# Patient Record
Sex: FEMALE | Race: WHITE | NOT HISPANIC OR LATINO | Employment: OTHER | URBAN - NONMETROPOLITAN AREA
[De-identification: names, ages, dates, MRNs, and addresses within clinical notes are randomized per-mention and may not be internally consistent; named-entity substitution may affect disease eponyms.]

---

## 2023-02-28 LAB — URINE CULTURE: NO GROWTH
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
## 2023-03-11 PROBLEM — R11.0 NAUSEA IN ADULT: Status: ACTIVE | Noted: 2023-03-11

## 2023-03-11 PROBLEM — F32.A ANXIETY AND DEPRESSION: Status: ACTIVE | Noted: 2023-03-11

## 2023-03-11 PROBLEM — R93.89 ABNORMAL MRI: Status: ACTIVE | Noted: 2023-03-11

## 2023-03-11 PROBLEM — K29.50 CHRONIC GASTRITIS: Status: ACTIVE | Noted: 2023-03-11

## 2023-03-11 PROBLEM — F32.1 MODERATE MAJOR DEPRESSION (MULTI): Status: ACTIVE | Noted: 2023-03-11

## 2023-03-11 PROBLEM — R10.9 ABDOMINAL PAIN: Status: ACTIVE | Noted: 2023-03-11

## 2023-03-11 PROBLEM — E03.9 HYPOTHYROID: Status: ACTIVE | Noted: 2023-03-11

## 2023-03-11 PROBLEM — M81.0 OSTEOPOROSIS: Status: ACTIVE | Noted: 2023-03-11

## 2023-03-11 PROBLEM — N39.0 ACUTE UTI: Status: ACTIVE | Noted: 2023-03-11

## 2023-03-11 PROBLEM — R39.9 URINARY SYMPTOM OR SIGN: Status: ACTIVE | Noted: 2023-03-11

## 2023-03-11 PROBLEM — K58.9 IBS (IRRITABLE BOWEL SYNDROME): Status: ACTIVE | Noted: 2023-03-11

## 2023-03-11 PROBLEM — F51.04 CHRONIC INSOMNIA: Status: ACTIVE | Noted: 2023-03-11

## 2023-03-11 PROBLEM — R63.4 WEIGHT LOSS: Status: ACTIVE | Noted: 2023-03-11

## 2023-03-11 PROBLEM — Q45.3 ABNORMALITY OF PANCREATIC DUCT: Status: ACTIVE | Noted: 2023-03-11

## 2023-03-11 PROBLEM — F41.9 ANXIETY AND DEPRESSION: Status: ACTIVE | Noted: 2023-03-11

## 2023-03-11 PROBLEM — M21.169 BOWLEGGED: Status: ACTIVE | Noted: 2023-03-11

## 2023-03-11 PROBLEM — K80.20 GALL STONES: Status: ACTIVE | Noted: 2023-03-11

## 2023-03-11 PROBLEM — R93.5 ABNORMAL CT OF THE ABDOMEN: Status: ACTIVE | Noted: 2023-03-11

## 2023-03-11 RX ORDER — ONDANSETRON 4 MG/1
4 TABLET, FILM COATED ORAL EVERY 6 HOURS PRN
COMMUNITY
End: 2023-03-22 | Stop reason: ALTCHOICE

## 2023-03-11 RX ORDER — LEVOTHYROXINE SODIUM 50 UG/1
1 TABLET ORAL DAILY
COMMUNITY
Start: 2021-05-12 | End: 2023-12-26 | Stop reason: SDUPTHER

## 2023-03-11 RX ORDER — PANTOPRAZOLE SODIUM 40 MG/1
1 TABLET, DELAYED RELEASE ORAL DAILY
COMMUNITY
End: 2023-12-26 | Stop reason: SDUPTHER

## 2023-03-11 RX ORDER — CIPROFLOXACIN 500 MG/1
500 TABLET ORAL EVERY 12 HOURS
COMMUNITY
End: 2023-03-22 | Stop reason: ALTCHOICE

## 2023-03-11 RX ORDER — ESCITALOPRAM OXALATE 20 MG/1
20 TABLET ORAL NIGHTLY
COMMUNITY

## 2023-03-11 RX ORDER — BUSPIRONE HYDROCHLORIDE 10 MG/1
20 TABLET ORAL 3 TIMES DAILY
COMMUNITY
Start: 2021-06-29

## 2023-03-11 RX ORDER — CALCIUM CARBONATE 300MG(750)
TABLET,CHEWABLE ORAL
COMMUNITY

## 2023-03-11 RX ORDER — DICYCLOMINE HYDROCHLORIDE 10 MG/1
10 CAPSULE ORAL EVERY 8 HOURS PRN
COMMUNITY
End: 2023-03-22 | Stop reason: ALTCHOICE

## 2023-03-11 RX ORDER — TRAZODONE HYDROCHLORIDE 150 MG/1
1 TABLET ORAL NIGHTLY
COMMUNITY

## 2023-03-11 RX ORDER — ACETAMINOPHEN 500 MG
1 TABLET ORAL DAILY
COMMUNITY

## 2023-03-11 RX ORDER — SUCRALFATE 1 G/1
1 TABLET ORAL 4 TIMES DAILY
COMMUNITY
Start: 2021-12-28 | End: 2023-04-05 | Stop reason: SDUPTHER

## 2023-03-11 RX ORDER — MUPIROCIN 20 MG/G
OINTMENT TOPICAL
Status: ON HOLD | COMMUNITY
End: 2024-01-25 | Stop reason: ALTCHOICE

## 2023-03-22 ENCOUNTER — OFFICE VISIT (OUTPATIENT)
Dept: PRIMARY CARE | Facility: CLINIC | Age: 78
End: 2023-03-22
Payer: MEDICARE

## 2023-03-22 VITALS
DIASTOLIC BLOOD PRESSURE: 60 MMHG | WEIGHT: 97 LBS | TEMPERATURE: 97.8 F | SYSTOLIC BLOOD PRESSURE: 100 MMHG | HEART RATE: 60 BPM | HEIGHT: 60 IN | BODY MASS INDEX: 19.04 KG/M2 | OXYGEN SATURATION: 98 %

## 2023-03-22 DIAGNOSIS — K59.00 CONSTIPATION, UNSPECIFIED CONSTIPATION TYPE: Primary | ICD-10-CM

## 2023-03-22 PROBLEM — R93.89 ABNORMAL MRI: Status: RESOLVED | Noted: 2023-03-11 | Resolved: 2023-03-22

## 2023-03-22 PROBLEM — R39.9 URINARY SYMPTOM OR SIGN: Status: RESOLVED | Noted: 2023-03-11 | Resolved: 2023-03-22

## 2023-03-22 PROBLEM — Q45.3 ABNORMALITY OF PANCREATIC DUCT: Status: RESOLVED | Noted: 2023-03-11 | Resolved: 2023-03-22

## 2023-03-22 PROBLEM — R11.0 NAUSEA IN ADULT: Status: RESOLVED | Noted: 2023-03-11 | Resolved: 2023-03-22

## 2023-03-22 PROBLEM — R93.5 ABNORMAL CT OF THE ABDOMEN: Status: RESOLVED | Noted: 2023-03-11 | Resolved: 2023-03-22

## 2023-03-22 PROBLEM — N39.0 ACUTE UTI: Status: RESOLVED | Noted: 2023-03-11 | Resolved: 2023-03-22
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
## 2023-03-22 PROCEDURE — 99212 OFFICE O/P EST SF 10 MIN: CPT | Performed by: FAMILY MEDICINE

## 2023-03-22 PROCEDURE — 1036F TOBACCO NON-USER: CPT | Performed by: FAMILY MEDICINE

## 2023-03-22 ASSESSMENT — PATIENT HEALTH QUESTIONNAIRE - PHQ9
SUM OF ALL RESPONSES TO PHQ9 QUESTIONS 1 AND 2: 0
2. FEELING DOWN, DEPRESSED OR HOPELESS: NOT AT ALL
1. LITTLE INTEREST OR PLEASURE IN DOING THINGS: NOT AT ALL

## 2023-03-22 ASSESSMENT — ENCOUNTER SYMPTOMS
DEPRESSION: 0
OCCASIONAL FEELINGS OF UNSTEADINESS: 0
LOSS OF SENSATION IN FEET: 0

## 2023-03-22 NOTE — PROGRESS NOTES
"Subjective   Patient ID: Latha Quispe is a 77 y.o. female who presents for Follow-up (3mo follow up/Stomach issue has cleared up).  HPI  Here for follow up   Abdominal pain resolved, been using miralax for constipation, has been helping.     Review of Systems  General: no fever  Eyes: no blurry vision  ENT: no sore throat, no ear pain  Resp: no cough, sob or wheezing  Cardio: no chest pain, no palpitations  Abd: no nausea/vomiting  : no dysuria, no increased urinary frequency      /60   Pulse 60   Temp 36.6 °C (97.8 °F)   Ht 1.52 m (4' 11.84\")   Wt 44 kg (97 lb)   SpO2 98%   BMI 19.04 kg/m²       Objective   Physical Exam  Gen: NAD, alert  Head: normocephalic/atraumatic  Eyes: conjunctivae normal  Ears: canals clear bilaterally, TM normal   Nose: Deferred due to covid precautions, wearing mask  Oropharynx: Deferred due to covid precautions, wearing mask  Resp: Clear to auscultation  CVS: Regular rate and rhythm  Abdomen: soft, NT, ND  Ext: no edema, NT of lower extremities         Assessment/Plan   Problem List Items Addressed This Visit    None  Visit Diagnoses       Constipation, unspecified constipation type    -  Primary  Continue miralax PRN    Body mass index (BMI) of 19 or less in adult                   "

## 2023-04-05 ENCOUNTER — TELEPHONE (OUTPATIENT)
Dept: PRIMARY CARE | Facility: CLINIC | Age: 78
End: 2023-04-05
Payer: MEDICARE

## 2023-04-05 DIAGNOSIS — K29.50 CHRONIC GASTRITIS, PRESENCE OF BLEEDING UNSPECIFIED, UNSPECIFIED GASTRITIS TYPE: Primary | ICD-10-CM

## 2023-04-05 RX ORDER — SUCRALFATE 1 G/1
1 TABLET ORAL 4 TIMES DAILY
Qty: 120 TABLET | Refills: 11 | Status: SHIPPED | OUTPATIENT
Start: 2023-04-05 | End: 2024-04-05 | Stop reason: SDUPTHER

## 2023-04-05 NOTE — TELEPHONE ENCOUNTER
Refill request:  sucralfate 1 gm tablet  Qty:  120  Refills:  3  Take 1 tablet by mouth 4 time dialy

## 2023-06-06 DIAGNOSIS — K58.9 IRRITABLE BOWEL SYNDROME, UNSPECIFIED TYPE: ICD-10-CM

## 2023-06-06 RX ORDER — DICYCLOMINE HYDROCHLORIDE 10 MG/1
10 CAPSULE ORAL EVERY 6 HOURS PRN
Qty: 120 CAPSULE | Refills: 2 | Status: SHIPPED | OUTPATIENT
Start: 2023-06-06 | End: 2023-07-06

## 2023-06-06 RX ORDER — DICYCLOMINE HYDROCHLORIDE 10 MG/1
10 CAPSULE ORAL EVERY 6 HOURS PRN
COMMUNITY
Start: 2021-12-08 | End: 2023-06-06 | Stop reason: SDUPTHER

## 2023-06-30 ENCOUNTER — OFFICE VISIT (OUTPATIENT)
Dept: PRIMARY CARE | Facility: CLINIC | Age: 78
End: 2023-06-30
Payer: MEDICARE

## 2023-06-30 VITALS
DIASTOLIC BLOOD PRESSURE: 62 MMHG | HEART RATE: 50 BPM | TEMPERATURE: 97.5 F | OXYGEN SATURATION: 97 % | SYSTOLIC BLOOD PRESSURE: 122 MMHG | BODY MASS INDEX: 19.01 KG/M2 | WEIGHT: 96.8 LBS | HEIGHT: 60 IN

## 2023-06-30 DIAGNOSIS — N39.0 ACUTE UTI: ICD-10-CM

## 2023-06-30 DIAGNOSIS — E03.9 HYPOTHYROIDISM, UNSPECIFIED TYPE: Primary | ICD-10-CM

## 2023-06-30 LAB
POC APPEARANCE, URINE: ABNORMAL
POC BILIRUBIN, URINE: ABNORMAL
POC BLOOD, URINE: ABNORMAL
POC COLOR, URINE: YELLOW
POC GLUCOSE, URINE: NEGATIVE MG/DL
POC KETONES, URINE: ABNORMAL MG/DL
POC LEUKOCYTES, URINE: ABNORMAL
POC NITRITE,URINE: NEGATIVE
POC PH, URINE: 6 PH
POC PROTEIN, URINE: ABNORMAL MG/DL
POC SPECIFIC GRAVITY, URINE: >=1.03
POC UROBILINOGEN, URINE: 0.2 EU/DL

## 2023-06-30 PROCEDURE — 1036F TOBACCO NON-USER: CPT | Performed by: FAMILY MEDICINE

## 2023-06-30 PROCEDURE — 87086 URINE CULTURE/COLONY COUNT: CPT

## 2023-06-30 PROCEDURE — 99213 OFFICE O/P EST LOW 20 MIN: CPT | Performed by: FAMILY MEDICINE

## 2023-06-30 PROCEDURE — 1160F RVW MEDS BY RX/DR IN RCRD: CPT | Performed by: FAMILY MEDICINE

## 2023-06-30 PROCEDURE — 87077 CULTURE AEROBIC IDENTIFY: CPT

## 2023-06-30 PROCEDURE — 1159F MED LIST DOCD IN RCRD: CPT | Performed by: FAMILY MEDICINE

## 2023-06-30 PROCEDURE — 81003 URINALYSIS AUTO W/O SCOPE: CPT | Performed by: FAMILY MEDICINE

## 2023-06-30 PROCEDURE — 87186 SC STD MICRODIL/AGAR DIL: CPT

## 2023-06-30 RX ORDER — SULFAMETHOXAZOLE AND TRIMETHOPRIM 800; 160 MG/1; MG/1
1 TABLET ORAL 2 TIMES DAILY
Qty: 14 TABLET | Refills: 0 | Status: SHIPPED | OUTPATIENT
Start: 2023-06-30 | End: 2023-07-07 | Stop reason: ALTCHOICE

## 2023-06-30 NOTE — PATIENT INSTRUCTIONS
Please take your medications as prescribed  please call our clinic if your symptoms fail to improve or worsen  Please follow up in 6 months  Please get your blood work done

## 2023-06-30 NOTE — PROGRESS NOTES
"Subjective   Patient ID: Latha Quispe is a 78 y.o. female who presents for UTI (Urgency going on 3 weeks, she thought it was her stomach, ).  HPI  Here for urgent visit  Has been having stomach pain, urinary urgency x 3 weeks, no dysuria, no fever, no flank pain  Has hypothyroidism, compliant with levothyroxine    Review of Systems  General: no fever  Eyes: no blurry vision  ENT: no sore throat, no ear pain  Resp: no cough, sob or wheezing  Cardio: no chest pain, no palpitations  Abd: no nausea/vomiting  : no dysuria, no increased urinary frequency      /62   Pulse 50   Temp 36.4 °C (97.5 °F)   Ht 1.52 m (4' 11.84\")   Wt (!) 43.9 kg (96 lb 12.8 oz)   SpO2 97%   BMI 19.01 kg/m²       Objective   Physical Exam  Gen: NAD, alert  Head: normocephalic/atraumatic  Eyes: conjunctivae normal  Ears: canals clear bilaterally, TM normal   Nose: external nose normal   Oropharynx: clear   Resp: Clear to auscultation  CVS: Regular rate and rhythm  Abdomen: soft, NT, ND  Ext: no edema, NT of lower extremities    Assessment/Plan   Problem List Items Addressed This Visit       Hypothyroid - Primary    Relevant Orders    Lipid Panel    TSH with reflex to Free T4 if abnormal    Comprehensive Metabolic Panel    CBC     Other Visit Diagnoses       Acute UTI        Relevant Medications    sulfamethoxazole-trimethoprim (Bactrim DS) 800-160 mg tablet    Other Relevant Orders    POCT UA Automated manually resulted (Completed)    Urine Culture               "

## 2023-07-03 LAB — URINE CULTURE: ABNORMAL

## 2023-07-07 ENCOUNTER — TELEPHONE (OUTPATIENT)
Dept: PRIMARY CARE | Facility: CLINIC | Age: 78
End: 2023-07-07
Payer: MEDICARE

## 2023-07-07 DIAGNOSIS — N39.0 ACUTE UTI: Primary | ICD-10-CM

## 2023-07-07 RX ORDER — CIPROFLOXACIN 500 MG/1
500 TABLET ORAL 2 TIMES DAILY
Qty: 10 TABLET | Refills: 0 | Status: SHIPPED | OUTPATIENT
Start: 2023-07-07 | End: 2023-07-12

## 2023-07-07 NOTE — TELEPHONE ENCOUNTER
Spoke to patient, switched to cipro, after finished to give a urine sample at the lab to make sure it's cleared

## 2023-07-07 NOTE — TELEPHONE ENCOUNTER
Patient called saying she was till having UTI symptoms. She finished the Abx. Pain in stomach/pain will move down legs

## 2023-07-10 ENCOUNTER — TELEPHONE (OUTPATIENT)
Dept: PRIMARY CARE | Facility: CLINIC | Age: 78
End: 2023-07-10
Payer: MEDICARE

## 2023-07-10 NOTE — TELEPHONE ENCOUNTER
Is The Patient Presenting As Previously Scheduled?: No, they are coming in before their scheduled appointment Patient went to ED for abdominal pain, they did the UA at the ED.  No UTI, constipation.  She is currently using Miralax and feels better.  RAIN   How Severe Is Your Rash?: moderate Is This A New Presentation, Or A Follow-Up?: Follow Up Rash

## 2023-10-04 DIAGNOSIS — K58.9 IRRITABLE BOWEL SYNDROME WITHOUT DIARRHEA: Primary | ICD-10-CM

## 2023-10-04 RX ORDER — DICYCLOMINE HYDROCHLORIDE 10 MG/1
10 CAPSULE ORAL EVERY 6 HOURS PRN
Qty: 120 CAPSULE | Refills: 1 | Status: SHIPPED | OUTPATIENT
Start: 2023-10-04 | End: 2023-12-03

## 2023-11-29 ENCOUNTER — OFFICE VISIT (OUTPATIENT)
Dept: PRIMARY CARE | Facility: CLINIC | Age: 78
End: 2023-11-29
Payer: MEDICARE

## 2023-11-29 VITALS
HEIGHT: 60 IN | SYSTOLIC BLOOD PRESSURE: 127 MMHG | DIASTOLIC BLOOD PRESSURE: 72 MMHG | TEMPERATURE: 96.8 F | WEIGHT: 86.8 LBS | BODY MASS INDEX: 17.04 KG/M2 | OXYGEN SATURATION: 97 % | HEART RATE: 50 BPM

## 2023-11-29 DIAGNOSIS — R10.9 CHRONIC ABDOMINAL PAIN: ICD-10-CM

## 2023-11-29 DIAGNOSIS — G89.29 CHRONIC ABDOMINAL PAIN: ICD-10-CM

## 2023-11-29 DIAGNOSIS — N39.0 ACUTE UTI: ICD-10-CM

## 2023-11-29 DIAGNOSIS — R31.29 PERSISTENT MICROSCOPIC HEMATURIA: Primary | ICD-10-CM

## 2023-11-29 LAB
POC APPEARANCE, URINE: CLEAR
POC BILIRUBIN, URINE: NEGATIVE
POC BLOOD, URINE: ABNORMAL
POC COLOR, URINE: YELLOW
POC GLUCOSE, URINE: NEGATIVE MG/DL
POC KETONES, URINE: NEGATIVE MG/DL
POC LEUKOCYTES, URINE: ABNORMAL
POC NITRITE,URINE: NEGATIVE
POC PH, URINE: 5.5 PH
POC PROTEIN, URINE: NEGATIVE MG/DL
POC SPECIFIC GRAVITY, URINE: 1.02
POC UROBILINOGEN, URINE: 0.2 EU/DL

## 2023-11-29 PROCEDURE — 1036F TOBACCO NON-USER: CPT | Performed by: FAMILY MEDICINE

## 2023-11-29 PROCEDURE — 87086 URINE CULTURE/COLONY COUNT: CPT

## 2023-11-29 PROCEDURE — 81003 URINALYSIS AUTO W/O SCOPE: CPT | Performed by: FAMILY MEDICINE

## 2023-11-29 PROCEDURE — 99214 OFFICE O/P EST MOD 30 MIN: CPT | Performed by: FAMILY MEDICINE

## 2023-11-29 PROCEDURE — 1159F MED LIST DOCD IN RCRD: CPT | Performed by: FAMILY MEDICINE

## 2023-11-29 PROCEDURE — 1160F RVW MEDS BY RX/DR IN RCRD: CPT | Performed by: FAMILY MEDICINE

## 2023-11-29 RX ORDER — NITROFURANTOIN 25; 75 MG/1; MG/1
100 CAPSULE ORAL 2 TIMES DAILY
Qty: 14 CAPSULE | Refills: 0 | Status: SHIPPED | OUTPATIENT
Start: 2023-11-29 | End: 2023-12-01 | Stop reason: ALTCHOICE

## 2023-11-29 ASSESSMENT — ENCOUNTER SYMPTOMS
DEPRESSION: 0
LOSS OF SENSATION IN FEET: 0
OCCASIONAL FEELINGS OF UNSTEADINESS: 0

## 2023-11-29 NOTE — PROGRESS NOTES
"Subjective   Patient ID: Latha Quispe is a 78 y.o. female who presents for Abdominal Pain (Steady really hurts/Lost weight).  HPI  Still having diffuse abdominal pain, worse in the lower abdomen x 1 year, had CT scan which was normal. Did have EGD which was normal. On protonix and carafate, not helping. No nausea/vomiting/diarrhea.   Has blood in her urine sample, persistent, no dysuria, or increased urinary frequency.   Has been losing weight    Review of Systems  General: no fever  Eyes: no blurry vision  ENT: no sore throat, no ear pain  Resp: no cough, sob or wheezing  Cardio: no chest pain, no palpitations  Abd: no nausea/vomiting  : see HPI    /72   Pulse 50   Temp 36 °C (96.8 °F)   Ht 1.52 m (4' 11.84\")   Wt (!) 39.4 kg (86 lb 12.8 oz)   SpO2 97%   BMI 17.04 kg/m²       Objective   Physical Exam  Gen: NAD, alert  Head: normocephalic/atraumatic  Eyes: conjunctivae normal  Ears: canals clear bilaterally, TM normal   Nose: external nose normal   Oropharynx: clear   Resp: Clear to auscultation  CVS: Regular rate and rhythm  Abdomen: soft, diffuse tenderness worse in the lower abdomen  Ext: no edema, NT of lower extremities  Neuro: gait normal     Assessment/Plan   Problem List Items Addressed This Visit       Acute UTI  Nitrofurantoin  Urinalysis and urine culture     Other Visit Diagnoses       Persistent microscopic hematuria    -  Primary    Relevant Orders    CT urography w 3D volume rendered imaging    Creatinine, Serum    Chronic abdominal pain        Relevant Orders    gastroenterology               "

## 2023-11-30 ENCOUNTER — LAB (OUTPATIENT)
Dept: LAB | Facility: LAB | Age: 78
End: 2023-11-30
Payer: MEDICARE

## 2023-11-30 ENCOUNTER — HOSPITAL ENCOUNTER (OUTPATIENT)
Dept: RADIOLOGY | Facility: HOSPITAL | Age: 78
Discharge: HOME | End: 2023-11-30
Payer: MEDICARE

## 2023-11-30 DIAGNOSIS — E03.9 HYPOTHYROIDISM, UNSPECIFIED TYPE: ICD-10-CM

## 2023-11-30 DIAGNOSIS — R31.29 PERSISTENT MICROSCOPIC HEMATURIA: ICD-10-CM

## 2023-11-30 DIAGNOSIS — R10.9 UNSPECIFIED ABDOMINAL PAIN: ICD-10-CM

## 2023-11-30 LAB
ALBUMIN SERPL BCP-MCNC: 3.9 G/DL (ref 3.4–5)
ALP SERPL-CCNC: 83 U/L (ref 33–136)
ALT SERPL W P-5'-P-CCNC: 14 U/L (ref 7–45)
ANION GAP SERPL CALC-SCNC: 13 MMOL/L (ref 10–20)
AST SERPL W P-5'-P-CCNC: 21 U/L (ref 9–39)
BILIRUB SERPL-MCNC: 0.5 MG/DL (ref 0–1.2)
BUN SERPL-MCNC: 8 MG/DL (ref 6–23)
CALCIUM SERPL-MCNC: 9.2 MG/DL (ref 8.6–10.3)
CHLORIDE SERPL-SCNC: 103 MMOL/L (ref 98–107)
CHOLEST SERPL-MCNC: 173 MG/DL (ref 0–199)
CHOLESTEROL/HDL RATIO: 3.1
CO2 SERPL-SCNC: 27 MMOL/L (ref 21–32)
CREAT SERPL-MCNC: 0.91 MG/DL (ref 0.5–1.05)
ERYTHROCYTE [DISTWIDTH] IN BLOOD BY AUTOMATED COUNT: 12.6 % (ref 11.5–14.5)
GFR SERPL CREATININE-BSD FRML MDRD: 65 ML/MIN/1.73M*2
GLUCOSE SERPL-MCNC: 119 MG/DL (ref 74–99)
HCT VFR BLD AUTO: 41.8 % (ref 36–46)
HDLC SERPL-MCNC: 56 MG/DL
HGB BLD-MCNC: 12.9 G/DL (ref 12–16)
LDLC SERPL CALC-MCNC: 95 MG/DL
MCH RBC QN AUTO: 29.4 PG (ref 26–34)
MCHC RBC AUTO-ENTMCNC: 30.9 G/DL (ref 32–36)
MCV RBC AUTO: 95 FL (ref 80–100)
NON HDL CHOLESTEROL: 117 MG/DL (ref 0–149)
NRBC BLD-RTO: 0 /100 WBCS (ref 0–0)
PLATELET # BLD AUTO: 267 X10*3/UL (ref 150–450)
POTASSIUM SERPL-SCNC: 3.3 MMOL/L (ref 3.5–5.3)
PROT SERPL-MCNC: 6.4 G/DL (ref 6.4–8.2)
RBC # BLD AUTO: 4.39 X10*6/UL (ref 4–5.2)
SODIUM SERPL-SCNC: 140 MMOL/L (ref 136–145)
TRIGL SERPL-MCNC: 111 MG/DL (ref 0–149)
TSH SERPL-ACNC: 1.32 MIU/L (ref 0.44–3.98)
VLDL: 22 MG/DL (ref 0–40)
WBC # BLD AUTO: 4.6 X10*3/UL (ref 4.4–11.3)

## 2023-11-30 PROCEDURE — 74176 CT ABD & PELVIS W/O CONTRAST: CPT | Mod: ASTAT | Performed by: STUDENT IN AN ORGANIZED HEALTH CARE EDUCATION/TRAINING PROGRAM

## 2023-11-30 PROCEDURE — 36415 COLL VENOUS BLD VENIPUNCTURE: CPT

## 2023-11-30 PROCEDURE — 74176 CT ABD & PELVIS W/O CONTRAST: CPT | Mod: ASTAT

## 2023-12-01 DIAGNOSIS — E87.6 HYPOKALEMIA: Primary | ICD-10-CM

## 2023-12-01 DIAGNOSIS — J18.9 PNEUMONIA OF LEFT LOWER LOBE DUE TO INFECTIOUS ORGANISM: ICD-10-CM

## 2023-12-01 DIAGNOSIS — K80.20 CALCULUS OF GALLBLADDER WITHOUT CHOLECYSTITIS WITHOUT OBSTRUCTION: Primary | ICD-10-CM

## 2023-12-01 LAB — BACTERIA UR CULT: NORMAL

## 2023-12-01 RX ORDER — AMOXICILLIN AND CLAVULANATE POTASSIUM 875; 125 MG/1; MG/1
875 TABLET, FILM COATED ORAL 2 TIMES DAILY
Qty: 20 TABLET | Refills: 0 | Status: SHIPPED | OUTPATIENT
Start: 2023-12-01 | End: 2023-12-11

## 2023-12-01 RX ORDER — POTASSIUM CHLORIDE 20 MEQ/1
20 TABLET, EXTENDED RELEASE ORAL DAILY
Qty: 5 TABLET | Refills: 0 | Status: SHIPPED | OUTPATIENT
Start: 2023-12-01 | End: 2023-12-06

## 2023-12-08 DIAGNOSIS — R11.2 NAUSEA AND VOMITING, UNSPECIFIED VOMITING TYPE: Primary | ICD-10-CM

## 2023-12-08 RX ORDER — ONDANSETRON 4 MG/1
4 TABLET, ORALLY DISINTEGRATING ORAL EVERY 12 HOURS PRN
Qty: 20 TABLET | Refills: 0 | Status: SHIPPED | OUTPATIENT
Start: 2023-12-08

## 2023-12-12 ENCOUNTER — APPOINTMENT (OUTPATIENT)
Dept: RADIOLOGY | Facility: HOSPITAL | Age: 78
End: 2023-12-12
Payer: MEDICARE

## 2023-12-14 ENCOUNTER — OFFICE VISIT (OUTPATIENT)
Dept: PRIMARY CARE | Facility: CLINIC | Age: 78
End: 2023-12-14
Payer: MEDICARE

## 2023-12-14 VITALS
DIASTOLIC BLOOD PRESSURE: 76 MMHG | HEART RATE: 55 BPM | TEMPERATURE: 97 F | HEIGHT: 60 IN | WEIGHT: 83.2 LBS | BODY MASS INDEX: 16.33 KG/M2 | OXYGEN SATURATION: 98 % | SYSTOLIC BLOOD PRESSURE: 130 MMHG

## 2023-12-14 DIAGNOSIS — Z00.00 MEDICARE ANNUAL WELLNESS VISIT, SUBSEQUENT: Primary | ICD-10-CM

## 2023-12-14 DIAGNOSIS — R10.32 CHRONIC LEFT LOWER QUADRANT PAIN: ICD-10-CM

## 2023-12-14 DIAGNOSIS — G89.29 CHRONIC LEFT LOWER QUADRANT PAIN: ICD-10-CM

## 2023-12-14 DIAGNOSIS — Z23 NEED FOR INFLUENZA VACCINATION: ICD-10-CM

## 2023-12-14 PROCEDURE — G0008 ADMIN INFLUENZA VIRUS VAC: HCPCS | Performed by: FAMILY MEDICINE

## 2023-12-14 PROCEDURE — 1036F TOBACCO NON-USER: CPT | Performed by: FAMILY MEDICINE

## 2023-12-14 PROCEDURE — 90662 IIV NO PRSV INCREASED AG IM: CPT | Performed by: FAMILY MEDICINE

## 2023-12-14 PROCEDURE — 1160F RVW MEDS BY RX/DR IN RCRD: CPT | Performed by: FAMILY MEDICINE

## 2023-12-14 PROCEDURE — 1170F FXNL STATUS ASSESSED: CPT | Performed by: FAMILY MEDICINE

## 2023-12-14 PROCEDURE — 1159F MED LIST DOCD IN RCRD: CPT | Performed by: FAMILY MEDICINE

## 2023-12-14 PROCEDURE — G0439 PPPS, SUBSEQ VISIT: HCPCS | Performed by: FAMILY MEDICINE

## 2023-12-14 ASSESSMENT — PATIENT HEALTH QUESTIONNAIRE - PHQ9
1. LITTLE INTEREST OR PLEASURE IN DOING THINGS: NOT AT ALL
SUM OF ALL RESPONSES TO PHQ9 QUESTIONS 1 AND 2: 0
2. FEELING DOWN, DEPRESSED OR HOPELESS: NOT AT ALL

## 2023-12-14 ASSESSMENT — ACTIVITIES OF DAILY LIVING (ADL)
DOING_HOUSEWORK: INDEPENDENT
GROCERY_SHOPPING: INDEPENDENT
TAKING_MEDICATION: INDEPENDENT
DRESSING: INDEPENDENT
MANAGING_FINANCES: INDEPENDENT
BATHING: INDEPENDENT

## 2023-12-14 ASSESSMENT — ENCOUNTER SYMPTOMS
LOSS OF SENSATION IN FEET: 0
OCCASIONAL FEELINGS OF UNSTEADINESS: 0
DEPRESSION: 0

## 2023-12-14 NOTE — PROGRESS NOTES
"Subjective   Reason for Visit: Latha Quispe is an 78 y.o. female here for a Medicare Wellness visit.     Past Medical, Surgical, and Family History reviewed and updated in chart.    Reviewed all medications by prescribing practitioner or clinical pharmacist (such as prescriptions, OTCs, herbal therapies and supplements) and documented in the medical record.    HPI    Still having LLQ pain for the past 6 months, finished augmentin. CT abd/pelvis a few weeks ago showed mild gallstones and non-obstructing left renal calculus without hydronephrosis, but was otherwise normal. Pain is debilitating for the patient. Has decreased appetite, loss in weight. Denies any constipation, no diarrhea.   Has appt with general surgeon in 2 days    Patient Care Team:  Melanie Andrade MD as PCP - General     Review of Systems  General: no fever  Eyes: no blurry vision  ENT: no sore throat, no ear pain  Resp: no cough, sob or wheezing  Cardio: no chest pain, no palpitations  Abd: see HPI  : no dysuria, no increased urinary frequency    Objective   Vitals:  /76   Pulse 55   Temp 36.1 °C (97 °F)   Ht 1.52 m (4' 11.84\")   Wt (!) 37.7 kg (83 lb 3.2 oz)   SpO2 98%   BMI 16.34 kg/m²       Physical Exam  Gen: NAD, alert  Head: normocephalic/atraumatic  Eyes: conjunctivae normal  Ears: canals clear bilaterally, TM normal   Nose: external nose normal   Oropharynx: clear   Resp: Clear to auscultation  CVS: Regular rate and rhythm  Abdomen: soft, tenderness in LLQ  Ext: no edema, NT of lower extremities       Assessment/Plan   Problem List Items Addressed This Visit    None  Visit Diagnoses       Medicare annual wellness visit, subsequent    -  Primary    Chronic left lower quadrant pain        Relevant Orders    Referral to General Surgery    Need for influenza vaccination        Relevant Orders    Flu vaccine, quadrivalent, high-dose, preservative free, age 65y+ (FLUZONE) (Completed)                    "

## 2023-12-20 ENCOUNTER — OFFICE VISIT (OUTPATIENT)
Dept: SURGERY | Facility: CLINIC | Age: 78
End: 2023-12-20
Payer: MEDICARE

## 2023-12-20 VITALS
BODY MASS INDEX: 16.89 KG/M2 | DIASTOLIC BLOOD PRESSURE: 79 MMHG | SYSTOLIC BLOOD PRESSURE: 121 MMHG | TEMPERATURE: 98 F | HEART RATE: 50 BPM | WEIGHT: 86 LBS

## 2023-12-20 DIAGNOSIS — G89.29 CHRONIC LEFT LOWER QUADRANT PAIN: ICD-10-CM

## 2023-12-20 DIAGNOSIS — R10.32 CHRONIC LEFT LOWER QUADRANT PAIN: ICD-10-CM

## 2023-12-20 DIAGNOSIS — R63.4 WEIGHT LOSS: Primary | ICD-10-CM

## 2023-12-20 PROCEDURE — 1036F TOBACCO NON-USER: CPT | Performed by: SURGERY

## 2023-12-20 PROCEDURE — 1160F RVW MEDS BY RX/DR IN RCRD: CPT | Performed by: SURGERY

## 2023-12-20 PROCEDURE — 1159F MED LIST DOCD IN RCRD: CPT | Performed by: SURGERY

## 2023-12-20 PROCEDURE — 99214 OFFICE O/P EST MOD 30 MIN: CPT | Performed by: SURGERY

## 2023-12-20 ASSESSMENT — ENCOUNTER SYMPTOMS
ABDOMINAL PAIN: 1
UNEXPECTED WEIGHT CHANGE: 1
CONSTIPATION: 1

## 2023-12-20 NOTE — PROGRESS NOTES
Subjective   Patient ID: Latha Quispe is a 78 y.o. female who presents for chronic left lower quadrant pain and weight loss.    HPI     This is a 75-year female was well until a few years ago when she began to develop chronic left lower quadrant pain that the pain was present in the left lower quadrant.  It was constant.  She has chronic constipation.  She apparently had colon resection for this.  Her pain is worse in the last few months and has been associated with weight loss.  She has 2 brothers with colon carcinoma.  She had a negative colonoscopy by her account 5 to 6 years ago.  She does not take any ibuprofen or aspirin.  She has had no blood in her stool.  Her weight loss has been approximate 20 pounds.  She has pain when she eats meals this occurs with every meal.  She had a essentially normal CT scan in 2023.  She did have mild irritation of her pancreatic duct but no pancreatic mass.  No evidence of bowel obstruction or abnormalities in the bowel.    Past Medical History:   Diagnosis Date    Cat bite 2023    Personal history of malignant neoplasm of breast 2021    History of malignant neoplasm of breast    Personal history of other complications of pregnancy, childbirth and the puerperium     History of spontaneous         Current Outpatient Medications on File Prior to Visit   Medication Sig Dispense Refill    busPIRone (Buspar) 10 mg tablet Take 2 tablets (20 mg) by mouth 3 times a day.      cholecalciferol (Vitamin D3) 50 mcg (2,000 unit) capsule Take 1 capsule (50 mcg) by mouth once daily.      escitalopram (Lexapro) 20 mg tablet Take 1 tablet (20 mg) by mouth once daily at bedtime.      levothyroxine (Synthroid, Levoxyl) 50 mcg tablet Take 1 tablet (50 mcg) by mouth once daily.      magnesium oxide (Mag-Ox) 400 mg tablet Take by mouth.      mupirocin (Bactroban) 2 % ointment Apply topically 3 times a day. Apply a small amount as directed      ondansetron ODT (Zofran-ODT) 4  mg disintegrating tablet Take 1 tablet (4 mg) by mouth every 12 hours if needed for nausea or vomiting. 20 tablet 0    pantoprazole (ProtoNix) 40 mg EC tablet Take 1 tablet (40 mg) by mouth once daily.      POTASSIUM ACETATE, BULK, MISC Take by mouth.      sucralfate (Carafate) 1 gram tablet Take 1 tablet (1 g) by mouth in the morning and 1 tablet (1 g) at noon and 1 tablet (1 g) in the evening and 1 tablet (1 g) before bedtime. Before meals and at bedtime. 120 tablet 11    traZODone (Desyrel) 150 mg tablet Take 1 tablet (150 mg) by mouth once daily at bedtime.       No current facility-administered medications on file prior to visit.        Review of Systems   Constitutional:  Positive for unexpected weight change.   Gastrointestinal:  Positive for abdominal pain and constipation.   All other systems reviewed and are negative.      Vitals:    12/20/23 1050   BP: 121/79   Pulse: 50   Temp: 36.7 °C (98 °F)        Objective     Physical Exam  Vitals reviewed. Exam conducted with a chaperone present.   Constitutional:       Appearance: She is cachectic.   HENT:      Head: Normocephalic.      Nose: Nose normal.      Mouth/Throat:      Pharynx: Oropharynx is clear.   Cardiovascular:      Rate and Rhythm: Normal rate and regular rhythm.      Heart sounds: Normal heart sounds.   Pulmonary:      Effort: Pulmonary effort is normal.      Breath sounds: Normal breath sounds.   Abdominal:      General: Abdomen is flat.      Palpations: Abdomen is soft. There is no mass.      Tenderness: There is no abdominal tenderness. There is no guarding.      Hernia: No hernia is present.      Comments: Upper midline scar.  Previous gastric resection.  Very thin.  No obvious palpable mass left lower quadrant   Musculoskeletal:         General: Normal range of motion.      Cervical back: Normal range of motion.   Skin:     General: Skin is warm.   Neurological:      General: No focal deficit present.   Psychiatric:         Mood and Affect:  Mood normal.       Labs reviewed.  Within normal limits.  Problem List Items Addressed This Visit       Weight loss - Primary     Other Visit Diagnoses       Chronic left lower quadrant pain                 Assessment/Plan   Chronic left lower quadrant pain which occurs with eating.  History of gastric resection.  Possible mesenteric ischemia.     Plan-CT angiogram.  EGD to assess gastric resection to rule out possible malignancy.    Cam Diez MD

## 2023-12-20 NOTE — PATIENT INSTRUCTIONS
You have significant weight loss.  You appear to have pain every time you eat.  I will order a scan to ensure that there is enough blood flow to your bowel.  In view of your previous stomach surgeries appropriate recommend a scope of your stomach and we will schedule that for you on January 2, 2024

## 2023-12-21 ENCOUNTER — PRE-ADMISSION TESTING (OUTPATIENT)
Dept: PREADMISSION TESTING | Facility: HOSPITAL | Age: 78
End: 2023-12-21
Payer: MEDICARE

## 2023-12-21 ENCOUNTER — ANESTHESIA EVENT (OUTPATIENT)
Dept: ANESTHESIOLOGY | Facility: HOSPITAL | Age: 78
End: 2023-12-21

## 2023-12-21 SDOH — HEALTH STABILITY: MENTAL HEALTH: CURRENT SMOKER: 0

## 2023-12-21 NOTE — ANESTHESIA PREPROCEDURE EVALUATION
Patient: Latha Quispe    Procedure Information    Date: 12/21/23  Reason: PAT         Relevant Problems   Anesthesia (within normal limits)      Endocrine   (+) Hypothyroid      GI   (+) IBS (irritable bowel syndrome)      Neuro/Psych   (+) Anxiety and depression   (+) Moderate major depression (CMS/HCC)      GI/Hepatic   (+) Gall stones       Clinical information reviewed:   Tobacco  Allergies  Meds   Med Hx  Surg Hx   Fam Hx  Soc Hx        NPO Detail:  No data recorded     Physical Exam    Airway  Mallampati: I  TM distance: >3 FB  Neck ROM: full     Cardiovascular - normal exam     Dental - normal exam     Pulmonary - normal exam     Abdominal - normal exam           Anesthesia Plan    ASA 3     MAC     The patient is not a current smoker.    intravenous induction   Anesthetic plan and risks discussed with patient.  Use of blood products discussed with patient who.    Plan discussed with CRNA.

## 2023-12-21 NOTE — PREPROCEDURE INSTRUCTIONS
Medication List            Accurate as of December 21, 2023 10:25 AM. Always use your most recent med list.                busPIRone 10 mg tablet  Commonly known as: Buspar     escitalopram 20 mg tablet  Commonly known as: Lexapro     levothyroxine 50 mcg tablet  Commonly known as: Synthroid, Levoxyl     magnesium oxide 400 mg tablet  Commonly known as: Mag-Ox     mupirocin 2 % ointment  Commonly known as: Bactroban     ondansetron ODT 4 mg disintegrating tablet  Commonly known as: Zofran-ODT  Take 1 tablet (4 mg) by mouth every 12 hours if needed for nausea or vomiting.     pantoprazole 40 mg EC tablet  Commonly known as: ProtoNix     POTASSIUM ACETATE (BULK) MISC     sucralfate 1 gram tablet  Commonly known as: Carafate  Take 1 tablet (1 g) by mouth in the morning and 1 tablet (1 g) at noon and 1 tablet (1 g) in the evening and 1 tablet (1 g) before bedtime. Before meals and at bedtime.     traZODone 150 mg tablet  Commonly known as: Desyrel     Vitamin D3 50 mcg (2,000 unit) capsule  Generic drug: cholecalciferol            MEDICATIONS:      The following is a list of medications you may take the morning of your procedure with just a sip of water    Buspar  Lexapro  Levothyroxine   Pantoprazole    Call the outpatient surgery on Friday December 29th. between 1-3 pm to get your arrival time for Jan 2nd.   [phone]    No food after midnight including candy, gum or mints on Monday January 1st.       You can have clear liquids up to 3 hrs prior to your procedure.  NO DAIRY, NO CREAMER, NO NON DAIRY OR ALMOND, OAT, COCONUT ETC.    Clear liquids is Coffee or Tea black, powerade, gatorade, sprite, popsicles, chicken broth.      Please refrain from smoking THC, cigarettes or vaping prior to your procedure at least 24 hrs.     When  you arrive park in the back ER parking lot.  Come through the ER lobby and take the first elevator to second floor.   Check in on the outpatient surgery window.    Leave all valuables at  home and remove all piercing's.      NO driving for 24 hrs if you have had anesthesia or sedation.   You will also need a  if you are receiving sedation.       Bring glasses so you can read what you are signing.

## 2023-12-26 DIAGNOSIS — E03.9 HYPOTHYROIDISM, UNSPECIFIED TYPE: ICD-10-CM

## 2023-12-26 DIAGNOSIS — K21.9 GASTROESOPHAGEAL REFLUX DISEASE WITHOUT ESOPHAGITIS: Primary | ICD-10-CM

## 2023-12-26 RX ORDER — LEVOTHYROXINE SODIUM 50 UG/1
50 TABLET ORAL DAILY
Qty: 90 TABLET | Refills: 3 | Status: SHIPPED | OUTPATIENT
Start: 2023-12-26

## 2023-12-26 RX ORDER — PANTOPRAZOLE SODIUM 40 MG/1
40 TABLET, DELAYED RELEASE ORAL DAILY
Qty: 90 TABLET | Refills: 3 | Status: SHIPPED | OUTPATIENT
Start: 2023-12-26

## 2023-12-28 ENCOUNTER — HOSPITAL ENCOUNTER (OUTPATIENT)
Dept: RADIOLOGY | Facility: HOSPITAL | Age: 78
Discharge: HOME | End: 2023-12-28
Payer: MEDICARE

## 2023-12-28 ENCOUNTER — APPOINTMENT (OUTPATIENT)
Dept: RADIOLOGY | Facility: HOSPITAL | Age: 78
End: 2023-12-28
Payer: MEDICARE

## 2023-12-28 DIAGNOSIS — R10.32 CHRONIC LEFT LOWER QUADRANT PAIN: ICD-10-CM

## 2023-12-28 DIAGNOSIS — G89.29 CHRONIC LEFT LOWER QUADRANT PAIN: ICD-10-CM

## 2023-12-28 PROCEDURE — 74175 CTA ABDOMEN W/CONTRAST: CPT | Performed by: RADIOLOGY

## 2023-12-28 PROCEDURE — 2550000001 HC RX 255 CONTRASTS: Performed by: NURSE PRACTITIONER

## 2023-12-28 PROCEDURE — 74175 CTA ABDOMEN W/CONTRAST: CPT

## 2023-12-28 RX ADMIN — IOHEXOL 75 ML: 350 INJECTION, SOLUTION INTRAVENOUS at 11:10

## 2023-12-31 ENCOUNTER — PREP FOR PROCEDURE (OUTPATIENT)
Dept: SURGERY | Facility: HOSPITAL | Age: 78
End: 2023-12-31
Payer: MEDICARE

## 2023-12-31 RX ORDER — ONDANSETRON HYDROCHLORIDE 2 MG/ML
4 INJECTION, SOLUTION INTRAVENOUS ONCE AS NEEDED
Status: CANCELLED | OUTPATIENT
Start: 2023-12-31

## 2023-12-31 RX ORDER — SODIUM CHLORIDE, SODIUM LACTATE, POTASSIUM CHLORIDE, CALCIUM CHLORIDE 600; 310; 30; 20 MG/100ML; MG/100ML; MG/100ML; MG/100ML
100 INJECTION, SOLUTION INTRAVENOUS CONTINUOUS
Status: CANCELLED | OUTPATIENT
Start: 2023-12-31

## 2024-01-02 ENCOUNTER — ANESTHESIA (OUTPATIENT)
Dept: GASTROENTEROLOGY | Facility: HOSPITAL | Age: 79
End: 2024-01-02
Payer: MEDICARE

## 2024-01-02 ENCOUNTER — ANESTHESIA EVENT (OUTPATIENT)
Dept: GASTROENTEROLOGY | Facility: HOSPITAL | Age: 79
End: 2024-01-02
Payer: MEDICARE

## 2024-01-02 ENCOUNTER — HOSPITAL ENCOUNTER (OUTPATIENT)
Dept: GASTROENTEROLOGY | Facility: HOSPITAL | Age: 79
Setting detail: OUTPATIENT SURGERY
Discharge: HOME | End: 2024-01-02
Payer: MEDICARE

## 2024-01-02 VITALS
BODY MASS INDEX: 17.33 KG/M2 | TEMPERATURE: 97.9 F | HEIGHT: 59 IN | HEART RATE: 44 BPM | WEIGHT: 85.98 LBS | RESPIRATION RATE: 18 BRPM | SYSTOLIC BLOOD PRESSURE: 113 MMHG | OXYGEN SATURATION: 99 % | DIASTOLIC BLOOD PRESSURE: 46 MMHG

## 2024-01-02 DIAGNOSIS — R63.4 WEIGHT LOSS: ICD-10-CM

## 2024-01-02 DIAGNOSIS — G89.29 CHRONIC LEFT LOWER QUADRANT PAIN: ICD-10-CM

## 2024-01-02 DIAGNOSIS — R10.32 CHRONIC LEFT LOWER QUADRANT PAIN: ICD-10-CM

## 2024-01-02 PROCEDURE — 7100000010 HC PHASE TWO TIME - EACH INCREMENTAL 1 MINUTE

## 2024-01-02 PROCEDURE — 2500000005 HC RX 250 GENERAL PHARMACY W/O HCPCS

## 2024-01-02 PROCEDURE — 43239 EGD BIOPSY SINGLE/MULTIPLE: CPT | Performed by: SURGERY

## 2024-01-02 PROCEDURE — 7100000009 HC PHASE TWO TIME - INITIAL BASE CHARGE

## 2024-01-02 PROCEDURE — 3700000001 HC GENERAL ANESTHESIA TIME - INITIAL BASE CHARGE

## 2024-01-02 PROCEDURE — 94760 N-INVAS EAR/PLS OXIMETRY 1: CPT

## 2024-01-02 PROCEDURE — 88305 TISSUE EXAM BY PATHOLOGIST: CPT | Mod: TC,SUR,GENLAB | Performed by: SURGERY

## 2024-01-02 PROCEDURE — 88305 TISSUE EXAM BY PATHOLOGIST: CPT | Performed by: STUDENT IN AN ORGANIZED HEALTH CARE EDUCATION/TRAINING PROGRAM

## 2024-01-02 PROCEDURE — 2500000004 HC RX 250 GENERAL PHARMACY W/ HCPCS (ALT 636 FOR OP/ED): Performed by: SURGERY

## 2024-01-02 PROCEDURE — 3700000002 HC GENERAL ANESTHESIA TIME - EACH INCREMENTAL 1 MINUTE

## 2024-01-02 PROCEDURE — 2500000004 HC RX 250 GENERAL PHARMACY W/ HCPCS (ALT 636 FOR OP/ED)

## 2024-01-02 RX ORDER — SODIUM CHLORIDE, SODIUM LACTATE, POTASSIUM CHLORIDE, CALCIUM CHLORIDE 600; 310; 30; 20 MG/100ML; MG/100ML; MG/100ML; MG/100ML
100 INJECTION, SOLUTION INTRAVENOUS CONTINUOUS
Status: DISCONTINUED | OUTPATIENT
Start: 2024-01-02 | End: 2024-01-03 | Stop reason: HOSPADM

## 2024-01-02 RX ORDER — PROPOFOL 10 MG/ML
INJECTION, EMULSION INTRAVENOUS AS NEEDED
Status: DISCONTINUED | OUTPATIENT
Start: 2024-01-02 | End: 2024-01-02

## 2024-01-02 RX ORDER — LIDOCAINE HYDROCHLORIDE 20 MG/ML
INJECTION, SOLUTION EPIDURAL; INFILTRATION; INTRACAUDAL; PERINEURAL AS NEEDED
Status: DISCONTINUED | OUTPATIENT
Start: 2024-01-02 | End: 2024-01-02

## 2024-01-02 RX ORDER — ONDANSETRON HYDROCHLORIDE 2 MG/ML
4 INJECTION, SOLUTION INTRAVENOUS ONCE AS NEEDED
Status: DISCONTINUED | OUTPATIENT
Start: 2024-01-02 | End: 2024-01-03 | Stop reason: HOSPADM

## 2024-01-02 RX ORDER — FENTANYL CITRATE 50 UG/ML
INJECTION, SOLUTION INTRAMUSCULAR; INTRAVENOUS CONTINUOUS PRN
Status: DISCONTINUED | OUTPATIENT
Start: 2024-01-02 | End: 2024-01-02

## 2024-01-02 RX ORDER — LACTULOSE 10 G/15ML
20 SOLUTION ORAL DAILY
Status: DISCONTINUED | OUTPATIENT
Start: 2024-01-02 | End: 2024-01-03 | Stop reason: HOSPADM

## 2024-01-02 RX ADMIN — PROPOFOL 20 MG: 10 INJECTION, EMULSION INTRAVENOUS at 09:34

## 2024-01-02 RX ADMIN — LIDOCAINE HYDROCHLORIDE 40 MG: 20 INJECTION, SOLUTION EPIDURAL; INFILTRATION; INTRACAUDAL; PERINEURAL at 09:32

## 2024-01-02 RX ADMIN — PROPOFOL 20 MG: 10 INJECTION, EMULSION INTRAVENOUS at 09:35

## 2024-01-02 RX ADMIN — PROPOFOL 100 MG: 10 INJECTION, EMULSION INTRAVENOUS at 09:32

## 2024-01-02 RX ADMIN — SODIUM CHLORIDE, POTASSIUM CHLORIDE, SODIUM LACTATE AND CALCIUM CHLORIDE 100 ML/HR: 600; 310; 30; 20 INJECTION, SOLUTION INTRAVENOUS at 08:43

## 2024-01-02 SDOH — HEALTH STABILITY: MENTAL HEALTH: CURRENT SMOKER: 0

## 2024-01-02 ASSESSMENT — COLUMBIA-SUICIDE SEVERITY RATING SCALE - C-SSRS
6. HAVE YOU EVER DONE ANYTHING, STARTED TO DO ANYTHING, OR PREPARED TO DO ANYTHING TO END YOUR LIFE?: NO
1. IN THE PAST MONTH, HAVE YOU WISHED YOU WERE DEAD OR WISHED YOU COULD GO TO SLEEP AND NOT WAKE UP?: NO
2. HAVE YOU ACTUALLY HAD ANY THOUGHTS OF KILLING YOURSELF?: NO

## 2024-01-02 ASSESSMENT — PAIN SCALES - GENERAL
PAINLEVEL_OUTOF10: 2
PAINLEVEL_OUTOF10: 2
PAINLEVEL_OUTOF10: 5 - MODERATE PAIN
PAINLEVEL_OUTOF10: 2
PAINLEVEL_OUTOF10: 0 - NO PAIN
PAINLEVEL_OUTOF10: 0 - NO PAIN
PAINLEVEL_OUTOF10: 2
PAIN_LEVEL: 0

## 2024-01-02 ASSESSMENT — PAIN - FUNCTIONAL ASSESSMENT
PAIN_FUNCTIONAL_ASSESSMENT: 0-10

## 2024-01-02 NOTE — ANESTHESIA POSTPROCEDURE EVALUATION
Patient: Latha Quispe    Procedure Summary       Date: 01/02/24 Room / Location: [facility]    Anesthesia Start: 0919 Anesthesia Stop: 0943    Procedure: EGD Diagnosis:       Chronic left lower quadrant pain      Weight loss    Scheduled Providers: Cam Diez MD Responsible Provider: DINORAH Min    Anesthesia Type: MAC ASA Status: 3            Anesthesia Type: MAC    Vitals Value Taken Time   /47 01/02/24 0943   Temp 36.6 01/02/24 0943   Pulse 82 01/02/24 0943   Resp 15 01/02/24 0943   SpO2 95 01/02/24 0943       Anesthesia Post Evaluation    Patient location during evaluation: PACU  Patient participation: complete - patient participated  Level of consciousness: awake and alert  Pain score: 0  Pain management: adequate  Airway patency: patent  Cardiovascular status: acceptable  Respiratory status: acceptable and room air  Hydration status: acceptable  Postoperative Nausea and Vomiting: none        There were no known notable events for this encounter.

## 2024-01-02 NOTE — ANESTHESIA PREPROCEDURE EVALUATION
Patient: Latha Quispe    Procedure Information       Date/Time: 01/02/24 0945    Scheduled providers: Cam Diez MD    Procedure: EGD    Location: [facility]            Relevant Problems   Anesthesia (within normal limits)      Cardiovascular (within normal limits)      Endocrine   (+) Hypothyroid      GI   (+) IBS (irritable bowel syndrome)      /Renal (within normal limits)      Neuro/Psych   (+) Anxiety and depression   (+) Moderate major depression (CMS/HCC)      Pulmonary (within normal limits)      GI/Hepatic   (+) Gall stones      Hematology (within normal limits)      Musculoskeletal (within normal limits)      Eyes, Ears, Nose, and Throat (within normal limits)      Infectious Disease (within normal limits)      Nervous   (+) Abdominal pain       Clinical information reviewed:   Tobacco  Allergies  Meds   Med Hx  Surg Hx   Fam Hx  Soc Hx        NPO Detail:  NPO/Void Status  Date of Last Liquid: 01/02/24  Time of Last Liquid: 0700  Date of Last Solid: 01/01/24  Time of Last Solid: 2030         Physical Exam    Airway  Mallampati: II  TM distance: >3 FB  Neck ROM: full     Cardiovascular - normal exam     Dental   (+) lower dentures, upper dentures     Pulmonary - normal exam     Abdominal - normal exam             Anesthesia Plan    ASA 3     MAC     The patient is not a current smoker.  Patient was previously instructed to abstain from smoking on day of procedure.  Patient did not smoke on day of procedure.    intravenous induction   Anesthetic plan and risks discussed with patient.  Use of blood products discussed with patient who consented to blood products.    Plan discussed with CRNA.

## 2024-01-02 NOTE — DISCHARGE INSTRUCTIONS

## 2024-01-02 NOTE — H&P
History Of Present Illness  Latha Quispe is a 78 y.o. female presenting with epigastric pain for an EGD . History of gastric ulcer resection      Past Medical History  Past Medical History:   Diagnosis Date    Cat bite 2023    Cataract     GERD (gastroesophageal reflux disease)     Hypothyroidism     Irritable bowel syndrome     Personal history of malignant neoplasm of breast 2021    History of malignant neoplasm of breast    Personal history of other complications of pregnancy, childbirth and the puerperium     History of spontaneous        Surgical History  Past Surgical History:   Procedure Laterality Date    BREAST SURGERY      CATARACT EXTRACTION      COLON SURGERY      CT ABDOMEN ANGIOGRAM W AND/OR WO IV CONTRAST  2023    CT ABDOMEN ANGIOGRAM W AND/OR WO IV CONTRAST 2023 CON CT    GASTRIC BYPASS      HYSTERECTOMY      MASTECTOMY, PARTIAL Left     OTHER SURGICAL HISTORY  2021    Varicose vein ligation        Social History  She reports that she has never smoked. She has never used smokeless tobacco. She reports that she does not currently use alcohol. She reports that she does not use drugs.    Family History  Family History   Problem Relation Name Age of Onset    Osteoporosis Mother      Alcohol abuse Father      Cancer Sister          breast    Osteoporosis Sister      Cancer Brother          colon. prostate    Hypothyroidism Other          Allergies  Nsaids (non-steroidal anti-inflammatory drug)    Review of Systems   All other systems reviewed and are negative.       Physical Exam  Vitals reviewed. Exam conducted with a chaperone present.   Constitutional:       Appearance: Normal appearance.   HENT:      Head: Normocephalic.      Nose: Nose normal.      Mouth/Throat:      Pharynx: Oropharynx is clear.   Cardiovascular:      Rate and Rhythm: Normal rate and regular rhythm.      Heart sounds: Normal heart sounds.   Pulmonary:      Effort: Pulmonary effort is normal.  "     Breath sounds: Normal breath sounds.   Abdominal:      General: Abdomen is flat.      Palpations: Abdomen is soft. There is no mass.      Tenderness: There is no abdominal tenderness. There is no guarding.      Hernia: No hernia is present.   Musculoskeletal:         General: Normal range of motion.      Cervical back: Normal range of motion.   Skin:     General: Skin is warm.   Neurological:      General: No focal deficit present.   Psychiatric:         Mood and Affect: Mood normal.          Last Recorded Vitals  Blood pressure 136/62, pulse (!) 45, temperature 36.4 °C (97.5 °F), resp. rate 18, height 1.499 m (4' 11\"), weight (!) 39 kg (85 lb 15.7 oz), SpO2 100 %.    Relevant Results           Assessment/Plan   Active Problems:Epigastric pain   There are no active Hospital Problems.    ESOPHAGOGASTRODODENOSCOPY/ BIOPSIES   Risks include, but not limited to pain, infection, bleeding, perforation,  missed lesions, aspiration, risk of cardiac, pulmonary, neurologic, locomotor, anesthetic events,  and other unforeseen complications including death.           Cam Diez MD    "

## 2024-01-03 DIAGNOSIS — K59.00 CONSTIPATION, UNSPECIFIED CONSTIPATION TYPE: Primary | ICD-10-CM

## 2024-01-03 RX ORDER — LACTULOSE 10 G/10G
10 SOLUTION ORAL 3 TIMES DAILY
Qty: 90 PACKET | Refills: 0 | Status: ON HOLD | OUTPATIENT
Start: 2024-01-03 | End: 2024-01-27 | Stop reason: SDUPTHER

## 2024-01-05 ENCOUNTER — TELEPHONE (OUTPATIENT)
Dept: SURGERY | Facility: CLINIC | Age: 79
End: 2024-01-05
Payer: MEDICARE

## 2024-01-05 LAB
LABORATORY COMMENT REPORT: NORMAL
PATH REPORT.FINAL DX SPEC: NORMAL
PATH REPORT.GROSS SPEC: NORMAL
PATH REPORT.TOTAL CANCER: NORMAL

## 2024-01-05 NOTE — TELEPHONE ENCOUNTER
----- Message from Cam Diez MD sent at 1/5/2024  1:24 PM EST -----  Let her know the biopsies showed mild inflammation but otherwise normal

## 2024-01-25 ENCOUNTER — HOSPITAL ENCOUNTER (INPATIENT)
Facility: HOSPITAL | Age: 79
LOS: 2 days | Discharge: HOME | DRG: 640 | End: 2024-01-27
Attending: STUDENT IN AN ORGANIZED HEALTH CARE EDUCATION/TRAINING PROGRAM | Admitting: NURSE PRACTITIONER
Payer: MEDICARE

## 2024-01-25 ENCOUNTER — APPOINTMENT (OUTPATIENT)
Dept: RADIOLOGY | Facility: HOSPITAL | Age: 79
DRG: 640 | End: 2024-01-25
Payer: MEDICARE

## 2024-01-25 ENCOUNTER — APPOINTMENT (OUTPATIENT)
Dept: CARDIOLOGY | Facility: HOSPITAL | Age: 79
DRG: 640 | End: 2024-01-25
Payer: MEDICARE

## 2024-01-25 DIAGNOSIS — E83.39 HYPOPHOSPHATEMIA: ICD-10-CM

## 2024-01-25 DIAGNOSIS — E83.42 HYPOMAGNESEMIA: ICD-10-CM

## 2024-01-25 DIAGNOSIS — K59.00 CONSTIPATION, UNSPECIFIED CONSTIPATION TYPE: ICD-10-CM

## 2024-01-25 DIAGNOSIS — K58.0 IRRITABLE BOWEL SYNDROME WITH DIARRHEA: ICD-10-CM

## 2024-01-25 DIAGNOSIS — R19.7 DIARRHEA, UNSPECIFIED TYPE: ICD-10-CM

## 2024-01-25 DIAGNOSIS — E87.6 HYPOKALEMIA: Primary | ICD-10-CM

## 2024-01-25 DIAGNOSIS — R63.4 WEIGHT LOSS: ICD-10-CM

## 2024-01-25 LAB
ALBUMIN SERPL BCP-MCNC: 4.1 G/DL (ref 3.4–5)
ALP SERPL-CCNC: 79 U/L (ref 33–136)
ALT SERPL W P-5'-P-CCNC: 13 U/L (ref 7–45)
ANION GAP SERPL CALC-SCNC: 13 MMOL/L (ref 10–20)
AST SERPL W P-5'-P-CCNC: 21 U/L (ref 9–39)
BILIRUB SERPL-MCNC: 0.4 MG/DL (ref 0–1.2)
BUN SERPL-MCNC: 17 MG/DL (ref 6–23)
CALCIUM SERPL-MCNC: 9.5 MG/DL (ref 8.6–10.3)
CHLORIDE SERPL-SCNC: 103 MMOL/L (ref 98–107)
CO2 SERPL-SCNC: 22 MMOL/L (ref 21–32)
CREAT SERPL-MCNC: 1 MG/DL (ref 0.5–1.05)
EGFRCR SERPLBLD CKD-EPI 2021: 58 ML/MIN/1.73M*2
ERYTHROCYTE [DISTWIDTH] IN BLOOD BY AUTOMATED COUNT: 12.8 % (ref 11.5–14.5)
GLUCOSE SERPL-MCNC: 76 MG/DL (ref 74–99)
HCT VFR BLD AUTO: 38.2 % (ref 36–46)
HGB BLD-MCNC: 12.4 G/DL (ref 12–16)
LACTATE SERPL-SCNC: 1.9 MMOL/L (ref 0.4–2)
LIPASE SERPL-CCNC: 41 U/L (ref 9–82)
MAGNESIUM SERPL-MCNC: 1.2 MG/DL (ref 1.6–2.4)
MCH RBC QN AUTO: 29.3 PG (ref 26–34)
MCHC RBC AUTO-ENTMCNC: 32.5 G/DL (ref 32–36)
MCV RBC AUTO: 90 FL (ref 80–100)
NRBC BLD-RTO: 0 /100 WBCS (ref 0–0)
PHOSPHATE SERPL-MCNC: 1.9 MG/DL (ref 2.5–4.9)
PLATELET # BLD AUTO: 228 X10*3/UL (ref 150–450)
POTASSIUM SERPL-SCNC: 2.8 MMOL/L (ref 3.5–5.3)
PROT SERPL-MCNC: 6.7 G/DL (ref 6.4–8.2)
RBC # BLD AUTO: 4.23 X10*6/UL (ref 4–5.2)
SODIUM SERPL-SCNC: 135 MMOL/L (ref 136–145)
T4 FREE SERPL-MCNC: 1.03 NG/DL (ref 0.61–1.12)
TSH SERPL-ACNC: 5.77 MIU/L (ref 0.44–3.98)
WBC # BLD AUTO: 6 X10*3/UL (ref 4.4–11.3)

## 2024-01-25 PROCEDURE — 85027 COMPLETE CBC AUTOMATED: CPT | Performed by: STUDENT IN AN ORGANIZED HEALTH CARE EDUCATION/TRAINING PROGRAM

## 2024-01-25 PROCEDURE — 84439 ASSAY OF FREE THYROXINE: CPT | Performed by: STUDENT IN AN ORGANIZED HEALTH CARE EDUCATION/TRAINING PROGRAM

## 2024-01-25 PROCEDURE — 2500000004 HC RX 250 GENERAL PHARMACY W/ HCPCS (ALT 636 FOR OP/ED): Performed by: STUDENT IN AN ORGANIZED HEALTH CARE EDUCATION/TRAINING PROGRAM

## 2024-01-25 PROCEDURE — 93005 ELECTROCARDIOGRAM TRACING: CPT

## 2024-01-25 PROCEDURE — 99285 EMERGENCY DEPT VISIT HI MDM: CPT | Mod: 25

## 2024-01-25 PROCEDURE — 96368 THER/DIAG CONCURRENT INF: CPT

## 2024-01-25 PROCEDURE — 99285 EMERGENCY DEPT VISIT HI MDM: CPT | Performed by: STUDENT IN AN ORGANIZED HEALTH CARE EDUCATION/TRAINING PROGRAM

## 2024-01-25 PROCEDURE — 36415 COLL VENOUS BLD VENIPUNCTURE: CPT | Performed by: STUDENT IN AN ORGANIZED HEALTH CARE EDUCATION/TRAINING PROGRAM

## 2024-01-25 PROCEDURE — 84100 ASSAY OF PHOSPHORUS: CPT | Performed by: STUDENT IN AN ORGANIZED HEALTH CARE EDUCATION/TRAINING PROGRAM

## 2024-01-25 PROCEDURE — 96365 THER/PROPH/DIAG IV INF INIT: CPT

## 2024-01-25 PROCEDURE — 74177 CT ABD & PELVIS W/CONTRAST: CPT

## 2024-01-25 PROCEDURE — 87506 IADNA-DNA/RNA PROBE TQ 6-11: CPT | Mod: GENLAB | Performed by: STUDENT IN AN ORGANIZED HEALTH CARE EDUCATION/TRAINING PROGRAM

## 2024-01-25 PROCEDURE — 2550000001 HC RX 255 CONTRASTS: Performed by: STUDENT IN AN ORGANIZED HEALTH CARE EDUCATION/TRAINING PROGRAM

## 2024-01-25 PROCEDURE — 83690 ASSAY OF LIPASE: CPT | Performed by: STUDENT IN AN ORGANIZED HEALTH CARE EDUCATION/TRAINING PROGRAM

## 2024-01-25 PROCEDURE — 74177 CT ABD & PELVIS W/CONTRAST: CPT | Performed by: STUDENT IN AN ORGANIZED HEALTH CARE EDUCATION/TRAINING PROGRAM

## 2024-01-25 PROCEDURE — 83605 ASSAY OF LACTIC ACID: CPT | Performed by: STUDENT IN AN ORGANIZED HEALTH CARE EDUCATION/TRAINING PROGRAM

## 2024-01-25 PROCEDURE — 84132 ASSAY OF SERUM POTASSIUM: CPT | Performed by: STUDENT IN AN ORGANIZED HEALTH CARE EDUCATION/TRAINING PROGRAM

## 2024-01-25 PROCEDURE — 84443 ASSAY THYROID STIM HORMONE: CPT | Performed by: STUDENT IN AN ORGANIZED HEALTH CARE EDUCATION/TRAINING PROGRAM

## 2024-01-25 PROCEDURE — 1100000001 HC PRIVATE ROOM DAILY: Mod: IPSPLIT

## 2024-01-25 PROCEDURE — 2500000001 HC RX 250 WO HCPCS SELF ADMINISTERED DRUGS (ALT 637 FOR MEDICARE OP): Performed by: STUDENT IN AN ORGANIZED HEALTH CARE EDUCATION/TRAINING PROGRAM

## 2024-01-25 PROCEDURE — 83735 ASSAY OF MAGNESIUM: CPT | Performed by: STUDENT IN AN ORGANIZED HEALTH CARE EDUCATION/TRAINING PROGRAM

## 2024-01-25 RX ORDER — SODIUM,POTASSIUM PHOSPHATES 280-250MG
1 POWDER IN PACKET (EA) ORAL 4 TIMES DAILY
Status: COMPLETED | OUTPATIENT
Start: 2024-01-25 | End: 2024-01-26

## 2024-01-25 RX ORDER — POTASSIUM CHLORIDE 20 MEQ/1
40 TABLET, EXTENDED RELEASE ORAL DAILY
Status: DISCONTINUED | OUTPATIENT
Start: 2024-01-25 | End: 2024-01-27 | Stop reason: HOSPADM

## 2024-01-25 RX ORDER — DICYCLOMINE HYDROCHLORIDE 10 MG/1
10 CAPSULE ORAL EVERY 6 HOURS PRN
COMMUNITY
End: 2024-02-26 | Stop reason: SDUPTHER

## 2024-01-25 RX ORDER — POTASSIUM CHLORIDE 14.9 MG/ML
20 INJECTION INTRAVENOUS
Status: COMPLETED | OUTPATIENT
Start: 2024-01-25 | End: 2024-01-26

## 2024-01-25 RX ORDER — MAGNESIUM SULFATE HEPTAHYDRATE 40 MG/ML
2 INJECTION, SOLUTION INTRAVENOUS ONCE
Status: COMPLETED | OUTPATIENT
Start: 2024-01-25 | End: 2024-01-25

## 2024-01-25 RX ORDER — SODIUM CHLORIDE 9 MG/ML
INJECTION, SOLUTION INTRAVENOUS
Status: COMPLETED
Start: 2024-01-25 | End: 2024-01-25

## 2024-01-25 RX ADMIN — IOHEXOL 75 ML: 350 INJECTION, SOLUTION INTRAVENOUS at 21:03

## 2024-01-25 RX ADMIN — POTASSIUM CHLORIDE 20 MEQ: 200 INJECTION, SOLUTION INTRAVENOUS at 21:08

## 2024-01-25 RX ADMIN — POTASSIUM CHLORIDE 40 MEQ: 1500 TABLET, EXTENDED RELEASE ORAL at 21:07

## 2024-01-25 RX ADMIN — MAGNESIUM SULFATE IN WATER 2 G: 40 INJECTION, SOLUTION INTRAVENOUS at 21:10

## 2024-01-25 RX ADMIN — POTASSIUM & SODIUM PHOSPHATES POWDER PACK 280-160-250 MG 1 PACKET: 280-160-250 PACK at 21:10

## 2024-01-25 SDOH — SOCIAL STABILITY: SOCIAL INSECURITY: ABUSE: ADULT

## 2024-01-25 SDOH — SOCIAL STABILITY: SOCIAL INSECURITY: WITHIN THE LAST YEAR, HAVE YOU BEEN AFRAID OF YOUR PARTNER OR EX-PARTNER?: NO

## 2024-01-25 SDOH — SOCIAL STABILITY: SOCIAL INSECURITY
WITHIN THE LAST YEAR, HAVE TO BEEN RAPED OR FORCED TO HAVE ANY KIND OF SEXUAL ACTIVITY BY YOUR PARTNER OR EX-PARTNER?: NO

## 2024-01-25 SDOH — ECONOMIC STABILITY: INCOME INSECURITY: IN THE PAST 12 MONTHS, HAS THE ELECTRIC, GAS, OIL, OR WATER COMPANY THREATENED TO SHUT OFF SERVICE IN YOUR HOME?: NO

## 2024-01-25 SDOH — SOCIAL STABILITY: SOCIAL INSECURITY: DO YOU FEEL UNSAFE GOING BACK TO THE PLACE WHERE YOU ARE LIVING?: NO

## 2024-01-25 SDOH — SOCIAL STABILITY: SOCIAL INSECURITY
WITHIN THE LAST YEAR, HAVE YOU BEEN KICKED, HIT, SLAPPED, OR OTHERWISE PHYSICALLY HURT BY YOUR PARTNER OR EX-PARTNER?: NO

## 2024-01-25 SDOH — SOCIAL STABILITY: SOCIAL INSECURITY: DOES ANYONE TRY TO KEEP YOU FROM HAVING/CONTACTING OTHER FRIENDS OR DOING THINGS OUTSIDE YOUR HOME?: NO

## 2024-01-25 SDOH — SOCIAL STABILITY: SOCIAL NETWORK: HOW OFTEN DO YOU ATTENT MEETINGS OF THE CLUB OR ORGANIZATION YOU BELONG TO?: NEVER

## 2024-01-25 SDOH — SOCIAL STABILITY: SOCIAL INSECURITY: HAS ANYONE EVER THREATENED TO HURT YOUR FAMILY OR YOUR PETS?: NO

## 2024-01-25 SDOH — SOCIAL STABILITY: SOCIAL INSECURITY: WITHIN THE LAST YEAR, HAVE YOU BEEN HUMILIATED OR EMOTIONALLY ABUSED IN OTHER WAYS BY YOUR PARTNER OR EX-PARTNER?: NO

## 2024-01-25 SDOH — SOCIAL STABILITY: SOCIAL NETWORK: HOW OFTEN DO YOU ATTEND CHURCH OR RELIGIOUS SERVICES?: NEVER

## 2024-01-25 SDOH — SOCIAL STABILITY: SOCIAL INSECURITY: DO YOU FEEL ANYONE HAS EXPLOITED OR TAKEN ADVANTAGE OF YOU FINANCIALLY OR OF YOUR PERSONAL PROPERTY?: NO

## 2024-01-25 SDOH — HEALTH STABILITY: MENTAL HEALTH
STRESS IS WHEN SOMEONE FEELS TENSE, NERVOUS, ANXIOUS, OR CAN'T SLEEP AT NIGHT BECAUSE THEIR MIND IS TROUBLED. HOW STRESSED ARE YOU?: NOT AT ALL

## 2024-01-25 SDOH — SOCIAL STABILITY: SOCIAL NETWORK
DO YOU BELONG TO ANY CLUBS OR ORGANIZATIONS SUCH AS CHURCH GROUPS UNIONS, FRATERNAL OR ATHLETIC GROUPS, OR SCHOOL GROUPS?: NO

## 2024-01-25 SDOH — SOCIAL STABILITY: SOCIAL INSECURITY: ARE THERE ANY APPARENT SIGNS OF INJURIES/BEHAVIORS THAT COULD BE RELATED TO ABUSE/NEGLECT?: NO

## 2024-01-25 SDOH — ECONOMIC STABILITY: FOOD INSECURITY: WITHIN THE PAST 12 MONTHS, THE FOOD YOU BOUGHT JUST DIDN'T LAST AND YOU DIDN'T HAVE MONEY TO GET MORE.: NEVER TRUE

## 2024-01-25 SDOH — SOCIAL STABILITY: SOCIAL NETWORK: ARE YOU MARRIED, WIDOWED, DIVORCED, SEPARATED, NEVER MARRIED, OR LIVING WITH A PARTNER?: MARRIED

## 2024-01-25 SDOH — SOCIAL STABILITY: SOCIAL INSECURITY: ARE YOU OR HAVE YOU BEEN THREATENED OR ABUSED PHYSICALLY, EMOTIONALLY, OR SEXUALLY BY ANYONE?: NO

## 2024-01-25 SDOH — SOCIAL STABILITY: SOCIAL INSECURITY: HAVE YOU HAD THOUGHTS OF HARMING ANYONE ELSE?: NO

## 2024-01-25 SDOH — SOCIAL STABILITY: SOCIAL NETWORK: IN A TYPICAL WEEK, HOW MANY TIMES DO YOU TALK ON THE PHONE WITH FAMILY, FRIENDS, OR NEIGHBORS?: NEVER

## 2024-01-25 SDOH — ECONOMIC STABILITY: FOOD INSECURITY: WITHIN THE PAST 12 MONTHS, YOU WORRIED THAT YOUR FOOD WOULD RUN OUT BEFORE YOU GOT MONEY TO BUY MORE.: NEVER TRUE

## 2024-01-25 SDOH — SOCIAL STABILITY: SOCIAL NETWORK: HOW OFTEN DO YOU GET TOGETHER WITH FRIENDS OR RELATIVES?: ONCE A WEEK

## 2024-01-25 ASSESSMENT — ACTIVITIES OF DAILY LIVING (ADL)
ADEQUATE_TO_COMPLETE_ADL: YES
FEEDING YOURSELF: NEEDS ASSISTANCE
BATHING: NEEDS ASSISTANCE
JUDGMENT_ADEQUATE_SAFELY_COMPLETE_DAILY_ACTIVITIES: YES
HEARING - LEFT EAR: FUNCTIONAL
PATIENT'S MEMORY ADEQUATE TO SAFELY COMPLETE DAILY ACTIVITIES?: YES
TOILETING: NEEDS ASSISTANCE
DRESSING YOURSELF: NEEDS ASSISTANCE
LACK_OF_TRANSPORTATION: NO
GROOMING: NEEDS ASSISTANCE
WALKS IN HOME: NEEDS ASSISTANCE
HEARING - RIGHT EAR: FUNCTIONAL

## 2024-01-25 ASSESSMENT — COLUMBIA-SUICIDE SEVERITY RATING SCALE - C-SSRS

## 2024-01-25 ASSESSMENT — PATIENT HEALTH QUESTIONNAIRE - PHQ9
1. LITTLE INTEREST OR PLEASURE IN DOING THINGS: NOT AT ALL
2. FEELING DOWN, DEPRESSED OR HOPELESS: NOT AT ALL
SUM OF ALL RESPONSES TO PHQ9 QUESTIONS 1 & 2: 0

## 2024-01-25 ASSESSMENT — COGNITIVE AND FUNCTIONAL STATUS - GENERAL
MOVING TO AND FROM BED TO CHAIR: A LITTLE
MOVING FROM LYING ON BACK TO SITTING ON SIDE OF FLAT BED WITH BEDRAILS: A LITTLE
MOBILITY SCORE: 18
DRESSING REGULAR LOWER BODY CLOTHING: A LITTLE
PATIENT BASELINE BEDBOUND: NO
STANDING UP FROM CHAIR USING ARMS: A LITTLE
WALKING IN HOSPITAL ROOM: A LITTLE
TOILETING: A LITTLE
DRESSING REGULAR UPPER BODY CLOTHING: A LITTLE
PERSONAL GROOMING: A LITTLE
EATING MEALS: A LITTLE
HELP NEEDED FOR BATHING: A LITTLE
CLIMB 3 TO 5 STEPS WITH RAILING: A LITTLE
DAILY ACTIVITIY SCORE: 18
TURNING FROM BACK TO SIDE WHILE IN FLAT BAD: A LITTLE

## 2024-01-25 ASSESSMENT — LIFESTYLE VARIABLES
HOW OFTEN DO YOU HAVE A DRINK CONTAINING ALCOHOL: NEVER
HOW OFTEN DO YOU HAVE 6 OR MORE DRINKS ON ONE OCCASION: NEVER
SUBSTANCE_ABUSE_PAST_12_MONTHS: NO
HOW MANY STANDARD DRINKS CONTAINING ALCOHOL DO YOU HAVE ON A TYPICAL DAY: PATIENT DOES NOT DRINK
PRESCIPTION_ABUSE_PAST_12_MONTHS: NO
AUDIT-C TOTAL SCORE: 0
SKIP TO QUESTIONS 9-10: 1
AUDIT-C TOTAL SCORE: 0

## 2024-01-25 ASSESSMENT — PAIN SCALES - GENERAL
PAINLEVEL_OUTOF10: 0 - NO PAIN
PAINLEVEL_OUTOF10: 0 - NO PAIN

## 2024-01-25 ASSESSMENT — PAIN - FUNCTIONAL ASSESSMENT
PAIN_FUNCTIONAL_ASSESSMENT: 0-10
PAIN_FUNCTIONAL_ASSESSMENT: 0-10

## 2024-01-26 LAB
ANION GAP SERPL CALC-SCNC: 11 MMOL/L (ref 10–20)
APPEARANCE UR: CLEAR
ATRIAL RATE: 47 BPM
ATRIAL RATE: 55 BPM
BILIRUB UR STRIP.AUTO-MCNC: NEGATIVE MG/DL
BUN SERPL-MCNC: 12 MG/DL (ref 6–23)
C COLI+JEJ+UPSA DNA STL QL NAA+PROBE: NOT DETECTED
CALCIUM SERPL-MCNC: 8.8 MG/DL (ref 8.6–10.3)
CHLORIDE SERPL-SCNC: 108 MMOL/L (ref 98–107)
CO2 SERPL-SCNC: 22 MMOL/L (ref 21–32)
COLOR UR: ABNORMAL
CREAT SERPL-MCNC: 0.81 MG/DL (ref 0.5–1.05)
EC STX1 GENE STL QL NAA+PROBE: NOT DETECTED
EC STX2 GENE STL QL NAA+PROBE: NOT DETECTED
EGFRCR SERPLBLD CKD-EPI 2021: 75 ML/MIN/1.73M*2
ERYTHROCYTE [DISTWIDTH] IN BLOOD BY AUTOMATED COUNT: 13 % (ref 11.5–14.5)
GLUCOSE SERPL-MCNC: 103 MG/DL (ref 74–99)
GLUCOSE UR STRIP.AUTO-MCNC: NEGATIVE MG/DL
HCT VFR BLD AUTO: 34.5 % (ref 36–46)
HGB BLD-MCNC: 11.3 G/DL (ref 12–16)
HOLD SPECIMEN: NORMAL
KETONES UR STRIP.AUTO-MCNC: NEGATIVE MG/DL
LEUKOCYTE ESTERASE UR QL STRIP.AUTO: ABNORMAL
MAGNESIUM SERPL-MCNC: 1.65 MG/DL (ref 1.6–2.4)
MCH RBC QN AUTO: 29.5 PG (ref 26–34)
MCHC RBC AUTO-ENTMCNC: 32.8 G/DL (ref 32–36)
MCV RBC AUTO: 90 FL (ref 80–100)
NITRITE UR QL STRIP.AUTO: NEGATIVE
NOROVIRUS GI + GII RNA STL NAA+PROBE: NOT DETECTED
NRBC BLD-RTO: 0 /100 WBCS (ref 0–0)
P AXIS: 70 DEGREES
P AXIS: 70 DEGREES
P OFFSET: 185 MS
P OFFSET: 197 MS
P ONSET: 132 MS
P ONSET: 141 MS
PH UR STRIP.AUTO: 5 [PH]
PHOSPHATE SERPL-MCNC: 3.7 MG/DL (ref 2.5–4.9)
PLATELET # BLD AUTO: 229 X10*3/UL (ref 150–450)
POTASSIUM SERPL-SCNC: 3.7 MMOL/L (ref 3.5–5.3)
PR INTERVAL: 150 MS
PR INTERVAL: 152 MS
PROT UR STRIP.AUTO-MCNC: NEGATIVE MG/DL
Q ONSET: 207 MS
Q ONSET: 217 MS
QRS COUNT: 8 BEATS
QRS COUNT: 9 BEATS
QRS DURATION: 102 MS
QRS DURATION: 90 MS
QT INTERVAL: 478 MS
QT INTERVAL: 538 MS
QTC CALCULATION(BAZETT): 457 MS
QTC CALCULATION(BAZETT): 476 MS
QTC FREDERICIA: 464 MS
QTC FREDERICIA: 495 MS
R AXIS: -62 DEGREES
R AXIS: -63 DEGREES
RBC # BLD AUTO: 3.83 X10*6/UL (ref 4–5.2)
RBC # UR STRIP.AUTO: ABNORMAL /UL
RBC #/AREA URNS AUTO: NORMAL /HPF
RV RNA STL NAA+PROBE: NOT DETECTED
SALMONELLA DNA STL QL NAA+PROBE: NOT DETECTED
SHIGELLA DNA SPEC QL NAA+PROBE: NOT DETECTED
SODIUM SERPL-SCNC: 137 MMOL/L (ref 136–145)
SP GR UR STRIP.AUTO: 1.03
SQUAMOUS #/AREA URNS AUTO: NORMAL /HPF
T AXIS: 68 DEGREES
T AXIS: 72 DEGREES
T OFFSET: 456 MS
T OFFSET: 476 MS
UROBILINOGEN UR STRIP.AUTO-MCNC: <2 MG/DL
V CHOLERAE DNA STL QL NAA+PROBE: NOT DETECTED
VENTRICULAR RATE: 47 BPM
VENTRICULAR RATE: 55 BPM
WBC # BLD AUTO: 5.8 X10*3/UL (ref 4.4–11.3)
WBC #/AREA URNS AUTO: NORMAL /HPF
Y ENTEROCOL DNA STL QL NAA+PROBE: NOT DETECTED

## 2024-01-26 PROCEDURE — 2500000004 HC RX 250 GENERAL PHARMACY W/ HCPCS (ALT 636 FOR OP/ED): Mod: IPSPLIT | Performed by: NURSE PRACTITIONER

## 2024-01-26 PROCEDURE — 97165 OT EVAL LOW COMPLEX 30 MIN: CPT | Mod: GO,IPSPLIT | Performed by: OCCUPATIONAL THERAPIST

## 2024-01-26 PROCEDURE — 2500000004 HC RX 250 GENERAL PHARMACY W/ HCPCS (ALT 636 FOR OP/ED): Mod: IPSPLIT | Performed by: STUDENT IN AN ORGANIZED HEALTH CARE EDUCATION/TRAINING PROGRAM

## 2024-01-26 PROCEDURE — 80048 BASIC METABOLIC PNL TOTAL CA: CPT | Mod: IPSPLIT | Performed by: NURSE PRACTITIONER

## 2024-01-26 PROCEDURE — 83735 ASSAY OF MAGNESIUM: CPT | Mod: IPSPLIT | Performed by: NURSE PRACTITIONER

## 2024-01-26 PROCEDURE — 2500000001 HC RX 250 WO HCPCS SELF ADMINISTERED DRUGS (ALT 637 FOR MEDICARE OP): Mod: IPSPLIT | Performed by: NURSE PRACTITIONER

## 2024-01-26 PROCEDURE — 97161 PT EVAL LOW COMPLEX 20 MIN: CPT | Mod: GP,IPSPLIT | Performed by: PHYSICAL THERAPIST

## 2024-01-26 PROCEDURE — 84100 ASSAY OF PHOSPHORUS: CPT | Mod: IPSPLIT | Performed by: NURSE PRACTITIONER

## 2024-01-26 PROCEDURE — 85027 COMPLETE CBC AUTOMATED: CPT | Mod: IPSPLIT | Performed by: NURSE PRACTITIONER

## 2024-01-26 PROCEDURE — 36415 COLL VENOUS BLD VENIPUNCTURE: CPT | Mod: IPSPLIT | Performed by: NURSE PRACTITIONER

## 2024-01-26 PROCEDURE — 99223 1ST HOSP IP/OBS HIGH 75: CPT | Performed by: NURSE PRACTITIONER

## 2024-01-26 PROCEDURE — 87086 URINE CULTURE/COLONY COUNT: CPT | Mod: GENLAB | Performed by: STUDENT IN AN ORGANIZED HEALTH CARE EDUCATION/TRAINING PROGRAM

## 2024-01-26 PROCEDURE — 2500000001 HC RX 250 WO HCPCS SELF ADMINISTERED DRUGS (ALT 637 FOR MEDICARE OP): Mod: IPSPLIT | Performed by: STUDENT IN AN ORGANIZED HEALTH CARE EDUCATION/TRAINING PROGRAM

## 2024-01-26 PROCEDURE — 81001 URINALYSIS AUTO W/SCOPE: CPT | Mod: IPSPLIT | Performed by: STUDENT IN AN ORGANIZED HEALTH CARE EDUCATION/TRAINING PROGRAM

## 2024-01-26 PROCEDURE — 1200000002 HC GENERAL ROOM WITH TELEMETRY DAILY: Mod: IPSPLIT

## 2024-01-26 RX ORDER — MULTIVIT-MIN/IRON FUM/FOLIC AC 7.5 MG-4
1 TABLET ORAL DAILY
Status: DISCONTINUED | OUTPATIENT
Start: 2024-01-27 | End: 2024-01-27

## 2024-01-26 RX ORDER — ESCITALOPRAM OXALATE 10 MG/1
20 TABLET ORAL NIGHTLY
Status: DISCONTINUED | OUTPATIENT
Start: 2024-01-26 | End: 2024-01-27 | Stop reason: HOSPADM

## 2024-01-26 RX ORDER — ONDANSETRON 4 MG/1
4 TABLET, ORALLY DISINTEGRATING ORAL EVERY 12 HOURS PRN
Status: DISCONTINUED | OUTPATIENT
Start: 2024-01-26 | End: 2024-01-27 | Stop reason: HOSPADM

## 2024-01-26 RX ORDER — ACETAMINOPHEN 650 MG/1
650 SUPPOSITORY RECTAL EVERY 4 HOURS PRN
Status: DISCONTINUED | OUTPATIENT
Start: 2024-01-26 | End: 2024-01-27 | Stop reason: HOSPADM

## 2024-01-26 RX ORDER — TALC
3 POWDER (GRAM) TOPICAL NIGHTLY PRN
Status: DISCONTINUED | OUTPATIENT
Start: 2024-01-26 | End: 2024-01-27 | Stop reason: HOSPADM

## 2024-01-26 RX ORDER — ACETAMINOPHEN 160 MG/5ML
650 SOLUTION ORAL EVERY 4 HOURS PRN
Status: DISCONTINUED | OUTPATIENT
Start: 2024-01-26 | End: 2024-01-27 | Stop reason: HOSPADM

## 2024-01-26 RX ORDER — ONDANSETRON HYDROCHLORIDE 2 MG/ML
4 INJECTION, SOLUTION INTRAVENOUS EVERY 8 HOURS PRN
Status: DISCONTINUED | OUTPATIENT
Start: 2024-01-26 | End: 2024-01-27 | Stop reason: HOSPADM

## 2024-01-26 RX ORDER — ACETAMINOPHEN 325 MG/1
650 TABLET ORAL EVERY 4 HOURS PRN
Status: DISCONTINUED | OUTPATIENT
Start: 2024-01-26 | End: 2024-01-27 | Stop reason: HOSPADM

## 2024-01-26 RX ORDER — CHOLECALCIFEROL (VITAMIN D3) 25 MCG
2000 TABLET ORAL DAILY
Status: DISCONTINUED | OUTPATIENT
Start: 2024-01-26 | End: 2024-01-27 | Stop reason: HOSPADM

## 2024-01-26 RX ORDER — LEVOTHYROXINE SODIUM 75 UG/1
75 TABLET ORAL DAILY
Status: DISCONTINUED | OUTPATIENT
Start: 2024-01-26 | End: 2024-01-27 | Stop reason: HOSPADM

## 2024-01-26 RX ORDER — HEPARIN SODIUM 5000 [USP'U]/ML
5000 INJECTION, SOLUTION INTRAVENOUS; SUBCUTANEOUS EVERY 8 HOURS
Status: DISCONTINUED | OUTPATIENT
Start: 2024-01-26 | End: 2024-01-27 | Stop reason: HOSPADM

## 2024-01-26 RX ORDER — PANTOPRAZOLE SODIUM 40 MG/1
40 TABLET, DELAYED RELEASE ORAL DAILY
Status: DISCONTINUED | OUTPATIENT
Start: 2024-01-26 | End: 2024-01-27 | Stop reason: HOSPADM

## 2024-01-26 RX ORDER — SUCRALFATE 1 G/1
1 TABLET ORAL 4 TIMES DAILY
Status: DISCONTINUED | OUTPATIENT
Start: 2024-01-26 | End: 2024-01-27 | Stop reason: HOSPADM

## 2024-01-26 RX ORDER — CEFTRIAXONE 1 G/50ML
1 INJECTION, SOLUTION INTRAVENOUS EVERY 24 HOURS
Status: DISCONTINUED | OUTPATIENT
Start: 2024-01-26 | End: 2024-01-27 | Stop reason: HOSPADM

## 2024-01-26 RX ORDER — ONDANSETRON 4 MG/1
4 TABLET, FILM COATED ORAL EVERY 8 HOURS PRN
Status: DISCONTINUED | OUTPATIENT
Start: 2024-01-26 | End: 2024-01-26

## 2024-01-26 RX ORDER — SODIUM CHLORIDE 9 MG/ML
100 INJECTION, SOLUTION INTRAVENOUS CONTINUOUS
Status: DISCONTINUED | OUTPATIENT
Start: 2024-01-26 | End: 2024-01-26

## 2024-01-26 RX ORDER — SODIUM CHLORIDE AND POTASSIUM CHLORIDE 150; 900 MG/100ML; MG/100ML
75 INJECTION, SOLUTION INTRAVENOUS CONTINUOUS
Status: DISCONTINUED | OUTPATIENT
Start: 2024-01-26 | End: 2024-01-27 | Stop reason: HOSPADM

## 2024-01-26 RX ORDER — LEVOTHYROXINE SODIUM 50 UG/1
50 TABLET ORAL DAILY
Status: DISCONTINUED | OUTPATIENT
Start: 2024-01-26 | End: 2024-01-26

## 2024-01-26 RX ADMIN — TRAZODONE HYDROCHLORIDE 150 MG: 100 TABLET ORAL at 00:35

## 2024-01-26 RX ADMIN — ONDANSETRON 4 MG: 2 INJECTION INTRAMUSCULAR; INTRAVENOUS at 21:08

## 2024-01-26 RX ADMIN — SUCRALFATE 1 G: 1 TABLET ORAL at 09:53

## 2024-01-26 RX ADMIN — SUCRALFATE 1 G: 1 TABLET ORAL at 21:07

## 2024-01-26 RX ADMIN — TRAZODONE HYDROCHLORIDE 150 MG: 100 TABLET ORAL at 21:07

## 2024-01-26 RX ADMIN — HEPARIN SODIUM 5000 UNITS: 5000 INJECTION INTRAVENOUS; SUBCUTANEOUS at 16:09

## 2024-01-26 RX ADMIN — POTASSIUM CHLORIDE AND SODIUM CHLORIDE 75 ML/HR: 900; 150 INJECTION, SOLUTION INTRAVENOUS at 09:50

## 2024-01-26 RX ADMIN — POTASSIUM CHLORIDE AND SODIUM CHLORIDE 75 ML/HR: 900; 150 INJECTION, SOLUTION INTRAVENOUS at 23:42

## 2024-01-26 RX ADMIN — ESCITALOPRAM OXALATE 20 MG: 10 TABLET, FILM COATED ORAL at 00:35

## 2024-01-26 RX ADMIN — PANTOPRAZOLE SODIUM 40 MG: 40 TABLET, DELAYED RELEASE ORAL at 09:53

## 2024-01-26 RX ADMIN — POTASSIUM CHLORIDE 20 MEQ: 200 INJECTION, SOLUTION INTRAVENOUS at 00:35

## 2024-01-26 RX ADMIN — HEPARIN SODIUM 5000 UNITS: 5000 INJECTION INTRAVENOUS; SUBCUTANEOUS at 00:35

## 2024-01-26 RX ADMIN — ACETAMINOPHEN 650 MG: 325 TABLET ORAL at 16:09

## 2024-01-26 RX ADMIN — SUCRALFATE 1 G: 1 TABLET ORAL at 16:09

## 2024-01-26 RX ADMIN — LEVOTHYROXINE SODIUM 75 MCG: 75 TABLET ORAL at 05:25

## 2024-01-26 RX ADMIN — SUCRALFATE 1 G: 1 TABLET ORAL at 12:15

## 2024-01-26 RX ADMIN — CEFTRIAXONE 1 G: 1 INJECTION, SOLUTION INTRAVENOUS at 05:25

## 2024-01-26 RX ADMIN — POTASSIUM & SODIUM PHOSPHATES POWDER PACK 280-160-250 MG 1 PACKET: 280-160-250 PACK at 09:55

## 2024-01-26 RX ADMIN — ESCITALOPRAM OXALATE 20 MG: 10 TABLET, FILM COATED ORAL at 21:07

## 2024-01-26 RX ADMIN — Medication 2000 UNITS: at 09:53

## 2024-01-26 RX ADMIN — HEPARIN SODIUM 5000 UNITS: 5000 INJECTION INTRAVENOUS; SUBCUTANEOUS at 09:53

## 2024-01-26 RX ADMIN — HEPARIN SODIUM 5000 UNITS: 5000 INJECTION INTRAVENOUS; SUBCUTANEOUS at 23:42

## 2024-01-26 ASSESSMENT — PAIN SCALES - GENERAL
PAINLEVEL_OUTOF10: 2
PAINLEVEL_OUTOF10: 0 - NO PAIN

## 2024-01-26 ASSESSMENT — COGNITIVE AND FUNCTIONAL STATUS - GENERAL
DAILY ACTIVITIY SCORE: 24
MOBILITY SCORE: 24

## 2024-01-26 ASSESSMENT — PAIN - FUNCTIONAL ASSESSMENT
PAIN_FUNCTIONAL_ASSESSMENT: 0-10

## 2024-01-26 ASSESSMENT — ENCOUNTER SYMPTOMS
FATIGUE: 1
UNEXPECTED WEIGHT CHANGE: 1
WEAKNESS: 1
ACTIVITY CHANGE: 1

## 2024-01-26 ASSESSMENT — ACTIVITIES OF DAILY LIVING (ADL)
ADL_ASSISTANCE: INDEPENDENT
ADL_ASSISTANCE: INDEPENDENT
BATHING_ASSISTANCE: INDEPENDENT

## 2024-01-26 ASSESSMENT — PAIN DESCRIPTION - LOCATION: LOCATION: HEAD

## 2024-01-26 NOTE — CARE PLAN
The patient's goals for the shift include      The clinical goals for the shift include Pt will have adequate intake    Pt ate 75% of breakfast and lunch. No laxatives have been given and pt davis not had a BM. K replacements administered as ordered and K is WNL. Spouse was at bedside and all questions answered. Pt is hopeful for discharge tomorrow.

## 2024-01-26 NOTE — H&P
"                     History and Physical         Latha Quispe 78 y.o. 1945     History Of Present Illness  Latha Quispe is a 78 y.o. female presented to [facility] ED from home.  PMH of constipation, hypothyroidism, GERD, Anxiety Depression IBS. Patient states she was recently seen by Dr. Diez GI for c/o worsening chronic left lower quadrant abdominal pain.  On 24 Patient underwent EGD which  showed mild inflammation. Patient states she was given a laxative.  Patient reports she became sick from the medication and stopped eating and drinking for \"over a week\" . She states she lost, \"over 10 pounds in a week\" . She came to the ED today for generalized weakness. 24 CT of Abdomen showed Moderate colonic stool On Admission Magnesium 1.20 Potassium 2.4 Electrolytes replaced.  In ED patient was started on Rocephin for UTI. On exam patient resting in bed. Alert x 4. Patient endorses worsening anxiety r/t health care concerns. Patient states she has  at 10# weight loss and has not eating in > one week. Refuses food at this time. Patient c/o impaired ambulation and states \" I am too weak to walk\" Approved of plan  to see PT/OT therapies. Patient denies sob pain cp n/v/d      Past Medical History  Past Medical History:   Diagnosis Date    Abdominal pain     Anxiety     Cat bite 2023    Cataract     Depression     Disease of thyroid gland     GERD (gastroesophageal reflux disease)     GERD (gastroesophageal reflux disease)     Hypothyroidism     Irritable bowel syndrome     Panic attacks     Personal history of malignant neoplasm of breast 2021    History of malignant neoplasm of breast    Personal history of other complications of pregnancy, childbirth and the puerperium     History of spontaneous         Surgical History  She has a past surgical history that includes Other surgical history (2021); Breast surgery; Gastric bypass; Colon surgery; Hysterectomy; Cataract extraction; " Mastectomy, partial (Left); and CT angio abdomen w and or wo IV IV contrast (12/28/2023).     Social History  Social History     Socioeconomic History    Marital status:      Spouse name: Not on file    Number of children: Not on file    Years of education: Not on file    Highest education level: Not on file   Occupational History    Not on file   Tobacco Use    Smoking status: Never     Passive exposure: Never    Smokeless tobacco: Never   Vaping Use    Vaping Use: Never used   Substance and Sexual Activity    Alcohol use: Never    Drug use: Never    Sexual activity: Defer   Other Topics Concern    Not on file   Social History Narrative    Not on file     Social Determinants of Health     Financial Resource Strain: Low Risk  (1/25/2024)    Overall Financial Resource Strain (CARDIA)     Difficulty of Paying Living Expenses: Not hard at all   Food Insecurity: No Food Insecurity (1/25/2024)    Hunger Vital Sign     Worried About Running Out of Food in the Last Year: Never true     Ran Out of Food in the Last Year: Never true   Transportation Needs: No Transportation Needs (1/25/2024)    PRAPARE - Transportation     Lack of Transportation (Medical): No     Lack of Transportation (Non-Medical): No   Physical Activity: Not on file   Stress: No Stress Concern Present (1/25/2024)    [employer] - Occupational Stress Questionnaire     Feeling of Stress : Not at all   Social Connections: Socially Isolated (1/25/2024)    Social Connection and Isolation Panel [NHANES]     Frequency of Communication with Friends and Family: Never     Frequency of Social Gatherings with Friends and Family: Once a week     Attends Congregation Services: Never     Active Member of Clubs or Organizations: No     Attends Club or Organization Meetings: Never     Marital Status:    Intimate Partner Violence: Not At Risk (1/25/2024)    Humiliation, Afraid, Rape, and Kick questionnaire     Fear of Current or  Ex-Partner: No     Emotionally Abused: No     Physically Abused: No     Sexually Abused: No   Housing Stability: Low Risk  (1/25/2024)    Housing Stability Vital Sign     Unable to Pay for Housing in the Last Year: No     Number of Places Lived in the Last Year: 1     Unstable Housing in the Last Year: No        Family History  Family History   Problem Relation Name Age of Onset    Osteoporosis Mother      Alcohol abuse Father      Cancer Sister          breast    Osteoporosis Sister      Cancer Brother          colon. prostate    Hypothyroidism Other          Allergies  Allergies   Allergen Reactions    Nsaids (Non-Steroidal Anti-Inflammatory Drug) Unknown        Vital Signs  Temp:  [36 °C (96.8 °F)-36.6 °C (97.9 °F)] 36.4 °C (97.5 °F)  Heart Rate:  [47-67] 50  Resp:  [18-26] 19  BP: (118-128)/() 127/85    Home Medications   Prior to Admission Medications   Prescriptions Last Dose Informant Patient Reported? Taking?   POTASSIUM ACETATE, BULK, MISC 1/25/2024  Yes No   Sig: Take by mouth.   busPIRone (Buspar) 10 mg tablet 1/25/2024  Yes No   Sig: Take 2 tablets (20 mg) by mouth 3 times a day.   cholecalciferol (Vitamin D3) 50 mcg (2,000 unit) capsule 1/25/2024  Yes No   Sig: Take 1 capsule (50 mcg) by mouth once daily.   dicyclomine (Bentyl) 10 mg capsule 1/25/2024  Yes No   Sig: Take 1 capsule (10 mg) by mouth every 6 hours if needed.   escitalopram (Lexapro) 20 mg tablet 1/24/2024  Yes No   Sig: Take 1 tablet (20 mg) by mouth once daily at bedtime.   lactulose (Kristalose) 10 gram packet 1/25/2024  No No   Sig: Take 1 packet (10 g) by mouth 3 times a day.   levothyroxine (Synthroid, Levoxyl) 50 mcg tablet 1/25/2024  No No   Sig: Take 1 tablet (50 mcg) by mouth once daily.   magnesium oxide (Mag-Ox) 400 mg tablet 1/25/2024  Yes No   Sig: Take by mouth.   ondansetron ODT (Zofran-ODT) 4 mg disintegrating tablet Unknown  No No   Sig: Take 1 tablet (4 mg) by mouth every 12 hours if needed for nausea or vomiting.    pantoprazole (ProtoNix) 40 mg EC tablet 1/25/2024  No No   Sig: Take 1 tablet (40 mg) by mouth once daily.   sucralfate (Carafate) 1 gram tablet 1/25/2024  No No   Sig: Take 1 tablet (1 g) by mouth in the morning and 1 tablet (1 g) at noon and 1 tablet (1 g) in the evening and 1 tablet (1 g) before bedtime. Before meals and at bedtime.   traZODone (Desyrel) 150 mg tablet 1/24/2024  Yes No   Sig: Take 1 tablet (150 mg) by mouth once daily at bedtime.      Facility-Administered Medications: None       New Hospital Orders  Current Facility-Administered Medications   Medication Dose Route Frequency Provider Last Rate Last Admin    acetaminophen (Tylenol) tablet 650 mg  650 mg oral q4h PRN MIRTHA Hines        Or    acetaminophen (Tylenol) oral liquid 650 mg  650 mg nasogastric tube q4h PRN MIRTHA Hines        Or    acetaminophen (Tylenol) suppository 650 mg  650 mg rectal q4h PRN GEO Hines-SKYLAR        cholecalciferol (Vitamin D-3) tablet 2,000 Units  2,000 Units oral Daily GEO Hines-SKYLAR        escitalopram (Lexapro) tablet 20 mg  20 mg oral Nightly GEO Hines-CNP   20 mg at 01/26/24 0035    heparin (porcine) injection 5,000 Units  5,000 Units subcutaneous q8h GEO Hines-CNP   5,000 Units at 01/26/24 0035    levothyroxine (Synthroid, Levoxyl) tablet 50 mcg  50 mcg oral Daily GEO Hines-CNP        melatonin tablet 3 mg  3 mg oral Nightly PRN GEO Hines-SKYLAR        ondansetron (Zofran) injection 4 mg  4 mg intravenous q8h PRN GEO Hines-SKYLAR        ondansetron ODT (Zofran-ODT) disintegrating tablet 4 mg  4 mg oral q12h PRN GEO Hines-SKYLAR        pantoprazole (ProtoNix) EC tablet 40 mg  40 mg oral Daily MIRTHA Hines        potassium chloride CR (Klor-Con M20) ER tablet 40 mEq  40 mEq oral Daily Felecia Michael,    40 mEq at 01/25/24 7648    potassium, sodium phosphates (Phos-NaK) 280-160-250 mg packet 1 packet  1  "packet oral 4x daily Felecia Michael, DO   1 packet at 01/25/24 2110    sucralfate (Carafate) tablet 1 g  1 g oral 4x daily MIRTHA Hines        traZODone (Desyrel) tablet 150 mg  150 mg oral Nightly MIRTHA Hines   150 mg at 01/26/24 0035           Review of Systems   Constitutional:  Positive for activity change, fatigue and unexpected weight change.   Neurological:  Positive for weakness.   All other systems reviewed and are negative.          Physical Exam  Constitutional:       Appearance: She is ill-appearing.   HENT:      Head: Normocephalic and atraumatic.      Nose: Nose normal.      Mouth/Throat:      Mouth: Mucous membranes are moist.      Pharynx: Oropharynx is clear.   Eyes:      Extraocular Movements: Extraocular movements intact.      Pupils: Pupils are equal, round, and reactive to light.   Cardiovascular:      Rate and Rhythm: Normal rate and regular rhythm.      Pulses: Normal pulses.      Heart sounds: Normal heart sounds.   Pulmonary:      Effort: Pulmonary effort is normal.      Breath sounds: Normal breath sounds.   Abdominal:      Tenderness: There is abdominal tenderness. There is left CVA tenderness and guarding.   Musculoskeletal:         General: Normal range of motion.      Cervical back: Normal range of motion and neck supple.   Skin:     General: Skin is warm and dry.   Neurological:      General: No focal deficit present.      Mental Status: She is alert and oriented to person, place, and time.   Psychiatric:         Mood and Affect: Mood normal.         Behavior: Behavior normal.            Last Recorded Vitals  Blood pressure 127/85, pulse 50, temperature 36.4 °C (97.5 °F), temperature source Temporal, resp. rate 19, height 1.499 m (4' 11\"), weight (!) 40.9 kg (90 lb 2.7 oz), SpO2 99 %.    Relevant Results  Results for orders placed or performed during the hospital encounter of 01/25/24   CBC   Result Value Ref Range    WBC 6.0 4.4 - 11.3 x10*3/uL    nRBC 0.0 " 0.0 - 0.0 /100 WBCs    RBC 4.23 4.00 - 5.20 x10*6/uL    Hemoglobin 12.4 12.0 - 16.0 g/dL    Hematocrit 38.2 36.0 - 46.0 %    MCV 90 80 - 100 fL    MCH 29.3 26.0 - 34.0 pg    MCHC 32.5 32.0 - 36.0 g/dL    RDW 12.8 11.5 - 14.5 %    Platelets 228 150 - 450 x10*3/uL   Comprehensive metabolic panel   Result Value Ref Range    Glucose 76 74 - 99 mg/dL    Sodium 135 (L) 136 - 145 mmol/L    Potassium 2.8 (LL) 3.5 - 5.3 mmol/L    Chloride 103 98 - 107 mmol/L    Bicarbonate 22 21 - 32 mmol/L    Anion Gap 13 10 - 20 mmol/L    Urea Nitrogen 17 6 - 23 mg/dL    Creatinine 1.00 0.50 - 1.05 mg/dL    eGFR 58 (L) >60 mL/min/1.73m*2    Calcium 9.5 8.6 - 10.3 mg/dL    Albumin 4.1 3.4 - 5.0 g/dL    Alkaline Phosphatase 79 33 - 136 U/L    Total Protein 6.7 6.4 - 8.2 g/dL    AST 21 9 - 39 U/L    Bilirubin, Total 0.4 0.0 - 1.2 mg/dL    ALT 13 7 - 45 U/L   Lactate   Result Value Ref Range    Lactate 1.9 0.4 - 2.0 mmol/L   Urinalysis with Reflex Culture and Microscopic   Result Value Ref Range    Color, Urine Straw Straw, Yellow    Appearance, Urine Clear Clear    Specific Gravity, Urine 1.033 1.005 - 1.035    pH, Urine 5.0 5.0, 5.5, 6.0, 6.5, 7.0, 7.5, 8.0    Protein, Urine NEGATIVE NEGATIVE mg/dL    Glucose, Urine NEGATIVE NEGATIVE mg/dL    Blood, Urine MODERATE (2+) (A) NEGATIVE    Ketones, Urine NEGATIVE NEGATIVE mg/dL    Bilirubin, Urine NEGATIVE NEGATIVE    Urobilinogen, Urine <2.0 <2.0 mg/dL    Nitrite, Urine NEGATIVE NEGATIVE    Leukocyte Esterase, Urine SMALL (1+) (A) NEGATIVE   Lipase   Result Value Ref Range    Lipase 41 9 - 82 U/L   Magnesium   Result Value Ref Range    Magnesium 1.20 (L) 1.60 - 2.40 mg/dL   Phosphorus   Result Value Ref Range    Phosphorus 1.9 (L) 2.5 - 4.9 mg/dL   TSH with reflex to Free T4 if abnormal   Result Value Ref Range    Thyroid Stimulating Hormone 5.77 (H) 0.44 - 3.98 mIU/L   Thyroxine, Free   Result Value Ref Range    Thyroxine, Free 1.03 0.61 - 1.12 ng/dL   Microscopic Only, Urine   Result Value Ref  Range    WBC, Urine 1-5 1-5, NONE /HPF    RBC, Urine 1-2 NONE, 1-2, 3-5 /HPF    Squamous Epithelial Cells, Urine 1-9 (SPARSE) Reference range not established. /HPF        Imaging   CT abdomen pelvis w IV contrast    Result Date: 1/25/2024  Interpreted By:  Jesus Patrick, STUDY: CT ABDOMEN PELVIS W IV CONTRAST;  1/25/2024 9:07 pm   INDICATION: Signs/Symptoms:history of bowel obstruction.   COMPARISON: CTA abdomen and pelvis 12/28/2023 and CT abdomen and pelvis 11/30/2023   ACCESSION NUMBER(S): [id number]   ORDERING CLINICIAN: JUAN ANTONIO SNOWDEN   TECHNIQUE: CT of the abdomen and pelvis was performed.  Standard contiguous axial images were obtained at 3 mm slice thickness through the abdomen and pelvis. Coronal and sagittal reconstructions at 3 mm slice thickness were performed.   75 ml of contrast Omnipaque 350 were administered intravenously without immediate complication.   FINDINGS: LOWER CHEST: The visualized lung base is unremarkable. The heart is normal in size without pericardial effusion. No pleural effusion is present. Visualized distal esophagus appears normal.   ABDOMEN:   LIVER: The liver demonstrates homogeneous attenuation without evidence of focal liver lesions.   BILE DUCTS: The intrahepatic and extrahepatic ducts are not dilated.   GALLBLADDER: The gallbladder is nondistended and without evidence of radiopaque stones.   PANCREAS: Stable diffusely prominent pancreatic duct. No visualized obstructing lesion.   SPLEEN: The spleen is normal in size.   ADRENAL GLANDS: Bilateral adrenal glands appear normal.   KIDNEYS AND URETERS: The kidneys are normal in size and enhance symmetrically.  No hydroureteronephrosis or nephroureterolithiasis is identified.   PELVIS:   BLADDER: The urinary bladder appears normal without abnormal wall thickening.   REPRODUCTIVE ORGANS: No pelvic masses.   BOWEL: Small hiatal hernia. The stomach is otherwise unremarkable. No bowel dilation or significant wall thickening.  Postoperative changes partial colectomy with sigmoid anastomosis. Moderate amount of colonic stool. The appendix is not definitely visualized. There is however no pericecal stranding or fluid.   VESSELS: There is no aneurysmal dilatation of the abdominal aorta. The IVC appears normal. Atherosclerotic calcification.   PERITONEUM/RETROPERITONEUM/LYMPH NODES: There is no free or loculated fluid collection, no free intraperitoneal air. The retroperitoneum appears normal.  No abdominopelvic lymphadenopathy is present.   BONES AND ABDOMINAL WALL: No suspicious osseous lesions are identified. Degenerative discogenic disease is noted in the lower thoracic and lumbar spine.  The abdominal wall soft tissues appear normal.       1.  No acute findings in the abdomen and pelvis. 2. Moderate colonic stool. 3. Other stable chronic findings as described above.     Signed by: Jesus Patrick 1/25/2024 9:37 PM Dictation workstation:   [id number]    EGD    Result Date: 1/2/2024  Table formatting from the original result was not included. Impression Performed forceps biopsies in the stomach FOOD in the greater curve of the stomach Findings Performed forceps biopsies in the stomach Regular Z-line 32 cm from the incisors FOOD in the greater curve of the stomach Recommendation  Await pathology results Indication Weight loss, Chronic left lower quadrant pain Staff Staff Role Cam Diez MD Proceduralist Medications See Anesthesia Record. Preprocedure A history and physical has been performed, and patient medication allergies have been reviewed. The patient's tolerance of previous anesthesia has been reviewed. The risks and benefits of the procedure and the sedation options and risks were discussed with the patient. All questions were answered and informed consent obtained. Details of the Procedure The patient underwent monitored anesthesia care, which was administered by an anesthesia professional. The patient's blood pressure, ECG,  ETCO2, heart rate, level of consciousness, oxygen and respirations were monitored throughout the procedure. The scope was introduced through the mouth and advanced to the second part of the duodenum. Retroflexion was performed in the cardia. Prior to the procedure, the patient's H. Pylori status was unknown. The patient experienced no blood loss. The procedure was not difficult. The patient tolerated the procedure well. There were no apparent adverse events. Events Procedure Events Event Event Time ENDO SCOPE IN TIME 1/2/2024  9:33 AM ENDO SCOPE OUT TIME 1/2/2024  9:37 AM Specimens ID Type Source Tests Collected by Time 1 :  Tissue STOMACH ANTRUM BIOPSY SURGICAL PATHOLOGY EXAM Cam Diez MD 1/2/2024 0935 Procedure Location [facility] [address] [id number] [phone] Referring Provider Cam Diez Md [address] [facility] [address] [place] [address] Procedure Provider Cam Diez MD     CT angio abdomen w and or wo IV IV contrast    Result Date: 12/30/2023  Interpreted By:  Chaim Falk  and Yordan Griffin STUDY: CT ANGIO ABDOMEN W AND/OR WO IV IV CONTRAST;  12/28/2023 11:09 am   INDICATION: Signs/Symptoms:Abdominal pain with eating.   COMPARISON: CT abdomen/pelvis exams dating back to 04/26/2021. Pancreatic MRI dated 01/14/2022.   ACCESSION NUMBER(S): [id number]   ORDERING CLINICIAN: CAM DIEZ   TECHNIQUE: Contiguous non-contrast axial images of the abdomen were initially obtained.   Thin-section axial images of the abdomen were obtained in the arterial phase after intravenous administration of 75 mL Omnipaque 350. Sagittal and coronal reformatted images were provided. MIP images were provided and reviewed. 3D reconstructions were created on a separate independent workstation and reviewed.   FINDINGS: VASCULATURE:   ABDOMINAL AORTA: No abdominal aortic aneurysm or dissection. Mild abdominal aortic atherosclerosis.   ABDOMINAL  ARTERIES: The celiac axis and its 1st order branches are widely patent without hemodynamically significant stenosis or occlusion. 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   The partially visualized aspects of the superior mesenteric artery and inferior mesenteric artery and their branching vessels appear widely patent without hemodynamically significant arterial stenosis or occlusion.   There are single bilateral renal arteries which demonstrate mild atherosclerotic changes without hemodynamically significant stenosis or occlusion.   The partially visualized aspects of the bilateral common iliac, external iliac and internal iliac arteries are patent.     CT ABDOMEN:   ABDOMINAL WALL: No significant abnormality.   LIVER: No significant parenchymal abnormality.   BILE DUCTS: No significant intrahepatic or extrahepatic dilatation.   GALLBLADDER: There is again cholelithiasis without evidence of cholecystitis.   SPLEEN: No significant abnormality.   PANCREAS: Unchanged appearance of diffuse pancreatic ductal dilatation measuring up to 0.5 cm in diameter without evidence of obstructing stone or mass.   ADRENALS: No significant abnormality.   KIDNEYS, URETERS: The kidneys are normal in size and enhance symmetrically. Unchanged appearance of tiny subcentimeter hypodense lesions within the bilateral renal cortices which are too small to characterize but favored to represent simple cysts. No hydronephrosis or nephrolithiasis.   VESSELS: (See above). No additional significant abnormality.   LYMPH NODES/RETROPERITONEUM: No enlarged lymph nodes.   BOWEL/MESENTERY/PERITONEUM: The stomach is partially decompressed and limited in evaluation, however, there appears to be postsurgical changes with anastomosis of the gastric body to a portion of the jejunum, incompletely evaluated without oral contrast. No bowel wall thickening or dilatation. Normal appendix.  "  No significant ascites, free air, or fluid collection.     OSSEOUS STRUCTURES: No acute osseous abnormality. No acute osseous abnormalities or suspicious osseous lesions. Mild discogenic degenerative changes throughout the visualized lower thoracic and lumbar spine are again noted.   LOWER CHEST: The partially visualized lower lungs are unremarkable. The heart is normal in size without significant pericardial effusion. The distal esophagus is unremarkable.       1. No abdominal aortic aneurysm, abdominal aortic pathology or hemodynamically significant arterial stenosis. 2. Suspected postsurgical changes of the stomach with anastomosis of the gastric body to a portion of jejunum, incompletely evaluated without oral contrast. Correlate with history of prior abdominal surgery. 3. 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 4. No acute process within the abdomen or pelvis. 5. Cholelithiasis without evidence cholecystitis. 6. Unchanged appearance of diffuse pancreatic ductal dilatation measuring up to 0.5 cm in diameter without evidence of obstructing stone or mass. Findings are better evaluated on prior pancreatic MRI dated 01/14/2022.     I personally reviewed the images/study and I agree with the resident findings as stated. This study was interpreted at [facility], [place], [place].   MACRO: None.   Signed by: Chaim Falk 12/30/2023 3:28 PM Dictation workstation:   [id number]          Assessment/Plan   Principal Problem:    Hypokalemia  Hypomagnesia   IBS  Diarrhea   Weakness   Patient is a 77 y/o Female She was recently seen by CASTILLO Felix for c/o worsening chronic left lower quadrant abdominal pain.  Patient underwent EGD which  showed mild inflammation. Patient states she was given a laxative.  Patient reports she became sick from the medication and stopped eating and drinking for \"over a week\"  She states she lost, \"over 10 pounds in " "a week\" . She came to the ED today for generalized weakness.   1/25/24 CT of Abdomen showed Moderate colonic stool  On Admission Magnesium 1.20 Potassium 2.4  Electrolytes replaced.  Repeat labs pending   Continue Tele Monitoring    Monitor VS   Continue home meds Zofran Carafate Protonix   Send stool sample for C Diff evaluation   Consult Nutritionist   PT/OT consult and treat     ##Hypothyroidism  TSH 5.77  Increased Levothyroxine  home dose from 50 mcg to 75 mcg  Follow up with PCP in [facility]     ## UTI   Patient c/o of dysuria- increased frequency   UA Showed Leukocytes and Hematuria   Urine Culture Pending   Continue Rocephin     ## Anxiety and Depression   Patient endorses worsening anxiety r/t health care concerns   Continue home meds Lexapro     DVT Prophylaxis Lovenox   Fluids: PRN   Electrolytes: replace as needed  Nutrition: Regular   Adjuncts: PIV    Total accumulated time spent face to face and not face to face preparing to see the patient, obtaining and reviewing separately obtained history; performing a medically appropriate examination and/or evaluation; counseling and educating the patient, family; ordering medications, tests, or procedures; referring and communicating with other health care professionals; documenting clinical information in the patient's medical record; independently interpreting results and communicating the results to the patient, family; and care coordination was 40 minutes      MIRTHA Hines    "

## 2024-01-26 NOTE — CONSULTS
"Nutrition Assessment Note  Nutrition Assessment      Per H&P / Progress Notes:  Pt presented to ED from home, was taking lactulose and began having a lot of diarrhea.  Also presented with weakness and poor appetite.  K+ 2.8 (L); Na 135 (L); Phos 1.9 (L); Mg 1.20 (L).  +UTI.  She received IV fluids, antibiotics, electrolytes were replaced and she was admitted to [facility] on .  Lactulose held.  Stool for C. Diff pending.  PT/OT consulted.  Current diet order is Regular.  NS with potassium chloride infusing at 75 mL per hour.    Past Medical History:   Diagnosis Date    Abdominal pain      Anxiety      Cat bite 2023    Cataract      Depression      Disease of thyroid gland      GERD (gastroesophageal reflux disease)      GERD (gastroesophageal reflux disease)      Hypothyroidism      Irritable bowel syndrome      Panic attacks      Personal history of malignant neoplasm of breast 2021     History of malignant neoplasm of breast    Personal history of other complications of pregnancy, childbirth and the puerperium       History of spontaneous      Surgical History  Other surgical history (2021); Breast surgery; Gastric bypass; Colon surgery; Hysterectomy; Cataract extraction; Mastectomy, partial (Left); and CT angio abdomen w and or wo IV IV contrast (2023).    Medications and allergies reviewed.    Pertinent Labs:  24:  H/H 11.3/34.5 (L)  Glu 103 (H)  Cl 108 (H)    Vital Signs:  /52 (BP Location: Right arm, Patient Position: Lying)   Pulse 61   Temp (!) 1 °C (33.8 °F)   Resp 18   Ht 1.499 m (4' 11.02\")   Wt (!) 40.9 kg (90 lb 2.7 oz)   SpO2 96%   BMI 18.20 kg/m²        Reason for Assessment  Reason for Assessment: Dietitian discretion (Self-referral, case finding, MST = 3 eating poorly, weight loss, no consult.  Provider consult for diet education.)    History:  Food and Nutrient History  Energy Intake: Fair 50-75 %  Food and Nutrient History: Visited Latha " "in room, she is laying in bed.  She states that she ate [place] toast and sausage renay for breakfast and salmon, green beans, mac n cheese for lunch today- says she did not eat all of lunch.  Per nursing, pt ate well at lunch today.  Pt states that she has a lot of stomach issues, has history of bowel blockage, started eating less and less.  She reports weight loss over the past couple of months, weighed 105 lbs and lost weight to 80 lbs.  She states that she eats a variety of foods at home and says her  prepares meals.  Says she does not like Ensure, although amenable to [place] Instant Breakfast.  She reports \"a little\" nausea, denies stomach pain, difficulty chewing / swallowing.  Pt denies having BM today.  Last BM x 1, loose on 1/25 per nursing flowsheet.         Food and Nutrient Administration History  Additional Food and Nutrient Administration History: PO Intake per flowsheet:  1/16- ? B / 100% L                   Anthropometrics:  Height: 149.9 cm (4' 11.02\")  Weight: (!) 40.9 kg (90 lb 2.7 oz)  BMI (Calculated): 18.2    Weight Change: 0    Weight Change  Weight History / % Weight Change: Weight history per chart:  1/25/24- 40.9 kg; 6/30/23- 43.9 kg (6.8% loss x 7 months); 3/22/23- 44 kg (7% loss x 10 months).  Significant Weight Loss: No         IBW/kg (Dietitian Calculated): 51.8 kg  Percent of IBW: 79 %     Wt Readings from Last 10 Encounters:   01/26/24 (!) 40.9 kg (90 lb 2.7 oz)   01/02/24 (!) 39 kg (85 lb 15.7 oz)   12/20/23 (!) 39 kg (86 lb)   12/14/23 (!) 37.7 kg (83 lb 3.2 oz)   11/29/23 (!) 39.4 kg (86 lb 12.8 oz)   06/30/23 (!) 43.9 kg (96 lb 12.8 oz)   03/22/23 44 kg (97 lb)   02/24/23 (!) 43.5 kg (96 lb)   02/10/23 (!) 43.1 kg (95 lb)   02/02/23 (!) 42.8 kg (94 lb 6 oz)                               Energy Needs:  Calculated Energy Needs Using Equations  Height: 149.9 cm (4' 11.02\")  Weight Used for Equation Calculations: 40.9 kg (90 lb 2.7 oz)  Tata Barros Equation (Overweight " or Obese Patients): 800  Temp: (!) 1 °C (33.8 °F)    Estimated Energy Needs  Total Energy Estimated Needs (kCal): 1430 kCal  Total Estimated Energy Need per Day (kCal/kg): 35 kCal/kg  Method for Estimating Needs: 35 kcal/kg actual weight.    Estimated Protein Needs  Total Protein Estimated Needs (g): 61 g  Total Protein Estimated Needs (g/kg): 1.5 g/kg  Method for Estimating Needs: 1.5 g/kg actual weight.    Estimated Fluid Needs  Total Fluid Estimated Needs (mL): 1430 mL  Method for Estimating Needs: 1 mL/kcal or fluid per medical team.         Nutrition Focused Physical Findings:  Subcutaneous Fat Loss  Orbital Fat Pads: Severe (dark circles, hollowing and loose skin)  Buccal Fat Pads: Severe (hollow, sunken and narrow face)  Triceps: Severe (negligible fat tissue)  Ribs: Defer    Muscle Wasting  Temporalis: Severe (hollowed scooping depression)  Pectoralis (Clavicular Region): Severe (protruding prominent clavicle)  Deltoid/Trapezius: Mild-Moderate (slight protrusion of acromion process) (moderate)  Interosseous: Mild-Moderate (slightly depressed area between thumb and forefinger) (moderate)  Trapezius/Infraspinatus/Supraspinatus (Scapular Region): Defer  Quadriceps: Defer  Gastrocnemius: Mild-Moderate (not well developed muscle)    Edema  Edema: none  Edema Location: per NFPE         Physical Findings (Nutrition Deficiency/Toxicity)  Hair: Negative  Eyes: Negative  Nails: Negative  Skin: Negative (No skin breakdown documented on nursing flowsheet.)       Nutrition Diagnosis   Malnutrition Diagnosis  Patient has Malnutrition Diagnosis: Yes  Diagnosis Status: New  Malnutrition Diagnosis: Severe malnutrition related to starvation  As Evidenced by: moderate to severe muscle loss (temporal depression, pectoralis major, deltoids, interosseous, gastrocnemius) and severe subcutaneous fat loss (orbital, perioral, below triceps); poor nutritional intake meeting less than 50% of estimated needs for greater than or equal to  one month.                                                             Nutrition Interventions/Recommendations   Nutrition Prescription  Individualized Nutrition Prescription Provided for : diet, fluids, oral supplements, diet education    Food and/or Nutrient Delivery Interventions  Meals and Snacks: Energy-modified diet, Protein-modified diet  Goal: Continue Regular liberalized diet; Encourage intake of protein foods at each meal.                                    Medical Food Supplement: Commercial beverage  Goal: [place] Instant Breakfast BID (440 kcal/26 g protein)    Vitamin Supplement Therapy: Other (Comment) (MVI with minerals)  Goal: Daily MVI with minerals    Mineral Supplement Therapy: Other (Comment) (MVI with minerals.)  Goal: Daily MVI with minerals                   Additional Interventions: 1.  Optimize antinausea medications; 2.  Check weight 2 to 3 times per week.         Coordination of Nutrition Care by a Nutrition Professional  Collaboration and Referral of Nutrition Care: Collaboration by nutrition professional with other providers  Goal: Pt reviewed at IDT Rounds; Felecia KEMP; Trina ELIZONDO    Education Documentation  Nutrition Care Manual, taught by Sammie Koo, RD, LD at 1/26/2024  3:40 PM.  Learner: Patient  Readiness: Acceptance  Method: Explanation, Handout  Response: Verbalizes Understanding  Comment: Suggested foods to increase intake of calories and protein and provided recipe for making a high calorie smoothie.  Printed High Protein and High Calorie diet education from [employer] and recipe from [employer].             Nutrition Monitoring and Evaluation   Food and Nutrient Related History  Energy Intake: Estimated energy intake  Criteria: Nutritional intake meeting > 75% of estimated needs.    Fluid Intake: Estimated fluid intake  Criteria: Fluid intake meeting estimated needs.    Amount of Food: Medical food intake  Criteria: Pt will consume [place] Instant Breakfast BID with good  tolerance.                   Vitamin Intake: Measured vitamin intake  Criteria: Pt will consume daily MVI with minerals.    Mineral/Element Intake: Measured mineral/element intake  Criteria: Pt will consume daily MVI with minerals.    Anthropometrics: Body Composition/Growth/Weight History  Weight: Measured weight  Criteria: Gradual non-fluid weight gain to acceptable BMI 23 to < 25 kg/m2.                   Biochemical Data, Medical Tests and Procedures                           Nutrition Focused Physical Findings  Adipose: Loss of subcutaneous fat  Criteria: Gain / maintain adipose stores.         Digestive System: Decrease in appetite, Nausea, Diarrhea  Criteria: Improved appetite and decrease in abdominal symptoms.    Muscles: Muscle atrophy  Criteria: Gain / maintain lean muscle mass.                   Follow Up  Time Spent (min): 60 minutes  Follow up: Provided inpatient RDN contact information  Last Date of Nutrition Visit: 01/26/24  Nutrition Follow-Up Needed?: 3-5 days, Dietitian to reassess per policy  Follow up Comment: % meals; CIB; nausea; SPCM

## 2024-01-26 NOTE — PROGRESS NOTES
Latha Quispe is a 78 y.o. female on day 1 of admission presenting with Hypokalemia.      Subjective   Patient assessed at bedside; lying in bed. She is very emaciated; she reports she does eat. She had severe constipation at the beginning of this month and was started on lactulose. She started having a lot of diarrhea. She became very weak; with no appetite. She denies pain, fever, chills, N/V/D/C.       Objective     Last Recorded Vitals  BP (!) 93/48 (BP Location: Right arm, Patient Position: Lying)   Pulse 64   Temp 36.5 °C (97.7 °F) (Temporal)   Resp 18   Wt (!) 40.9 kg (90 lb 2.7 oz)   SpO2 97%   Intake/Output last 3 Shifts:    Intake/Output Summary (Last 24 hours) at 1/26/2024 1223  Last data filed at 1/26/2024 0832  Gross per 24 hour   Intake 50 ml   Output 550 ml   Net -500 ml       Admission Weight  Weight: (!) 37 kg (81 lb 9.1 oz) (01/25/24 1943)    Daily Weight  01/25/24 : (!) 40.9 kg (90 lb 2.7 oz)    Image Results  ECG 12 lead  Sinus bradycardia  Left anterior fascicular block  Cannot rule out Inferior infarct (cited on or before 25-JAN-2024)  Abnormal ECG  When compared with ECG of 08-JUL-2023 23:27,  Previous ECG has undetermined rhythm, needs review  Left anterior fascicular block is now Present  Nonspecific T wave abnormality has replaced inverted T waves in Inferior leads  T wave amplitude has increased in Lateral leads  ECG 12 lead  Sinus bradycardia  Possible Left atrial enlargement  Left anterior fascicular block  Abnormal ECG  When compared with ECG of 25-JAN-2024 19:44, (unconfirmed)  No significant change was found      Physical Exam  Vitals reviewed.   Constitutional:       Appearance: She is ill-appearing.      Comments: cachexia   HENT:      Head: Normocephalic and atraumatic.      Nose: Nose normal.      Mouth/Throat:      Mouth: Mucous membranes are moist.      Pharynx: Oropharynx is clear.   Eyes:      Conjunctiva/sclera: Conjunctivae normal.      Pupils: Pupils are equal, round,  and reactive to light.   Cardiovascular:      Rate and Rhythm: Normal rate and regular rhythm.      Pulses: Normal pulses.      Heart sounds: Normal heart sounds.   Pulmonary:      Effort: Pulmonary effort is normal.      Comments: Breath sounds diminished;  kyphotic spine  Abdominal:      General: Bowel sounds are normal.      Palpations: Abdomen is soft.      Tenderness: There is abdominal tenderness.   Musculoskeletal:         General: Normal range of motion.      Cervical back: Normal range of motion and neck supple.   Skin:     General: Skin is warm and dry.   Neurological:      General: No focal deficit present.      Mental Status: She is alert and oriented to person, place, and time.   Psychiatric:         Mood and Affect: Mood normal.         Behavior: Behavior normal.         Relevant Results    Scheduled medications  cefTRIAXone, 1 g, intravenous, q24h  cholecalciferol, 2,000 Units, oral, Daily  escitalopram, 20 mg, oral, Nightly  heparin (porcine), 5,000 Units, subcutaneous, q8h  levothyroxine, 75 mcg, oral, Daily  pantoprazole, 40 mg, oral, Daily  potassium chloride CR, 40 mEq, oral, Daily  sucralfate, 1 g, oral, 4x daily  traZODone, 150 mg, oral, Nightly      Continuous medications  potassium chloride in 0.9%NaCl, 75 mL/hr, Last Rate: 75 mL/hr (01/26/24 0950)      PRN medications  PRN medications: acetaminophen **OR** acetaminophen **OR** acetaminophen, melatonin, [DISCONTINUED] ondansetron **OR** ondansetron, ondansetron ODT    Results for orders placed or performed during the hospital encounter of 01/25/24 (from the past 24 hour(s))   CBC   Result Value Ref Range    WBC 6.0 4.4 - 11.3 x10*3/uL    nRBC 0.0 0.0 - 0.0 /100 WBCs    RBC 4.23 4.00 - 5.20 x10*6/uL    Hemoglobin 12.4 12.0 - 16.0 g/dL    Hematocrit 38.2 36.0 - 46.0 %    MCV 90 80 - 100 fL    MCH 29.3 26.0 - 34.0 pg    MCHC 32.5 32.0 - 36.0 g/dL    RDW 12.8 11.5 - 14.5 %    Platelets 228 150 - 450 x10*3/uL   Comprehensive metabolic panel    Result Value Ref Range    Glucose 76 74 - 99 mg/dL    Sodium 135 (L) 136 - 145 mmol/L    Potassium 2.8 (LL) 3.5 - 5.3 mmol/L    Chloride 103 98 - 107 mmol/L    Bicarbonate 22 21 - 32 mmol/L    Anion Gap 13 10 - 20 mmol/L    Urea Nitrogen 17 6 - 23 mg/dL    Creatinine 1.00 0.50 - 1.05 mg/dL    eGFR 58 (L) >60 mL/min/1.73m*2    Calcium 9.5 8.6 - 10.3 mg/dL    Albumin 4.1 3.4 - 5.0 g/dL    Alkaline Phosphatase 79 33 - 136 U/L    Total Protein 6.7 6.4 - 8.2 g/dL    AST 21 9 - 39 U/L    Bilirubin, Total 0.4 0.0 - 1.2 mg/dL    ALT 13 7 - 45 U/L   Lipase   Result Value Ref Range    Lipase 41 9 - 82 U/L   Magnesium   Result Value Ref Range    Magnesium 1.20 (L) 1.60 - 2.40 mg/dL   Phosphorus   Result Value Ref Range    Phosphorus 1.9 (L) 2.5 - 4.9 mg/dL   TSH with reflex to Free T4 if abnormal   Result Value Ref Range    Thyroid Stimulating Hormone 5.77 (H) 0.44 - 3.98 mIU/L   Thyroxine, Free   Result Value Ref Range    Thyroxine, Free 1.03 0.61 - 1.12 ng/dL   ECG 12 lead   Result Value Ref Range    Ventricular Rate 47 BPM    Atrial Rate 47 BPM    FL Interval 150 ms    QRS Duration 102 ms    QT Interval 538 ms    QTC Calculation(Bazett) 476 ms    P Axis 70 degrees    R Axis -63 degrees    T Axis 72 degrees    QRS Count 8 beats    Q Onset 207 ms    P Onset 132 ms    P Offset 185 ms    T Offset 476 ms    QTC Fredericia 495 ms   Lactate   Result Value Ref Range    Lactate 1.9 0.4 - 2.0 mmol/L   ECG 12 lead   Result Value Ref Range    Ventricular Rate 55 BPM    Atrial Rate 55 BPM    FL Interval 152 ms    QRS Duration 90 ms    QT Interval 478 ms    QTC Calculation(Bazett) 457 ms    P Axis 70 degrees    R Axis -62 degrees    T Axis 68 degrees    QRS Count 9 beats    Q Onset 217 ms    P Onset 141 ms    P Offset 197 ms    T Offset 456 ms    QTC Fredericia 464 ms   Urinalysis with Reflex Culture and Microscopic   Result Value Ref Range    Color, Urine Straw Straw, Yellow    Appearance, Urine Clear Clear    Specific Gravity, Urine  1.033 1.005 - 1.035    pH, Urine 5.0 5.0, 5.5, 6.0, 6.5, 7.0, 7.5, 8.0    Protein, Urine NEGATIVE NEGATIVE mg/dL    Glucose, Urine NEGATIVE NEGATIVE mg/dL    Blood, Urine MODERATE (2+) (A) NEGATIVE    Ketones, Urine NEGATIVE NEGATIVE mg/dL    Bilirubin, Urine NEGATIVE NEGATIVE    Urobilinogen, Urine <2.0 <2.0 mg/dL    Nitrite, Urine NEGATIVE NEGATIVE    Leukocyte Esterase, Urine SMALL (1+) (A) NEGATIVE   Microscopic Only, Urine   Result Value Ref Range    WBC, Urine 1-5 1-5, NONE /HPF    RBC, Urine 1-2 NONE, 1-2, 3-5 /HPF    Squamous Epithelial Cells, Urine 1-9 (SPARSE) Reference range not established. /HPF   CBC   Result Value Ref Range    WBC 5.8 4.4 - 11.3 x10*3/uL    nRBC 0.0 0.0 - 0.0 /100 WBCs    RBC 3.83 (L) 4.00 - 5.20 x10*6/uL    Hemoglobin 11.3 (L) 12.0 - 16.0 g/dL    Hematocrit 34.5 (L) 36.0 - 46.0 %    MCV 90 80 - 100 fL    MCH 29.5 26.0 - 34.0 pg    MCHC 32.8 32.0 - 36.0 g/dL    RDW 13.0 11.5 - 14.5 %    Platelets 229 150 - 450 x10*3/uL   Basic metabolic panel   Result Value Ref Range    Glucose 103 (H) 74 - 99 mg/dL    Sodium 137 136 - 145 mmol/L    Potassium 3.7 3.5 - 5.3 mmol/L    Chloride 108 (H) 98 - 107 mmol/L    Bicarbonate 22 21 - 32 mmol/L    Anion Gap 11 10 - 20 mmol/L    Urea Nitrogen 12 6 - 23 mg/dL    Creatinine 0.81 0.50 - 1.05 mg/dL    eGFR 75 >60 mL/min/1.73m*2    Calcium 8.8 8.6 - 10.3 mg/dL   SST TOP   Result Value Ref Range    Extra Tube Hold for add-ons.    Magnesium   Result Value Ref Range    Magnesium 1.65 1.60 - 2.40 mg/dL   Phosphorus   Result Value Ref Range    Phosphorus 3.7 2.5 - 4.9 mg/dL                   Assessment/Plan      Principal Problem:    Hypokalemia    Electrolyte imbalance  Hypokalemia  Hyponatremia  Hypophosphatemia  Hypomagnesia  Severe protein calorie malnutrition  - K 2.8 > 3.7  - Na 135 > 137  - Phos 1.9 > 3.7  - Mg 1.20 > 1.65  - repleted all electrolytes per protocol  - monitor BMP, Mg, phos  - Dietician consult  - started 0.9% NS with 20 mEq of KCL  -  Stool studies pending    IBS  Chronic Constipation  Diarrhea  - CT abd: Moderate colonic stool.   - hold lactulose    UTI  - UA 1+ leukocytes; 1-5 WBC  - urine culture pending  - continue ceftriaxone    Hypothyroidism  - continue levothyroxine    Depression  Anxiety  Insomnia  - continue escitalopram, trazodone    Vitamin D Deficiency  - continue cholecalciferol    GERD  - continue pantoprazole, sucralfate    DVT ppx  -  continue heparin subcutaneous    Code status: Full    Disposition: Patient requires more than 2 inpatient days.    Total accumulated time spent face to face and not face to face preparing to see the patient, obtaining and reviewing separately obtained history; performing a medically appropriate examination and/or evaluation; counseling and educating the patient, family; ordering medications, tests, or procedures; referring and communicating with other health care professionals; documenting clinical information in the patient's medical record; independently interpreting results and communicating the results to the patient, family; and care coordination was 45 minutes.                 Deja Yi, APRN-CNP

## 2024-01-26 NOTE — PROGRESS NOTES
Physical Therapy    Physical Therapy Evaluation    Patient Name: Latha Quispe  MRN: [id number]  Today's Date: 1/26/2024   Time Calculation  Start Time: 1110  Stop Time: 1133  Time Calculation (min): 23 min    Assessment/Plan   PT Assessment  PT Assessment Results:  (No major deficits noted with functional mobility)  Rehab Prognosis: Excellent  Barriers to Discharge: None  Evaluation/Treatment Tolerance: Patient tolerated treatment well  Medical Staff Made Aware: Yes  Strengths: Access to adaptive/assistive products, Insight into problems, Living arrangement secure, Premorbid level of function  Barriers to Participation:  (NONE)  End of Session Communication: Bedside nurse  Assessment Comment: Pt is functioning independently Here in the hospital.  She feels weak but will get stronger as she recooperates.  Recommend No Needs from PT  End of Session Patient Position:  (Sitting on the toilet with the OT)  IP OR SWING BED PT PLAN  Inpatient or Swing Bed: Inpatient  PT Plan  PT Eval Only Reason: No acute PT needs identified  PT - OK to Discharge: Yes      Subjective   General Visit Information:  General  Reason for Referral: General Weakness, Hypo K+, Hypo Mg+, IBS, Diahrrea  Referred By: Abhilash Lee  Past Medical History Relevant to Rehab: Constipation, hypothyroid, Gerd, Anxiety, Depression,IBS,Gastric Bypass, Colon SX, Hyster, Breast SX  Family/Caregiver Present: No  Prior to Session Communication: Bedside nurse  Patient Position Received: Bed, 2 rail up, Alarm off, not on at start of session  Preferred Learning Style: verbal  General Comment: Pt found in bed.  Alert and Oriented x 4.  Pleasant and cooperative.  Agreeable to PT evaluation.  Home Living:  Home Living  Type of Home: House  Lives With: Spouse, Dependent children (Grandson is 12)  Home Adaptive Equipment:  (Has access to a WW)  Home Layout: Two level (House built over garage.  Has 15 steps to get in 2 rails)  Home Access: Stairs to enter with  rails  Entrance Stairs-Rails: Both  Entrance Stairs-Number of Steps: 15  Bathroom Shower/Tub: Tub/shower unit  Prior Level of Function:  Prior Function Per Pt/Caregiver Report  Level of Belt: Independent with ADLs and functional transfers, Independent with homemaking with ambulation  Receives Help From: Family  ADL Assistance: Independent  Homemaking Assistance: Independent  Ambulatory Assistance: Independent  Hand Dominance: Right  Prior Function Comments: Fully Independent prior to this episode  Precautions:  Precautions  Precautions Comment: Contact Plus  R/O C Diff (Falls)  Vital Signs:  Vital Signs  Heart Rate: 62  Heart Rate Source: Monitor  Resp: 18  SpO2: 94 %  Patient Position: Lying    Objective   Pain:  Pain Assessment  Pain Assessment: 0-10  Pain Score: 0 - No pain  Cognition:  Cognition  Overall Cognitive Status: Within Functional Limits    General Assessments:  General Observation  General Observation: Pt performing transfers, bed mobility and gait Independently               Activity Tolerance  Endurance: Endurance does not limit participation in activity    Sensation  Light Touch: No apparent deficits    Strength  Strength Comments: 4/5 B UE,  B LE  Strength  Strength Comments: 4/5 B UE,  B LE    Coordination  Movements are Fluid and Coordinated: Yes  Rapid Alternating Movements: Intact  Finger to Target: Intact    Postural Control  Postural Control: Within Functional Limits  Posture Comment: Withstood resistance in all 4 directions without losing balance standing    Static Sitting Balance  Static Sitting-Balance Support: No upper extremity supported  Static Sitting-Level of Assistance: Independent  Dynamic Sitting Balance  Dynamic Sitting-Balance Support: Feet supported (Able to pull up socks and straighten clothes out)  Dynamic Sitting-Comments: Able to straighten clothing and pull socks up on each foot    Static Standing Balance  Static Standing-Balance Support: No upper extremity  supported  Static Standing-Comment/Number of Minutes: No LOB  Dynamic Standing Balance  Dynamic Standing-Balance Support: No upper extremity supported  Dynamic Standing-Comments: No LOB  Functional Assessments:  Bed Mobility  Bed Mobility: Yes  Bed Mobility 1  Bed Mobility 1: Supine to sitting, Scooting  Level of Assistance 1: Independent    Transfers  Transfer: Yes  Transfer 1  Transfer From 1: Sit to  Transfer to 1: Stand  Technique 1: Sit to stand  Transfer Device 1:  (None)  Transfer Level of Assistance 1: Independent    Ambulation/Gait Training  Ambulation/Gait Training Performed: Yes  Ambulation/Gait Training 1  Surface 1: Level tile  Device 1: No device  Gait Support Devices: Gait belt  Assistance 1: Independent  Quality of Gait 1:  (WFL.  Noted extensive B Varus knees.  Soft knees when walking.)  Comments/Distance (ft) 1: Ambulated in Room with IV follow with Distance Supervision to [facility].  Extremity/Trunk Assessments:  RUE   RUE : Within Functional Limits  LUE   LUE: Within Functional Limits  RLE   RLE : Within Functional Limits  LLE   LLE : Within Functional Limits  Outcome Measures:  [facility] Basic Mobility  Turning from your back to your side while in a flat bed without using bedrails: None  Moving from lying on your back to sitting on the side of a flat bed without using bedrails: None  Moving to and from bed to chair (including a wheelchair): None  Standing up from a chair using your arms (e.g. wheelchair or bedside chair): None  To walk in hospital room: None  Climbing 3-5 steps with railing: None  Basic Mobility - Total Score: 24    Encounter Problems       Encounter Problems (Active)       Pain - Adult              Education Documentation  No documentation found.  Education Comments  No comments found.

## 2024-01-26 NOTE — PROGRESS NOTES
Patient evaluated at bedside. AAOX3. Lives with her  and grandson who is 12 years old in single family home built above their garage. There are 15 steps with bilateral railing to enter the residence. Once inside, everything is on one floor. DME: grab bars in the shower. Independent in ADL's and iADL's. Patient does not drive and is reliant on her  for transportation. Patient denies any falls within the past 6 months. PCP: Melanie Andrade last seen 11/30/23. Pharmacy: [facility] in [place]. Patient's  sets up her home medications utilizing a weekly pill box. She self administers medications, and denies issues with affordability. PT/OT state no additional needs on discharge. Patient denies any need for further assistance after discharge home. They feel comfortable going home when medically ready.      Discharge plan: Home no needs.  DC Secure

## 2024-01-26 NOTE — CARE PLAN
The patient's goals for the shift include  rest to promote healing    The clinical goals for the shift include pt potassium mwill be > 3.4 by am    Over the shift, the patient admitted during the night. Remained free from injury, and VSS. No c/o pain. Tolerating IV potassium. Patient is in contact isolation for R/O C-diff, un- resulted. Rocephin ordered for 1+ Leuks with UA result. Rested well during the night.     Problem: Nutrition  Goal: Promote healing  Outcome: Progressing     Problem: Nutrition  Goal: Electrolytes WNL  Outcome: Progressing

## 2024-01-26 NOTE — ED PROVIDER NOTES
Chief Complaint: Diarrhea, generalized weakness  HPI: This is a 78 year old female, past medical history significant for breast cancer, past history of breast cancer and colon resection, presenting to the emergency department for concern of diarrhea as well as generalized weakness.  She states that she was given lactulose by her surgeon for concern of possible small bowel obstruction versus constipation, and she is concerned that this is why she feels weak and is losing weight.  She denies any abdominal pain.  She denies any fevers chills.    Past Medical History:   Diagnosis Date    Abdominal pain     Anxiety     Cat bite 2023    Cataract     Depression     Disease of thyroid gland     GERD (gastroesophageal reflux disease)     GERD (gastroesophageal reflux disease)     Hypothyroidism     Irritable bowel syndrome     Panic attacks     Personal history of malignant neoplasm of breast 2021    History of malignant neoplasm of breast    Personal history of other complications of pregnancy, childbirth and the puerperium     History of spontaneous       Past Surgical History:   Procedure Laterality Date    BREAST SURGERY      CATARACT EXTRACTION      COLON SURGERY      CT ABDOMEN ANGIOGRAM W AND/OR WO IV CONTRAST  2023    CT ABDOMEN ANGIOGRAM W AND/OR WO IV CONTRAST 2023 CON CT    GASTRIC BYPASS      HYSTERECTOMY      MASTECTOMY, PARTIAL Left     OTHER SURGICAL HISTORY  2021    Varicose vein ligation       Physical Exam  Constitutional:       General: She is not in acute distress.     Appearance: Normal appearance. She is ill-appearing (Chronically).      Comments: Thin, cachectic   HENT:      Head: Normocephalic and atraumatic.      Mouth/Throat:      Mouth: Mucous membranes are moist.   Eyes:      Conjunctiva/sclera: Conjunctivae normal.   Cardiovascular:      Rate and Rhythm: Normal rate.   Pulmonary:      Effort: Pulmonary effort is normal.   Abdominal:      General: Abdomen is  flat.      Palpations: Abdomen is soft.   Musculoskeletal:         General: Normal range of motion.      Cervical back: Normal range of motion.   Skin:     General: Skin is warm and dry.   Neurological:      General: No focal deficit present.      Mental Status: She is alert.   Psychiatric:         Mood and Affect: Mood normal.            ED Course/MDM  Diagnoses as of 01/26/24 0610   Hypokalemia   Hypophosphatemia   Hypomagnesemia   Diarrhea, unspecified type   Weight loss     EKG interpreted by myself (ED attending physician): Sinus bradycardia, rate of 55, left axis deviation, normal intervals, no ST segment changes, nonischemic EKG.    Discussion of Management with Other Providers:   I discussed the patient/results with: Hospitalist, discussed case over the phone, they accepted the patient in admission.      This is a 78 y.o. female presenting to the ED for generalized weakness and diarrhea as well as weight loss.  Patient states that she has history of breast cancer.  She states she has been having some constipation, and was given lactulose by her surgeon, for concern of possible constipation versus small bowel obstruction, and is now having diarrhea.  On physical exam, the patient is thin, cachectic, chronically ill-appearing, however is in no acute distress.  Abdomen is soft and nontender.  Lab work was concerning for hypokalemia as well as hypomagnesemia and hypophosphatemia, all of which were repleted in the emergency department.  Given her symptoms, weight loss, and generalized weakness as well as significant hypokalemia, I do feel that she will require admission for further care.  She was admitted in stable condition.    Final Impression  1.  Hypokalemia  2.  Hypophosphatemia  3.  Hypomagnesemia  4.  Diarrhea  5.  Weight loss   Disposition/Plan: admit  Condition at disposition: Stable.     Felecia Michael DO  Emergency Medicine Physician     Felecia Michael DO  02/07/24 0210

## 2024-01-26 NOTE — PROGRESS NOTES
Occupational Therapy    Evaluation    Patient Name: Latha Quispe  MRN: [id number]  Today's Date: 1/26/2024  Time Calculation  Start Time: 1127  Stop Time: 1145  Time Calculation (min): 18 min    Assessment  IP OT Assessment  OT Assessment: Pt. is a 78 y.o. female referred to OT for impaired self-care d/t admission for hypokalemia. Pt. appears to be at her baseline and does not display ADL deficits at this time. No OT needs.  Prognosis: Good  Barriers to Discharge: None  Evaluation/Treatment Tolerance: Patient tolerated treatment well  Medical Staff Made Aware: Yes  End of Session Communication: Bedside nurse  End of Session Patient Position: Bed, 2 rail up, Alarm on (Edge of Bed)  Plan:  No Skilled OT: Independent with ADLs (At [facility])  OT Frequency: OT eval only  OT Discharge Recommendations: No further acute OT, No OT needed after discharge  OT Recommended Transfer Status: Independent  OT - OK to Discharge: Yes (Based on completed evaluation and care plan recommendations, no barriers to discharge to next site of care)    Subjective   Current Problem:  1. Hypokalemia        2. Hypophosphatemia        3. Hypomagnesemia        4. Diarrhea, unspecified type        5. Weight loss          General:  General  Reason for Referral: impaired self-care d/t admission for hypokalemia  Referred By: MIRTHA Villagomez  Past Medical History Relevant to Rehab: constipation, hypothyroidism, GERD, anxiety, depression, IBS, malignant neoplasm of breast  Family/Caregiver Present: No  Co-Treatment: PT  Prior to Session Communication: Bedside nurse  Patient Position Received: Bed, 2 rail up, Alarm off, not on at start of session (alarm on bed not completely set)  Preferred Learning Style: verbal  General Comment: IV;  Precautions:  Medical Precautions: Fall precautions, Infection precautions (r/o CDiff)  Vital Signs:  Heart Rate: 64  Heart Rate Source: Monitor  SpO2: 95 %  Patient Position: Lying  Pain:  Pain Assessment  Pain  Assessment: 0-10  Pain Score: 0 - No pain    Objective   Cognition:  Overall Cognitive Status: Within Functional Limits  Orientation Level: Oriented X4  Home Living:  Type of Home: House  Lives With: Spouse, Grandchildren (dependent 12 y.o. grandson)  Home Adaptive Equipment: Walker rolling or standard, Other (Comment) (has access to shower chair; does not use)  Home Layout: One level (house is located above garage)  Home Access: Stairs to enter with rails  Entrance Stairs-Rails: Both  Entrance Stairs-Number of Steps: 15  Bathroom Shower/Tub: Tub/shower unit  Bathroom Toilet: Standard  Bathroom Equipment: Shower chair without back (does not use)   Prior Function:  Level of [place]: Independent with ADLs and functional transfers, Independent with homemaking with ambulation  Receives Help From: Family  ADL Assistance: Independent  Homemaking Assistance: Independent  Ambulatory Assistance: Independent  Hand Dominance: Right  IADL History:  Homemaking Responsibilities: Yes  Meal Prep Responsibility: Primary  Laundry Responsibility: Primary  Cleaning Responsibility: Primary  Shopping Responsibility: Secondary  Current License: No  ADL:  Eating Assistance: Independent (anticipated)  Grooming Assistance: Independent (anticipated)  Bathing Assistance: Independent (anticipated)  UE Dressing Assistance: Independent (anticipated)  LE Dressing Assistance: Independent  Toileting Assistance with Device: Independent  Activity Tolerance:  Endurance: Endurance does not limit participation in activity  Bed Mobility/Transfers: Bed Mobility  Bed Mobility: Yes  Bed Mobility 1  Bed Mobility 1: Supine to sitting  Level of Assistance 1: Independent    Transfers  Transfer: Yes  Transfer 1  Transfer From 1: Bed to  Transfer to 1: Stand, Toilet  Technique 1: Sit to stand, Stand to sit  Transfer Level of Assistance 1: Independent  Transfers 2  Transfer From 2: Toilet to  Transfer to 2: Bed, Sit  Technique 2: Sit to stand, Stand to  sit  Transfer Level of Assistance 2: Modified independent  Trials/Comments 2: grab bars as needed  Ambulation/Gait Training:  Ambulation/Gait Training  Ambulation/Gait Training Performed: Yes  Ambulation/Gait Training 1  Surface 1: Level tile  Device 1: No device  Assistance 1: Independent  Sitting Balance:  Static Sitting Balance  Static Sitting-Balance Support: Feet supported  Static Sitting-Level of Assistance: Independent  Dynamic Sitting Balance  Dynamic Sitting-Balance Support: Feet supported  Dynamic Sitting-Balance: Forward lean  Dynamic Sitting-Comments: independent  Standing Balance:  Static Standing Balance  Static Standing-Balance Support: No upper extremity supported  Static Standing-Level of Assistance: Independent  Dynamic Standing Balance  Dynamic Standing-Balance Support: No upper extremity supported  Dynamic Standing-Balance: Turning  Dynamic Standing-Comments: independent  IADL's:   Homemaking Responsibilities: Yes  Meal Prep Responsibility: Primary  Laundry Responsibility: Primary  Cleaning Responsibility: Primary  Shopping Responsibility: Secondary  Current License: No  Vision: Vision - Basic Assessment  Current Vision:  (wears glasses at times)  Sensation:  Sensation Comment: denies deficits  Strength:  Strength Comments: BUE: grossly 4+/5  Coordination:  Movements are Fluid and Coordinated: Yes  Rapid Alternating Movements: Intact  Finger to Target: Intact   Hand Function:  Hand Function  Gross Grasp: Functional  Coordination: Functional  Extremities: RUE   RUE : Within Functional Limits and LUE   LUE: Within Functional Limits    Outcome Measures: [facility] Daily Activity  Putting on and taking off regular lower body clothing: None  Bathing (including washing, rinsing, drying): None  Putting on and taking off regular upper body clothing: None  Toileting, which includes using toilet, bedpan or urinal: None  Taking care of personal grooming such as brushing teeth: None  Eating Meals: None  Daily  Activity - Total Score: 24      Education Documentation  ADL Training, taught by Miranda Edmondson OT at 1/26/2024  1:21 PM.  Learner: Patient  Readiness: Acceptance  Method: Explanation  Response: Verbalizes Understanding  Comment: Edu on POC and DC rec.    Education Comments  No comments found.      Goals: Not set as pt. Is at baseline.

## 2024-01-26 NOTE — DISCHARGE INSTR - OTHER ORDERS
Thank you for choosing [facility] for your Health Care needs. As you transition from the hospital back to home, we hope we took your preferences into account on how you manage your health needs so you can manage your health at home.     You may receive a survey in the mail within the next couple weeks. Please take the time to complete it and return it. Your input is ALWAYS important to us. Thank you!  Your Care Transition Team - [facility] [employer] [employer] - [phone]    For questions about your medications listed on your discharge instructions, please call the Nurses Station at [phone].

## 2024-01-27 VITALS
HEART RATE: 52 BPM | TEMPERATURE: 97.9 F | BODY MASS INDEX: 18.18 KG/M2 | RESPIRATION RATE: 18 BRPM | OXYGEN SATURATION: 98 % | HEIGHT: 59 IN | DIASTOLIC BLOOD PRESSURE: 58 MMHG | WEIGHT: 90.17 LBS | SYSTOLIC BLOOD PRESSURE: 114 MMHG

## 2024-01-27 LAB
ANION GAP SERPL CALC-SCNC: 13 MMOL/L (ref 10–20)
BACTERIA UR CULT: ABNORMAL
BUN SERPL-MCNC: 9 MG/DL (ref 6–23)
CALCIUM SERPL-MCNC: 8.6 MG/DL (ref 8.6–10.3)
CHLORIDE SERPL-SCNC: 104 MMOL/L (ref 98–107)
CO2 SERPL-SCNC: 21 MMOL/L (ref 21–32)
CREAT SERPL-MCNC: 0.71 MG/DL (ref 0.5–1.05)
EGFRCR SERPLBLD CKD-EPI 2021: 88 ML/MIN/1.73M*2
ERYTHROCYTE [DISTWIDTH] IN BLOOD BY AUTOMATED COUNT: 13.2 % (ref 11.5–14.5)
GLUCOSE SERPL-MCNC: 92 MG/DL (ref 74–99)
HCT VFR BLD AUTO: 36.5 % (ref 36–46)
HGB BLD-MCNC: 11.5 G/DL (ref 12–16)
MCH RBC QN AUTO: 29.3 PG (ref 26–34)
MCHC RBC AUTO-ENTMCNC: 31.5 G/DL (ref 32–36)
MCV RBC AUTO: 93 FL (ref 80–100)
NRBC BLD-RTO: 0 /100 WBCS (ref 0–0)
PLATELET # BLD AUTO: 207 X10*3/UL (ref 150–450)
POTASSIUM SERPL-SCNC: 3.7 MMOL/L (ref 3.5–5.3)
RBC # BLD AUTO: 3.93 X10*6/UL (ref 4–5.2)
SODIUM SERPL-SCNC: 134 MMOL/L (ref 136–145)
WBC # BLD AUTO: 4.5 X10*3/UL (ref 4.4–11.3)

## 2024-01-27 PROCEDURE — 2500000004 HC RX 250 GENERAL PHARMACY W/ HCPCS (ALT 636 FOR OP/ED): Mod: IPSPLIT | Performed by: STUDENT IN AN ORGANIZED HEALTH CARE EDUCATION/TRAINING PROGRAM

## 2024-01-27 PROCEDURE — 84132 ASSAY OF SERUM POTASSIUM: CPT | Mod: IPSPLIT | Performed by: NURSE PRACTITIONER

## 2024-01-27 PROCEDURE — 36415 COLL VENOUS BLD VENIPUNCTURE: CPT | Mod: IPSPLIT | Performed by: NURSE PRACTITIONER

## 2024-01-27 PROCEDURE — 2500000004 HC RX 250 GENERAL PHARMACY W/ HCPCS (ALT 636 FOR OP/ED): Mod: IPSPLIT | Performed by: NURSE PRACTITIONER

## 2024-01-27 PROCEDURE — 99232 SBSQ HOSP IP/OBS MODERATE 35: CPT | Performed by: NURSE PRACTITIONER

## 2024-01-27 PROCEDURE — 85027 COMPLETE CBC AUTOMATED: CPT | Mod: IPSPLIT | Performed by: NURSE PRACTITIONER

## 2024-01-27 PROCEDURE — 2500000001 HC RX 250 WO HCPCS SELF ADMINISTERED DRUGS (ALT 637 FOR MEDICARE OP): Mod: IPSPLIT | Performed by: NURSE PRACTITIONER

## 2024-01-27 PROCEDURE — 87493 C DIFF AMPLIFIED PROBE: CPT | Mod: GENLAB | Performed by: NURSE PRACTITIONER

## 2024-01-27 RX ORDER — LACTULOSE 10 G/10G
10 SOLUTION ORAL
Start: 2024-01-27 | End: 2024-03-15 | Stop reason: WASHOUT

## 2024-01-27 RX ORDER — BISMUTH SUBSALICYLATE 262 MG
1 TABLET,CHEWABLE ORAL DAILY
Status: DISCONTINUED | OUTPATIENT
Start: 2024-01-27 | End: 2024-01-27 | Stop reason: HOSPADM

## 2024-01-27 RX ADMIN — Medication 2000 UNITS: at 08:23

## 2024-01-27 RX ADMIN — THERA TABS 1 TABLET: TAB at 08:23

## 2024-01-27 RX ADMIN — LEVOTHYROXINE SODIUM 75 MCG: 75 TABLET ORAL at 06:03

## 2024-01-27 RX ADMIN — PANTOPRAZOLE SODIUM 40 MG: 40 TABLET, DELAYED RELEASE ORAL at 08:23

## 2024-01-27 RX ADMIN — ACETAMINOPHEN 650 MG: 325 TABLET ORAL at 08:31

## 2024-01-27 RX ADMIN — SUCRALFATE 1 G: 1 TABLET ORAL at 06:03

## 2024-01-27 RX ADMIN — CEFTRIAXONE 1 G: 1 INJECTION, SOLUTION INTRAVENOUS at 04:28

## 2024-01-27 RX ADMIN — POTASSIUM CHLORIDE 40 MEQ: 1500 TABLET, EXTENDED RELEASE ORAL at 08:23

## 2024-01-27 RX ADMIN — HEPARIN SODIUM 5000 UNITS: 5000 INJECTION INTRAVENOUS; SUBCUTANEOUS at 08:23

## 2024-01-27 ASSESSMENT — PAIN SCALES - GENERAL: PAINLEVEL_OUTOF10: 0 - NO PAIN

## 2024-01-27 NOTE — DISCHARGE SUMMARY
Discharge Diagnosis  Hypokalemia  Hyponatremia  Hypophosphatemia  Hypomagnesia  Severe protein calorie malnutrition  IBS  Diarrhea  UTI    Issues Requiring Follow-Up      Discharge Meds     Your medication list        CONTINUE taking these medications        Instructions Last Dose Given Next Dose Due   busPIRone 10 mg tablet  Commonly known as: Buspar           dicyclomine 10 mg capsule  Commonly known as: Bentyl           escitalopram 20 mg tablet  Commonly known as: Lexapro           lactulose 10 gram packet  Commonly known as: Kristalose      Take 1 packet (10 g) by mouth 3 times a day.       levothyroxine 50 mcg tablet  Commonly known as: Synthroid, Levoxyl      Take 1 tablet (50 mcg) by mouth once daily.       magnesium oxide 400 mg tablet  Commonly known as: Mag-Ox           ondansetron ODT 4 mg disintegrating tablet  Commonly known as: Zofran-ODT      Take 1 tablet (4 mg) by mouth every 12 hours if needed for nausea or vomiting.       pantoprazole 40 mg EC tablet  Commonly known as: ProtoNix      Take 1 tablet (40 mg) by mouth once daily.       POTASSIUM ACETATE (BULK) MISC           sucralfate 1 gram tablet  Commonly known as: Carafate      Take 1 tablet (1 g) by mouth in the morning and 1 tablet (1 g) at noon and 1 tablet (1 g) in the evening and 1 tablet (1 g) before bedtime. Before meals and at bedtime.       traZODone 150 mg tablet  Commonly known as: Desyrel           Vitamin D3 50 mcg (2,000 unit) capsule  Generic drug: cholecalciferol                    Test Results Pending At Discharge  Pending Labs       Order Current Status    Extra Urine Gray Tube Collected (01/25/24 2022)    C. difficile, PCR In process    Urinalysis with Reflex Culture and Microscopic In process        78 y.o. female presented to [facility] ED from home.  PMH of constipation, hypothyroidism, GERD, Anxiety Depression IBS. Patient states she was recently seen by Dr. Diez GI for c/o worsening chronic left lower quadrant abdominal  "pain.  On 1/2/24 Patient underwent EGD which  showed mild inflammation. Patient states she was given a laxative.  Patient reports she became sick from the medication and stopped eating and drinking for \"over a week\" . She states she lost, \"over 10 pounds in a week\" . She came to the ED today for generalized weakness. 1/25/24 CT of Abdomen showed Moderate colonic stool On Admission Magnesium 1.20 Potassium 2.4 Electrolytes replaced.  In ED patient was started on Rocephin for UTI. On exam patient resting in bed. Alert x 4. Patient endorses worsening anxiety r/t health care concerns. Patient states she has  at 10# weight loss and has not eating in > one week. Refuses food at this time. Patient c/o impaired ambulation and states \" I am too weak to walk\" Approved of plan  to see PT/OT therapies. Patient denies sob pain cp n/v/d     Hospital Course    Electrolyte imbalance  Hypokalemia  Hyponatremia  Hypophosphatemia  Hypomagnesia  Severe protein calorie malnutrition  - K 2.8 > 3.7  - Na 135 > 137  - Phos 1.9 > 3.7  - Mg 1.20 > 1.65  - repleted all electrolytes per protocol  - monitor BMP, Mg, phos  - Dietician consult  - started 0.9% NS with 20 mEq of KCL  - Stool studies pending     IBS  Chronic Constipation  Diarrhea  - CT abd: Moderate colonic stool.   - hold lactulose     UTI  - UA 1+ leukocytes; 1-5 WBC  - urine culture pending  - continue ceftriaxone     Hypothyroidism  - continue levothyroxine     Depression  Anxiety  Insomnia  - continue escitalopram, trazodone     Vitamin D Deficiency  - continue cholecalciferol     GERD  - continue pantoprazole, sucralfate     DVT ppx  -  continue heparin subcutaneous     Code status: Full     Disposition: Patient was stable to be discharged to home. She will follow up with her PCP and with GI    Total cumulative time spent in preparation of this discharge including documentation review, coordination of care with the medical team including PT/SW/care coordinators and treating " consultants, discussion with patient and pertinent family members and finalization of prescriptions, follow-up appointments, and this discharge summary was approximately 45 minutes.     Pertinent Physical Exam At Time of Discharge  Physical Exam  Vitals reviewed.   Constitutional:       Appearance: She is ill-appearing.      Comments: cachexia   HENT:      Head: Normocephalic and atraumatic.      Nose: Nose normal.      Mouth/Throat:      Mouth: Mucous membranes are moist.      Pharynx: Oropharynx is clear.   Eyes:      Conjunctiva/sclera: Conjunctivae normal.      Pupils: Pupils are equal, round, and reactive to light.   Cardiovascular:      Rate and Rhythm: Normal rate and regular rhythm.      Pulses: Normal pulses.      Heart sounds: Normal heart sounds.   Pulmonary:      Effort: Pulmonary effort is normal.      Comments: Breath sounds diminished;  kyphotic spine  Abdominal:      General: Bowel sounds are normal.      Palpations: Abdomen is soft.      Tenderness: There is abdominal tenderness.   Musculoskeletal:         General: Normal range of motion.      Cervical back: Normal range of motion and neck supple.   Skin:     General: Skin is warm and dry.   Neurological:      General: No focal deficit present.      Mental Status: She is alert and oriented to person, place, and time.   Psychiatric:         Mood and Affect: Mood normal.         Behavior: Behavior normal.   Outpatient Follow-Up  Future Appointments   Date Time Provider Department Center   1/31/2024  4:15 PM Cam Diez MD [id number] [facility]   3/14/2024 10:30 AM Melanie Andrade MD [id number] [facility]         Deja Yi APRN-CNP

## 2024-01-28 LAB — C DIF TOX TCDA+TCDB STL QL NAA+PROBE: NOT DETECTED

## 2024-01-30 ENCOUNTER — PATIENT OUTREACH (OUTPATIENT)
Dept: PRIMARY CARE | Facility: CLINIC | Age: 79
End: 2024-01-30
Payer: MEDICARE

## 2024-01-30 NOTE — PROGRESS NOTES
Discharge Facility:[facility]  Discharge Diagnosis:Hypokalemia  Hyponatremia  Hypophosphatemia  Hypomagnesia  Severe protein calorie malnutrition  IBS  Diarrhea  UTI  Admission Date:1/25/24  Discharge Date: 1/27/24    PCP Appointment Date:Tasked to office  Specialist Appointment Date: General Surgery  Hospital Encounter and Summary: Linked           2 attempts were made to reach patient to assess needs. No return call as of this note.   If patient schedules follow up within 14 days of discharge, visit is TCM billable.  Message sent to practice clinical pool to reach out to patient and schedule an appointment within 7-13 days from discharge date.    If patient meets criteria for moderately complex & has follow-up within 14 days-can bill [id number] for hospital follow up.   If patient meets criteria for highly complex & has follow-up visit within 7 days-can bill [phone] for hospital follow up        Genevieve Thornton LPN

## 2024-01-31 ENCOUNTER — TELEMEDICINE (OUTPATIENT)
Dept: SURGERY | Facility: CLINIC | Age: 79
End: 2024-01-31
Payer: MEDICARE

## 2024-01-31 DIAGNOSIS — R10.84 GENERALIZED ABDOMINAL PAIN: Primary | ICD-10-CM

## 2024-01-31 DIAGNOSIS — K59.04 CHRONIC IDIOPATHIC CONSTIPATION: ICD-10-CM

## 2024-01-31 PROCEDURE — 99441 PR PHYS/QHP TELEPHONE EVALUATION 5-10 MIN: CPT | Performed by: SURGERY

## 2024-01-31 NOTE — PATIENT INSTRUCTIONS
You have done well since her endoscopy.  Your EGD did not show any obvious abnormalities.  Your constipation is much improved with the lactulose and this does help your abdominal pain I would take lactulose as needed.  Continue with a high-fiber diet.

## 2024-01-31 NOTE — PROGRESS NOTES
Latha Quispe        An interactive audio and video telecommunication system which permits real time communications between the patient (at the originating site) and provider (at the distant site) was utilized to provide this telehealth service.   Verbal consent was requested and obtained from Latha Quispe on 1.31.24 for a telehealth visit.      Follow-up EGD.  Biopsies were unremarkable.  Having much improvement in her pain.  Was placed on lactulose and has had significant improvement in her constipation and abdominal pain    Problem List Items Addressed This Visit       Abdominal pain - Primary    Chronic idiopathic constipation      Plan-continue lactulose as needed.  Continue high-fiber diet.  Follow as needed.    Time equals 6 minutes

## 2024-02-12 ENCOUNTER — PATIENT OUTREACH (OUTPATIENT)
Dept: PRIMARY CARE | Facility: CLINIC | Age: 79
End: 2024-02-12
Payer: MEDICARE

## 2024-02-26 DIAGNOSIS — K58.9 IRRITABLE BOWEL SYNDROME, UNSPECIFIED TYPE: ICD-10-CM

## 2024-02-26 RX ORDER — DICYCLOMINE HYDROCHLORIDE 10 MG/1
10 CAPSULE ORAL EVERY 6 HOURS PRN
Qty: 120 CAPSULE | Refills: 3 | Status: SHIPPED | OUTPATIENT
Start: 2024-02-26 | End: 2024-05-10 | Stop reason: HOSPADM

## 2024-02-28 ENCOUNTER — PATIENT OUTREACH (OUTPATIENT)
Dept: PRIMARY CARE | Facility: CLINIC | Age: 79
End: 2024-02-28
Payer: MEDICARE

## 2024-03-14 ENCOUNTER — OFFICE VISIT (OUTPATIENT)
Dept: PRIMARY CARE | Facility: CLINIC | Age: 79
End: 2024-03-14
Payer: MEDICARE

## 2024-03-14 VITALS
OXYGEN SATURATION: 98 % | HEART RATE: 53 BPM | DIASTOLIC BLOOD PRESSURE: 74 MMHG | BODY MASS INDEX: 17.05 KG/M2 | HEIGHT: 59 IN | SYSTOLIC BLOOD PRESSURE: 133 MMHG | TEMPERATURE: 97 F | WEIGHT: 84.6 LBS

## 2024-03-14 DIAGNOSIS — E03.9 HYPOTHYROIDISM, UNSPECIFIED TYPE: Primary | ICD-10-CM

## 2024-03-14 DIAGNOSIS — F32.1 MODERATE MAJOR DEPRESSION (MULTI): ICD-10-CM

## 2024-03-14 DIAGNOSIS — K59.00 CONSTIPATION, UNSPECIFIED CONSTIPATION TYPE: ICD-10-CM

## 2024-03-14 DIAGNOSIS — R91.8 GROUND GLASS OPACITY PRESENT ON IMAGING OF LUNG: ICD-10-CM

## 2024-03-14 DIAGNOSIS — R39.9 URINARY SYMPTOM OR SIGN: ICD-10-CM

## 2024-03-14 LAB
POC APPEARANCE, URINE: CLEAR
POC BILIRUBIN, URINE: NEGATIVE
POC BLOOD, URINE: ABNORMAL
POC COLOR, URINE: YELLOW
POC GLUCOSE, URINE: NEGATIVE MG/DL
POC KETONES, URINE: NEGATIVE MG/DL
POC LEUKOCYTES, URINE: ABNORMAL
POC NITRITE,URINE: NEGATIVE
POC PH, URINE: 6 PH
POC PROTEIN, URINE: NEGATIVE MG/DL
POC SPECIFIC GRAVITY, URINE: 1.01
POC UROBILINOGEN, URINE: 0.2 EU/DL

## 2024-03-14 PROCEDURE — 87086 URINE CULTURE/COLONY COUNT: CPT

## 2024-03-14 PROCEDURE — 99214 OFFICE O/P EST MOD 30 MIN: CPT | Performed by: FAMILY MEDICINE

## 2024-03-14 PROCEDURE — 81003 URINALYSIS AUTO W/O SCOPE: CPT | Performed by: FAMILY MEDICINE

## 2024-03-14 PROCEDURE — 1036F TOBACCO NON-USER: CPT | Performed by: FAMILY MEDICINE

## 2024-03-14 PROCEDURE — 1159F MED LIST DOCD IN RCRD: CPT | Performed by: FAMILY MEDICINE

## 2024-03-14 PROCEDURE — 1124F ACP DISCUSS-NO DSCNMKR DOCD: CPT | Performed by: FAMILY MEDICINE

## 2024-03-14 ASSESSMENT — ENCOUNTER SYMPTOMS
DEPRESSION: 0
LOSS OF SENSATION IN FEET: 0
OCCASIONAL FEELINGS OF UNSTEADINESS: 0

## 2024-03-14 NOTE — PROGRESS NOTES
"Subjective   Patient ID: Latha Quispe is a 78 y.o. female who presents for Follow-up (3 month/Still a lot of pain from no magnesium or potassium takes it daily now).    HPI  Here for follow up  Still having abdominal pain. Ct abdomen and pelvis showed moderate colonic stool. Did see general surgeon as well. Was given lactulose but patient admits she has not been taking it as prescribed daily.   Had patchy left lower lobe ground glass opacities in previous CT scan in November.   Has hypothyroidism, compliant with levothyroxine  Depression under control with lexapro 20mg    Review of Systems  General: no fever  Eyes: no blurry vision  ENT: no sore throat, no ear pain  Resp: no cough, sob or wheezing  Cardio: no chest pain, no palpitations  Abd: no nausea/vomiting  : no dysuria, no increased urinary frequency      /74   Pulse 53   Temp 36.1 °C (97 °F)   Ht 1.499 m (4' 11\")   Wt (!) 38.4 kg (84 lb 9.6 oz)   SpO2 98%   BMI 17.09 kg/m²       Objective   Physical Exam  Gen: NAD, alert  Head: normocephalic/atraumatic  Eyes: conjunctivae normal  Ears: canals clear bilaterally, TM normal   Nose: external nose normal   Oropharynx: clear   Resp: Clear to auscultation  CVS: Regular rate and rhythm  Abdomen: soft, bilateral lower abdominal tenderness, no rebound or guarding  Ext: no edema, NT of lower extremities  Neuro: gait normal     Assessment/Plan   Problem List Items Addressed This Visit       Hypothyroid - Primary    Relevant Orders    TSH with reflex to Free T4 if abnormal    Moderate major depression (CMS/HCC)  Continue current regimen     Other Visit Diagnoses       Ground glass opacity present on imaging of lung        Relevant Orders    CT chest wo IV contrast    Urinary symptom or sign        Relevant Orders    POCT UA Automated manually resulted (Completed)    Urine Culture (Completed)    Constipation, unspecified constipation type        Relevant Medications    lactulose 20 gram/30 mL oral solution "

## 2024-03-15 LAB — BACTERIA UR CULT: ABNORMAL

## 2024-03-15 RX ORDER — LACTULOSE 10 G/15ML
10 SOLUTION ORAL 2 TIMES DAILY PRN
Qty: 473 ML | Refills: 1 | Status: SHIPPED | OUTPATIENT
Start: 2024-03-15 | End: 2024-06-13

## 2024-03-16 DIAGNOSIS — R31.21 ASYMPTOMATIC MICROSCOPIC HEMATURIA: ICD-10-CM

## 2024-03-16 DIAGNOSIS — R39.9 URINARY SYMPTOM OR SIGN: Primary | ICD-10-CM

## 2024-03-27 ENCOUNTER — HOSPITAL ENCOUNTER (OUTPATIENT)
Dept: RADIOLOGY | Facility: HOSPITAL | Age: 79
Discharge: HOME | End: 2024-03-27
Payer: MEDICARE

## 2024-03-27 DIAGNOSIS — R91.8 GROUND GLASS OPACITY PRESENT ON IMAGING OF LUNG: ICD-10-CM

## 2024-03-27 PROCEDURE — 71250 CT THORAX DX C-: CPT | Performed by: RADIOLOGY

## 2024-03-27 PROCEDURE — 71250 CT THORAX DX C-: CPT

## 2024-04-05 DIAGNOSIS — K29.50 CHRONIC GASTRITIS, PRESENCE OF BLEEDING UNSPECIFIED, UNSPECIFIED GASTRITIS TYPE: ICD-10-CM

## 2024-04-05 RX ORDER — SUCRALFATE 1 G/1
1 TABLET ORAL 4 TIMES DAILY
Qty: 120 TABLET | Refills: 11 | Status: SHIPPED | OUTPATIENT
Start: 2024-04-05

## 2024-04-09 ENCOUNTER — OFFICE VISIT (OUTPATIENT)
Dept: PRIMARY CARE | Facility: CLINIC | Age: 79
End: 2024-04-09
Payer: MEDICARE

## 2024-04-09 ENCOUNTER — HOSPITAL ENCOUNTER (OUTPATIENT)
Dept: RADIOLOGY | Facility: HOSPITAL | Age: 79
Discharge: HOME | End: 2024-04-09
Payer: MEDICARE

## 2024-04-09 VITALS
DIASTOLIC BLOOD PRESSURE: 70 MMHG | TEMPERATURE: 97 F | OXYGEN SATURATION: 98 % | WEIGHT: 85.5 LBS | HEIGHT: 55 IN | BODY MASS INDEX: 19.79 KG/M2 | SYSTOLIC BLOOD PRESSURE: 148 MMHG | HEART RATE: 52 BPM

## 2024-04-09 DIAGNOSIS — R10.84 GENERALIZED ABDOMINAL PAIN: Primary | ICD-10-CM

## 2024-04-09 DIAGNOSIS — R39.9 URINARY SYMPTOM OR SIGN: ICD-10-CM

## 2024-04-09 DIAGNOSIS — R10.84 GENERALIZED ABDOMINAL PAIN: ICD-10-CM

## 2024-04-09 LAB
POC APPEARANCE, URINE: CLEAR
POC BILIRUBIN, URINE: NEGATIVE
POC BLOOD, URINE: ABNORMAL
POC COLOR, URINE: YELLOW
POC GLUCOSE, URINE: NEGATIVE MG/DL
POC KETONES, URINE: NEGATIVE MG/DL
POC LEUKOCYTES, URINE: ABNORMAL
POC NITRITE,URINE: NEGATIVE
POC PH, URINE: 6 PH
POC PROTEIN, URINE: NEGATIVE MG/DL
POC SPECIFIC GRAVITY, URINE: <=1.005
POC UROBILINOGEN, URINE: 0.2 EU/DL

## 2024-04-09 PROCEDURE — 87186 SC STD MICRODIL/AGAR DIL: CPT

## 2024-04-09 PROCEDURE — 81003 URINALYSIS AUTO W/O SCOPE: CPT | Performed by: FAMILY MEDICINE

## 2024-04-09 PROCEDURE — 74018 RADEX ABDOMEN 1 VIEW: CPT

## 2024-04-09 PROCEDURE — 99213 OFFICE O/P EST LOW 20 MIN: CPT | Performed by: FAMILY MEDICINE

## 2024-04-09 PROCEDURE — 87086 URINE CULTURE/COLONY COUNT: CPT

## 2024-04-09 PROCEDURE — 1159F MED LIST DOCD IN RCRD: CPT | Performed by: FAMILY MEDICINE

## 2024-04-09 ASSESSMENT — ENCOUNTER SYMPTOMS
OCCASIONAL FEELINGS OF UNSTEADINESS: 0
LOSS OF SENSATION IN FEET: 0
DEPRESSION: 0

## 2024-04-09 NOTE — PROGRESS NOTES
Subjective   Patient ID: Latha Quispe is a 78 y.o. female who presents for Abdominal Cramping (Going on since hospital trip).  HPI  Still having abdominal pain, diffuse. Ct abdomen and pelvis showed moderate colonic stool. Did see general surgeon as well.   Has not been taking her lactulose daily, last time was 3 days ago. Advised to start taking daily.  BP elevated today, but usually under control     Review of Systems  General: no fever  Eyes: no blurry vision  ENT: no sore throat, no ear pain  Resp: no cough, sob or wheezing  Cardio: no chest pain, no palpitations  Abd: no nausea/vomiting  : no dysuria, no increased urinary frequency      /70   Pulse 52   Temp 36.1 °C (97 °F)   Ht 1.219 m (4')   Wt (!) 38.8 kg (85 lb 8 oz)   SpO2 98%   BMI 26.09 kg/m²       Objective   Physical Exam  Gen: NAD, alert  Head: normocephalic/atraumatic  Eyes: conjunctivae normal  Ears: canals clear bilaterally, TM normal   Nose: external nose normal   Oropharynx: clear   Resp: Clear to auscultation  CVS: Regular rate and rhythm  Abdomen: soft, diffuse abdominal pain, no rebound or guarding  Ext: no edema, NT of lower extremities    Assessment/Plan   Problem List Items Addressed This Visit       Abdominal pain - Primary  XRAY Abdomen  Lactulose daily     Other Visit Diagnoses       Urinary symptom or sign        Relevant Orders    POCT UA Automated manually resulted (Completed)    Urine Culture (Completed)

## 2024-04-11 DIAGNOSIS — N39.0 ACUTE UTI: Primary | ICD-10-CM

## 2024-04-11 RX ORDER — CEPHALEXIN 500 MG/1
500 CAPSULE ORAL 2 TIMES DAILY
Qty: 10 CAPSULE | Refills: 0 | Status: SHIPPED | OUTPATIENT
Start: 2024-04-11 | End: 2024-04-16

## 2024-04-12 DIAGNOSIS — N39.0 ACUTE UTI: Primary | ICD-10-CM

## 2024-04-12 LAB
BACTERIA UR CULT: ABNORMAL
BACTERIA UR CULT: ABNORMAL

## 2024-04-12 RX ORDER — NITROFURANTOIN 25; 75 MG/1; MG/1
100 CAPSULE ORAL 2 TIMES DAILY
Qty: 14 CAPSULE | Refills: 0 | Status: SHIPPED | OUTPATIENT
Start: 2024-04-12 | End: 2024-04-19

## 2024-04-25 ENCOUNTER — TELEMEDICINE (OUTPATIENT)
Dept: UROLOGY | Facility: CLINIC | Age: 79
End: 2024-04-25
Payer: MEDICARE

## 2024-04-25 DIAGNOSIS — R10.13 EPIGASTRIC PAIN: ICD-10-CM

## 2024-04-25 DIAGNOSIS — R31.21 ASYMPTOMATIC MICROSCOPIC HEMATURIA: Primary | ICD-10-CM

## 2024-04-25 PROCEDURE — 99214 OFFICE O/P EST MOD 30 MIN: CPT

## 2024-04-25 PROCEDURE — 1036F TOBACCO NON-USER: CPT

## 2024-04-25 PROCEDURE — 1159F MED LIST DOCD IN RCRD: CPT

## 2024-04-25 NOTE — PROGRESS NOTES
[facility]  Outpatient Clinic Note    Patient: Latha Quispe  Age/Sex: 78 y.o., female  MRN: [id number]  Virtual Visit: An interactive audio and video telecommunication system which permits real time communications between the patient (at the originating site) and provider (at the distant site) was utilized to provide this telehealth service. Verbal consent was requested and obtained from Latha Quispe on this date 2024 for a telehealth visit.     Chief Complaint:  microhematuria         History of Present Illness  This is a 78 y.o. female,  who presents microhematuria, referred by her PCP. Urine culture on 2024 was positive for Klebsiella and enterobacter. There are not any microscopic urine labs in the system showing RBC in the last 6 months. She denies urinary urgency and frequency. Denies OLGA, and UUI. She is always constipated, she takes a stool softer. She gets a bad abdominal pain right after she eats. Denies nausea, vomiting. She admits to black tarry stools that started last summer. She has been losing weight, she was 79lbs when she went into the hospital in January. She is 82lbs now. She denies using NSAIDs, using tylenol when she needs it for any type or pain or inflammation. She stated she had surgeries for stomach ulcers in the past. The patient had an EGD by Dr. Cam Diez on 2024 showing to be unremarkable from the providers notes. He placed her on lactulose. She has had 6 vaginal births. History of a Hysterectomy due to prolapse the patient stated.   She had breast cancer, radiation completed 20 years ago. The patient denies ever using tobacco.     Past Medical & Surgical History  Past Medical History:   Diagnosis Date    Abdominal pain     Anxiety     Cat bite 2023    Cataract     Depression     Disease of thyroid gland     GERD (gastroesophageal reflux disease)     GERD (gastroesophageal reflux disease)     Hypothyroidism     Irritable bowel syndrome     Panic  attacks     Personal history of malignant neoplasm of breast 2021    History of malignant neoplasm of breast    Personal history of other complications of pregnancy, childbirth and the puerperium     History of spontaneous      Past Surgical History:   Procedure Laterality Date    BREAST SURGERY      CATARACT EXTRACTION      COLON SURGERY      CT ABDOMEN ANGIOGRAM W AND/OR WO IV CONTRAST  2023    CT ABDOMEN ANGIOGRAM W AND/OR WO IV CONTRAST 2023 CON CT    GASTRIC BYPASS      HYSTERECTOMY      MASTECTOMY, PARTIAL Left     OTHER SURGICAL HISTORY  2021    Varicose vein ligation       Family History  Family History   Problem Relation Name Age of Onset    Osteoporosis Mother      Alcohol abuse Father      Cancer Sister          breast    Osteoporosis Sister      Cancer Brother          colon. prostate    Hypothyroidism Other         Social History  She reports that she has never smoked. She has never been exposed to tobacco smoke. She has never used smokeless tobacco. She reports that she does not drink alcohol and does not use drugs.    Allergies  Nsaids (non-steroidal anti-inflammatory drug)    Medications:  Current Outpatient Medications on File Prior to Visit   Medication Sig Dispense Refill    busPIRone (Buspar) 10 mg tablet Take 2 tablets (20 mg) by mouth 3 times a day.      cholecalciferol (Vitamin D3) 50 mcg (2,000 unit) capsule Take 1 capsule (50 mcg) by mouth once daily.      dicyclomine (Bentyl) 10 mg capsule Take 1 capsule (10 mg) by mouth every 6 hours if needed (abdominal pain or cramps). 120 capsule 3    escitalopram (Lexapro) 20 mg tablet Take 1 tablet (20 mg) by mouth once daily at bedtime.      lactulose 20 gram/30 mL oral solution Take 15 mL (10 g) by mouth 2 times a day as needed (Constipation). 473 mL 1    levothyroxine (Synthroid, Levoxyl) 50 mcg tablet Take 1 tablet (50 mcg) by mouth once daily. 90 tablet 3    magnesium oxide (Mag-Ox) 400 mg tablet Take by mouth.       [] nitrofurantoin, macrocrystal-monohydrate, (Macrobid) 100 mg capsule Take 1 capsule (100 mg) by mouth 2 times a day for 7 days. 14 capsule 0    ondansetron ODT (Zofran-ODT) 4 mg disintegrating tablet Take 1 tablet (4 mg) by mouth every 12 hours if needed for nausea or vomiting. 20 tablet 0    pantoprazole (ProtoNix) 40 mg EC tablet Take 1 tablet (40 mg) by mouth once daily. 90 tablet 3    POTASSIUM ACETATE, BULK, MISC Take by mouth.      sucralfate (Carafate) 1 gram tablet Take 1 tablet (1 g) by mouth 4 times a day. Before meals and at bedtime 120 tablet 11    traZODone (Desyrel) 150 mg tablet Take 1 tablet (150 mg) by mouth once daily at bedtime.       No current facility-administered medications on file prior to visit.        Review of Systems   A comprehensive 10+ review of systems was negative except for: see hpi          Physical Exam                                                                                                                      General: Well developed, well nourished, alert and cooperative, appears in no acute distress  Eyes: no proptosis  Lungs: Breathing is easy, non-labored while speaking in clear and complete sentences.   Neuro: alert and oriented to person, place and time  Psych: Demonstrates good judgement and reason, without hallucinations, abnormal affect or abnormal behaviors.  Skin: no obvious lesions, no rashes      Labs  ntains abnormal data Urine Culture  Order: [id number]   Collected 2024 13:39       Status: Final result       Visible to patient: Yes (not seen)       Dx: Urinary symptom or sign    Specimen Information: Clean Catch/Voided; Urine   4 Result Notes  Urine Culture 20,000 - 80,000 Klebsiella pneumoniae/variicola Abnormal       20,000 - 80,000 Enterobacter cloacae complex Abnormal    SECOND and THIRD generation cephalosporin results are not reported as resistance may develop during therapy with these agents.        Resulting Agency: [facility]      Susceptibility     Klebsiella pneumoniae/variicola Enterobacter cloacae complex     MICROSCAN MICROSCAN    $ Amoxicillin/Clavulanate Susceptible Resistant    $$ Ampicillin Resistant Resistant    $$$ Ampicillin/Sulbactam Susceptible Resistant    $ Cefazolin Susceptible Resistant     Cefazolin (uncomplicated UTIs only) Susceptible     $$ Cefepime  Susceptible    $ Ciprofloxacin Susceptible Susceptible    $ Gentamicin Susceptible Susceptible    $ Nitrofurantoin Susceptible Susceptible    $$ Piperacillin/Tazobactam Susceptible Susceptible    $ Trimethoprim/Sulfamethoxazole Susceptible Susceptible                    Specimen Collected: 24 13:39 Last Resulted: 24 08:40             Imaging  N/A    IMPRESSION AND PLAN:  Latha Quispe is a 78 y.o.  who presents microhematuria, referred by her PCP. Urine culture on 2024 was positive for Klebsiella and enterobacter. There are not any microscopic urine labs in the system showing RBC in the last 6 months. She denies urinary urgency and frequency. Denies LOGA, and UUI. She is always constipated, she takes a stool softer. She gets a bad abdominal pain right after she eats. Denies nausea, vomiting. She admits to black tarry stools that started last summer. She has been losing weight, she was 79lbs when she went into the hospital in January. She is 82lbs now. She denies using NSAIDs, using tylenol when she needs it for any type or pain or inflammation. She stated she had surgeries for stomach ulcers in the past. The patient had an EGD by Dr. Cam Diez on 2024 showing to be unremarkable from the providers notes. He placed her on lactulose. She has had 6 vaginal births. History of a Hysterectomy due to prolapse the patient stated.   She had breast cancer, radiation completed 20 years ago. The patient denies ever using tobacco.     Microhematuria  -ordered a urine microscopy and urine culture, will call with results    Epigastic pain  -GI  referral    Follow up 3 months     All questions and concerns were answered and addressed.  The patient expressed understanding and agrees with the plan.     Reviewed and approved by MACKENZIE GREEN on 4/25/24 at 7:34 AM.

## 2024-04-26 ENCOUNTER — LAB (OUTPATIENT)
Dept: LAB | Facility: LAB | Age: 79
End: 2024-04-26
Payer: MEDICARE

## 2024-04-26 DIAGNOSIS — E03.9 HYPOTHYROIDISM, UNSPECIFIED TYPE: ICD-10-CM

## 2024-04-26 DIAGNOSIS — R31.21 ASYMPTOMATIC MICROSCOPIC HEMATURIA: ICD-10-CM

## 2024-04-26 LAB
BACTERIA #/AREA URNS AUTO: ABNORMAL /HPF
MUCOUS THREADS #/AREA URNS AUTO: ABNORMAL /LPF
RBC #/AREA URNS AUTO: ABNORMAL /HPF
SQUAMOUS #/AREA URNS AUTO: ABNORMAL /HPF
TSH SERPL-ACNC: 2.62 MIU/L (ref 0.44–3.98)
WBC #/AREA URNS AUTO: ABNORMAL /HPF

## 2024-04-26 PROCEDURE — 36415 COLL VENOUS BLD VENIPUNCTURE: CPT

## 2024-04-26 PROCEDURE — 87086 URINE CULTURE/COLONY COUNT: CPT

## 2024-04-28 LAB — BACTERIA UR CULT: NORMAL

## 2024-04-29 ENCOUNTER — PATIENT OUTREACH (OUTPATIENT)
Dept: PRIMARY CARE | Facility: CLINIC | Age: 79
End: 2024-04-29

## 2024-04-29 ENCOUNTER — TELEMEDICINE (OUTPATIENT)
Dept: UROLOGY | Facility: CLINIC | Age: 79
End: 2024-04-29
Payer: MEDICARE

## 2024-04-29 DIAGNOSIS — R10.13 EPIGASTRIC PAIN: ICD-10-CM

## 2024-04-29 DIAGNOSIS — R31.29 MICROHEMATURIA: Primary | ICD-10-CM

## 2024-04-29 PROCEDURE — 1159F MED LIST DOCD IN RCRD: CPT

## 2024-04-29 PROCEDURE — 99214 OFFICE O/P EST MOD 30 MIN: CPT

## 2024-04-29 NOTE — PROGRESS NOTES
[facility] [place]  Outpatient Clinic Note    Patient: Latha Quispe  Age/Sex: 78 y.o., female  MRN: [id number]  Virtual Visit: An interactive audio and video telecommunication system which permits real time communications between the patient (at the originating site) and provider (at the distant site) was utilized to provide this telehealth service. Verbal consent was requested and obtained from Latha Quispe on this date 2024 for a telehealth visit.     Chief Complaint:  FUV         History of Present Illness  This is a 78 y.o. female, who presents virtually to the clinic for follow up on her lab results. The patient was previously referred by her PCP for microhematuria. The patient's microscopic urine lab resulted in 11-20 RBC. Her urine culture was negative. The patient has a GI appointment on 2024 to discuss her abdominal pain right after she eats, black tarry stools, and weight loss. She denies gross hematuria, dysuria, flank pain, nausea, vomiting, fever or chills.                Past Medical & Surgical History  Past Medical History:   Diagnosis Date    Abdominal pain     Anxiety     Cat bite 2023    Cataract     Depression     Disease of thyroid gland     GERD (gastroesophageal reflux disease)     GERD (gastroesophageal reflux disease)     Hypothyroidism     Irritable bowel syndrome     Panic attacks     Personal history of malignant neoplasm of breast 2021    History of malignant neoplasm of breast    Personal history of other complications of pregnancy, childbirth and the puerperium     History of spontaneous      Past Surgical History:   Procedure Laterality Date    BREAST SURGERY      CATARACT EXTRACTION      COLON SURGERY      CT ABDOMEN ANGIOGRAM W AND/OR WO IV CONTRAST  2023    CT ABDOMEN ANGIOGRAM W AND/OR WO IV CONTRAST 2023 CON CT    GASTRIC BYPASS      HYSTERECTOMY      MASTECTOMY, PARTIAL Left     OTHER SURGICAL HISTORY  2021    Varicose vein ligation        Family History  Family History   Problem Relation Name Age of Onset    Osteoporosis Mother      Alcohol abuse Father      Cancer Sister          breast    Osteoporosis Sister      Cancer Brother          colon. prostate    Hypothyroidism Other         Social History  She reports that she has never smoked. She has never been exposed to tobacco smoke. She has never used smokeless tobacco. She reports that she does not drink alcohol and does not use drugs.    Allergies  Nsaids (non-steroidal anti-inflammatory drug)    Medications:  Current Outpatient Medications on File Prior to Visit   Medication Sig Dispense Refill    busPIRone (Buspar) 10 mg tablet Take 2 tablets (20 mg) by mouth 3 times a day.      cholecalciferol (Vitamin D3) 50 mcg (2,000 unit) capsule Take 1 capsule (50 mcg) by mouth once daily.      dicyclomine (Bentyl) 10 mg capsule Take 1 capsule (10 mg) by mouth every 6 hours if needed (abdominal pain or cramps). 120 capsule 3    escitalopram (Lexapro) 20 mg tablet Take 1 tablet (20 mg) by mouth once daily at bedtime.      lactulose 20 gram/30 mL oral solution Take 15 mL (10 g) by mouth 2 times a day as needed (Constipation). 473 mL 1    levothyroxine (Synthroid, Levoxyl) 50 mcg tablet Take 1 tablet (50 mcg) by mouth once daily. 90 tablet 3    magnesium oxide (Mag-Ox) 400 mg tablet Take by mouth.      ondansetron ODT (Zofran-ODT) 4 mg disintegrating tablet Take 1 tablet (4 mg) by mouth every 12 hours if needed for nausea or vomiting. 20 tablet 0    pantoprazole (ProtoNix) 40 mg EC tablet Take 1 tablet (40 mg) by mouth once daily. 90 tablet 3    POTASSIUM ACETATE, BULK, MISC Take by mouth.      sucralfate (Carafate) 1 gram tablet Take 1 tablet (1 g) by mouth 4 times a day. Before meals and at bedtime 120 tablet 11    traZODone (Desyrel) 150 mg tablet Take 1 tablet (150 mg) by mouth once daily at bedtime.       No current facility-administered medications on file prior to visit.        Review of Systems    A comprehensive 10+ review of systems was negative except for: see hpi          Physical Exam                                                                                                                      General: Well developed, well nourished, alert and cooperative, appears in no acute distress  Eyes: no proptosis  Lungs: Breathing is easy, non-labored while speaking in clear and complete sentences.   Neuro: alert and oriented to person, place and time  Psych: Demonstrates good judgement and reason, without hallucinations, abnormal affect or abnormal behaviors.  Skin: no obvious lesions, no rashes      Labs  N/A    Imaging  N/A    IMPRESSION AND PLAN:  Latha Quispe is a 78 y.o.  who presents microhematuria, referred by her PCP. Urine culture on 2024 was positive for Klebsiella and enterobacter. There are not any microscopic urine labs in the system showing RBC in the last 6 months. She denies urinary urgency and frequency. Denies OLGA, and UUI. She is always constipated, she takes a stool softer. She gets a bad abdominal pain right after she eats. Denies nausea, vomiting. She admits to black tarry stools that started last summer. She has been losing weight, she was 79lbs when she went into the hospital in January. She is 82lbs now. She denies using NSAIDs, using tylenol when she needs it for any type or pain or inflammation. She stated she had surgeries for stomach ulcers in the past. The patient had an EGD by Dr. Cam Diez on 2024 showing to be unremarkable from the providers notes. He placed her on lactulose. She has had 6 vaginal births. History of a Hysterectomy due to prolapse the patient stated.   She had breast cancer, radiation completed 20 years ago. The patient denies ever using tobacco.      Microhematuria   -microscopic urine lab resulted in 11-20 RBC. Her urine culture was negative.  -We will order CT Urogram and cystoscopy     Epigastic pain  -GI referral- she has an pau on  6/24/2024    Follow up with Dr. Rodriguez for Cystoscopy on 6/6/2024    All questions and concerns were answered and addressed.  The patient expressed understanding and agrees with the plan.     Reviewed and approved by MACKENZIE GREEN on 4/29/24 at 9:34 AM.

## 2024-05-05 ENCOUNTER — APPOINTMENT (OUTPATIENT)
Dept: RADIOLOGY | Facility: HOSPITAL | Age: 79
DRG: 391 | End: 2024-05-05
Payer: MEDICARE

## 2024-05-05 ENCOUNTER — HOSPITAL ENCOUNTER (INPATIENT)
Facility: HOSPITAL | Age: 79
LOS: 4 days | Discharge: HOME | DRG: 391 | End: 2024-05-10
Attending: FAMILY MEDICINE | Admitting: INTERNAL MEDICINE
Payer: MEDICARE

## 2024-05-05 DIAGNOSIS — R00.1 BRADYCARDIA: ICD-10-CM

## 2024-05-05 DIAGNOSIS — E87.6 HYPOKALEMIA: ICD-10-CM

## 2024-05-05 DIAGNOSIS — R10.13 EPIGASTRIC PAIN: ICD-10-CM

## 2024-05-05 DIAGNOSIS — R94.31 ABNORMAL ECG: ICD-10-CM

## 2024-05-05 DIAGNOSIS — K52.9 ACUTE GASTROENTERITIS: ICD-10-CM

## 2024-05-05 DIAGNOSIS — K58.0 IRRITABLE BOWEL SYNDROME WITH DIARRHEA: ICD-10-CM

## 2024-05-05 DIAGNOSIS — E86.0 DEHYDRATION: Primary | ICD-10-CM

## 2024-05-05 PROBLEM — R19.7 DIARRHEA: Status: ACTIVE | Noted: 2024-05-05

## 2024-05-05 LAB
ALBUMIN SERPL BCP-MCNC: 3.8 G/DL (ref 3.4–5)
ALP SERPL-CCNC: 79 U/L (ref 33–136)
ALT SERPL W P-5'-P-CCNC: 10 U/L (ref 7–45)
ANION GAP SERPL CALC-SCNC: 12 MMOL/L (ref 10–20)
APPEARANCE UR: CLEAR
AST SERPL W P-5'-P-CCNC: 17 U/L (ref 9–39)
BACTERIA #/AREA URNS AUTO: ABNORMAL /HPF
BASOPHILS # BLD AUTO: 0.05 X10*3/UL (ref 0–0.1)
BASOPHILS NFR BLD AUTO: 1 %
BILIRUB SERPL-MCNC: 0.6 MG/DL (ref 0–1.2)
BILIRUB UR STRIP.AUTO-MCNC: NEGATIVE MG/DL
BNP SERPL-MCNC: 61 PG/ML (ref 0–99)
BUN SERPL-MCNC: 7 MG/DL (ref 6–23)
CALCIUM SERPL-MCNC: 8.8 MG/DL (ref 8.6–10.3)
CARDIAC TROPONIN I PNL SERPL HS: 7 NG/L (ref 0–13)
CARDIAC TROPONIN I PNL SERPL HS: 8 NG/L (ref 0–13)
CHLORIDE SERPL-SCNC: 99 MMOL/L (ref 98–107)
CO2 SERPL-SCNC: 24 MMOL/L (ref 21–32)
COLOR UR: ABNORMAL
CREAT SERPL-MCNC: 0.87 MG/DL (ref 0.5–1.05)
CRP SERPL-MCNC: <0.1 MG/DL
D DIMER PPP FEU-MCNC: 339 NG/ML FEU
EGFRCR SERPLBLD CKD-EPI 2021: 69 ML/MIN/1.73M*2
EOSINOPHIL # BLD AUTO: 0.01 X10*3/UL (ref 0–0.4)
EOSINOPHIL NFR BLD AUTO: 0.2 %
ERYTHROCYTE [DISTWIDTH] IN BLOOD BY AUTOMATED COUNT: 12.2 % (ref 11.5–14.5)
GLUCOSE SERPL-MCNC: 151 MG/DL (ref 74–99)
GLUCOSE UR STRIP.AUTO-MCNC: NORMAL MG/DL
HCT VFR BLD AUTO: 36.2 % (ref 36–46)
HGB BLD-MCNC: 11.9 G/DL (ref 12–16)
IMM GRANULOCYTES # BLD AUTO: 0.01 X10*3/UL (ref 0–0.5)
IMM GRANULOCYTES NFR BLD AUTO: 0.2 % (ref 0–0.9)
INR PPP: 1.2 (ref 0.9–1.1)
KETONES UR STRIP.AUTO-MCNC: NEGATIVE MG/DL
LACTATE SERPL-SCNC: 1.2 MMOL/L (ref 0.4–2)
LACTATE SERPL-SCNC: 2.1 MMOL/L (ref 0.4–2)
LEUKOCYTE ESTERASE UR QL STRIP.AUTO: ABNORMAL
LIPASE SERPL-CCNC: 10 U/L (ref 9–82)
LYMPHOCYTES # BLD AUTO: 0.92 X10*3/UL (ref 0.8–3)
LYMPHOCYTES NFR BLD AUTO: 18.4 %
MAGNESIUM SERPL-MCNC: 1.54 MG/DL (ref 1.6–2.4)
MCH RBC QN AUTO: 28.9 PG (ref 26–34)
MCHC RBC AUTO-ENTMCNC: 32.9 G/DL (ref 32–36)
MCV RBC AUTO: 88 FL (ref 80–100)
MONOCYTES # BLD AUTO: 0.41 X10*3/UL (ref 0.05–0.8)
MONOCYTES NFR BLD AUTO: 8.2 %
NEUTROPHILS # BLD AUTO: 3.61 X10*3/UL (ref 1.6–5.5)
NEUTROPHILS NFR BLD AUTO: 72 %
NITRITE UR QL STRIP.AUTO: NEGATIVE
NRBC BLD-RTO: 0 /100 WBCS (ref 0–0)
PH UR STRIP.AUTO: 6.5 [PH]
PHOSPHATE SERPL-MCNC: 3.1 MG/DL (ref 2.5–4.9)
PLATELET # BLD AUTO: 257 X10*3/UL (ref 150–450)
POTASSIUM SERPL-SCNC: 3.2 MMOL/L (ref 3.5–5.3)
PROT SERPL-MCNC: 6.3 G/DL (ref 6.4–8.2)
PROT UR STRIP.AUTO-MCNC: NEGATIVE MG/DL
PROTHROMBIN TIME: 13 SECONDS (ref 9.8–12.8)
RBC # BLD AUTO: 4.12 X10*6/UL (ref 4–5.2)
RBC # UR STRIP.AUTO: ABNORMAL /UL
RBC #/AREA URNS AUTO: ABNORMAL /HPF
SODIUM SERPL-SCNC: 132 MMOL/L (ref 136–145)
SP GR UR STRIP.AUTO: 1
SQUAMOUS #/AREA URNS AUTO: ABNORMAL /HPF
UROBILINOGEN UR STRIP.AUTO-MCNC: NORMAL MG/DL
WBC # BLD AUTO: 5 X10*3/UL (ref 4.4–11.3)
WBC #/AREA URNS AUTO: ABNORMAL /HPF

## 2024-05-05 PROCEDURE — 74177 CT ABD & PELVIS W/CONTRAST: CPT

## 2024-05-05 PROCEDURE — 80053 COMPREHEN METABOLIC PANEL: CPT | Performed by: FAMILY MEDICINE

## 2024-05-05 PROCEDURE — 96365 THER/PROPH/DIAG IV INF INIT: CPT

## 2024-05-05 PROCEDURE — 71045 X-RAY EXAM CHEST 1 VIEW: CPT

## 2024-05-05 PROCEDURE — 71045 X-RAY EXAM CHEST 1 VIEW: CPT | Performed by: RADIOLOGY

## 2024-05-05 PROCEDURE — 74177 CT ABD & PELVIS W/CONTRAST: CPT | Performed by: STUDENT IN AN ORGANIZED HEALTH CARE EDUCATION/TRAINING PROGRAM

## 2024-05-05 PROCEDURE — G0378 HOSPITAL OBSERVATION PER HR: HCPCS

## 2024-05-05 PROCEDURE — 85379 FIBRIN DEGRADATION QUANT: CPT

## 2024-05-05 PROCEDURE — 2550000001 HC RX 255 CONTRASTS: Performed by: FAMILY MEDICINE

## 2024-05-05 PROCEDURE — 83735 ASSAY OF MAGNESIUM: CPT | Performed by: FAMILY MEDICINE

## 2024-05-05 PROCEDURE — 83690 ASSAY OF LIPASE: CPT | Performed by: FAMILY MEDICINE

## 2024-05-05 PROCEDURE — 84484 ASSAY OF TROPONIN QUANT: CPT | Performed by: FAMILY MEDICINE

## 2024-05-05 PROCEDURE — 81001 URINALYSIS AUTO W/SCOPE: CPT | Performed by: FAMILY MEDICINE

## 2024-05-05 PROCEDURE — 85025 COMPLETE CBC W/AUTO DIFF WBC: CPT | Performed by: FAMILY MEDICINE

## 2024-05-05 PROCEDURE — 96361 HYDRATE IV INFUSION ADD-ON: CPT

## 2024-05-05 PROCEDURE — 99285 EMERGENCY DEPT VISIT HI MDM: CPT | Mod: 25

## 2024-05-05 PROCEDURE — 36415 COLL VENOUS BLD VENIPUNCTURE: CPT | Performed by: FAMILY MEDICINE

## 2024-05-05 PROCEDURE — 85610 PROTHROMBIN TIME: CPT | Performed by: FAMILY MEDICINE

## 2024-05-05 PROCEDURE — 96367 TX/PROPH/DG ADDL SEQ IV INF: CPT

## 2024-05-05 PROCEDURE — 96366 THER/PROPH/DIAG IV INF ADDON: CPT

## 2024-05-05 PROCEDURE — 87086 URINE CULTURE/COLONY COUNT: CPT | Mod: GENLAB | Performed by: FAMILY MEDICINE

## 2024-05-05 PROCEDURE — 2500000004 HC RX 250 GENERAL PHARMACY W/ HCPCS (ALT 636 FOR OP/ED): Performed by: FAMILY MEDICINE

## 2024-05-05 PROCEDURE — 86140 C-REACTIVE PROTEIN: CPT | Performed by: FAMILY MEDICINE

## 2024-05-05 PROCEDURE — 83605 ASSAY OF LACTIC ACID: CPT | Performed by: FAMILY MEDICINE

## 2024-05-05 PROCEDURE — 84100 ASSAY OF PHOSPHORUS: CPT | Performed by: FAMILY MEDICINE

## 2024-05-05 PROCEDURE — 83880 ASSAY OF NATRIURETIC PEPTIDE: CPT | Performed by: FAMILY MEDICINE

## 2024-05-05 RX ORDER — MAGNESIUM SULFATE HEPTAHYDRATE 40 MG/ML
2 INJECTION, SOLUTION INTRAVENOUS ONCE
Status: COMPLETED | OUTPATIENT
Start: 2024-05-05 | End: 2024-05-05

## 2024-05-05 RX ORDER — SUCRALFATE 1 G/1
1 TABLET ORAL 4 TIMES DAILY
Status: DISCONTINUED | OUTPATIENT
Start: 2024-05-05 | End: 2024-05-10 | Stop reason: HOSPADM

## 2024-05-05 RX ORDER — TALC
3 POWDER (GRAM) TOPICAL NIGHTLY PRN
Status: DISCONTINUED | OUTPATIENT
Start: 2024-05-05 | End: 2024-05-06

## 2024-05-05 RX ORDER — LANOLIN ALCOHOL/MO/W.PET/CERES
400 CREAM (GRAM) TOPICAL DAILY
Status: DISCONTINUED | OUTPATIENT
Start: 2024-05-06 | End: 2024-05-08

## 2024-05-05 RX ORDER — ESCITALOPRAM OXALATE 10 MG/1
20 TABLET ORAL NIGHTLY
Status: DISCONTINUED | OUTPATIENT
Start: 2024-05-05 | End: 2024-05-10 | Stop reason: HOSPADM

## 2024-05-05 RX ORDER — LEVOTHYROXINE SODIUM 50 UG/1
50 TABLET ORAL DAILY
Status: DISCONTINUED | OUTPATIENT
Start: 2024-05-06 | End: 2024-05-10 | Stop reason: HOSPADM

## 2024-05-05 RX ORDER — HEPARIN SODIUM 5000 [USP'U]/ML
5000 INJECTION, SOLUTION INTRAVENOUS; SUBCUTANEOUS EVERY 8 HOURS
Status: DISCONTINUED | OUTPATIENT
Start: 2024-05-05 | End: 2024-05-10 | Stop reason: HOSPADM

## 2024-05-05 RX ORDER — PANTOPRAZOLE SODIUM 40 MG/10ML
40 INJECTION, POWDER, LYOPHILIZED, FOR SOLUTION INTRAVENOUS
Status: DISCONTINUED | OUTPATIENT
Start: 2024-05-06 | End: 2024-05-10 | Stop reason: HOSPADM

## 2024-05-05 RX ORDER — BISACODYL 10 MG/1
10 SUPPOSITORY RECTAL DAILY PRN
Status: DISCONTINUED | OUTPATIENT
Start: 2024-05-05 | End: 2024-05-10 | Stop reason: HOSPADM

## 2024-05-05 RX ORDER — DICYCLOMINE HYDROCHLORIDE 10 MG/1
10 CAPSULE ORAL EVERY 6 HOURS PRN
Status: DISCONTINUED | OUTPATIENT
Start: 2024-05-05 | End: 2024-05-07

## 2024-05-05 RX ORDER — BUSPIRONE HYDROCHLORIDE 10 MG/1
20 TABLET ORAL 3 TIMES DAILY
Status: DISCONTINUED | OUTPATIENT
Start: 2024-05-05 | End: 2024-05-10 | Stop reason: HOSPADM

## 2024-05-05 RX ORDER — SODIUM CHLORIDE 9 MG/ML
50 INJECTION, SOLUTION INTRAVENOUS CONTINUOUS
Status: DISCONTINUED | OUTPATIENT
Start: 2024-05-05 | End: 2024-05-10

## 2024-05-05 RX ORDER — PANTOPRAZOLE SODIUM 40 MG/1
40 TABLET, DELAYED RELEASE ORAL DAILY
Status: DISCONTINUED | OUTPATIENT
Start: 2024-05-06 | End: 2024-05-05

## 2024-05-05 RX ORDER — ONDANSETRON HYDROCHLORIDE 2 MG/ML
4 INJECTION, SOLUTION INTRAVENOUS EVERY 8 HOURS PRN
Status: DISCONTINUED | OUTPATIENT
Start: 2024-05-05 | End: 2024-05-10 | Stop reason: HOSPADM

## 2024-05-05 RX ORDER — ACETAMINOPHEN 500 MG
5000 TABLET ORAL DAILY
Status: DISCONTINUED | OUTPATIENT
Start: 2024-05-06 | End: 2024-05-10 | Stop reason: HOSPADM

## 2024-05-05 RX ORDER — POLYETHYLENE GLYCOL 3350 17 G/17G
17 POWDER, FOR SOLUTION ORAL DAILY PRN
Status: DISCONTINUED | OUTPATIENT
Start: 2024-05-05 | End: 2024-05-10 | Stop reason: HOSPADM

## 2024-05-05 RX ORDER — MAGNESIUM HYDROXIDE 2400 MG/10ML
10 SUSPENSION ORAL DAILY PRN
Status: DISCONTINUED | OUTPATIENT
Start: 2024-05-05 | End: 2024-05-10 | Stop reason: HOSPADM

## 2024-05-05 RX ORDER — POTASSIUM CHLORIDE 14.9 MG/ML
20 INJECTION INTRAVENOUS ONCE
Status: COMPLETED | OUTPATIENT
Start: 2024-05-05 | End: 2024-05-05

## 2024-05-05 RX ORDER — ACETAMINOPHEN 325 MG/1
650 TABLET ORAL EVERY 4 HOURS PRN
Status: DISCONTINUED | OUTPATIENT
Start: 2024-05-05 | End: 2024-05-10 | Stop reason: HOSPADM

## 2024-05-05 RX ORDER — METOCLOPRAMIDE HYDROCHLORIDE 5 MG/ML
5 INJECTION INTRAMUSCULAR; INTRAVENOUS EVERY 6 HOURS PRN
Status: DISCONTINUED | OUTPATIENT
Start: 2024-05-05 | End: 2024-05-10 | Stop reason: HOSPADM

## 2024-05-05 RX ORDER — TALC
3 POWDER (GRAM) TOPICAL NIGHTLY PRN
COMMUNITY
Start: 2024-01-26

## 2024-05-05 RX ORDER — LACTULOSE 10 G/15ML
10 SOLUTION ORAL 2 TIMES DAILY PRN
Status: DISCONTINUED | OUTPATIENT
Start: 2024-05-05 | End: 2024-05-10 | Stop reason: HOSPADM

## 2024-05-05 RX ADMIN — SODIUM CHLORIDE 125 ML/HR: 9 INJECTION, SOLUTION INTRAVENOUS at 19:04

## 2024-05-05 RX ADMIN — IOHEXOL 75 ML: 350 INJECTION, SOLUTION INTRAVENOUS at 15:45

## 2024-05-05 RX ADMIN — POTASSIUM CHLORIDE 20 MEQ: 14.9 INJECTION, SOLUTION INTRAVENOUS at 19:04

## 2024-05-05 RX ADMIN — SODIUM CHLORIDE 1000 ML: 9 INJECTION, SOLUTION INTRAVENOUS at 14:49

## 2024-05-05 RX ADMIN — MAGNESIUM SULFATE IN WATER 2 G: 40 INJECTION, SOLUTION INTRAVENOUS at 16:20

## 2024-05-05 SDOH — SOCIAL STABILITY: SOCIAL INSECURITY: DOES ANYONE TRY TO KEEP YOU FROM HAVING/CONTACTING OTHER FRIENDS OR DOING THINGS OUTSIDE YOUR HOME?: NO

## 2024-05-05 SDOH — SOCIAL STABILITY: SOCIAL INSECURITY: WERE YOU ABLE TO COMPLETE ALL THE BEHAVIORAL HEALTH SCREENINGS?: YES

## 2024-05-05 SDOH — SOCIAL STABILITY: SOCIAL INSECURITY: HAVE YOU HAD THOUGHTS OF HARMING ANYONE ELSE?: NO

## 2024-05-05 SDOH — SOCIAL STABILITY: SOCIAL INSECURITY: HAVE YOU HAD ANY THOUGHTS OF HARMING ANYONE ELSE?: NO

## 2024-05-05 SDOH — SOCIAL STABILITY: SOCIAL INSECURITY: DO YOU FEEL UNSAFE GOING BACK TO THE PLACE WHERE YOU ARE LIVING?: NO

## 2024-05-05 SDOH — SOCIAL STABILITY: SOCIAL INSECURITY: ARE THERE ANY APPARENT SIGNS OF INJURIES/BEHAVIORS THAT COULD BE RELATED TO ABUSE/NEGLECT?: NO

## 2024-05-05 SDOH — SOCIAL STABILITY: SOCIAL INSECURITY: ARE YOU OR HAVE YOU BEEN THREATENED OR ABUSED PHYSICALLY, EMOTIONALLY, OR SEXUALLY BY ANYONE?: NO

## 2024-05-05 SDOH — SOCIAL STABILITY: SOCIAL INSECURITY: ABUSE: ADULT

## 2024-05-05 SDOH — SOCIAL STABILITY: SOCIAL INSECURITY: DO YOU FEEL ANYONE HAS EXPLOITED OR TAKEN ADVANTAGE OF YOU FINANCIALLY OR OF YOUR PERSONAL PROPERTY?: NO

## 2024-05-05 SDOH — SOCIAL STABILITY: SOCIAL INSECURITY: HAS ANYONE EVER THREATENED TO HURT YOUR FAMILY OR YOUR PETS?: NO

## 2024-05-05 ASSESSMENT — PAIN - FUNCTIONAL ASSESSMENT
PAIN_FUNCTIONAL_ASSESSMENT: 0-10
PAIN_FUNCTIONAL_ASSESSMENT: 0-10

## 2024-05-05 ASSESSMENT — COGNITIVE AND FUNCTIONAL STATUS - GENERAL
DAILY ACTIVITIY SCORE: 18
DRESSING REGULAR LOWER BODY CLOTHING: A LITTLE
CLIMB 3 TO 5 STEPS WITH RAILING: A LITTLE
HELP NEEDED FOR BATHING: A LITTLE
PERSONAL GROOMING: A LITTLE
DRESSING REGULAR UPPER BODY CLOTHING: A LITTLE
PATIENT BASELINE BEDBOUND: NO
WALKING IN HOSPITAL ROOM: A LITTLE
TURNING FROM BACK TO SIDE WHILE IN FLAT BAD: A LITTLE
STANDING UP FROM CHAIR USING ARMS: A LITTLE
MOVING FROM LYING ON BACK TO SITTING ON SIDE OF FLAT BED WITH BEDRAILS: A LITTLE
EATING MEALS: A LITTLE
MOVING TO AND FROM BED TO CHAIR: A LITTLE
TOILETING: A LITTLE
MOBILITY SCORE: 18

## 2024-05-05 ASSESSMENT — LIFESTYLE VARIABLES
SUBSTANCE_ABUSE_PAST_12_MONTHS: NO
HOW MANY STANDARD DRINKS CONTAINING ALCOHOL DO YOU HAVE ON A TYPICAL DAY: PATIENT DOES NOT DRINK
PRESCIPTION_ABUSE_PAST_12_MONTHS: NO
AUDIT-C TOTAL SCORE: 0
HOW OFTEN DO YOU HAVE A DRINK CONTAINING ALCOHOL: NEVER
HOW OFTEN DO YOU HAVE 6 OR MORE DRINKS ON ONE OCCASION: NEVER
SKIP TO QUESTIONS 9-10: 1
AUDIT-C TOTAL SCORE: 0

## 2024-05-05 ASSESSMENT — ACTIVITIES OF DAILY LIVING (ADL)
BATHING: NEEDS ASSISTANCE
WALKS IN HOME: INDEPENDENT
GROOMING: NEEDS ASSISTANCE
JUDGMENT_ADEQUATE_SAFELY_COMPLETE_DAILY_ACTIVITIES: YES
DRESSING YOURSELF: NEEDS ASSISTANCE
FEEDING YOURSELF: INDEPENDENT
LACK_OF_TRANSPORTATION: NO
HEARING - LEFT EAR: FUNCTIONAL
ADEQUATE_TO_COMPLETE_ADL: YES
HEARING - RIGHT EAR: FUNCTIONAL
PATIENT'S MEMORY ADEQUATE TO SAFELY COMPLETE DAILY ACTIVITIES?: YES
TOILETING: NEEDS ASSISTANCE

## 2024-05-05 ASSESSMENT — PATIENT HEALTH QUESTIONNAIRE - PHQ9
SUM OF ALL RESPONSES TO PHQ9 QUESTIONS 1 & 2: 0
2. FEELING DOWN, DEPRESSED OR HOPELESS: NOT AT ALL
1. LITTLE INTEREST OR PLEASURE IN DOING THINGS: NOT AT ALL

## 2024-05-05 ASSESSMENT — PAIN SCALES - GENERAL
PAINLEVEL_OUTOF10: 0 - NO PAIN
PAINLEVEL_OUTOF10: 0 - NO PAIN

## 2024-05-05 NOTE — ED PROVIDER NOTES
HPI   Chief Complaint   Patient presents with    Weakness, Gen     Weakness and diarrhea for 2 days. States she usually has to take laxatives to have a bowel movement but for some reason she has been having a lot of diarrhea .        HPI  This 78-year-old female patient came to the emergency room complaining of diarrhea for last 2 to 3 days and she has been having runny stools and diarrhea up to 12 times a day.  She has a prior history of constipation and normally she is constipated but lately she has been any diarrhea.  However she has history of chronic recurrent GI problems she had colon resection in about 5 years ago at [facility] when she was noted to have what she describes dilated malfunction:.  After colon resection she was told she should not have problems with constipation she has history of chronic constipation and was using laxative causing her colon mobility disorder required resection per patient description.  However she is still still having constipation and for last few days she been having diarrhea.  She also complained of old blood per rectally last blood per rectally was about 3 days ago.  She is scheduled to be seen by gastroenterologist next week.  She complains of feeling chills but denies any fever.  She admitted to some shortness of breath and for generalized fatigue and weakness.  After colon resection and chronic GI disorder she has lost about 27 pounds.  She denies any chest pain admitted with some cramping abdominal pain relieved with her diarrhea.  She is not using antibiotic.  Denies history of C. difficile.    Family history: Reviewed  Social history: Reviewed, denies substance abuse.  Review of system: 10 review of system obtained review of system as In HPI otherwise negative.    Past medical history reviewed.        No data recorded                   Patient History   Past Medical History:   Diagnosis Date    Abdominal pain     Anxiety     Cat bite 03/11/2023    Cataract     Depression      Disease of thyroid gland     GERD (gastroesophageal reflux disease)     GERD (gastroesophageal reflux disease)     Hypothyroidism     Irritable bowel syndrome     Panic attacks     Personal history of malignant neoplasm of breast 2021    History of malignant neoplasm of breast    Personal history of other complications of pregnancy, childbirth and the puerperium     History of spontaneous      Past Surgical History:   Procedure Laterality Date    BREAST SURGERY      CATARACT EXTRACTION      COLON SURGERY      CT ABDOMEN ANGIOGRAM W AND/OR WO IV CONTRAST  2023    CT ABDOMEN ANGIOGRAM W AND/OR WO IV CONTRAST 2023 CON CT    GASTRIC BYPASS      HYSTERECTOMY      MASTECTOMY, PARTIAL Left     OTHER SURGICAL HISTORY  2021    Varicose vein ligation     Family History   Problem Relation Name Age of Onset    Osteoporosis Mother      Alcohol abuse Father      Cancer Sister          breast    Osteoporosis Sister      Cancer Brother          colon. prostate    Hypothyroidism Other       Social History     Tobacco Use    Smoking status: Never     Passive exposure: Never    Smokeless tobacco: Never   Vaping Use    Vaping status: Never Used   Substance Use Topics    Alcohol use: Never    Drug use: Never   Sinus bradycardia with PVCs.  Rate of 52.  Left anterior fascicular block.  Anterior infarct age undetermined.  No ST-T wave elevation.  No STEMI.  Abnormal EKG MD at this EKG.    Physical Exam   ED Triage Vitals [24 1348]   Temperature Heart Rate Respirations BP   37.1 °C (98.7 °F) 58 16 116/73      Pulse Ox Temp Source Heart Rate Source Patient Position   96 % Temporal Monitor Sitting      BP Location FiO2 (%)     Left arm --       Physical Exam    CONSTITUTIONAL: Very thin petite female patient who was fully awake and alert talking breathing comfortably no tachypnea hypoxemia respite staff noted.  Gives good history.  However she overall overall looks thin and possibly a loss of weight  as described  Patient during history.  No tachypnea hypoxemia or respiratory distress noted she did have multiple bowel movement according to his description and she just came back from the bathroom after bowel movement.HENMT: The airway was intact. There was no ear or nose discharge. No drooling or stridor. Neck was supple. Talking and breathing comfortably.  EYES: Clear bilaterally, pupils equal, round and reactive to light. CARDIOVASCULAR: Regular rate and rhythm. No friction rub or murmur good peripheral pulses no peripheral edema. Calf muscle nontender, Homans' sign negative bilaterally intact distal pulse intact sensation.  Cap refill less than 2 seconds.. RESPIRATORY: Patient was breathing comfortably. No tachypnea no respiratory distress.  She was moving air well no tachypnea hypoxemia respiratory distress noted no rales or rhonchi's with some crackles at lung bases though.  Breathing without acute distress.GASTROINTESTINAL: Abdomen nondistended soft flat nonobese positive bowel sounds mild diffuse abdominal discomfort without any guarding, rebound or rigidity.  No CVA tenderness noted.      GENITOURINARY:  No costovertebral angle tenderness. MUSCULOSKELETAL: Head was normocephalic atraumatic cervical thoracic lumbar spine nontender.  Chest was nontender  Both upper extremity good motion nontender intact distal pulse intact sensation. NEUROLOGICAL: Awake alert pleasant and cooperative. No motor or sensory deficit no arms selective noted. Intact neurovascular function and motor function. No facial drooping or drooling. Talking and breathing comfortably.  Cranial nerves II to XII grossly intact. No arms selective noted. No nystagmus.  SKIN: Skin normal color for race, warm, dry and intact. No evidence of trauma or lesions. PSYCHIATRIC: Awake alert and without acute distress. No obvious depression, no suicidal thoughts or ideation. Appropriate mood. Talking and normal tone. HEME/LYMPH: No adenopathy or  splenomegaly.        CRITICAL CARE    VITAL SIGNS:            DISPOSITION      ED Course & MDM   Diagnoses as of 05/05/24 1929   Dehydration   Acute gastroenteritis   Bradycardia   Hypokalemia       Medical Decision Making      Procedure  Procedures     Madison Oliver MD  05/05/24 1929

## 2024-05-06 ENCOUNTER — APPOINTMENT (OUTPATIENT)
Dept: CARDIOLOGY | Facility: HOSPITAL | Age: 79
DRG: 391 | End: 2024-05-06
Payer: MEDICARE

## 2024-05-06 PROBLEM — E83.42 HYPOMAGNESEMIA: Status: ACTIVE | Noted: 2024-05-06

## 2024-05-06 PROBLEM — E55.9 VITAMIN D DEFICIENCY: Status: ACTIVE | Noted: 2024-05-06

## 2024-05-06 PROBLEM — K21.9 GERD (GASTROESOPHAGEAL REFLUX DISEASE): Status: ACTIVE | Noted: 2024-05-06

## 2024-05-06 PROBLEM — E87.1 HYPONATREMIA: Status: ACTIVE | Noted: 2024-05-06

## 2024-05-06 PROBLEM — J44.9 COPD (CHRONIC OBSTRUCTIVE PULMONARY DISEASE) (MULTI): Status: ACTIVE | Noted: 2024-05-06

## 2024-05-06 PROBLEM — N30.00 ACUTE CYSTITIS: Status: ACTIVE | Noted: 2024-05-06

## 2024-05-06 PROBLEM — E86.0 DEHYDRATION: Status: ACTIVE | Noted: 2024-05-06

## 2024-05-06 PROBLEM — R00.1 BRADYCARDIA: Status: ACTIVE | Noted: 2024-05-06

## 2024-05-06 PROBLEM — R53.1 WEAKNESS: Status: ACTIVE | Noted: 2024-05-06

## 2024-05-06 LAB
25(OH)D3 SERPL-MCNC: 101 NG/ML (ref 30–100)
ANION GAP SERPL CALC-SCNC: 10 MMOL/L (ref 10–20)
BUN SERPL-MCNC: 6 MG/DL (ref 6–23)
C COLI+JEJ+UPSA DNA STL QL NAA+PROBE: NOT DETECTED
C DIF TOX TCDA+TCDB STL QL NAA+PROBE: NOT DETECTED
CALCIUM SERPL-MCNC: 8.3 MG/DL (ref 8.6–10.3)
CHLORIDE SERPL-SCNC: 108 MMOL/L (ref 98–107)
CO2 SERPL-SCNC: 23 MMOL/L (ref 21–32)
CREAT SERPL-MCNC: 0.77 MG/DL (ref 0.5–1.05)
EC STX1 GENE STL QL NAA+PROBE: NOT DETECTED
EC STX2 GENE STL QL NAA+PROBE: NOT DETECTED
EGFRCR SERPLBLD CKD-EPI 2021: 80 ML/MIN/1.73M*2
ERYTHROCYTE [DISTWIDTH] IN BLOOD BY AUTOMATED COUNT: 12.3 % (ref 11.5–14.5)
GLUCOSE SERPL-MCNC: 110 MG/DL (ref 74–99)
HCT VFR BLD AUTO: 33.9 % (ref 36–46)
HEMOCCULT SP1 STL QL: NEGATIVE
HGB BLD-MCNC: 11.1 G/DL (ref 12–16)
HOLD SPECIMEN: NORMAL
HOLD SPECIMEN: NORMAL
MAGNESIUM SERPL-MCNC: 1.69 MG/DL (ref 1.6–2.4)
MCH RBC QN AUTO: 29.1 PG (ref 26–34)
MCHC RBC AUTO-ENTMCNC: 32.7 G/DL (ref 32–36)
MCV RBC AUTO: 89 FL (ref 80–100)
NOROVIRUS GI + GII RNA STL NAA+PROBE: NOT DETECTED
NRBC BLD-RTO: 0 /100 WBCS (ref 0–0)
PLATELET # BLD AUTO: 230 X10*3/UL (ref 150–450)
POTASSIUM SERPL-SCNC: 3.2 MMOL/L (ref 3.5–5.3)
RBC # BLD AUTO: 3.81 X10*6/UL (ref 4–5.2)
RV RNA STL NAA+PROBE: NOT DETECTED
SALMONELLA DNA STL QL NAA+PROBE: NOT DETECTED
SHIGELLA DNA SPEC QL NAA+PROBE: NOT DETECTED
SODIUM SERPL-SCNC: 138 MMOL/L (ref 136–145)
TSH SERPL-ACNC: 1.83 MIU/L (ref 0.44–3.98)
V CHOLERAE DNA STL QL NAA+PROBE: NOT DETECTED
WBC # BLD AUTO: 5.3 X10*3/UL (ref 4.4–11.3)
Y ENTEROCOL DNA STL QL NAA+PROBE: NOT DETECTED

## 2024-05-06 PROCEDURE — 99222 1ST HOSP IP/OBS MODERATE 55: CPT | Performed by: NURSE PRACTITIONER

## 2024-05-06 PROCEDURE — 97161 PT EVAL LOW COMPLEX 20 MIN: CPT | Mod: GP,IPSPLIT | Performed by: PHYSICAL THERAPIST

## 2024-05-06 PROCEDURE — 96372 THER/PROPH/DIAG INJ SC/IM: CPT

## 2024-05-06 PROCEDURE — 93306 TTE W/DOPPLER COMPLETE: CPT | Mod: IPSPLIT

## 2024-05-06 PROCEDURE — 94640 AIRWAY INHALATION TREATMENT: CPT | Mod: IPSPLIT

## 2024-05-06 PROCEDURE — 94760 N-INVAS EAR/PLS OXIMETRY 1: CPT | Mod: IPSPLIT

## 2024-05-06 PROCEDURE — 2500000001 HC RX 250 WO HCPCS SELF ADMINISTERED DRUGS (ALT 637 FOR MEDICARE OP)

## 2024-05-06 PROCEDURE — 94762 N-INVAS EAR/PLS OXIMTRY CONT: CPT | Mod: IPSPLIT

## 2024-05-06 PROCEDURE — 1200000002 HC GENERAL ROOM WITH TELEMETRY DAILY: Mod: IPSPLIT

## 2024-05-06 PROCEDURE — 2500000006 HC RX 250 W HCPCS SELF ADMINISTERED DRUGS (ALT 637 FOR ALL PAYERS)

## 2024-05-06 PROCEDURE — 93306 TTE W/DOPPLER COMPLETE: CPT | Performed by: INTERNAL MEDICINE

## 2024-05-06 PROCEDURE — 9420000001 HC RT PATIENT EDUCATION 5 MIN: Mod: IPSPLIT

## 2024-05-06 PROCEDURE — 97165 OT EVAL LOW COMPLEX 30 MIN: CPT | Mod: GO | Performed by: OCCUPATIONAL THERAPIST

## 2024-05-06 PROCEDURE — 2500000005 HC RX 250 GENERAL PHARMACY W/O HCPCS

## 2024-05-06 PROCEDURE — 36415 COLL VENOUS BLD VENIPUNCTURE: CPT

## 2024-05-06 PROCEDURE — 87506 IADNA-DNA/RNA PROBE TQ 6-11: CPT | Mod: GENLAB

## 2024-05-06 PROCEDURE — 82270 OCCULT BLOOD FECES: CPT | Mod: IPSPLIT

## 2024-05-06 PROCEDURE — 84443 ASSAY THYROID STIM HORMONE: CPT | Mod: IPSPLIT

## 2024-05-06 PROCEDURE — 85027 COMPLETE CBC AUTOMATED: CPT

## 2024-05-06 PROCEDURE — 2500000004 HC RX 250 GENERAL PHARMACY W/ HCPCS (ALT 636 FOR OP/ED): Mod: IPSPLIT

## 2024-05-06 PROCEDURE — 87493 C DIFF AMPLIFIED PROBE: CPT | Mod: 59,GENLAB

## 2024-05-06 PROCEDURE — C9113 INJ PANTOPRAZOLE SODIUM, VIA: HCPCS

## 2024-05-06 PROCEDURE — 83735 ASSAY OF MAGNESIUM: CPT

## 2024-05-06 PROCEDURE — 2500000001 HC RX 250 WO HCPCS SELF ADMINISTERED DRUGS (ALT 637 FOR MEDICARE OP): Mod: IPSPLIT

## 2024-05-06 PROCEDURE — 2500000004 HC RX 250 GENERAL PHARMACY W/ HCPCS (ALT 636 FOR OP/ED): Mod: JZ

## 2024-05-06 PROCEDURE — 2500000004 HC RX 250 GENERAL PHARMACY W/ HCPCS (ALT 636 FOR OP/ED)

## 2024-05-06 PROCEDURE — 80048 BASIC METABOLIC PNL TOTAL CA: CPT

## 2024-05-06 PROCEDURE — 2500000002 HC RX 250 W HCPCS SELF ADMINISTERED DRUGS (ALT 637 FOR MEDICARE OP, ALT 636 FOR OP/ED): Mod: MUE

## 2024-05-06 PROCEDURE — 99222 1ST HOSP IP/OBS MODERATE 55: CPT

## 2024-05-06 PROCEDURE — 82306 VITAMIN D 25 HYDROXY: CPT | Mod: GENLAB

## 2024-05-06 RX ORDER — FORMOTEROL FUMARATE DIHYDRATE 20 UG/2ML
20 SOLUTION RESPIRATORY (INHALATION)
Status: DISCONTINUED | OUTPATIENT
Start: 2024-05-06 | End: 2024-05-10 | Stop reason: HOSPADM

## 2024-05-06 RX ORDER — METRONIDAZOLE 500 MG/100ML
500 INJECTION, SOLUTION INTRAVENOUS EVERY 8 HOURS
Status: DISCONTINUED | OUTPATIENT
Start: 2024-05-06 | End: 2024-05-07

## 2024-05-06 RX ORDER — TALC
6 POWDER (GRAM) TOPICAL NIGHTLY PRN
Status: DISCONTINUED | OUTPATIENT
Start: 2024-05-06 | End: 2024-05-10 | Stop reason: HOSPADM

## 2024-05-06 RX ORDER — CEFTRIAXONE 1 G/50ML
1 INJECTION, SOLUTION INTRAVENOUS EVERY 24 HOURS
Status: DISCONTINUED | OUTPATIENT
Start: 2024-05-06 | End: 2024-05-07

## 2024-05-06 RX ORDER — POTASSIUM CHLORIDE 20 MEQ/1
40 TABLET, EXTENDED RELEASE ORAL ONCE
Status: COMPLETED | OUTPATIENT
Start: 2024-05-06 | End: 2024-05-06

## 2024-05-06 RX ORDER — SODIUM CHLORIDE 9 MG/ML
10 INJECTION, SOLUTION INTRAVENOUS CONTINUOUS PRN
Status: DISCONTINUED | OUTPATIENT
Start: 2024-05-06 | End: 2024-05-10 | Stop reason: HOSPADM

## 2024-05-06 RX ADMIN — CEFTRIAXONE 1 G: 1 INJECTION, SOLUTION INTRAVENOUS at 06:15

## 2024-05-06 RX ADMIN — Medication 400 MG: at 09:41

## 2024-05-06 RX ADMIN — ESCITALOPRAM OXALATE 20 MG: 10 TABLET ORAL at 20:49

## 2024-05-06 RX ADMIN — CHOLECALCIFEROL TAB 125 MCG (5000 UNIT) 5000 UNITS: 125 TAB at 09:41

## 2024-05-06 RX ADMIN — SUCRALFATE 1 G: 1 TABLET ORAL at 20:48

## 2024-05-06 RX ADMIN — POTASSIUM CHLORIDE 40 MEQ: 1500 TABLET, EXTENDED RELEASE ORAL at 09:40

## 2024-05-06 RX ADMIN — SUCRALFATE 1 G: 1 TABLET ORAL at 16:59

## 2024-05-06 RX ADMIN — SODIUM CHLORIDE 125 ML/HR: 9 INJECTION, SOLUTION INTRAVENOUS at 22:59

## 2024-05-06 RX ADMIN — HEPARIN SODIUM 5000 UNITS: 5000 INJECTION INTRAVENOUS; SUBCUTANEOUS at 12:51

## 2024-05-06 RX ADMIN — PANTOPRAZOLE SODIUM 40 MG: 40 INJECTION, POWDER, LYOPHILIZED, FOR SOLUTION INTRAVENOUS at 06:14

## 2024-05-06 RX ADMIN — HEPARIN SODIUM 5000 UNITS: 5000 INJECTION INTRAVENOUS; SUBCUTANEOUS at 00:30

## 2024-05-06 RX ADMIN — BUSPIRONE HYDROCHLORIDE 20 MG: 10 TABLET ORAL at 20:48

## 2024-05-06 RX ADMIN — SODIUM CHLORIDE 125 ML/HR: 9 INJECTION, SOLUTION INTRAVENOUS at 12:52

## 2024-05-06 RX ADMIN — LEVOTHYROXINE SODIUM 50 MCG: 0.05 TABLET ORAL at 09:41

## 2024-05-06 RX ADMIN — TRAZODONE HYDROCHLORIDE 150 MG: 100 TABLET ORAL at 20:48

## 2024-05-06 RX ADMIN — Medication 6 MG: at 02:24

## 2024-05-06 RX ADMIN — SUCRALFATE 1 G: 1 TABLET ORAL at 00:29

## 2024-05-06 RX ADMIN — ESCITALOPRAM OXALATE 20 MG: 10 TABLET ORAL at 00:29

## 2024-05-06 RX ADMIN — FORMOTEROL FUMARATE DIHYDRATE 20 MCG: 20 SOLUTION RESPIRATORY (INHALATION) at 09:39

## 2024-05-06 RX ADMIN — METRONIDAZOLE 500 MG: 500 INJECTION, SOLUTION INTRAVENOUS at 16:07

## 2024-05-06 RX ADMIN — Medication 2 L/MIN: at 20:00

## 2024-05-06 RX ADMIN — HEPARIN SODIUM 5000 UNITS: 5000 INJECTION INTRAVENOUS; SUBCUTANEOUS at 20:48

## 2024-05-06 RX ADMIN — SUCRALFATE 1 G: 1 TABLET ORAL at 12:51

## 2024-05-06 RX ADMIN — HEPARIN SODIUM 5000 UNITS: 5000 INJECTION INTRAVENOUS; SUBCUTANEOUS at 09:40

## 2024-05-06 RX ADMIN — SODIUM CHLORIDE 125 ML/HR: 9 INJECTION, SOLUTION INTRAVENOUS at 02:28

## 2024-05-06 RX ADMIN — METRONIDAZOLE 500 MG: 500 INJECTION, SOLUTION INTRAVENOUS at 09:41

## 2024-05-06 RX ADMIN — METRONIDAZOLE 500 MG: 500 INJECTION, SOLUTION INTRAVENOUS at 23:00

## 2024-05-06 RX ADMIN — BUSPIRONE HYDROCHLORIDE 20 MG: 10 TABLET ORAL at 14:33

## 2024-05-06 ASSESSMENT — COGNITIVE AND FUNCTIONAL STATUS - GENERAL
HELP NEEDED FOR BATHING: A LITTLE
PERSONAL GROOMING: A LITTLE
MOBILITY SCORE: 24
TOILETING: A LITTLE
HELP NEEDED FOR BATHING: A LITTLE
PERSONAL GROOMING: A LITTLE
DRESSING REGULAR UPPER BODY CLOTHING: A LITTLE
DAILY ACTIVITIY SCORE: 19
MOBILITY SCORE: 24
DRESSING REGULAR UPPER BODY CLOTHING: A LITTLE
DRESSING REGULAR LOWER BODY CLOTHING: A LITTLE
DRESSING REGULAR LOWER BODY CLOTHING: A LITTLE
TOILETING: A LITTLE
DAILY ACTIVITIY SCORE: 19

## 2024-05-06 ASSESSMENT — PAIN - FUNCTIONAL ASSESSMENT
PAIN_FUNCTIONAL_ASSESSMENT: 0-10

## 2024-05-06 ASSESSMENT — ENCOUNTER SYMPTOMS
NERVOUS/ANXIOUS: 1
FATIGUE: 1
ENDOCRINE NEGATIVE: 1
WEAKNESS: 1
MUSCULOSKELETAL NEGATIVE: 1
ACTIVITY CHANGE: 1
DIARRHEA: 1
CHILLS: 1
HEMATOLOGIC/LYMPHATIC NEGATIVE: 1
CARDIOVASCULAR NEGATIVE: 1
ROS GI COMMENTS: SEE HPI
ALLERGIC/IMMUNOLOGIC NEGATIVE: 1
EYES NEGATIVE: 1
SHORTNESS OF BREATH: 1

## 2024-05-06 ASSESSMENT — PAIN SCALES - GENERAL
PAINLEVEL_OUTOF10: 0 - NO PAIN

## 2024-05-06 ASSESSMENT — ACTIVITIES OF DAILY LIVING (ADL)
ADL_ASSISTANCE: INDEPENDENT
ADL_ASSISTANCE: INDEPENDENT

## 2024-05-06 NOTE — NURSING NOTE
Received hand off report from VIRIDIANA Pearce. Pt arrived to this unit, pt noted with Resp of 36, pt noted to be SOB, placed on 2 L Nasal cannula at this time, Respiratory therapist made aware. PT oriented to room,call light in reach, bed alarm in place. Admission assessment initiated. Will continue to monitor.  @0629 Pt noted resting in bed at this time. Pt showing no s.s of distress. Pt started on IV ATB Ceftriaxone at this time. PT safety measurers in place, Will endorse to oncoming nurse to continue to monitor.

## 2024-05-06 NOTE — CONSULTS
Cardiology Consult Note  Patient: Latha Quispe  Unit/Bed: 230/230-A  YOB: 1945    Admitting Diagnosis: Dehydration [E86.0]  Acute gastroenteritis [K52.9]  Hypokalemia [E87.6]  Bradycardia [R00.1]  Diarrhea [R19.7]  Date:  5/5/2024  Hospital Day: 0  Attending: Cardiology consulting at the request of  Javier Donnelly MD      Chief Complaint   Patient presents with    Weakness, Gen     Weakness and diarrhea for 2 days. States she usually has to take laxatives to have a bowel movement but for some reason she has been having a lot of diarrhea .         History of Present Illness:  77 yo women presents to [facility] ED with complaints of weakness and diarrhea x2 days. History of laxative use, however BMs much more than usual. Cardiology consulted for asymptomatic bradycardia over night.     SUBJECTIVE:   Patient and  at the bedside. No known cardiac history. Her normal resting HR is low 50s. She denies LH, dizziness, palpitations or chest pain. Notes she has never had any problems with bradycardia. Telemetry did not show any acute bradyarrhythmia.     Past medical history significant for  GERD, hypothyroidism, IBS, panic attacks, H/o Breast CA, H/o colon resection, chronic GI issues.       Allergies:  Allergies   Allergen Reactions    Nsaids (Non-Steroidal Anti-Inflammatory Drug) Unknown      Home Medication:  Medications Prior to Admission   Medication Sig Dispense Refill Last Dose    busPIRone (Buspar) 10 mg tablet Take 2 tablets (20 mg) by mouth 3 times a day.   5/5/2024    cholecalciferol (Vitamin D3) 50 mcg (2,000 unit) capsule Take 1 capsule (50 mcg) by mouth once daily.   5/5/2024    dicyclomine (Bentyl) 10 mg capsule Take 1 capsule (10 mg) by mouth every 6 hours if needed (abdominal pain or cramps). 120 capsule 3 5/5/2024    escitalopram (Lexapro) 20 mg tablet Take 1 tablet (20 mg) by mouth once daily at bedtime.   5/4/2024    lactulose 20 gram/30 mL oral solution Take 15 mL (10 g) by  mouth 2 times a day as needed (Constipation). 473 mL 1 Past Week    levothyroxine (Synthroid, Levoxyl) 50 mcg tablet Take 1 tablet (50 mcg) by mouth once daily. 90 tablet 3 2024    magnesium oxide (Mag-Ox) 400 mg tablet Take by mouth.   2024    melatonin 3 mg tablet Take 1 tablet (3 mg) by mouth as needed at bedtime.       ondansetron ODT (Zofran-ODT) 4 mg disintegrating tablet Take 1 tablet (4 mg) by mouth every 12 hours if needed for nausea or vomiting. 20 tablet 0 2024    pantoprazole (ProtoNix) 40 mg EC tablet Take 1 tablet (40 mg) by mouth once daily. 90 tablet 3 2024    POTASSIUM ACETATE, BULK, MISC Take by mouth.   2024    sucralfate (Carafate) 1 gram tablet Take 1 tablet (1 g) by mouth 4 times a day. Before meals and at bedtime 120 tablet 11 2024    traZODone (Desyrel) 150 mg tablet Take 1 tablet (150 mg) by mouth once daily at bedtime.   Past Week      PMHx:  Past Medical History:   Diagnosis Date    Abdominal pain     Anxiety     Cat bite 2023    Cataract     Depression     Disease of thyroid gland     GERD (gastroesophageal reflux disease)     GERD (gastroesophageal reflux disease)     Hypothyroidism     Irritable bowel syndrome     Panic attacks     Personal history of malignant neoplasm of breast 2021    History of malignant neoplasm of breast    Personal history of other complications of pregnancy, childbirth and the puerperium     History of spontaneous        PSHx:  Past Surgical History:   Procedure Laterality Date    BREAST SURGERY      CATARACT EXTRACTION      COLON SURGERY      CT ABDOMEN ANGIOGRAM W AND/OR WO IV CONTRAST  2023    CT ABDOMEN ANGIOGRAM W AND/OR WO IV CONTRAST 2023 CON CT    GASTRIC BYPASS      HYSTERECTOMY      MASTECTOMY, PARTIAL Left     OTHER SURGICAL HISTORY  2021    Varicose vein ligation       Social Hx:  Social History     Socioeconomic History    Marital status:      Spouse name: Not on file    Number of  children: Not on file    Years of education: Not on file    Highest education level: Not on file   Occupational History    Not on file   Tobacco Use    Smoking status: Never     Passive exposure: Never    Smokeless tobacco: Never   Vaping Use    Vaping status: Never Used   Substance and Sexual Activity    Alcohol use: Never    Drug use: Never    Sexual activity: Defer   Other Topics Concern    Not on file   Social History Narrative    Not on file     Social Determinants of Health     Financial Resource Strain: Low Risk  (5/5/2024)    Overall Financial Resource Strain (CARDIA)     Difficulty of Paying Living Expenses: Not hard at all   Food Insecurity: No Food Insecurity (1/25/2024)    Hunger Vital Sign     Worried About Running Out of Food in the Last Year: Never true     Ran Out of Food in the Last Year: Never true   Transportation Needs: No Transportation Needs (5/5/2024)    PRAPARE - Transportation     Lack of Transportation (Medical): No     Lack of Transportation (Non-Medical): No   Physical Activity: Not on file   Stress: No Stress Concern Present (1/25/2024)    [place] [employer] - Occupational Stress Questionnaire     Feeling of Stress : Not at all   Social Connections: Socially Isolated (1/25/2024)    Social Connection and Isolation Panel [NHANES]     Frequency of Communication with Friends and Family: Never     Frequency of Social Gatherings with Friends and Family: Once a week     Attends [employer] Services: Never     Active Member of Clubs or Organizations: No     Attends Club or Organization Meetings: Never     Marital Status:    Intimate Partner Violence: Not At Risk (1/25/2024)    Humiliation, Afraid, Rape, and Kick questionnaire     Fear of Current or Ex-Partner: No     Emotionally Abused: No     Physically Abused: No     Sexually Abused: No   Housing Stability: Low Risk  (5/5/2024)    Housing Stability Vital Sign     Unable to Pay for Housing in the Last Year: No      Number of Places Lived in the Last Year: 1     Unstable Housing in the Last Year: No       Family Hx:  Family History   Problem Relation Name Age of Onset    Osteoporosis Mother      Alcohol abuse Father      Cancer Sister          breast    Osteoporosis Sister      Cancer Brother          colon. prostate    Hypothyroidism Other         Review of Systems:   12 system review of symptoms is negative except as noted above          OBJECTIVE  Vitals:   Visit Vitals  /59 (BP Location: Right arm, Patient Position: Lying)   Pulse (!) 45   Temp 36.8 °C (98.2 °F) (Temporal)   Resp 19        Wt Readings from Last 5 Encounters:   05/05/24 (!) 38.8 kg (85 lb 8.6 oz)   04/09/24 (!) 38.8 kg (85 lb 8 oz)   03/14/24 (!) 38.4 kg (84 lb 9.6 oz)   01/26/24 (!) 40.9 kg (90 lb 2.7 oz)   01/02/24 (!) 39 kg (85 lb 15.7 oz)       Intake / Output:   I/O last 3 completed shifts:  In: 1512.5 (39 mL/kg) [I.V.:1512.5 (39 mL/kg)]  Out: - (0 mL/kg)   Weight: 38.8 kg      Tele monitoring: SR-SB 50s    Physical Examination:    Vitals reviewed.   Constitutional:       General: Awake.      Appearance: Well-developed, underweight and not in distress. Frail. Ill-appearing.   Eyes:      General: Lids are normal.      Pupils: Pupils are equal, round, and reactive to light.   HENT:      Right Ear: External ear normal.      Left Ear: External ear normal.      Nose: Nose normal.    Mouth/Throat:      Lips: Pink.      Mouth: Mucous membranes are moist.   Neck:      Vascular: JVD normal.   Pulmonary:      Breath sounds: Normal air entry.   Cardiovascular:      PMI at left midclavicular line. Normal rate. Regular rhythm. Normal S1. Normal S2.       Murmurs: There is no murmur.   Edema:     Peripheral edema absent.   Abdominal:      General: Bowel sounds are normal.      Palpations: Abdomen is soft.      Tenderness: There is no abdominal tenderness.   Musculoskeletal: Normal range of motion.      Cervical back: Full passive range of motion without pain,  "normal range of motion and neck supple. Skin:     General: Skin is warm and dry.   Neurological:      General: No focal deficit present.      Mental Status: Alert, oriented to person, place, and time and oriented to person, place and time. Mental status is at baseline.   Psychiatric:         Attention and Perception: Attention normal.         Mood and Affect: Mood normal.         Speech: Speech normal.         Behavior: Behavior is cooperative.            LABS:  CMP:   Recent Labs     05/06/24  0555 05/05/24  1444 01/27/24  0840 01/26/24  0714 01/25/24 1957 11/30/23  0938 07/08/23 2152 07/13/22  0952 07/13/21  1217 04/26/21 2009    132* 134* 137 135* 140 132* 141 141 134*   K 3.2* 3.2* 3.7 3.7 2.8* 3.3* 3.8 3.5 3.5 3.1*   * 99 104 108* 103 103 99 104 103 104   CO2 23 24 21 22 22 27 22 31 30 17*   ANIONGAP 10 12 13 11 13 13 15 10 12 16   BUN 6 7 9 12 17 8 11 11 8 15   CREATININE 0.77 0.87 0.71 0.81 1.00 0.91 1.00 0.93 0.78 0.70   EGFR 80 69 88 75 58* 65  --   --   --   --    MG 1.69 1.54*  --  1.65 1.20*  --   --   --   --   --      LIVER ENZYMES:   Recent Labs     05/05/24  1444 01/25/24 1957 11/30/23  0938 07/08/23 2152 07/13/22  0952 04/26/21 2009   ALBUMIN 3.8 4.1 3.9 4.3 4.2 4.2   ALT 10 13 14 10 7 13   AST 17 21 21 23 16 22   BILITOT 0.6 0.4 0.5 0.5 0.4 0.8   LIPASE 10 41  --  18  --  11     CBC:  Recent Labs     05/06/24  0555 05/05/24  1444 01/27/24  0840 01/26/24  0714 01/25/24 1957 11/30/23  0938 07/08/23 2152 07/13/22  0952   WBC 5.3 5.0 4.5 5.8 6.0 4.6 7.5 5.8   HGB 11.1* 11.9* 11.5* 11.3* 12.4 12.9 12.4 13.4   HCT 33.9* 36.2 36.5 34.5* 38.2 41.8 39.1 42.5    257 207 229 228 267 252 212   MCV 89 88 93 90 90 95 94 100     COAG:   Recent Labs     05/05/24  1444 04/26/21 2009   INR 1.2* 1.2*     ABO: No results for input(s): \"ABO\" in the last 42457 hours.  HEME/ENDO:  Recent Labs     05/06/24  0555 04/26/24  0932   TSH 1.83 2.62      CARDIAC:   Recent Labs     05/05/24  1625 " "05/05/24  1444   [facility] 8 7   BNP  --  61       Lipid Panel:  No results found for: \"HDL\", \"CHHDL\", \"VLDL\", \"TRIG\", \"NHDL\"       Current Medications:   MEDICATIONS  Infusions:  sodium chloride 0.9%, Last Rate: 125 mL/hr (05/06/24 1337)  sodium chloride 0.9%      Scheduled:  busPIRone, 20 mg, TID  cefTRIAXone, 1 g, q24h  cholecalciferol, 5,000 Units, Daily  escitalopram, 20 mg, Nightly  formoterol, 20 mcg, q12h  heparin (porcine), 5,000 Units, q8h  levothyroxine, 50 mcg, Daily  magnesium oxide, 400 mg, Daily  metroNIDAZOLE, 500 mg, q8h  oxygen, , Continuous - Inhalation  pantoprazole, 40 mg, Daily before breakfast  sucralfate, 1 g, 4x daily  traZODone, 150 mg, Nightly      PRN:  acetaminophen, 650 mg, q4h PRN  bisacodyl, 10 mg, Daily PRN  dicyclomine, 10 mg, q6h PRN  lactulose, 10 g, BID PRN  magnesium hydroxide, 10 mL, Daily PRN  melatonin, 6 mg, Nightly PRN  metoclopramide, 5 mg, q6h PRN  ondansetron, 4 mg, q8h PRN  oxygen, , Continuous PRN - O2/gases  polyethylene glycol, 17 g, Daily PRN  sodium chloride 0.9%, 10 mL/hr, Continuous PRN          LAST IMAGING RESULTS   CT abdomen pelvis w IV contrast  Narrative: Interpreted By:  Talia Cancino,   STUDY:  CT ABDOMEN PELVIS W IV CONTRAST;  5/5/2024 3:43 pm      INDICATION:  Signs/Symptoms:Diarrhea weakness abdominal pain history of prior  colon surgery feels dehydrated.      COMPARISON:  CT abdomen pelvis with contrast dated 01/25/2024      ACCESSION NUMBER(S):  [id number]      ORDERING CLINICIAN:  KRISTIAN CASTRO      TECHNIQUE:  CT of the abdomen and pelvis was performed after administration of  intravenous contrast. Standard contiguous axial images were obtained  at 3 mm slice thickness through the abdomen and pelvis. Coronal and  sagittal reconstructions at 3 mm slice thickness were performed.      75 ml of contrast 350 mg iohexol were administered intravenously  without immediate complication.      FINDINGS:  LOWER CHEST:  The visualized lung base is " unremarkable. The heart is normal in size  without pericardial effusion. No pleural effusion is present.  Visualized distal esophagus appears normal.      ABDOMEN:      LIVER:  Liver is normal in size and morphology. There is a 6 mm hypodense  lesion in the left lobe of liver similar compared to prior CT  examination.      BILE DUCTS:  The intrahepatic and extrahepatic ducts are not dilated.      GALLBLADDER:  Gallbladder is moderately distended and demonstrates a calcified  stone in the lumen.      PANCREAS:  There is diffuse pancreatic ductal dilatation measuring up to 5 mm in  width. However pancreatic parenchyma appears grossly unremarkable  without significant atrophy. This is similar compared to prior CT  examination dating back to 2021.      SPLEEN:  The spleen is normal in size.      ADRENAL GLANDS:  Bilateral adrenal glands appear normal.      KIDNEYS AND URETERS:  The kidneys are normal in size and enhance symmetrically.  No  hydroureteronephrosis or nephroureterolithiasis is identified.      PELVIS:      BLADDER:  Urinary bladder is diffusely distended and appears grossly  unremarkable.      REPRODUCTIVE ORGANS:  No pelvic mass lesion.      BOWEL:  Postsurgical changes are noted at the gastroesophageal junction.  Otherwise stomach is not well distended limiting evaluation.  Postsurgical changes of colectomy with ileocolonic anastomosis in the  left lower quadrant are noted. There are few fluid-filled dilated  small bowel loops in the lower pelvis which are similar compared to  prior imaging. Otherwise rest of the bowel loops appear grossly  unremarkable.      VESSELS:  Mild atherosclerotic calcifications of the abdominal aorta without  aneurysmal dilatation.      PERITONEUM/RETROPERITONEUM/LYMPH NODES:  There is no free or loculated fluid collection, no free  intraperitoneal air. The retroperitoneum appears normal.  No evidence  of enlarged lymphadenopathy in the abdomen and pelvis.      BONES AND  ABDOMINAL WALL:  No suspicious osseous lesions. Bones demonstrate diffuse osseous  demineralization.      Impression: 1.  No acute findings in the abdomen and pelvis.  2. Few fluid-filled small bowel loops in the lower abdomen without  definite segmental distention to suggest obstruction.  3. Redemonstration of diffusely dilated main pancreatic duct, similar  compared to prior imaging dating back to 2021. This was worked up by  MRI and CT pancreas protocol in 2022. Recommend correlation with  prior reports.  4. Cholelithiasis without acute cholecystitis.  5. Diffuse osseous demineralization.      MACRO:  None      Signed by: Talia Cancino 5/5/2024 4:12 PM  Dictation workstation:   [id number]  XR chest 1 view  Narrative: Interpreted By:  Jevon Paz,   STUDY:  XR CHEST 1 VIEW;  5/5/2024 3:43 pm      INDICATION:  Signs/Symptoms:Constipation followed by diarrhea history of GI bleed  severe diarrhea weakness.      COMPARISON:  None.      ACCESSION NUMBER(S):  [id number]      ORDERING CLINICIAN:  KRISTIAN CASTRO      FINDINGS:      The cardiac silhouette is unremarkable. Costophrenic angles are  sharp. Biapical pleural scarring is seen. Lungs are hyperinflated but  otherwise clear. The trachea is midline. There is no pneumothorax.  Bones are osteopenic. Degenerative changes and levoscoliosis of the  thoracolumbar junction noted. Surgical clips in the left mid to upper  abdomen      Impression: 1.  COPD. No acute pulmonary process          Signed by: Jevon Paz 5/5/2024 3:47 PM  Dictation workstation:   [id number]       EKG dated 5/5/2024 independently reviewed   SB with PVCs, rate 52    Cardiovascular studies:    None on file      Assessment:   79 yo women admitted with diarrhea, lactic acidosis and chronic idiopathic constipation, electrolyte disturbance and concern for bradyarrhythmia overnight (lowest documented 45 BPM from today  5/6/2024)    COPD  Hypothyroidism  Anxiety/depression      Plan:  Echocardiogram to assess LVEF and structural heart   Agree with checking TSH levels with reflex to T4  Telemetry monitoring while admitted  Avoid any AV stephanie blockers  14 day ZIO monitor as OP  Supportive treatment for underlying illness per primary team    Further recommendations pending above results     Thank you  for the consult   Will follow   Please call or message with questions or concerns    The case was discussed with Veronique Bradshaw PA-C    Electronically signed by MIRTHA Avila on 5/6/2024 at 1:52 PM

## 2024-05-06 NOTE — PROGRESS NOTES
mathieu Quispe is a 78 y.o. female on day 0 of admission presenting with Diarrhea.    Patient discussed in IDT. Patient lives at home with spouse and grandson in a house, that has 13 steps to manipulate, but [facility]. Notes she is typically independent at baseline without DME, but they have a walker. Therapy worked with patient and notes [facility] 19. ADOD 1-2 days. Spouse anticipates patient will discharge home with no concerns or needs. Does not have oxygen at baseline. Patient and spouse manages her medications at home. Care Transitions will continue to follow during course of hospital stay for any changes to discharge needs.  Amada Patel RN

## 2024-05-06 NOTE — H&P
History Of Present Illness  mathieu Quispe is a 78 y.o. female presenting with weakness and diarrhea x 2 days, 12 x daily.  She has a prior history of constipation and normally she is constipated but lately she has been having diarrhea. However she has history of chronic recurrent GI problems she had colon resection about 5 years ago at [facility].  She also complains of blood rectally about 3 days ago. She is scheduled to be seen by gastroenterologist next week. She complains of feeling chills but denies any fever. She admitted to some shortness of breath and for generalized fatigue and weakness. After colon resection and chronic GI disorder she has lost about 27 pounds. She is not using antibiotic. Denies history of C. difficile. Upon arrival to floor patient SOB, requiring 2L NC.  Patient CXR notes COPD, patient denies previous assessment or treatment for COPD.  Will add D-Dimer as well.  Patient currently resting in bed with no complaints, no s/s of distress observed.       Past Medical History  Past Medical History:   Diagnosis Date    Abdominal pain     Anxiety     Cat bite 2023    Cataract     Depression     Disease of thyroid gland     GERD (gastroesophageal reflux disease)     GERD (gastroesophageal reflux disease)     Hypothyroidism     Irritable bowel syndrome     Panic attacks     Personal history of malignant neoplasm of breast 2021    History of malignant neoplasm of breast    Personal history of other complications of pregnancy, childbirth and the puerperium     History of spontaneous        Surgical History  Past Surgical History:   Procedure Laterality Date    BREAST SURGERY      CATARACT EXTRACTION      COLON SURGERY      CT ABDOMEN ANGIOGRAM W AND/OR WO IV CONTRAST  2023    CT ABDOMEN ANGIOGRAM W AND/OR WO IV CONTRAST 2023 CON CT    GASTRIC BYPASS      HYSTERECTOMY      MASTECTOMY, PARTIAL Left     OTHER SURGICAL HISTORY  2021    Varicose vein ligation        Social  History  She reports that she has never smoked. She has never been exposed to tobacco smoke. She has never used smokeless tobacco. She reports that she does not drink alcohol and does not use drugs.    Family History  Family History   Problem Relation Name Age of Onset    Osteoporosis Mother      Alcohol abuse Father      Cancer Sister          breast    Osteoporosis Sister      Cancer Brother          colon. prostate    Hypothyroidism Other          Allergies  Nsaids (non-steroidal anti-inflammatory drug)    Review of Systems   Constitutional:  Positive for activity change, chills and fatigue.   HENT: Negative.     Eyes: Negative.    Respiratory:  Positive for shortness of breath.    Cardiovascular: Negative.    Gastrointestinal:  Positive for diarrhea.   Endocrine: Negative.    Genitourinary: Negative.    Musculoskeletal: Negative.    Skin: Negative.    Allergic/Immunologic: Negative.    Neurological:  Positive for weakness.   Hematological: Negative.    Psychiatric/Behavioral:  The patient is nervous/anxious.         Physical Exam  Constitutional:       Appearance: Normal appearance.   HENT:      Head: Normocephalic and atraumatic.      Nose: Nose normal.      Mouth/Throat:      Mouth: Mucous membranes are moist.   Eyes:      Extraocular Movements: Extraocular movements intact.      Pupils: Pupils are equal, round, and reactive to light.   Cardiovascular:      Rate and Rhythm: Regular rhythm. Bradycardia present.      Pulses: Normal pulses.      Heart sounds: Normal heart sounds.   Pulmonary:      Effort: Pulmonary effort is normal.      Comments: Diminished  Abdominal:      General: Bowel sounds are normal.      Palpations: Abdomen is soft.   Musculoskeletal:         General: Normal range of motion.      Cervical back: Normal range of motion.   Skin:     General: Skin is warm and dry.      Capillary Refill: Capillary refill takes less than 2 seconds.   Neurological:      Mental Status: She is alert. Mental  status is at baseline.      Motor: Weakness present.      Gait: Gait abnormal.   Psychiatric:         Mood and Affect: Mood normal.         Behavior: Behavior normal.          Last Recorded Vitals  Blood pressure 146/69, pulse 54, temperature 36.2 °C (97.2 °F), temperature source Temporal, resp. rate (!) 36, height 1.524 m (5'), weight (!) 38.8 kg (85 lb 8.6 oz), SpO2 97%.    Relevant Results  Scheduled medications  [Held by provider] busPIRone, 20 mg, oral, TID  cholecalciferol, 5,000 Units, oral, Daily  escitalopram, 20 mg, oral, Nightly  formoterol, 20 mcg, nebulization, q12h  heparin (porcine), 5,000 Units, subcutaneous, q8h  levothyroxine, 50 mcg, oral, Daily  magnesium oxide, 400 mg, oral, Daily  oxygen, , inhalation, Continuous - Inhalation  pantoprazole, 40 mg, intravenous, Daily before breakfast  sucralfate, 1 g, oral, 4x daily  [Held by provider] traZODone, 150 mg, oral, Nightly      Continuous medications  sodium chloride 0.9%, 125 mL/hr, Last Rate: 125 mL/hr (05/06/24 0228)      PRN medications  PRN medications: acetaminophen, bisacodyl, dicyclomine, lactulose, magnesium hydroxide, melatonin, metoclopramide, ondansetron, oxygen, polyethylene glycol    CT abdomen pelvis w IV contrast    Result Date: 5/5/2024  Interpreted By:  Talia Cancino, STUDY: CT ABDOMEN PELVIS W IV CONTRAST;  5/5/2024 3:43 pm   INDICATION: Signs/Symptoms:Diarrhea weakness abdominal pain history of prior colon surgery feels dehydrated.   COMPARISON: CT abdomen pelvis with contrast dated 01/25/2024   ACCESSION NUMBER(S): [id number]   ORDERING CLINICIAN: KRISTIAN CASTRO   TECHNIQUE: CT of the abdomen and pelvis was performed after administration of intravenous contrast. Standard contiguous axial images were obtained at 3 mm slice thickness through the abdomen and pelvis. Coronal and sagittal reconstructions at 3 mm slice thickness were performed.   75 ml of contrast 350 mg iohexol were administered intravenously without immediate  complication.   FINDINGS: LOWER CHEST: The visualized lung base is unremarkable. The heart is normal in size without pericardial effusion. No pleural effusion is present. Visualized distal esophagus appears normal.   ABDOMEN:   LIVER: Liver is normal in size and morphology. There is a 6 mm hypodense lesion in the left lobe of liver similar compared to prior CT examination.   BILE DUCTS: The intrahepatic and extrahepatic ducts are not dilated.   GALLBLADDER: Gallbladder is moderately distended and demonstrates a calcified stone in the lumen.   PANCREAS: There is diffuse pancreatic ductal dilatation measuring up to 5 mm in width. However pancreatic parenchyma appears grossly unremarkable without significant atrophy. This is similar compared to prior CT examination dating back to 2021.   SPLEEN: The spleen is normal in size.   ADRENAL GLANDS: Bilateral adrenal glands appear normal.   KIDNEYS AND URETERS: The kidneys are normal in size and enhance symmetrically.  No hydroureteronephrosis or nephroureterolithiasis is identified.   PELVIS:   BLADDER: Urinary bladder is diffusely distended and appears grossly unremarkable.   REPRODUCTIVE ORGANS: No pelvic mass lesion.   BOWEL: Postsurgical changes are noted at the gastroesophageal junction. Otherwise stomach is not well distended limiting evaluation. Postsurgical changes of colectomy with ileocolonic anastomosis in the left lower quadrant are noted. There are few fluid-filled dilated small bowel loops in the lower pelvis which are similar compared to prior imaging. Otherwise rest of the bowel loops appear grossly unremarkable.   VESSELS: Mild atherosclerotic calcifications of the abdominal aorta without aneurysmal dilatation.   PERITONEUM/RETROPERITONEUM/LYMPH NODES: There is no free or loculated fluid collection, no free intraperitoneal air. The retroperitoneum appears normal.  No evidence of enlarged lymphadenopathy in the abdomen and pelvis.   BONES AND ABDOMINAL WALL:  No suspicious osseous lesions. Bones demonstrate diffuse osseous demineralization.       1.  No acute findings in the abdomen and pelvis. 2. Few fluid-filled small bowel loops in the lower abdomen without definite segmental distention to suggest obstruction. 3. Redemonstration of diffusely dilated main pancreatic duct, similar compared to prior imaging dating back to 2021. This was worked up by MRI and CT pancreas protocol in 2022. Recommend correlation with prior reports. 4. Cholelithiasis without acute cholecystitis. 5. Diffuse osseous demineralization.   MACRO: None   Signed by: Talia Cancino 5/5/2024 4:12 PM Dictation workstation:   [id number]    XR chest 1 view    Result Date: 5/5/2024  Interpreted By:  Jevon Paz, STUDY: XR CHEST 1 VIEW;  5/5/2024 3:43 pm   INDICATION: Signs/Symptoms:Constipation followed by diarrhea history of GI bleed severe diarrhea weakness.   COMPARISON: None.   ACCESSION NUMBER(S): [id number]   ORDERING CLINICIAN: KRISTIAN CASTRO   FINDINGS:   The cardiac silhouette is unremarkable. Costophrenic angles are sharp. Biapical pleural scarring is seen. Lungs are hyperinflated but otherwise clear. The trachea is midline. There is no pneumothorax. Bones are osteopenic. Degenerative changes and levoscoliosis of the thoracolumbar junction noted. Surgical clips in the left mid to upper abdomen       1.  COPD. No acute pulmonary process     Signed by: Jevon Paz 5/5/2024 3:47 PM Dictation workstation:   [id number]    OCT MACULA CIRRUS OU (BOTH EYES)    Result Date: 4/18/2024  Date of Procedure 4/18/2024. Interpretation Right Eye Abnormal foveal contour. Findings include Atrophy. Left Eye Abnormal foveal contour. Findings include Atrophy.     XR abdomen 1 view    Result Date: 4/10/2024  Interpreted By:  Julio Hernandez, STUDY: XR ABDOMEN 1 VIEW;  4/9/2024 2:28 pm   INDICATION: Signs/Symptoms:persistent abdominal pain.   COMPARISON: None.   ACCESSION NUMBER(S): [id number]   ORDERING  CLINICIAN: JAMES ANTONIO   FINDINGS: Abdomen, two views   Nonobstructive bowel gas pattern. Limited evaluation of pneumoperitoneum on supine imaging, however no gross evidence of free air is noted. Surgical clips over the left aspect of the abdomen   Visualized lungs are clear.   Osseous structures demonstrate no acute bony changes.       1. Nonobstructive bowel gas pattern.   MACRO: None   Signed by: Julio Hernandez 4/10/2024 6:57 PM Dictation workstation:   [id number]     Results for orders placed or performed during the hospital encounter of 05/05/24 (from the past 24 hour(s))   CBC and Auto Differential   Result Value Ref Range    WBC 5.0 4.4 - 11.3 x10*3/uL    nRBC 0.0 0.0 - 0.0 /100 WBCs    RBC 4.12 4.00 - 5.20 x10*6/uL    Hemoglobin 11.9 (L) 12.0 - 16.0 g/dL    Hematocrit 36.2 36.0 - 46.0 %    MCV 88 80 - 100 fL    MCH 28.9 26.0 - 34.0 pg    MCHC 32.9 32.0 - 36.0 g/dL    RDW 12.2 11.5 - 14.5 %    Platelets 257 150 - 450 x10*3/uL    Neutrophils % 72.0 40.0 - 80.0 %    Immature Granulocytes %, Automated 0.2 0.0 - 0.9 %    Lymphocytes % 18.4 13.0 - 44.0 %    Monocytes % 8.2 2.0 - 10.0 %    Eosinophils % 0.2 0.0 - 6.0 %    Basophils % 1.0 0.0 - 2.0 %    Neutrophils Absolute 3.61 1.60 - 5.50 x10*3/uL    Immature Granulocytes Absolute, Automated 0.01 0.00 - 0.50 x10*3/uL    Lymphocytes Absolute 0.92 0.80 - 3.00 x10*3/uL    Monocytes Absolute 0.41 0.05 - 0.80 x10*3/uL    Eosinophils Absolute 0.01 0.00 - 0.40 x10*3/uL    Basophils Absolute 0.05 0.00 - 0.10 x10*3/uL   Comprehensive metabolic panel   Result Value Ref Range    Glucose 151 (H) 74 - 99 mg/dL    Sodium 132 (L) 136 - 145 mmol/L    Potassium 3.2 (L) 3.5 - 5.3 mmol/L    Chloride 99 98 - 107 mmol/L    Bicarbonate 24 21 - 32 mmol/L    Anion Gap 12 10 - 20 mmol/L    Urea Nitrogen 7 6 - 23 mg/dL    Creatinine 0.87 0.50 - 1.05 mg/dL    eGFR 69 >60 mL/min/1.73m*2    Calcium 8.8 8.6 - 10.3 mg/dL    Albumin 3.8 3.4 - 5.0 g/dL    Alkaline Phosphatase 79  33 - 136 U/L    Total Protein 6.3 (L) 6.4 - 8.2 g/dL    AST 17 9 - 39 U/L    Bilirubin, Total 0.6 0.0 - 1.2 mg/dL    ALT 10 7 - 45 U/L   Magnesium   Result Value Ref Range    Magnesium 1.54 (L) 1.60 - 2.40 mg/dL   Phosphorus   Result Value Ref Range    Phosphorus 3.1 2.5 - 4.9 mg/dL   Lipase   Result Value Ref Range    Lipase 10 9 - 82 U/L   Lactate   Result Value Ref Range    Lactate 2.1 (H) 0.4 - 2.0 mmol/L   Protime-INR   Result Value Ref Range    Protime 13.0 (H) 9.8 - 12.8 seconds    INR 1.2 (H) 0.9 - 1.1   C-Reactive Protein   Result Value Ref Range    C-Reactive Protein <0.10 <1.00 mg/dL   B-Type Natriuretic Peptide   Result Value Ref Range    BNP 61 0 - 99 pg/mL   Urinalysis with Reflex Culture and Microscopic   Result Value Ref Range    Color, Urine Light-Yellow Light-Yellow, Yellow, Dark-Yellow    Appearance, Urine Clear Clear    Specific Gravity, Urine 1.002 (N) 1.005 - 1.035    pH, Urine 6.5 5.0, 5.5, 6.0, 6.5, 7.0, 7.5, 8.0    Protein, Urine NEGATIVE NEGATIVE, 10 (TRACE), 20 (TRACE) mg/dL    Glucose, Urine Normal Normal mg/dL    Blood, Urine 0.1 (1+) (A) NEGATIVE    Ketones, Urine NEGATIVE NEGATIVE mg/dL    Bilirubin, Urine NEGATIVE NEGATIVE    Urobilinogen, Urine Normal Normal mg/dL    Nitrite, Urine NEGATIVE NEGATIVE    Leukocyte Esterase, Urine 75 William/µL (A) NEGATIVE   Extra Urine Gray Tube   Result Value Ref Range    Extra Tube Hold for add-ons.    Troponin I, High Sensitivity, Initial   Result Value Ref Range    Troponin I, High Sensitivity 7 0 - 13 ng/L   Microscopic Only, Urine   Result Value Ref Range    WBC, Urine 1-5 1-5, NONE /HPF    RBC, Urine 3-5 NONE, 1-2, 3-5 /HPF    Squamous Epithelial Cells, Urine 1-9 (SPARSE) Reference range not established. /HPF    Bacteria, Urine 1+ (A) NONE SEEN /HPF   D-dimer, VTE Exclusion   Result Value Ref Range    D-Dimer, Quantitative VTE Exclusion 339 <=500 ng/mL FEU   Troponin, High Sensitivity, 1 Hour   Result Value Ref Range    Troponin I, High Sensitivity  8 0 - 13 ng/L   Lactate   Result Value Ref Range    Lactate 1.2 0.4 - 2.0 mmol/L        Assessment/Plan   Principal Problem:    Diarrhea  Active Problems:    Anxiety and depression    Hypothyroid    IBS (irritable bowel syndrome)    Hypokalemia    Chronic idiopathic constipation    COPD (chronic obstructive pulmonary disease) (Multi)    Hypomagnesemia    Vitamin D deficiency    Acute cystitis    Hyponatremia    GERD (gastroesophageal reflux disease)    Bradycardia    Weakness      Principal Problem:    #Diarrhea    #IBS (irritable bowel syndrome)    #Chronic idiopathic constipation    #GERD  -Lipase 10  -Lactate 2.1 > 1.2  -Occult Stool Ordered  -Stool Pathogen Ordered  -C-Diff Ordered  -CT Abd/Pelvis  IMPRESSION:  1.  No acute findings in the abdomen and pelvis.  2. Few fluid-filled small bowel loops in the lower abdomen without  definite segmental distention to suggest obstruction.  3. Redemonstration of diffusely dilated main pancreatic duct, similar  compared to prior imaging dating back to 2021. This was worked up by  MRI and CT pancreas protocol in 2022. Recommend correlation with  prior reports.  4. Cholelithiasis without acute cholecystitis.  5. Diffuse osseous demineralization.  -continue Carafate 1 g QID   -Protonix 40 mg IV Daily   -consult to GI, appreciate recs     Active Problems:    #UTI  -WBC 5.0  -Urine Culture Pending  -Rocephin 1 g IV Daily (Day 1)      #Bradycardia (30s - 40s)  -Cardiology consulted, appreciate recs      #Hyponatremia  -Sodium 132  -NS 1,000 ml IV x 1 in ED       #Hypokalemia  -Potassium 3.2  -Potassium 20 meq IV x 1 in ED       #Anxiety and depression  -continue Buspar 20 mg PO TID (Held due to Bradycardia)  -continue Trazadone 150 mg PO HS (Held due to Bradycardia)  -continue Lexapro 20 mg PO HS       #Hypothyroid  -continue Synthroid 50 mcg PO Daily       #COPD (chronic obstructive pulmonary disease) (Multi)  -CXR  IMPRESSION:  1.  COPD. No acute pulmonary process  -D-Dimer  339  -Home O2 RA  -currently 2L NC continuous   -Overnight pulse oximetry   -Formoterol 20 mcg BID (New)      #Hypomagnesemia  -Magnesium 1.54  -continue Magnesium 400 mg PO Daily   -magnesium 2 g IV x 1 in ED       #Vitamin D deficiency  -Vitamin D level ordered  -continue Vitamin D 5,000 units PO Daily       #Weakness  -PT/OT Eval and Treat    DVT Prophylaxis: Heparin   Fluids: NS @ 125 ml /hr   Nutrition: Clear Liquid     Code Status: Full Code    Pt requires inpatient stay at this time.  Total accumulated time spent face to face and not face to face preparing to see the patient, obtaining and reviewing separately obtained history; performing a medically appropriate examination and/or evaluation; counseling and educating the patient, family; ordering medications, tests, or procedures; referring and communicating with other health care professionals; documenting clinical information in the patient's medical record; independently interpreting results and communicating the results to the patient, family; and care coordination was 45 minutes.      GEO Simpson-CNP

## 2024-05-06 NOTE — CONSULTS
Inpatient consult to Gastroenterology  Consult performed by: MIRTHA Montana  Consult ordered by: MIRTHA Simpson  Reason for consult: IBS, diarrhea      History Of Present Illness  Latha Quispe is a 78 y.o. female who presented to [facility] ED with a chief complaint of diarrhea and weakness.  Her  and grandson are at bedside.  Patient and  report that patient normally has slow transit constipation and actually underwent colon resection for her chronic constipation in the past, but for 2 days prior to admission, she was having greater than 10 bms daily. She normally needs to take lactulose in order to have regular bowel movements, last dose was on Wednesday or Thursday.  She began having diarrhea on Thursday, and finally presented to the ED on  due to diarrhea and progressive weakness.       Today she feeling a little better, she denies abdominal pain, but she is still having diarrhea every time she drinks Gatorade. She denies BRBPR, melena, hematochezia.  She is feeling anxious and still feels fairly weak today. Patient states that prior to admission, she would have a lot of abdominal pain associated with her constipation.       Past Medical History:   Diagnosis Date    Abdominal pain     Anxiety     Cat bite 2023    Cataract     Depression     Disease of thyroid gland     GERD (gastroesophageal reflux disease)     GERD (gastroesophageal reflux disease)     Hypothyroidism     Irritable bowel syndrome     Panic attacks     Personal history of malignant neoplasm of breast 2021    History of malignant neoplasm of breast    Personal history of other complications of pregnancy, childbirth and the puerperium     History of spontaneous        Past Surgical History:   Procedure Laterality Date    BREAST SURGERY      CATARACT EXTRACTION      COLON SURGERY      CT ABDOMEN ANGIOGRAM W AND/OR WO IV CONTRAST  2023    CT ABDOMEN ANGIOGRAM W AND/OR WO IV CONTRAST  12/28/2023 CON CT    GASTRIC BYPASS      HYSTERECTOMY      MASTECTOMY, PARTIAL Left     OTHER SURGICAL HISTORY  06/30/2021    Varicose vein ligation        Social history  She reports that she has never smoked. She has never been exposed to tobacco smoke. She has never used smokeless tobacco. She reports that she does not drink alcohol and does not use drugs.    Family History   Problem Relation Name Age of Onset    Osteoporosis Mother      Alcohol abuse Father      Cancer Sister          breast    Osteoporosis Sister      Cancer Brother          colon. prostate    Hypothyroidism Other          Allergies  Nsaids (non-steroidal anti-inflammatory drug)    Review of Systems   Constitutional:  Positive for activity change.   Gastrointestinal:  Positive for diarrhea.        See HPI   Neurological:  Positive for weakness.   Psychiatric/Behavioral:  The patient is nervous/anxious.    All other systems reviewed and are negative.      Last Recorded Vitals  Blood pressure 169/78, pulse 54, temperature 36.7 °C (98.1 °F), temperature source Temporal, resp. rate 17, height 1.524 m (5'), weight (!) 38.8 kg (85 lb 8.6 oz), SpO2 93%.    Physical Exam  Constitutional: well nourished, well appearing. NAD. Alert and cooperative  Skin: no jaundice   Eyes: anicteric, normal conjunctiva  ENT: MMM  Pulmonary: easy and nonlabored on RA  Abdomen: soft, NT, ND. No ascites.  MSK: MAEx4  Extremities: no edema  Neuro: aaox3. No asterixis.   Psych: appropriate mood and behavior    Intake/Output last 3 Shifts:  I/O last 3 completed shifts:  In: 1512.5 (39 mL/kg) [I.V.:1512.5 (39 mL/kg)]  Out: - (0 mL/kg)   Weight: 38.8 kg      Current medications during hospitalization  Scheduled medications  busPIRone, 20 mg, oral, TID  cefTRIAXone, 1 g, intravenous, q24h  cholecalciferol, 5,000 Units, oral, Daily  escitalopram, 20 mg, oral, Nightly  formoterol, 20 mcg, nebulization, q12h  heparin (porcine), 5,000 Units, subcutaneous, q8h  levothyroxine, 50 mcg,  oral, Daily  magnesium oxide, 400 mg, oral, Daily  metroNIDAZOLE, 500 mg, intravenous, q8h  oxygen, , inhalation, Continuous - Inhalation  pantoprazole, 40 mg, intravenous, Daily before breakfast  sucralfate, 1 g, oral, 4x daily  traZODone, 150 mg, oral, Nightly      Continuous medications  sodium chloride 0.9%, 125 mL/hr, Last Rate: 125 mL/hr (05/06/24 1337)  sodium chloride 0.9%, 10 mL/hr      PRN medications  PRN medications: acetaminophen, bisacodyl, dicyclomine, lactulose, magnesium hydroxide, melatonin, metoclopramide, ondansetron, oxygen, polyethylene glycol, sodium chloride 0.9%    Relevant Results  BMP:  Lab Results   Component Value Date     05/06/2024     (L) 05/05/2024     (L) 01/27/2024    K 3.2 (L) 05/06/2024    K 3.2 (L) 05/05/2024    K 3.7 01/27/2024     (H) 05/06/2024    CL 99 05/05/2024     01/27/2024    CO2 23 05/06/2024    CO2 24 05/05/2024    CO2 21 01/27/2024    BUN 6 05/06/2024    BUN 7 05/05/2024    BUN 9 01/27/2024    CREATININE 0.77 05/06/2024    CREATININE 0.87 05/05/2024    CREATININE 0.71 01/27/2024       CBC:  Lab Results   Component Value Date    WBC 5.3 05/06/2024    WBC 5.0 05/05/2024    WBC 4.5 01/27/2024    RBC 3.81 (L) 05/06/2024    RBC 4.12 05/05/2024    RBC 3.93 (L) 01/27/2024    HGB 11.1 (L) 05/06/2024    HGB 11.9 (L) 05/05/2024    HGB 11.5 (L) 01/27/2024    HCT 33.9 (L) 05/06/2024    HCT 36.2 05/05/2024    HCT 36.5 01/27/2024    MCV 89 05/06/2024    MCV 88 05/05/2024    MCV 93 01/27/2024    MCH 29.1 05/06/2024    MCH 28.9 05/05/2024    MCH 29.3 01/27/2024    MCHC 32.7 05/06/2024    MCHC 32.9 05/05/2024    MCHC 31.5 (L) 01/27/2024    RDW 12.3 05/06/2024    RDW 12.2 05/05/2024    RDW 13.2 01/27/2024     05/06/2024     05/05/2024     01/27/2024       Lactate Level:  Lab Results   Component Value Date    LACTATE 1.2 05/05/2024    LACTATE 2.1 (H) 05/05/2024    LACTATE 1.9 01/25/2024       CRP/ESR Level:  Lab Results   Component  "Value Date    CRP <0.10 05/05/2024       Hepatic Function Panel:  Lab Results   Component Value Date    ALKPHOS 79 05/05/2024    ALKPHOS 79 01/25/2024    ALKPHOS 83 11/30/2023    ALT 10 05/05/2024    ALT 13 01/25/2024    ALT 14 11/30/2023    AST 17 05/05/2024    AST 21 01/25/2024    AST 21 11/30/2023    PROT 6.3 (L) 05/05/2024    PROT 6.7 01/25/2024    PROT 6.4 11/30/2023    BILITOT 0.6 05/05/2024    BILITOT 0.4 01/25/2024    BILITOT 0.5 11/30/2023       Magnesium:  Lab Results   Component Value Date    MG 1.69 05/06/2024       INR:  Lab Results   Component Value Date    PROTIME 13.0 (H) 05/05/2024    PROTIME 13.7 (H) 04/26/2021    INR 1.2 (H) 05/05/2024    INR 1.2 (H) 04/26/2021       TSH/T4:  Lab Results   Component Value Date    TSH 1.83 05/06/2024       Lipid Profile:  No results found for: \"CHLPL\", \"TRIG\", \"HDL\", \"LDLCALC\", \"LDLDIRECT\"    Amylase/Lipase:  Lab Results   Component Value Date    LIPASE 10 05/05/2024    LIPASE 41 01/25/2024    LIPASE 18 07/08/2023       Calcium Level  Lab Results   Component Value Date    CALCIUM 8.3 (L) 05/06/2024    CALCIUM 8.8 05/05/2024    CALCIUM 8.6 01/27/2024        Radiology:   Transthoracic Echo (TTE) Complete         CT abdomen pelvis w IV contrast   Final Result   1.  No acute findings in the abdomen and pelvis.   2. Few fluid-filled small bowel loops in the lower abdomen without   definite segmental distention to suggest obstruction.   3. Redemonstration of diffusely dilated main pancreatic duct, similar   compared to prior imaging dating back to 2021. This was worked up by   MRI and CT pancreas protocol in 2022. Recommend correlation with   prior reports.   4. Cholelithiasis without acute cholecystitis.   5. Diffuse osseous demineralization.        MACRO:   None        Signed by: Talia Cancino 5/5/2024 4:12 PM   Dictation workstation:   [id number]      XR chest 1 view   Final Result   1.  COPD. No acute pulmonary process             Signed by: Jevon Paz 5/5/2024 " 3:47 PM   Dictation workstation:   [id number]      Holter Or Event Cardiac Monitor    (Results Pending)      Last colonoscopy done in 2020 with Dr. Guzmán (modified due to altered anatomy) which noted friable anastomosis but otherwise WNL. 5 year follow up recommended.   Last EGD done in 12/2020 with findings of moderate food residue in stomach, hx Vadim en Y due to previous ulcers and negative biopsies for h.pylori.     Assessment/Plan   78 y.o. female on day 0 of admission presenting with Diarrhea. Enteric stool panel, C. difficile, and occult stool negative.  Lactate mildly elevated on admission, CRP negative.    Monitor CBC, BMP daily  Monitor stool output  Can trial loperamide 4 times daily as needed for diarrhea  Continue dicyclomine as needed  Continue supportive care, per primary team, likely can advance diet tomorrow-she does not have an appetite and does not wish to eat right now          GEO [place]-CNP

## 2024-05-06 NOTE — PROGRESS NOTES
mathieu Quispe is a 78 y.o. female on day 0 of admission presenting with Diarrhea.    Subjective     No overnight events reported.     Patient resting comfortably this morning. She reports that she felt weak and dizzy at home; she thinks she was dehydrated from having diarrhea. Diarrhea has improved since admission, stool studies are pending. Patient denies any fevers, chills, nausea, vomiting, or urinary symptoms. She is currently on ceftriaxone, will add Flagyl for anaerobic coverage. GI is consulted to see the patient. Plan of care discussed with patient, all questions answered.         Objective     Physical Exam  Constitutional:       General: She is not in acute distress.     Appearance: She is not toxic-appearing.      Comments: Thin-appearing   HENT:      Head: Normocephalic and atraumatic.      Mouth/Throat:      Mouth: Mucous membranes are dry.   Eyes:      Conjunctiva/sclera: Conjunctivae normal.   Cardiovascular:      Rate and Rhythm: Normal rate and regular rhythm.   Pulmonary:      Effort: No respiratory distress.      Breath sounds: Normal breath sounds.   Abdominal:      General: Bowel sounds are normal. There is no distension.      Palpations: Abdomen is soft.      Tenderness: There is abdominal tenderness (lower quadrants). There is no guarding or rebound.   Musculoskeletal:         General: No swelling.   Skin:     General: Skin is warm and dry.   Neurological:      Mental Status: She is alert and oriented to person, place, and time.   Psychiatric:      Comments: Anxious         Last Recorded Vitals  Blood pressure 157/59, pulse (!) 45, temperature 36.8 °C (98.2 °F), temperature source Temporal, resp. rate 19, height 1.524 m (5'), weight (!) 38.8 kg (85 lb 8.6 oz), SpO2 95%.  Intake/Output last 3 Shifts:  I/O last 3 completed shifts:  In: 1512.5 (39 mL/kg) [I.V.:1512.5 (39 mL/kg)]  Out: - (0 mL/kg)   Weight: 38.8 kg     Relevant Results        Scheduled medications  busPIRone, 20 mg, oral,  TID  cefTRIAXone, 1 g, intravenous, q24h  cholecalciferol, 5,000 Units, oral, Daily  escitalopram, 20 mg, oral, Nightly  formoterol, 20 mcg, nebulization, q12h  heparin (porcine), 5,000 Units, subcutaneous, q8h  levothyroxine, 50 mcg, oral, Daily  magnesium oxide, 400 mg, oral, Daily  metroNIDAZOLE, 500 mg, intravenous, q8h  oxygen, , inhalation, Continuous - Inhalation  pantoprazole, 40 mg, intravenous, Daily before breakfast  sucralfate, 1 g, oral, 4x daily  traZODone, 150 mg, oral, Nightly      Continuous medications  sodium chloride 0.9%, 125 mL/hr, Last Rate: 125 mL/hr (05/06/24 0646)  sodium chloride 0.9%, 10 mL/hr      PRN medications  PRN medications: acetaminophen, bisacodyl, dicyclomine, lactulose, magnesium hydroxide, melatonin, metoclopramide, ondansetron, oxygen, polyethylene glycol, sodium chloride 0.9%    Results for orders placed or performed during the hospital encounter of 05/05/24 (from the past 24 hour(s))   CBC and Auto Differential   Result Value Ref Range    WBC 5.0 4.4 - 11.3 x10*3/uL    nRBC 0.0 0.0 - 0.0 /100 WBCs    RBC 4.12 4.00 - 5.20 x10*6/uL    Hemoglobin 11.9 (L) 12.0 - 16.0 g/dL    Hematocrit 36.2 36.0 - 46.0 %    MCV 88 80 - 100 fL    MCH 28.9 26.0 - 34.0 pg    MCHC 32.9 32.0 - 36.0 g/dL    RDW 12.2 11.5 - 14.5 %    Platelets 257 150 - 450 x10*3/uL    Neutrophils % 72.0 40.0 - 80.0 %    Immature Granulocytes %, Automated 0.2 0.0 - 0.9 %    Lymphocytes % 18.4 13.0 - 44.0 %    Monocytes % 8.2 2.0 - 10.0 %    Eosinophils % 0.2 0.0 - 6.0 %    Basophils % 1.0 0.0 - 2.0 %    Neutrophils Absolute 3.61 1.60 - 5.50 x10*3/uL    Immature Granulocytes Absolute, Automated 0.01 0.00 - 0.50 x10*3/uL    Lymphocytes Absolute 0.92 0.80 - 3.00 x10*3/uL    Monocytes Absolute 0.41 0.05 - 0.80 x10*3/uL    Eosinophils Absolute 0.01 0.00 - 0.40 x10*3/uL    Basophils Absolute 0.05 0.00 - 0.10 x10*3/uL   Comprehensive metabolic panel   Result Value Ref Range    Glucose 151 (H) 74 - 99 mg/dL    Sodium 132 (L)  136 - 145 mmol/L    Potassium 3.2 (L) 3.5 - 5.3 mmol/L    Chloride 99 98 - 107 mmol/L    Bicarbonate 24 21 - 32 mmol/L    Anion Gap 12 10 - 20 mmol/L    Urea Nitrogen 7 6 - 23 mg/dL    Creatinine 0.87 0.50 - 1.05 mg/dL    eGFR 69 >60 mL/min/1.73m*2    Calcium 8.8 8.6 - 10.3 mg/dL    Albumin 3.8 3.4 - 5.0 g/dL    Alkaline Phosphatase 79 33 - 136 U/L    Total Protein 6.3 (L) 6.4 - 8.2 g/dL    AST 17 9 - 39 U/L    Bilirubin, Total 0.6 0.0 - 1.2 mg/dL    ALT 10 7 - 45 U/L   Magnesium   Result Value Ref Range    Magnesium 1.54 (L) 1.60 - 2.40 mg/dL   Phosphorus   Result Value Ref Range    Phosphorus 3.1 2.5 - 4.9 mg/dL   Lipase   Result Value Ref Range    Lipase 10 9 - 82 U/L   Lactate   Result Value Ref Range    Lactate 2.1 (H) 0.4 - 2.0 mmol/L   Protime-INR   Result Value Ref Range    Protime 13.0 (H) 9.8 - 12.8 seconds    INR 1.2 (H) 0.9 - 1.1   C-Reactive Protein   Result Value Ref Range    C-Reactive Protein <0.10 <1.00 mg/dL   B-Type Natriuretic Peptide   Result Value Ref Range    BNP 61 0 - 99 pg/mL   Urinalysis with Reflex Culture and Microscopic   Result Value Ref Range    Color, Urine Light-Yellow Light-Yellow, Yellow, Dark-Yellow    Appearance, Urine Clear Clear    Specific Gravity, Urine 1.002 (N) 1.005 - 1.035    pH, Urine 6.5 5.0, 5.5, 6.0, 6.5, 7.0, 7.5, 8.0    Protein, Urine NEGATIVE NEGATIVE, 10 (TRACE), 20 (TRACE) mg/dL    Glucose, Urine Normal Normal mg/dL    Blood, Urine 0.1 (1+) (A) NEGATIVE    Ketones, Urine NEGATIVE NEGATIVE mg/dL    Bilirubin, Urine NEGATIVE NEGATIVE    Urobilinogen, Urine Normal Normal mg/dL    Nitrite, Urine NEGATIVE NEGATIVE    Leukocyte Esterase, Urine 75 William/µL (A) NEGATIVE   Extra Urine Gray Tube   Result Value Ref Range    Extra Tube Hold for add-ons.    Troponin I, High Sensitivity, Initial   Result Value Ref Range    Troponin I, High Sensitivity 7 0 - 13 ng/L   Microscopic Only, Urine   Result Value Ref Range    WBC, Urine 1-5 1-5, NONE /HPF    RBC, Urine 3-5 NONE, 1-2,  3-5 /HPF    Squamous Epithelial Cells, Urine 1-9 (SPARSE) Reference range not established. /HPF    Bacteria, Urine 1+ (A) NONE SEEN /HPF   D-dimer, VTE Exclusion   Result Value Ref Range    D-Dimer, Quantitative VTE Exclusion 339 <=500 ng/mL FEU   Troponin, High Sensitivity, 1 Hour   Result Value Ref Range    Troponin I, High Sensitivity 8 0 - 13 ng/L   Lactate   Result Value Ref Range    Lactate 1.2 0.4 - 2.0 mmol/L   CBC   Result Value Ref Range    WBC 5.3 4.4 - 11.3 x10*3/uL    nRBC 0.0 0.0 - 0.0 /100 WBCs    RBC 3.81 (L) 4.00 - 5.20 x10*6/uL    Hemoglobin 11.1 (L) 12.0 - 16.0 g/dL    Hematocrit 33.9 (L) 36.0 - 46.0 %    MCV 89 80 - 100 fL    MCH 29.1 26.0 - 34.0 pg    MCHC 32.7 32.0 - 36.0 g/dL    RDW 12.3 11.5 - 14.5 %    Platelets 230 150 - 450 x10*3/uL   Basic metabolic panel   Result Value Ref Range    Glucose 110 (H) 74 - 99 mg/dL    Sodium 138 136 - 145 mmol/L    Potassium 3.2 (L) 3.5 - 5.3 mmol/L    Chloride 108 (H) 98 - 107 mmol/L    Bicarbonate 23 21 - 32 mmol/L    Anion Gap 10 10 - 20 mmol/L    Urea Nitrogen 6 6 - 23 mg/dL    Creatinine 0.77 0.50 - 1.05 mg/dL    eGFR 80 >60 mL/min/1.73m*2    Calcium 8.3 (L) 8.6 - 10.3 mg/dL   Magnesium   Result Value Ref Range    Magnesium 1.69 1.60 - 2.40 mg/dL   SST TOP   Result Value Ref Range    Extra Tube Hold for add-ons.        CT abdomen pelvis w IV contrast    Result Date: 5/5/2024  Interpreted By:  Talia Cancino, STUDY: CT ABDOMEN PELVIS W IV CONTRAST;  5/5/2024 3:43 pm   INDICATION: Signs/Symptoms:Diarrhea weakness abdominal pain history of prior colon surgery feels dehydrated.   COMPARISON: CT abdomen pelvis with contrast dated 01/25/2024   ACCESSION NUMBER(S): [id number]   ORDERING CLINICIAN: KRISTIAN CASTRO   TECHNIQUE: CT of the abdomen and pelvis was performed after administration of intravenous contrast. Standard contiguous axial images were obtained at 3 mm slice thickness through the abdomen and pelvis. Coronal and sagittal reconstructions at 3 mm  slice thickness were performed.   75 ml of contrast 350 mg iohexol were administered intravenously without immediate complication.   FINDINGS: LOWER CHEST: The visualized lung base is unremarkable. The heart is normal in size without pericardial effusion. No pleural effusion is present. Visualized distal esophagus appears normal.   ABDOMEN:   LIVER: Liver is normal in size and morphology. There is a 6 mm hypodense lesion in the left lobe of liver similar compared to prior CT examination.   BILE DUCTS: The intrahepatic and extrahepatic ducts are not dilated.   GALLBLADDER: Gallbladder is moderately distended and demonstrates a calcified stone in the lumen.   PANCREAS: There is diffuse pancreatic ductal dilatation measuring up to 5 mm in width. However pancreatic parenchyma appears grossly unremarkable without significant atrophy. This is similar compared to prior CT examination dating back to 2021.   SPLEEN: The spleen is normal in size.   ADRENAL GLANDS: Bilateral adrenal glands appear normal.   KIDNEYS AND URETERS: The kidneys are normal in size and enhance symmetrically.  No hydroureteronephrosis or nephroureterolithiasis is identified.   PELVIS:   BLADDER: Urinary bladder is diffusely distended and appears grossly unremarkable.   REPRODUCTIVE ORGANS: No pelvic mass lesion.   BOWEL: Postsurgical changes are noted at the gastroesophageal junction. Otherwise stomach is not well distended limiting evaluation. Postsurgical changes of colectomy with ileocolonic anastomosis in the left lower quadrant are noted. There are few fluid-filled dilated small bowel loops in the lower pelvis which are similar compared to prior imaging. Otherwise rest of the bowel loops appear grossly unremarkable.   VESSELS: Mild atherosclerotic calcifications of the abdominal aorta without aneurysmal dilatation.   PERITONEUM/RETROPERITONEUM/LYMPH NODES: There is no free or loculated fluid collection, no free intraperitoneal air. The  retroperitoneum appears normal.  No evidence of enlarged lymphadenopathy in the abdomen and pelvis.   BONES AND ABDOMINAL WALL: No suspicious osseous lesions. Bones demonstrate diffuse osseous demineralization.       1.  No acute findings in the abdomen and pelvis. 2. Few fluid-filled small bowel loops in the lower abdomen without definite segmental distention to suggest obstruction. 3. Redemonstration of diffusely dilated main pancreatic duct, similar compared to prior imaging dating back to 2021. This was worked up by MRI and CT pancreas protocol in 2022. Recommend correlation with prior reports. 4. Cholelithiasis without acute cholecystitis. 5. Diffuse osseous demineralization.   MACRO: None   Signed by: Talia Cancino 5/5/2024 4:12 PM Dictation workstation:   [id number]    XR chest 1 view    Result Date: 5/5/2024  Interpreted By:  Jevon Paz, STUDY: XR CHEST 1 VIEW;  5/5/2024 3:43 pm   INDICATION: Signs/Symptoms:Constipation followed by diarrhea history of GI bleed severe diarrhea weakness.   COMPARISON: None.   ACCESSION NUMBER(S): [id number]   ORDERING CLINICIAN: KRISTIAN CASTRO   FINDINGS:   The cardiac silhouette is unremarkable. Costophrenic angles are sharp. Biapical pleural scarring is seen. Lungs are hyperinflated but otherwise clear. The trachea is midline. There is no pneumothorax. Bones are osteopenic. Degenerative changes and levoscoliosis of the thoracolumbar junction noted. Surgical clips in the left mid to upper abdomen       1.  COPD. No acute pulmonary process     Signed by: Jevon Paz 5/5/2024 3:47 PM Dictation workstation:   [id number]    OCT MACULA CIRRUS OU (BOTH EYES)    Result Date: 4/18/2024  Date of Procedure 4/18/2024. Interpretation Right Eye Abnormal foveal contour. Findings include Atrophy. Left Eye Abnormal foveal contour. Findings include Atrophy.     XR abdomen 1 view    Result Date: 4/10/2024  Interpreted By:  Julio Hernandez, STUDY: XR ABDOMEN 1 VIEW;  4/9/2024 2:28  pm   INDICATION: Signs/Symptoms:persistent abdominal pain.   COMPARISON: None.   ACCESSION NUMBER(S): [id number]   ORDERING CLINICIAN: JAMES ANTONIO   FINDINGS: Abdomen, two views   Nonobstructive bowel gas pattern. Limited evaluation of pneumoperitoneum on supine imaging, however no gross evidence of free air is noted. Surgical clips over the left aspect of the abdomen   Visualized lungs are clear.   Osseous structures demonstrate no acute bony changes.       1. Nonobstructive bowel gas pattern.   MACRO: None   Signed by: Julio Hernandez 4/10/2024 6:57 PM Dictation workstation:   [id number]                  Assessment/Plan   Principal Problem:    Diarrhea  Active Problems:    Anxiety and depression    Hypothyroid    IBS (irritable bowel syndrome)    Hypokalemia    Chronic idiopathic constipation    COPD (chronic obstructive pulmonary disease) (Multi)    Hypomagnesemia    Vitamin D deficiency    Acute cystitis    Hyponatremia    GERD (gastroesophageal reflux disease)    Bradycardia    Weakness    Dehydration    #Diarrhea (improving)   #GERD  #Lactic acidosis (resolved)   #Chronic idiopathic constipation  - Patient reports diarrhea for the last two days; is normally constipated  - Lactate 2.1 > 1.2  - CRP < 0.10  - Lipase 10  - WBC 5.0 > 5.3  - CT Abd/pelvis:  No acute findings in the abdomen and pelvis. 2. Few fluid-filled small bowel loops in the lower abdomen without definite segmental distention to suggest obstruction. 3. Redemonstration of diffusely dilated main pancreatic duct, similar compared to prior imaging dating back to 2021. This was worked up by MRI and CT pancreas protocol in 2022. Recommend correlation with prior reports. 4. Cholelithiasis without acute cholecystitis.   - Stool pathogen panel, C. Diff PCR, occult stool pending  - Isolation protocol until stool results  - Continue ceftriaxone (day 1) and metronidazole (day 1)  - Protonix IVP every day  - Continue Carafate QID - home med    - Continue NS @ 125 ml/hr while having diarrhea/poor PO intake  - Clear liquid diet as tolerated  - Dicyclomine PRN for abdominal cramping, Zofran PRN for nausea/vomiting   - Nutrition consulted for low BMI, appreciate recs  - GI consulted, appreciate recs     #Hypokalemia  #Hypomagnesemia (improving)   #Hyponatremia (resolved)  - Likely due to diarrhea   - Na 132 > 138  - K 3.2, repleted per protocol  - Mag 1.54 > 1.69 today  - Continue NS @ 125 ml/hr while PO intake is poor  - Continue Mag-Ox BID (home med)   - Daily BMP and mag level; replete electrolytes per protocol    #UTI  - UA: 75 LE, 1-5 WBCs, 1+ bacteria  - UCx pending  - Continue ceftriaxone (day 1)  - Adjust ATBs pending final urine culture results    #Bradycardia, asymptomatic   - Patient's HR reportedly in the 30s-40s overnight; lowest HR documented is 45 bpm   - Check TSH/T4- patient has history of hypothyroidism  - Telemetry monitoring  - Cardiology consulted, appreciate recs     #COPD, not in acute exacerbation  - Placed on 1L O2 overnight, now on room air  - Patient denies cough or shortness of breath  - CXR: COPD. No acute pulmonary process   - Continue Perforomist  - Monitor SpO2 continuously    #Hypothyroidism  - Continue levothyroxine  - TSH/T4 pending in setting of bradycardia    #Anxiety   #Depression  - Continue buspirone, escitalopram, trazodone     DVT PPx: Heparin  GI PPx: Protonix   Diet: Clear liquids  Code Status: Full     Disposition: Patient requires inpatient management at this time.     Total accumulated time spent face to face and not face to face preparing to see the patient, obtaining and reviewing separately obtained history; performing a medically appropriate examination and/or evaluation; counseling and educating the patient, family; ordering medications, tests, or procedures; referring and communicating with other health care professionals; documenting clinical information in the patient's medical record; independently  interpreting results and communicating the results to the patient, family; and care coordination was 45 minutes.     Veronique Bradshaw PA-C

## 2024-05-06 NOTE — CARE PLAN
The patient's goals for the shift include To get a good nights rest.    The clinical goals for the shift include patient will have decreased BM this shift      IVF/ IVATB per order.  Cont with clear liquid, no nausea, no vomiting.  Still +diarrhea; Cdiff/ pathogen NEGATIVE, isolation discontinued.

## 2024-05-06 NOTE — CONSULTS
Nutrition Assessment Note  Nutrition Assessment      Reason for Assessment  Reason for Assessment: Provider consult order    HPI: [id number] presenting with diarrhea BMI 16.7    PMH: abdominal pain, anxiety, depression, disease of thyroid gland, GERD, hypothyroidism IBS, hx colon surgery     History:  Food and Nutrient History  Energy Intake: Poor < 50 %  Food and Nutrient History: Per H&P “78 y.o. female presenting with weakness and diarrhea x 2 days, 12 x daily.  She has a prior history of constipation and normally she is constipated but lately she has been having diarrhea. However she has history of chronic recurrent GI problems she had colon resection about 5 years ago at [facility]… After colon resection and chronic GI disorder she has lost about 27 pounds.” Latha states that she had her colon resection about 6 years ago, however, this past Thanksgiving she started having a lot of stomach problems. She was very constipated and was put on laxatives. After that she started losing weight. Stated her UBW in Nov 2023 was 120lbs. She often is nauseated. Her  does the cooking for her. She likes his meals, but higher fatty foods she is not able to tolerate. She is still having diarrhea. She is currently on a clear liquid diet and for both breakfast and lunch she had a ginger ale and a Jello. She has also been drinking Gatorade. She is very tired and weak. She states at home she will sometimes drink carnation instant breakfast, but she does not tolerate other supplements like Ensure or Boost.     Nutrition History:  Food Allergies/Intolerances:  None  Energy intake: Energy Intake: Poor < 50 %  GI Symptoms: Diarrhea and Nausea   Oral Problems: None     Objective Data:  Nutrition Significant Labs:  5/6/24  Glucose 110^  K 3.2 v  Cl 108^  Ca 8.3 v  H/H 11.1/33.9 v    Nutrition Specific Mediations:  Medications reviewed.    I/O:     Intake/Output Summary (Last 24 hours) at 5/6/2024 0618  Last data filed at 5/6/2024 1708  Gross  per 24 hour   Intake 3816.25 ml   Output --   Net 3816.25 ml     /78 (BP Location: Right arm, Patient Position: Lying)   Pulse 54   Temp 36.7 °C (98.1 °F) (Temporal)   Resp 17   Ht 1.524 m (5')   Wt (!) 38.8 kg (85 lb 8.6 oz)   SpO2 93%   BMI 16.71 kg/m²       Dietary Orders (From admission, onward)       Start     Ordered    05/05/24 2126  May Participate in Room Service With Assistance  Once        Question:  .  Answer:  Yes    05/05/24 2125 05/05/24 2052  Adult diet Clear Liquid  Diet effective now        Question:  Diet type  Answer:  Clear Liquid    05/05/24 2051                  Wt Readings from Last 10 Encounters:   05/05/24 (!) 38.8 kg (85 lb 8.6 oz)   04/09/24 (!) 38.8 kg (85 lb 8 oz)   03/14/24 (!) 38.4 kg (84 lb 9.6 oz)   01/26/24 (!) 40.9 kg (90 lb 2.7 oz)   01/02/24 (!) 39 kg (85 lb 15.7 oz)   12/20/23 (!) 39 kg (86 lb)   12/14/23 (!) 37.7 kg (83 lb 3.2 oz)   11/29/23 (!) 39.4 kg (86 lb 12.8 oz)   06/30/23 (!) 43.9 kg (96 lb 12.8 oz)   03/22/23 44 kg (97 lb)   ]  Anthropometrics:  Height: 152.4 cm (5')  Weight: (!) 38.8 kg (85 lb 8.6 oz)  BMI (Calculated): 16.71    Weight Change: 3.06    Weight Change  Weight History / % Weight Change: weight history per chart, 12/14/23 wt was 83lbs, weight gain. However, per pt, UBW is 120lbs and pt has had recent significant weight loss per chart in Jan 2024.  Significant Weight Loss: Yes (29% loss over the past year per Pt.)  Interpretation of Weight Loss: Other (see comment)       IBW/kg (Dietitian Calculated): 45.5 kg  Percent of IBW: 85 %          Energy Needs:  Calculated Energy Needs Using Equations  Height: 152.4 cm (5')  Temp: 36.7 °C (98.1 °F)    Estimated Energy Needs  Total Energy Estimated Needs (kCal): 1575 kCal  Total Estimated Energy Need per Day (kCal/kg): 35 kCal/kg  Method for Estimating Needs: 1350-1575kcal (30-35kcal/kg of IBW)    Estimated Protein Needs  Total Protein Estimated Needs (g): 58 g  Total Protein Estimated Needs (g/kg):  1.5 g/kg  Method for Estimating Needs: 1.5g/kg actual weight    Estimated Fluid Needs  Total Fluid Estimated Needs (mL): 1350 mL  Method for Estimating Needs: 1 mL/kcal or fluid per medical team         Nutrition Focused Physical Findings:  NFPF completed by Sammie Koo MS, RDN, LD  Subcutaneous Fat Loss  Orbital Fat Pads: Severe (dark circles, hollowing and loose skin)  Buccal Fat Pads: Severe (hollow, sunken and narrow face)  Triceps: Severe (negligible fat tissue)  Ribs: Defer    Muscle Wasting  Temporalis: Severe (hollowed scooping depression)  Pectoralis (Clavicular Region): Severe (protruding prominent clavicle)  Deltoid/Trapezius: Severe (squared shoulders, acromion process prominent)  Interosseous: Severe (depressed area between thumb and forefinger)  Trapezius/Infraspinatus/Supraspinatus (Scapular Region): Defer  Quadriceps: Defer  Gastrocnemius: Severe (minimal muscle definition)                      Nutrition Diagnosis   Malnutrition Diagnosis  Patient has Malnutrition Diagnosis: Yes  Diagnosis Status: New  Malnutrition Diagnosis: Severe malnutrition related to starvation  As Evidenced by: moderate to severe muscle loss (temporal depression, pectoralis major, deltoids, interosseous, gastrocnemius) and severe subcutaneous fat loss (orbital, perioral, below triceps); poor nutritional intake meeting less than 50% of estimated needs for greater than or equal to one month.       Nutrition Interventions/Recommendations        Food and/or Nutrient Delivery Interventions  Meals and Snacks: General healthful diet  Goal: increase diet as tolerated per medical team             Suggest adding [place] Instant Breakfast once diet is advanced.    Medical Food Supplement: Commercial beverage  Goal: once diet is advanced from clear liquid diet, suggest [place] Instant Breakfast with meals. Pt does not like ensure products.       Coordination of Nutrition Care by a Nutrition Professional  Collaboration and Referral of  Nutrition Care: Collaboration by nutrition professional with other providers  Goal: Spoke with RN Janeth and Deidre Arechiga Akuszewski and Immanuel about ONS once diet is advanced.    Education Documentation  Nutrition Care Manual, taught by Breann Kirk RDN, MIGUEL at 5/6/2024  5:45 PM.  Learner: Patient  Readiness: Acceptance  Method: Explanation  Response: Verbalizes Understanding             Nutrition Monitoring and Evaluation   Food and Nutrient Related History  Energy Intake: Estimated energy intake  Criteria: nutritional intake meeting > 75% of estimated needs    Fluid Intake: Estimated fluid intake  Criteria: Fluid meeting estimated needs                                  Anthropometrics: Body Composition/Growth/Weight History  Weight: Measured weight  Criteria: Gradual weight gain 1-2lbs per month                 Nutrition Focused Physical Findings  Adipose: Loss of subcutaneous fat  Criteria: maintain/gain adipose stores          Muscles: Muscle atrophy  Criteria: maintain/gain lean muscle mass               Follow Up  Time Spent (min): 120 minutes  Follow up: Provided information on outpatient nutrition therapy services, Provided inpatient RDN contact information  Last Date of Nutrition Visit: 05/06/24  Nutrition Follow-Up Needed?: 3-5 days, Dietitian to reassess per policy  Follow up Comment: %meals, ONS carnation instant breakfast

## 2024-05-06 NOTE — CARE PLAN
The patient's goals for the shift include To get a good nights rest.    The clinical goals for the shift include patient will have decreased BM this shift       Patient working with therapy today. Weak but mostly independent in adls. Loose stools have decreased awaiting stool for guiac

## 2024-05-06 NOTE — PROGRESS NOTES
Occupational Therapy    Evaluation    Patient Name: Latha Quispe  MRN: [id number]  Today's Date: 5/6/2024  Time Calculation  Start Time: 0944  Stop Time: 1005  Time Calculation (min): 21 min    Assessment  IP OT Assessment  OT Assessment: Pt is 77y/o female presenting with weakness d/t recent diarrhea and weight loss/GI issues. Pt has increased anxiety with any activity. Pt overall decreased balance and unsteady with mobility and transfers requiring CGA for ADL's.  Prognosis: Good  Barriers to Discharge: None  Evaluation/Treatment Tolerance: Patient limited by fatigue  Medical Staff Made Aware: Yes  End of Session Communication: Bedside nurse  End of Session Patient Position: Bed, 2 rail up, Alarm off, caregiver present  Plan:  Treatment Interventions: ADL retraining, Functional transfer training, Endurance training, Patient/family training  OT Frequency: 2 times per week  OT Discharge Recommendations: Low intensity level of continued care  OT Recommended Transfer Status: Stand by assist  OT - OK to Discharge: Yes, Based on completed evaluation and care plan recommendations, no barriers to discharge to next site of care       Subjective   Current Problem:  1. Dehydration        2. Acute gastroenteritis        3. Bradycardia        4. Hypokalemia          General:  General  Reason for Referral: Admitted for diarrhea, UTI, bradycardia, hyponatremia, hypokalemia. OT for ADL and safety assessment  Referred By: GEO Simpson-CNP  Past Medical History Relevant to Rehab: PMH: breast CA with partial mastectomy, gastric bypass, OBS, GERD, thyroid d/o, depression, anxiety, panic attack  Family/Caregiver Present: Yes  Prior to Session Communication: Bedside nurse  Patient Position Received: Bed, 2 rail up, Alarm off, not on at start of session  General Comment: Pt had diarrhea x 2 days, typically has constipation. Hx of colon resection, chronic GI issues and significant weight loss. Denies falls. Significant varus  deformities of the knees  Precautions:  Medical Precautions: Fall precautions, Infection precautions (contact + precautions to r/o c-diff)  Vital Signs:  Heart Rate: (!) 45  Heart Rate Source: Monitor  SpO2: 95 % (room air)  Patient Position: Sitting  Pain:  Pain Assessment  Pain Assessment: 0-10  Pain Score: 0 - No pain    Objective   Cognition:  Overall Cognitive Status: Within Functional Limits  Insight: Mild (decreased awareness of deficits and effect on current function)     Home Living:  Type of Home: House (garage with living space above garage)  Lives With: Spouse (has custody of 13 y/o grandson who lives with them)  Home Adaptive Equipment: None (intermittent use of spouse's rollator)  Home Layout: Two level, Bed/bath upstairs  Home Access: Other (Comment) (chair lift)  Bathroom Shower/Tub: Tub/shower unit  Bathroom Toilet: Handicapped height  Bathroom Equipment: Shower chair with back, Grab bars in shower (shower chair not currently in shower, it is in storage in garage)  Home Living Comments: sleeps in bed   Prior Function:  Level of [place]: Independent with ADLs and functional transfers, Independent with homemaking with ambulation  Receives Help From: Family  ADL Assistance: Independent  Homemaking Assistance: Independent  Ambulatory Assistance: Independent  Hand Dominance: Right  IADL History:  Homemaking Responsibilities:  (spouse does the cooking, share cleaning and laundry)  Current License: No (spouse drives)  ADL:  Eating Assistance: Independent  Grooming Assistance: Stand by  UE Dressing Assistance: Stand by  LE Dressing Assistance: Stand by  Toileting Assistance with Device: Stand by  ADL Comments: unsteadiness with mobility and balance in stand consequently affecting assist level of ADL's d/t safety  Activity Tolerance:  Endurance: Tolerates less than 10 min exercise with changes in vital signs (pt very anxious, breathing increases with sitting at EOB)  Bed Mobility/Transfers: Bed  Mobility  Bed Mobility: Yes  Bed Mobility 1  Bed Mobility 1: Supine to sitting, Sitting to supine  Level of Assistance 1: Independent    Transfers  Transfer: Yes  Transfer 1  Transfer From 1: Bed to  Transfer to 1: Chair with arms  Technique 1: Sit to stand, Stand to sit  Transfer Level of Assistance 1: Contact guard  Trials/Comments 1: returned to bed after transfers    Functional Mobility:  Functional Mobility  Functional Mobility Performed: Yes  Functional Mobility 1  Surface 1: Level tile  Device 1: No device  Assistance 1: Contact guard  Quality of Functional Mobility 1: Narrow base of support (d/t severe varus of knees)  Comments 1: impulsive with movements d/t anxiety  Sitting Balance:  Static Sitting Balance  Static Sitting-Level of Assistance:  (Normal)  Dynamic Sitting Balance  Dynamic Sitting-Balance:  (Normal)  Standing Balance:  Static Standing Balance  Static Standing-Level of Assistance:  (Fair)  Dynamic Standing Balance  Dynamic Standing-Balance:  (Fair-)     IADL's:   Homemaking Responsibilities:  (spouse does the cooking, share cleaning and laundry)  Current License: No (spouse drives)  Vision: Vision - Basic Assessment  Current Vision: Wears glasses all the time  Sensation:  Light Touch: No apparent deficits     Coordination:  Movements are Fluid and Coordinated: Yes   Hand Function:  Hand Function  Gross Grasp: Functional  Coordination: Functional  Extremities: RUE   RUE : Within Functional Limits and LUE   LUE: Within Functional Limits    Outcome Measures: [facility] Daily Activity  Putting on and taking off regular lower body clothing: A little  Bathing (including washing, rinsing, drying): A little  Putting on and taking off regular upper body clothing: A little  Toileting, which includes using toilet, bedpan or urinal: A little  Taking care of personal grooming such as brushing teeth: A little  Eating Meals: None  Daily Activity - Total Score: 19    Education Documentation  Precautions, taught by  Peter Holcomb OT at 5/6/2024 10:34 AM.  Learner: Patient  Readiness: Acceptance  Method: Explanation  Response: Verbalizes Understanding, Needs Reinforcement  Comment: safety, use of AD, OT POC    ADL Training, taught by Peter Holcomb OT at 5/6/2024 10:34 AM.  Learner: Patient  Readiness: Acceptance  Method: Explanation  Response: Verbalizes Understanding, Needs Reinforcement  Comment: safety, use of AD, OT POC    Goals:   Encounter Problems       Encounter Problems (Active)       ADLs       Patient with complete upper body dressing with modified independent level of assistance donning and doffing all UE clothes including item retrieval       Start:  05/06/24    Expected End:  05/13/24            Patient with complete lower body dressing with modified independent level of assistance donning and doffing all LE clothes including item retrieval       Start:  05/06/24    Expected End:  05/20/24            Patient will complete daily grooming tasks brushing teeth and washing face/hair with modified independent level of assistance standing at sinkside       Start:  05/06/24    Expected End:  05/13/24            Patient will complete toileting including hygiene clothing management/hygiene with modified independent level of assistance and raised toilet seat.       Start:  05/06/24    Expected End:  05/13/24               TRANSFERS       Patient will complete functional transfer to toilet, chair, bed with least restrictive device with modified independent level of assistance.       Start:  05/06/24    Expected End:  05/13/24

## 2024-05-06 NOTE — NURSING NOTE
ECHO completed, pt sitting @ edge of bed.    IVF/ IVATB per order.  Family @ bedside.  Cdiff/ pathogens NEGATIVE; isolation discontinued.    [id number] @ bedside

## 2024-05-07 LAB
ANION GAP SERPL CALC-SCNC: 11 MMOL/L (ref 10–20)
AORTIC VALVE PEAK VELOCITY: 1.3 M/S
AV PEAK GRADIENT: 6.8 MMHG
AVA (PEAK VEL): 2.66 CM2
BACTERIA UR CULT: NORMAL
BUN SERPL-MCNC: 3 MG/DL (ref 6–23)
CALCIUM SERPL-MCNC: 8.6 MG/DL (ref 8.6–10.3)
CHLORIDE SERPL-SCNC: 108 MMOL/L (ref 98–107)
CO2 SERPL-SCNC: 22 MMOL/L (ref 21–32)
CREAT SERPL-MCNC: 0.85 MG/DL (ref 0.5–1.05)
EGFRCR SERPLBLD CKD-EPI 2021: 71 ML/MIN/1.73M*2
EJECTION FRACTION APICAL 4 CHAMBER: 63.5
ERYTHROCYTE [DISTWIDTH] IN BLOOD BY AUTOMATED COUNT: 12.6 % (ref 11.5–14.5)
GLUCOSE SERPL-MCNC: 103 MG/DL (ref 74–99)
HCT VFR BLD AUTO: 36.4 % (ref 36–46)
HGB BLD-MCNC: 11.9 G/DL (ref 12–16)
LEFT ATRIUM VOLUME AREA LENGTH INDEX BSA: 26.4 ML/M2
LEFT VENTRICLE INTERNAL DIMENSION DIASTOLE: 3.85 CM (ref 3.5–6)
LEFT VENTRICULAR OUTFLOW TRACT DIAMETER: 1.9 CM
LV EJECTION FRACTION BIPLANE: 66 %
MAGNESIUM SERPL-MCNC: 1.15 MG/DL (ref 1.6–2.4)
MCH RBC QN AUTO: 29.8 PG (ref 26–34)
MCHC RBC AUTO-ENTMCNC: 32.7 G/DL (ref 32–36)
MCV RBC AUTO: 91 FL (ref 80–100)
MITRAL VALVE E/A RATIO: 0.81
MITRAL VALVE E/E' RATIO: 11.2
NRBC BLD-RTO: 0 /100 WBCS (ref 0–0)
PLATELET # BLD AUTO: 221 X10*3/UL (ref 150–450)
POTASSIUM SERPL-SCNC: 3 MMOL/L (ref 3.5–5.3)
RBC # BLD AUTO: 4 X10*6/UL (ref 4–5.2)
RIGHT VENTRICLE FREE WALL PEAK S': 11.7 CM/S
RIGHT VENTRICLE PEAK SYSTOLIC PRESSURE: 20.8 MMHG
SODIUM SERPL-SCNC: 138 MMOL/L (ref 136–145)
TRICUSPID ANNULAR PLANE SYSTOLIC EXCURSION: 2.1 CM
WBC # BLD AUTO: 4.6 X10*3/UL (ref 4.4–11.3)

## 2024-05-07 PROCEDURE — 2500000001 HC RX 250 WO HCPCS SELF ADMINISTERED DRUGS (ALT 637 FOR MEDICARE OP): Mod: IPSPLIT

## 2024-05-07 PROCEDURE — 99231 SBSQ HOSP IP/OBS SF/LOW 25: CPT

## 2024-05-07 PROCEDURE — 99232 SBSQ HOSP IP/OBS MODERATE 35: CPT | Performed by: NURSE PRACTITIONER

## 2024-05-07 PROCEDURE — 1200000002 HC GENERAL ROOM WITH TELEMETRY DAILY: Mod: IPSPLIT

## 2024-05-07 PROCEDURE — 82947 ASSAY GLUCOSE BLOOD QUANT: CPT | Mod: IPSPLIT

## 2024-05-07 PROCEDURE — 83735 ASSAY OF MAGNESIUM: CPT | Mod: IPSPLIT

## 2024-05-07 PROCEDURE — 36415 COLL VENOUS BLD VENIPUNCTURE: CPT | Mod: IPSPLIT

## 2024-05-07 PROCEDURE — 2500000004 HC RX 250 GENERAL PHARMACY W/ HCPCS (ALT 636 FOR OP/ED): Mod: IPSPLIT

## 2024-05-07 PROCEDURE — 2500000005 HC RX 250 GENERAL PHARMACY W/O HCPCS: Mod: IPSPLIT

## 2024-05-07 PROCEDURE — 97535 SELF CARE MNGMENT TRAINING: CPT | Mod: GO,IPSPLIT | Performed by: OCCUPATIONAL THERAPIST

## 2024-05-07 PROCEDURE — 2500000006 HC RX 250 W HCPCS SELF ADMINISTERED DRUGS (ALT 637 FOR ALL PAYERS): Mod: IPSPLIT

## 2024-05-07 PROCEDURE — C9113 INJ PANTOPRAZOLE SODIUM, VIA: HCPCS | Mod: IPSPLIT

## 2024-05-07 PROCEDURE — 85027 COMPLETE CBC AUTOMATED: CPT | Mod: IPSPLIT

## 2024-05-07 RX ORDER — POTASSIUM CHLORIDE 20 MEQ/1
40 TABLET, EXTENDED RELEASE ORAL ONCE
Status: COMPLETED | OUTPATIENT
Start: 2024-05-07 | End: 2024-05-07

## 2024-05-07 RX ORDER — MAGNESIUM SULFATE HEPTAHYDRATE 40 MG/ML
2 INJECTION, SOLUTION INTRAVENOUS ONCE
Status: COMPLETED | OUTPATIENT
Start: 2024-05-07 | End: 2024-05-07

## 2024-05-07 RX ORDER — MORPHINE SULFATE 2 MG/ML
2 INJECTION, SOLUTION INTRAMUSCULAR; INTRAVENOUS EVERY 8 HOURS PRN
Status: DISCONTINUED | OUTPATIENT
Start: 2024-05-07 | End: 2024-05-10 | Stop reason: HOSPADM

## 2024-05-07 RX ORDER — POTASSIUM CHLORIDE 14.9 MG/ML
20 INJECTION INTRAVENOUS ONCE
Status: COMPLETED | OUTPATIENT
Start: 2024-05-07 | End: 2024-05-07

## 2024-05-07 RX ORDER — DICYCLOMINE HYDROCHLORIDE 10 MG/1
10 CAPSULE ORAL
Status: DISCONTINUED | OUTPATIENT
Start: 2024-05-07 | End: 2024-05-08

## 2024-05-07 RX ADMIN — METRONIDAZOLE 500 MG: 500 INJECTION, SOLUTION INTRAVENOUS at 08:55

## 2024-05-07 RX ADMIN — SUCRALFATE 1 G: 1 TABLET ORAL at 17:13

## 2024-05-07 RX ADMIN — DICYCLOMINE HYDROCHLORIDE 10 MG: 10 CAPSULE ORAL at 15:49

## 2024-05-07 RX ADMIN — CEFTRIAXONE 1 G: 1 INJECTION, SOLUTION INTRAVENOUS at 06:27

## 2024-05-07 RX ADMIN — SUCRALFATE 1 G: 1 TABLET ORAL at 21:49

## 2024-05-07 RX ADMIN — PANTOPRAZOLE SODIUM 40 MG: 40 INJECTION, POWDER, LYOPHILIZED, FOR SOLUTION INTRAVENOUS at 06:27

## 2024-05-07 RX ADMIN — SUCRALFATE 1 G: 1 TABLET ORAL at 06:27

## 2024-05-07 RX ADMIN — CHOLECALCIFEROL TAB 125 MCG (5000 UNIT) 5000 UNITS: 125 TAB at 08:56

## 2024-05-07 RX ADMIN — LEVOTHYROXINE SODIUM 50 MCG: 0.05 TABLET ORAL at 08:56

## 2024-05-07 RX ADMIN — BUSPIRONE HYDROCHLORIDE 20 MG: 10 TABLET ORAL at 08:56

## 2024-05-07 RX ADMIN — POTASSIUM CHLORIDE 20 MEQ: 14.9 INJECTION, SOLUTION INTRAVENOUS at 11:30

## 2024-05-07 RX ADMIN — ACETAMINOPHEN 650 MG: 325 TABLET ORAL at 02:57

## 2024-05-07 RX ADMIN — BUSPIRONE HYDROCHLORIDE 20 MG: 10 TABLET ORAL at 21:49

## 2024-05-07 RX ADMIN — MORPHINE SULFATE 2 MG: 2 INJECTION, SOLUTION INTRAMUSCULAR; INTRAVENOUS at 13:36

## 2024-05-07 RX ADMIN — SODIUM CHLORIDE 125 ML/HR: 9 INJECTION, SOLUTION INTRAVENOUS at 08:54

## 2024-05-07 RX ADMIN — DICYCLOMINE HYDROCHLORIDE 10 MG: 10 CAPSULE ORAL at 11:29

## 2024-05-07 RX ADMIN — MORPHINE SULFATE 2 MG: 2 INJECTION, SOLUTION INTRAMUSCULAR; INTRAVENOUS at 21:50

## 2024-05-07 RX ADMIN — ESCITALOPRAM OXALATE 20 MG: 10 TABLET ORAL at 21:49

## 2024-05-07 RX ADMIN — SUCRALFATE 1 G: 1 TABLET ORAL at 12:54

## 2024-05-07 RX ADMIN — BUSPIRONE HYDROCHLORIDE 20 MG: 10 TABLET ORAL at 15:49

## 2024-05-07 RX ADMIN — HEPARIN SODIUM 5000 UNITS: 5000 INJECTION INTRAVENOUS; SUBCUTANEOUS at 05:23

## 2024-05-07 RX ADMIN — HEPARIN SODIUM 5000 UNITS: 5000 INJECTION INTRAVENOUS; SUBCUTANEOUS at 12:54

## 2024-05-07 RX ADMIN — MAGNESIUM SULFATE IN WATER 2 G: 40 INJECTION, SOLUTION INTRAVENOUS at 10:18

## 2024-05-07 RX ADMIN — POTASSIUM CHLORIDE 40 MEQ: 1500 TABLET, EXTENDED RELEASE ORAL at 10:18

## 2024-05-07 RX ADMIN — HEPARIN SODIUM 5000 UNITS: 5000 INJECTION INTRAVENOUS; SUBCUTANEOUS at 21:49

## 2024-05-07 RX ADMIN — Medication 400 MG: at 11:09

## 2024-05-07 RX ADMIN — Medication 21 PERCENT: at 20:00

## 2024-05-07 RX ADMIN — TRAZODONE HYDROCHLORIDE 150 MG: 100 TABLET ORAL at 21:49

## 2024-05-07 ASSESSMENT — COGNITIVE AND FUNCTIONAL STATUS - GENERAL
MOBILITY SCORE: 24
TOILETING: A LITTLE
TOILETING: A LITTLE
HELP NEEDED FOR BATHING: A LITTLE
DAILY ACTIVITIY SCORE: 22
DAILY ACTIVITIY SCORE: 22
HELP NEEDED FOR BATHING: A LITTLE

## 2024-05-07 ASSESSMENT — PAIN SCALES - GENERAL
PAINLEVEL_OUTOF10: 0 - NO PAIN
PAINLEVEL_OUTOF10: 3
PAINLEVEL_OUTOF10: 3
PAINLEVEL_OUTOF10: 0 - NO PAIN
PAINLEVEL_OUTOF10: 0 - NO PAIN
PAINLEVEL_OUTOF10: 8
PAINLEVEL_OUTOF10: 0 - NO PAIN
PAINLEVEL_OUTOF10: 0 - NO PAIN
PAINLEVEL_OUTOF10: 3
PAINLEVEL_OUTOF10: 9

## 2024-05-07 ASSESSMENT — ENCOUNTER SYMPTOMS
ABDOMINAL PAIN: 1
DIARRHEA: 1
WEAKNESS: 1

## 2024-05-07 ASSESSMENT — PAIN - FUNCTIONAL ASSESSMENT
PAIN_FUNCTIONAL_ASSESSMENT: 0-10

## 2024-05-07 ASSESSMENT — ACTIVITIES OF DAILY LIVING (ADL): HOME_MANAGEMENT_TIME_ENTRY: 12

## 2024-05-07 ASSESSMENT — PAIN DESCRIPTION - LOCATION
LOCATION: ABDOMEN
LOCATION: HEAD

## 2024-05-07 NOTE — PROGRESS NOTES
mathieu Quispe is a 78 y.o. female on day 1 of admission presenting with Diarrhea.    Subjective     Patient had 3 episodes of loose stool overnight.     Patient sitting up in chair this morning, she is tolerating clear liquids without nausea or vomiting. No fevers or chills reported. She is still having abdominal cramping, Bentyl changed from PRN to scheduled by GI. Stool studies were negative; antibiotics now discontinued. Patient continues on IVF. Will advance diet to GI soft today and continue to monitor. Plan of care discussed with patient, all questions answered.         Objective     Physical Exam  Constitutional:       General: She is not in acute distress.     Appearance: She is not toxic-appearing.      Comments: Thin-appearing   HENT:      Head: Normocephalic and atraumatic.      Mouth/Throat:      Mouth: Mucous membranes are moist.   Eyes:      Conjunctiva/sclera: Conjunctivae normal.   Cardiovascular:      Rate and Rhythm: Normal rate and regular rhythm.   Pulmonary:      Effort: No respiratory distress.      Breath sounds: Normal breath sounds.      Comments: On room air   Abdominal:      General: Bowel sounds are normal. There is no distension.      Palpations: Abdomen is soft.      Tenderness: There is abdominal tenderness. There is no guarding or rebound.   Musculoskeletal:         General: No swelling.   Skin:     General: Skin is warm and dry.   Neurological:      Mental Status: She is alert and oriented to person, place, and time.   Psychiatric:         Mood and Affect: Mood normal.         Behavior: Behavior normal.         Last Recorded Vitals  Blood pressure 129/78, pulse (!) 48, temperature 36.8 °C (98.2 °F), temperature source Temporal, resp. rate 16, height 1.524 m (5'), weight (!) 38.8 kg (85 lb 8.6 oz), SpO2 94%.  Intake/Output last 3 Shifts:  I/O last 3 completed shifts:  In: 5388.3 (138.9 mL/kg) [P.O.:880; I.V.:4108.3 (105.9 mL/kg); IV Piggyback:400]  Out: - (0 mL/kg)   Weight: 38.8 kg      Relevant Results        Scheduled medications  busPIRone, 20 mg, oral, TID  cholecalciferol, 5,000 Units, oral, Daily  dicyclomine, 10 mg, oral, TID AC  escitalopram, 20 mg, oral, Nightly  formoterol, 20 mcg, nebulization, q12h  heparin (porcine), 5,000 Units, subcutaneous, q8h  levothyroxine, 50 mcg, oral, Daily  magnesium oxide, 400 mg, oral, Daily  oxygen, , inhalation, Continuous - Inhalation  pantoprazole, 40 mg, intravenous, Daily before breakfast  potassium chloride, 20 mEq, intravenous, Once  sucralfate, 1 g, oral, 4x daily  traZODone, 150 mg, oral, Nightly      Continuous medications  sodium chloride 0.9%, 125 mL/hr, Last Rate: 125 mL/hr (05/07/24 0854)  sodium chloride 0.9%, 10 mL/hr      PRN medications  PRN medications: acetaminophen, bisacodyl, lactulose, magnesium hydroxide, melatonin, metoclopramide, morphine, ondansetron, oxygen, polyethylene glycol, sodium chloride 0.9%    Results for orders placed or performed during the hospital encounter of 05/05/24 (from the past 24 hour(s))   Occult Blood, Stool    Specimen: Stool   Result Value Ref Range    Occult Blood, Stool X1 Negative Negative   Transthoracic Echo (TTE) Complete   Result Value Ref Range    AV pk toribio 1.30 m/s    LVOT diam 1.90 cm    LV Biplane EF 66 %    MV avg E/e' ratio 11.20     MV E/A ratio 0.81     LA vol index A/L 26.4 ml/m2    Tricuspid annular plane systolic excursion 2.1 cm    RV free wall pk S' 11.70 cm/s    LVIDd 3.85 cm    RVSP 20.8 mmHg    Aortic Valve Area by Continuity of Peak Velocity 2.66 cm2    AV pk grad 6.8 mmHg    LV A4C EF 63.5    CBC   Result Value Ref Range    WBC 4.6 4.4 - 11.3 x10*3/uL    nRBC 0.0 0.0 - 0.0 /100 WBCs    RBC 4.00 4.00 - 5.20 x10*6/uL    Hemoglobin 11.9 (L) 12.0 - 16.0 g/dL    Hematocrit 36.4 36.0 - 46.0 %    MCV 91 80 - 100 fL    MCH 29.8 26.0 - 34.0 pg    MCHC 32.7 32.0 - 36.0 g/dL    RDW 12.6 11.5 - 14.5 %    Platelets 221 150 - 450 x10*3/uL   Basic metabolic panel   Result Value Ref Range     Glucose 103 (H) 74 - 99 mg/dL    Sodium 138 136 - 145 mmol/L    Potassium 3.0 (L) 3.5 - 5.3 mmol/L    Chloride 108 (H) 98 - 107 mmol/L    Bicarbonate 22 21 - 32 mmol/L    Anion Gap 11 10 - 20 mmol/L    Urea Nitrogen 3 (L) 6 - 23 mg/dL    Creatinine 0.85 0.50 - 1.05 mg/dL    eGFR 71 >60 mL/min/1.73m*2    Calcium 8.6 8.6 - 10.3 mg/dL   Magnesium   Result Value Ref Range    Magnesium 1.15 (L) 1.60 - 2.40 mg/dL       Transthoracic Echo (TTE) Complete    Result Date: 5/7/2024   [facility], [address] [place] [address]              Tel [phone] and Fax [phone] TRANSTHORACIC ECHOCARDIOGRAM REPORT  Patient Name:      CLAUDIA RAY       Reading Physician:    [id number] Cruzito Gaspar MD Study Date:        5/6/2024             Ordering Provider:    [id number] NICO DUVAL MRN/PID:           [id number]             Fellow: Accession#:        [id number]         Nurse: Date of Birth/Age: 1945 / 78 years Sonographer:          Travis Dunn RDCS Gender:            F                    Additional Staff: Height:            152.40 cm            Admit Date: Weight:            36.74 kg             Admission Status:     Inpatient -                                                               Routine BSA / BMI:         1.27 m2 / 15.82      Encounter#:           [id number]                    kg/m2                                         Department Location:  [facility] Blood Pressure: 169 /78 mmHg Study Type:    TRANSTHORACIC ECHO (TTE) COMPLETE Diagnosis/ICD: Abnormal electrocardiogram [ECG] [EKG]-R94.31 Indication:    Bradycardia CPT Code:      Echo Complete w Full Doppler-[id number] Patient History: Pertinent History: COPD and Dyspnea. Bradycardia. Study Detail: The following Echo studies were performed: 2D,  M-Mode, Doppler and               color flow. The patient was awake.  PHYSICIAN INTERPRETATION: Left Ventricle: The left ventricular systolic function is normal, with an estimated ejection fraction of 60-65%. There are no regional wall motion abnormalities. The left ventricular cavity size is normal. Spectral Doppler shows an impaired relaxation pattern of left ventricular diastolic filling. Left Atrium: The left atrium is normal in size. Right Ventricle: The right ventricle is normal in size. There is normal right ventricular global systolic function. Right Atrium: The right atrium is normal in size. Aortic Valve: The aortic valve is trileaflet. There is mild aortic valve cusp calcification. There is mild aortic valve regurgitation. The peak instantaneous gradient of the aortic valve is 6.8 mmHg. Mitral Valve: The mitral valve is normal in structure. There is mild mitral valve regurgitation. Tricuspid Valve: The tricuspid valve is structurally normal. There is mild tricuspid regurgitation. Pulmonic Valve: The pulmonic valve is not well visualized. There is physiologic pulmonic valve regurgitation. Pericardium: There is no pericardial effusion noted. Aorta: The aortic root was not well visualized.  CONCLUSIONS:  1. Left ventricular systolic function is normal with a 60-65% estimated ejection fraction.  2. Spectral Doppler shows an impaired relaxation pattern of left ventricular diastolic filling.  3. Mild aortic valve regurgitation. QUANTITATIVE DATA SUMMARY: 2D MEASUREMENTS:                          Normal Ranges: Ao Root d:     3.10 cm   (2.0-3.7cm) LAs:           3.30 cm   (2.7-4.0cm) IVSd:          0.93 cm   (0.6-1.1cm) LVPWd:         0.85 cm   (0.6-1.1cm) LVIDd:         3.85 cm   (3.9-5.9cm) LVIDs:         2.14 cm LV Mass Index: 80.4 g/m2 LV % FS        44.4 % LA VOLUME:                               Normal Ranges: LA Vol A4C:        26.4 ml    (22+/-6mL/m2) LA Vol A2C:        32.0 ml LA Vol BP:         33.6 ml  LA Vol Index A4C:  20.7ml/m2 LA Vol Index A2C:  25.2 ml/m2 LA Vol Index BP:   26.4 ml/m2 LA Area A4C:       11.0 cm2 LA Area A2C:       14.0 cm2 LA Major Axis A4C: 3.9 cm LA Major Axis A2C: 5.2 cm RA VOLUME BY A/L METHOD:                               Normal Ranges: RA Vol A4C:        20.2 ml    (8.3-19.5ml) RA Vol Index A4C:  15.9 ml/m2 RA Area A4C:       10.0 cm2 RA Major Axis A4C: 4.2 cm M-MODE MEASUREMENTS:                  Normal Ranges: Ao Root: 2.90 cm (2.0-3.7cm) LAs:     3.30 cm (2.7-4.0cm) AORTA MEASUREMENTS:                    Normal Ranges: Asc Ao, d: 2.10 cm (2.1-3.4cm) LV SYSTOLIC FUNCTION BY 2D PLANIMETRY (MOD):                     Normal Ranges: EF-A4C View: 63.5 % (>=55%) EF-A2C View: 66.4 % EF-Biplane:  66.1 % LV DIASTOLIC FUNCTION:                               Normal Ranges: MV Peak E:        0.73 m/s    (0.7-1.2 m/s) MV Peak A:        0.90 m/s    (0.42-0.7 m/s) E/A Ratio:        0.81        (1.0-2.2) MV e'             0.06 m/s    (>8.0) MV lateral e'     0.08 m/s MV medial e'      0.05 m/s E/e' Ratio:       11.20       (<8.0) PulmV Sys Petey:    59.20 cm/s PulmV Nguyen Petey:   35.60 cm/s PulmV S/D Petey:    1.70 PulmV A Revs Petey: 33.50 cm/s PulmV A Revs Dur: 114.00 msec MITRAL VALVE:                 Normal Ranges: MV DT: 219 msec (150-240msec) AORTIC VALVE:                         Normal Ranges: AoV Vmax:      1.30 m/s (<=1.7m/s) AoV Peak P.8 mmHg (<20mmHg) LVOT Max Petey:  1.22 m/s (<=1.1m/s) LVOT VTI:      24.60 cm LVOT Diameter: 1.90 cm  (1.8-2.4cm) AoV Area,Vmax: 2.66 cm2 (2.5-4.5cm2)  RIGHT VENTRICLE: RV Basal 3.00 cm RV Mid   1.50 cm RV Major 4.8 cm TAPSE:   21.0 mm RV s'    0.12 m/s TRICUSPID VALVE/RVSP:                             Normal Ranges: Peak TR Velocity: 2.11 m/s RV Syst Pressure: 20.8 mmHg (< 30mmHg) IVC Diam:         1.60 cm PULMONIC VALVE:                      Normal Ranges: PV Max Petey: 1.0 m/s  (0.6-0.9m/s) PV Max P.2 mmHg Pulmonary Veins: PulmV A Revs Dur: 114.00 msec  PulmV A Revs Petey: 33.50 cm/s PulmV Nguyen Petey:   35.60 cm/s PulmV S/D Petey:    1.70 PulmV Sys Petey:    59.20 cm/s  [id number] Cruzito Gaspar MD Electronically signed on 5/7/2024 at 7:49:26 AM  ** Final **     CT abdomen pelvis w IV contrast    Result Date: 5/5/2024  Interpreted By:  Talia Cancino, STUDY: CT ABDOMEN PELVIS W IV CONTRAST;  5/5/2024 3:43 pm   INDICATION: Signs/Symptoms:Diarrhea weakness abdominal pain history of prior colon surgery feels dehydrated.   COMPARISON: CT abdomen pelvis with contrast dated 01/25/2024   ACCESSION NUMBER(S): [id number]   ORDERING CLINICIAN: KRISTIAN CASTRO   TECHNIQUE: CT of the abdomen and pelvis was performed after administration of intravenous contrast. Standard contiguous axial images were obtained at 3 mm slice thickness through the abdomen and pelvis. Coronal and sagittal reconstructions at 3 mm slice thickness were performed.   75 ml of contrast 350 mg iohexol were administered intravenously without immediate complication.   FINDINGS: LOWER CHEST: The visualized lung base is unremarkable. The heart is normal in size without pericardial effusion. No pleural effusion is present. Visualized distal esophagus appears normal.   ABDOMEN:   LIVER: Liver is normal in size and morphology. There is a 6 mm hypodense lesion in the left lobe of liver similar compared to prior CT examination.   BILE DUCTS: The intrahepatic and extrahepatic ducts are not dilated.   GALLBLADDER: Gallbladder is moderately distended and demonstrates a calcified stone in the lumen.   PANCREAS: There is diffuse pancreatic ductal dilatation measuring up to 5 mm in width. However pancreatic parenchyma appears grossly unremarkable without significant atrophy. This is similar compared to prior CT examination dating back to 2021.   SPLEEN: The spleen is normal in size.   ADRENAL GLANDS: Bilateral adrenal glands appear normal.   KIDNEYS AND URETERS: The kidneys are normal in size and enhance symmetrically.  No  hydroureteronephrosis or nephroureterolithiasis is identified.   PELVIS:   BLADDER: Urinary bladder is diffusely distended and appears grossly unremarkable.   REPRODUCTIVE ORGANS: No pelvic mass lesion.   BOWEL: Postsurgical changes are noted at the gastroesophageal junction. Otherwise stomach is not well distended limiting evaluation. Postsurgical changes of colectomy with ileocolonic anastomosis in the left lower quadrant are noted. There are few fluid-filled dilated small bowel loops in the lower pelvis which are similar compared to prior imaging. Otherwise rest of the bowel loops appear grossly unremarkable.   VESSELS: Mild atherosclerotic calcifications of the abdominal aorta without aneurysmal dilatation.   PERITONEUM/RETROPERITONEUM/LYMPH NODES: There is no free or loculated fluid collection, no free intraperitoneal air. The retroperitoneum appears normal.  No evidence of enlarged lymphadenopathy in the abdomen and pelvis.   BONES AND ABDOMINAL WALL: No suspicious osseous lesions. Bones demonstrate diffuse osseous demineralization.       1.  No acute findings in the abdomen and pelvis. 2. Few fluid-filled small bowel loops in the lower abdomen without definite segmental distention to suggest obstruction. 3. Redemonstration of diffusely dilated main pancreatic duct, similar compared to prior imaging dating back to 2021. This was worked up by MRI and CT pancreas protocol in 2022. Recommend correlation with prior reports. 4. Cholelithiasis without acute cholecystitis. 5. Diffuse osseous demineralization.   MACRO: None   Signed by: Talia Cancino 5/5/2024 4:12 PM Dictation workstation:   [id number]    XR chest 1 view    Result Date: 5/5/2024  Interpreted By:  Jevon Paz, STUDY: XR CHEST 1 VIEW;  5/5/2024 3:43 pm   INDICATION: Signs/Symptoms:Constipation followed by diarrhea history of GI bleed severe diarrhea weakness.   COMPARISON: None.   ACCESSION NUMBER(S): [id number]   ORDERING CLINICIAN: KRISTIAN  GLORIA   FINDINGS:   The cardiac silhouette is unremarkable. Costophrenic angles are sharp. Biapical pleural scarring is seen. Lungs are hyperinflated but otherwise clear. The trachea is midline. There is no pneumothorax. Bones are osteopenic. Degenerative changes and levoscoliosis of the thoracolumbar junction noted. Surgical clips in the left mid to upper abdomen       1.  COPD. No acute pulmonary process     Signed by: Jevon Paz 5/5/2024 3:47 PM Dictation workstation:   [id number]    OCT MACULA CIRRUS OU (BOTH EYES)    Result Date: 4/18/2024  Date of Procedure 4/18/2024. Interpretation Right Eye Abnormal foveal contour. Findings include Atrophy. Left Eye Abnormal foveal contour. Findings include Atrophy.     XR abdomen 1 view    Result Date: 4/10/2024  Interpreted By:  Julio Hernandez, STUDY: XR ABDOMEN 1 VIEW;  4/9/2024 2:28 pm   INDICATION: Signs/Symptoms:persistent abdominal pain.   COMPARISON: None.   ACCESSION NUMBER(S): [id number]   ORDERING CLINICIAN: JAMES ANTONIO   FINDINGS: Abdomen, two views   Nonobstructive bowel gas pattern. Limited evaluation of pneumoperitoneum on supine imaging, however no gross evidence of free air is noted. Surgical clips over the left aspect of the abdomen   Visualized lungs are clear.   Osseous structures demonstrate no acute bony changes.       1. Nonobstructive bowel gas pattern.   MACRO: None   Signed by: Julio Hernandez 4/10/2024 6:57 PM Dictation workstation:   [id number]             Malnutrition Diagnosis Status: New  Malnutrition Diagnosis: Severe malnutrition related to starvation  As Evidenced by: moderate to severe muscle loss (temporal depression, pectoralis major, deltoids, interosseous, gastrocnemius) and severe subcutaneous fat loss (orbital, perioral, below triceps); poor nutritional intake meeting less than 50% of estimated needs for greater than or equal to one month.  I agree with the dietitian's malnutrition  diagnosis.      Assessment/Plan   Principal Problem:    Diarrhea  Active Problems:    Anxiety and depression    Hypothyroid    IBS (irritable bowel syndrome)    Hypokalemia    Chronic idiopathic constipation    COPD (chronic obstructive pulmonary disease) (Multi)    Hypomagnesemia    Vitamin D deficiency    Acute cystitis    Hyponatremia    GERD (gastroesophageal reflux disease)    Bradycardia    Weakness    Dehydration    #Diarrhea (improving)   #GERD  #Lactic acidosis (resolved)   #Chronic idiopathic constipation  - Patient reports diarrhea for two days prior to admission; is normally constipated  - Lactate 2.1 > 1.2  - CRP < 0.10  - Lipase 10  - WBC 5.0 > 5.3 > 4.6   - CT Abd/pelvis:  No acute findings in the abdomen and pelvis. 2. Few fluid-filled small bowel loops in the lower abdomen without definite segmental distention to suggest obstruction. 3. Redemonstration of diffusely dilated main pancreatic duct, similar compared to prior imaging dating back to 2021. This was worked up by MRI and CT pancreas protocol in 2022. Recommend correlation with prior reports. 4. Cholelithiasis without acute cholecystitis.   - Stool pathogen panel, C. Diff PCR, occult stool negative   - Isolation protocol discontinued   - Antibiotics discontinued 5/7, received one day of ceftriaxone/metronidazole   - Protonix IVP every day  - Continue Carafate QID - home med   - Continue NS @ 125 ml/hr while having diarrhea/poor PO intake  - Diet advanced to GI soft today   - Dicyclomine changed from PRN to TID scheduled before meals   - Zofran PRN for nausea/vomiting   - Morphine 2 mg IVP q8h PRN for severe pain    - Nutrition consulted for low BMI, appreciate recs  - GI consulted, appreciate recs      #Hypokalemia  #Hypomagnesemia   #Hyponatremia (resolved)  - Likely due to diarrhea   - Na 132 > 138 > 138   - K 3.2 > 3.0, repleted per protocol  - Mag 1.69 > 1.15 today, repleted per protocol   - Continue NS @ 125 ml/hr while PO intake is  poor  - Continue Mag-Ox BID (home med)   - Daily BMP and mag level; replete electrolytes per protocol     #Bradycardia, asymptomatic   - Patient's HR reportedly in the 30s-40s overnight; lowest HR documented is 45 bpm   - TSH 1.83  - Echo: LVEF 60-65%, diastolic dysfunction, mild aortic valve regurgitation   - Telemetry monitoring  - Cardiology consulted, recommend 14 day Zio patch on discharge      #COPD, not in acute exacerbation  - Currently on room air   - Patient denies cough or shortness of breath  - CXR: COPD. No acute pulmonary process   - Continue Perforomist  - Monitor SpO2 continuously     #Hypothyroidism  - TSH 1.83   - Continue levothyroxine     #Anxiety   #Depression  - Continue buspirone, escitalopram, trazodone     DVT PPx: Heparin  GI PPx: Protonix   Diet: Soft   Code Status: Full      Disposition: Patient requires inpatient management at this time.      Total accumulated time spent face to face and not face to face preparing to see the patient, obtaining and reviewing separately obtained history; performing a medically appropriate examination and/or evaluation; counseling and educating the patient, family; ordering medications, tests, or procedures; referring and communicating with other health care professionals; documenting clinical information in the patient's medical record; independently interpreting results and communicating the results to the patient, family; and care coordination was 45 minutes.          Veronique Bradshaw PA-C

## 2024-05-07 NOTE — PROGRESS NOTES
05/07/24 0806   Discharge Planning   Living Arrangements Spouse/significant other   Support Systems Spouse/significant other   Assistance Needed Patient is A&OX4, Independent at baseline and has a walker at home if needed   Type of Residence Private residence   Number of Stairs to Enter Residence 13   Number of Stairs Within Residence 0   Do you have animals or pets at home? Yes   Type of Animals or Pets Dogs   Who is requesting discharge planning? Patient   Home or Post Acute Services None   Patient expects to be discharged to: Patient plan is to d/c home with no needs once medically clear.   Does the patient need discharge transport arranged? No

## 2024-05-07 NOTE — PROCEDURES
Overnight O2 trend performed on RA per MD order. Pt does not qualify for home HS O2. Pt had no desaturation events during entire sleep study.    Start time: 5/6/24 @ 2300  End time: 5/7/24 @ 0634    Total Time Below 88%: 0 sec  Longest Duration: 0 sec  Lowest SpO2: N/A  Number of events: 0

## 2024-05-07 NOTE — NURSING NOTE
Pt reports 3 loose BM's during shift, independent to the bathroom, tolerated IV antibiotics. Tolerating clear liquids. Pt having overnight SPO2 study, wears 2L at HS at home baseline. Call light with in reach.

## 2024-05-07 NOTE — CARE PLAN
The patient's goals for the shift include To get a good nights rest.    The clinical goals for the shift include pt will have decreased diarrhea during shift    Pt had loose stools through out the night but more formed than they have been. Pt was able to rest during the night

## 2024-05-07 NOTE — PROGRESS NOTES
Subjective   78 y.o. female on day 1 of admission presenting with Diarrhea.  Having abdominal discomfort today, better than earlier this am. Still feels weak. Tolerated lunch without diarrhea, has only had 1 bm today.      Review of Systems   Gastrointestinal:  Positive for abdominal pain and diarrhea.   Neurological:  Positive for weakness.     Objective   PHYSICAL EXAM:  Gen: NAD, alert and awake   Head: Normocephalic, atraumatic  Eyes: Sclera anicteric, conjunctiva pink   ENMT: MMM  Lungs: Easy, non-labored breathing  Abd: soft, non-distended, mild tenderness on palpation, no organomegaly, no ascites, no asterixis  Musculoskeletal: MAEx4  Ext: No BLE edema  Neuro: A/O x3, no gross focal deficits  Psych: Congruent mood and affect, appropriate insight and judgement  Skin: No jaundice, no pallor    Last Recorded Vitals  Blood pressure 129/78, pulse (!) 48, temperature 36.8 °C (98.2 °F), temperature source Temporal, resp. rate 16, height 1.524 m (5'), weight (!) 38.8 kg (85 lb 8.6 oz), SpO2 94%.     Intake/Output last 3 Shifts:  I/O last 3 completed shifts:  In: 5388.3 (138.9 mL/kg) [P.O.:880; I.V.:4108.3 (105.9 mL/kg); IV Piggyback:400]  Out: - (0 mL/kg)   Weight: 38.8 kg      Current medications during hospitalization  Scheduled medications  busPIRone, 20 mg, oral, TID  cholecalciferol, 5,000 Units, oral, Daily  escitalopram, 20 mg, oral, Nightly  formoterol, 20 mcg, nebulization, q12h  heparin (porcine), 5,000 Units, subcutaneous, q8h  levothyroxine, 50 mcg, oral, Daily  magnesium oxide, 400 mg, oral, Daily  magnesium sulfate, 2 g, intravenous, Once  oxygen, , inhalation, Continuous - Inhalation  pantoprazole, 40 mg, intravenous, Daily before breakfast  potassium chloride, 20 mEq, intravenous, Once  sucralfate, 1 g, oral, 4x daily  traZODone, 150 mg, oral, Nightly      Continuous medications  sodium chloride 0.9%, 125 mL/hr, Last Rate: 125 mL/hr (05/07/24 0854)  sodium chloride 0.9%, 10 mL/hr      PRN  medications  PRN medications: acetaminophen, bisacodyl, dicyclomine, lactulose, magnesium hydroxide, melatonin, metoclopramide, morphine, ondansetron, oxygen, polyethylene glycol, sodium chloride 0.9%    Relevant Results  Results for orders placed or performed during the hospital encounter of 05/05/24 (from the past 24 hour(s))   Occult Blood, Stool    Specimen: Stool   Result Value Ref Range    Occult Blood, Stool X1 Negative Negative   Transthoracic Echo (TTE) Complete   Result Value Ref Range    AV pk toribio 1.30 m/s    LVOT diam 1.90 cm    LV Biplane EF 66 %    MV avg E/e' ratio 11.20     MV E/A ratio 0.81     LA vol index A/L 26.4 ml/m2    Tricuspid annular plane systolic excursion 2.1 cm    RV free wall pk S' 11.70 cm/s    LVIDd 3.85 cm    RVSP 20.8 mmHg    Aortic Valve Area by Continuity of Peak Velocity 2.66 cm2    AV pk grad 6.8 mmHg    LV A4C EF 63.5    CBC   Result Value Ref Range    WBC 4.6 4.4 - 11.3 x10*3/uL    nRBC 0.0 0.0 - 0.0 /100 WBCs    RBC 4.00 4.00 - 5.20 x10*6/uL    Hemoglobin 11.9 (L) 12.0 - 16.0 g/dL    Hematocrit 36.4 36.0 - 46.0 %    MCV 91 80 - 100 fL    MCH 29.8 26.0 - 34.0 pg    MCHC 32.7 32.0 - 36.0 g/dL    RDW 12.6 11.5 - 14.5 %    Platelets 221 150 - 450 x10*3/uL   Basic metabolic panel   Result Value Ref Range    Glucose 103 (H) 74 - 99 mg/dL    Sodium 138 136 - 145 mmol/L    Potassium 3.0 (L) 3.5 - 5.3 mmol/L    Chloride 108 (H) 98 - 107 mmol/L    Bicarbonate 22 21 - 32 mmol/L    Anion Gap 11 10 - 20 mmol/L    Urea Nitrogen 3 (L) 6 - 23 mg/dL    Creatinine 0.85 0.50 - 1.05 mg/dL    eGFR 71 >60 mL/min/1.73m*2    Calcium 8.6 8.6 - 10.3 mg/dL   Magnesium   Result Value Ref Range    Magnesium 1.15 (L) 1.60 - 2.40 mg/dL         Radiology:   Transthoracic Echo (TTE) Complete   Final Result      CT abdomen pelvis w IV contrast   Final Result   1.  No acute findings in the abdomen and pelvis.   2. Few fluid-filled small bowel loops in the lower abdomen without   definite segmental distention  to suggest obstruction.   3. Redemonstration of diffusely dilated main pancreatic duct, similar   compared to prior imaging dating back to 2021. This was worked up by   MRI and CT pancreas protocol in 2022. Recommend correlation with   prior reports.   4. Cholelithiasis without acute cholecystitis.   5. Diffuse osseous demineralization.        MACRO:   None        Signed by: Talia Cancino 5/5/2024 4:12 PM   Dictation workstation:   [id number]      XR chest 1 view   Final Result   1.  COPD. No acute pulmonary process             Signed by: Jevon Paz 5/5/2024 3:47 PM   Dictation workstation:   [id number]      Holter Or Event Cardiac Monitor    (Results Pending)           ASSESSMENT/PLAN  78 y.o. female on day 1 of admission presenting with Diarrhea.  Enteric stool panel, C. difficile, and occult stool negative. Having some abdominal pain today, but less diarrhea.    Switch dicyclomine to scheduled before meals  Monitor CBC, BMP daily  Monitor stool output  GI soft diet  Supportive care per primary team        Cori Carrasco, APRN-CNP

## 2024-05-07 NOTE — PROGRESS NOTES
Cardiology Progress Note  Patient: Latha Quispe  Unit/Bed: 230/230-A  YOB: 1945    Admitting Diagnosis: Dehydration [E86.0]  Acute gastroenteritis [K52.9]  Hypokalemia [E87.6]  Bradycardia [R00.1]  Diarrhea [R19.7]  Date:  5/5/2024  Attending: Javier Donnelly MD      Hospital Day: 1    SUBJECTIVE:   Laying in bed, overall feeling improved somewhat. Denies SOB, Chest discomfort, palpitations or LH/dizziness. Complaining of abdominal pain and ongoing diarrhea.    OBJECTIVE  Vitals:   Visit Vitals  /78 (BP Location: Right arm, Patient Position: Lying)   Pulse (!) 48   Temp 36.8 °C (98.2 °F) (Temporal)   Resp 16        Wt Readings from Last 5 Encounters:   05/05/24 (!) 38.8 kg (85 lb 8.6 oz)   04/09/24 (!) 38.8 kg (85 lb 8 oz)   03/14/24 (!) 38.4 kg (84 lb 9.6 oz)   01/26/24 (!) 40.9 kg (90 lb 2.7 oz)   01/02/24 (!) 39 kg (85 lb 15.7 oz)       Intake / Output:   I/O last 3 completed shifts:  In: 5388.3 (138.9 mL/kg) [P.O.:880; I.V.:4108.3 (105.9 mL/kg); IV Piggyback:400]  Out: - (0 mL/kg)   Weight: 38.8 kg      Tele monitoring: SB 45-50s    Physical Examination:    Vitals reviewed.   Constitutional:       General: Awake.      Appearance: Normal appearance. Well-developed and not in distress.   Eyes:      General: Lids are normal.      Pupils: Pupils are equal, round, and reactive to light.   HENT:      Right Ear: External ear normal.      Left Ear: External ear normal.      Nose: Nose normal.    Mouth/Throat:      Lips: Pink.      Mouth: Mucous membranes are moist.   Neck:      Vascular: JVD normal.   Pulmonary:      Breath sounds: Normal air entry.   Cardiovascular:      PMI at left midclavicular line. Normal rate. Regular rhythm. Normal S1. Normal S2.       Murmurs: There is no murmur.   Edema:     Peripheral edema absent.   Abdominal:      General: Bowel sounds are normal.      Palpations: Abdomen is soft.      Tenderness: There is no abdominal tenderness.   Musculoskeletal: Normal range  "of motion.      Cervical back: Full passive range of motion without pain, normal range of motion and neck supple. Skin:     General: Skin is warm and dry.   Neurological:      General: No focal deficit present.      Mental Status: Alert, oriented to person, place, and time and oriented to person, place and time. Mental status is at baseline.   Psychiatric:         Attention and Perception: Attention normal.         Mood and Affect: Mood normal.         Speech: Speech normal.          LABS:  CMP:   Recent Labs     05/07/24  0814 05/06/24  0555 05/05/24  1444 01/27/24  0840 01/26/24  0714 01/25/24 1957 11/30/23  0938 07/08/23 2152 07/13/22  0952 07/13/21  1217    138 132* 134* 137 135* 140 132* 141 141   K 3.0* 3.2* 3.2* 3.7 3.7 2.8* 3.3* 3.8 3.5 3.5   * 108* 99 104 108* 103 103 99 104 103   CO2 22 23 24 21 22 22 27 22 31 30   ANIONGAP 11 10 12 13 11 13 13 15 10 12   BUN 3* 6 7 9 12 17 8 11 11 8   CREATININE 0.85 0.77 0.87 0.71 0.81 1.00 0.91 1.00 0.93 0.78   EGFR 71 80 69 88 75 58* 65  --   --   --    MG 1.15* 1.69 1.54*  --  1.65 1.20*  --   --   --   --      LIVER ENZYMES:   Recent Labs     05/05/24 1444 01/25/24 1957 11/30/23  0938 07/08/23 2152 07/13/22  0952 04/26/21 2009   ALBUMIN 3.8 4.1 3.9 4.3 4.2 4.2   ALT 10 13 14 10 7 13   AST 17 21 21 23 16 22   BILITOT 0.6 0.4 0.5 0.5 0.4 0.8   LIPASE 10 41  --  18  --  11     CBC:  Recent Labs     05/07/24  0814 05/06/24  0555 05/05/24 1444 01/27/24  0840 01/26/24  0714 01/25/24 1957 11/30/23  0938 07/08/23  2152   WBC 4.6 5.3 5.0 4.5 5.8 6.0 4.6 7.5   HGB 11.9* 11.1* 11.9* 11.5* 11.3* 12.4 12.9 12.4   HCT 36.4 33.9* 36.2 36.5 34.5* 38.2 41.8 39.1    230 257 207 229 228 267 252   MCV 91 89 88 93 90 90 95 94     COAG:   Recent Labs     05/05/24  1444 04/26/21 2009   INR 1.2* 1.2*     ABO: No results for input(s): \"ABO\" in the last 00811 hours.  HEME/ENDO:  Recent Labs     05/06/24  0555 04/26/24  0932   TSH 1.83 2.62      CARDIAC:   Recent " "Labs     05/05/24  1625 05/05/24  1444   TROPHS 8 7   BNP  --  61       Lipid Panel:  No results found for: \"HDL\", \"CHHDL\", \"VLDL\", \"TRIG\", \"NHDL\"       Current Medications:   MEDICATIONS  Infusions:  sodium chloride 0.9%, Last Rate: 125 mL/hr (05/07/24 0854)  sodium chloride 0.9%      Scheduled:  busPIRone, 20 mg, TID  cholecalciferol, 5,000 Units, Daily  escitalopram, 20 mg, Nightly  formoterol, 20 mcg, q12h  heparin (porcine), 5,000 Units, q8h  levothyroxine, 50 mcg, Daily  magnesium oxide, 400 mg, Daily  magnesium sulfate, 2 g, Once  oxygen, , Continuous - Inhalation  pantoprazole, 40 mg, Daily before breakfast  potassium chloride, 20 mEq, Once  sucralfate, 1 g, 4x daily  traZODone, 150 mg, Nightly      PRN:  acetaminophen, 650 mg, q4h PRN  bisacodyl, 10 mg, Daily PRN  dicyclomine, 10 mg, q6h PRN  lactulose, 10 g, BID PRN  magnesium hydroxide, 10 mL, Daily PRN  melatonin, 6 mg, Nightly PRN  metoclopramide, 5 mg, q6h PRN  morphine, 2 mg, q8h PRN  ondansetron, 4 mg, q8h PRN  oxygen, , Continuous PRN - O2/gases  polyethylene glycol, 17 g, Daily PRN  sodium chloride 0.9%, 10 mL/hr, Continuous PRN        Cardiovascular studies:    Echocardiogram 5/6/2024  CONCLUSIONS:   1. Left ventricular systolic function is normal with a 60-65% estimated ejection fraction.   2. Spectral Doppler shows an impaired relaxation pattern of left ventricular diastolic filling.   3. Mild aortic valve regurgitation.    Imaging:   CT abdomen, pelvis 5/5/2024  IMPRESSION:  1.  No acute findings in the abdomen and pelvis.  2. Few fluid-filled small bowel loops in the lower abdomen without  definite segmental distention to suggest obstruction.  3. Redemonstration of diffusely dilated main pancreatic duct, similar  compared to prior imaging dating back to 2021. This was worked up by  MRI and CT pancreas protocol in 2022. Recommend correlation with  prior reports.  4. Cholelithiasis without acute cholecystitis.  5. Diffuse osseous " demineralization.    CXR 5/5/2024  IMPRESSION:  1.  COPD. No acute pulmonary process    Assessment/Plan   Problem List Items Addressed This Visit             ICD-10-CM    Hypokalemia E87.6    Bradycardia R00.1    Relevant Orders    Transthoracic Echo (TTE) Complete (Completed)    Holter Or Event Cardiac Monitor    Dehydration - Primary E86.0     Other Visit Diagnoses         Codes    Acute gastroenteritis     K52.9    Abnormal ECG     R94.31    Relevant Orders    Transthoracic Echo (TTE) Complete (Completed)    Holter Or Event Cardiac Monitor          Assessment:    Patient Active Problem List   Diagnosis    Abdominal pain    Anxiety and depression    Chronic gastritis    Chronic insomnia    Gall stones    Hypothyroid    Moderate major depression (Multi)    Osteoporosis    Weight loss    Bowlegged    IBS (irritable bowel syndrome)    Hypokalemia    Chronic idiopathic constipation    Diarrhea    COPD (chronic obstructive pulmonary disease) (Multi)    Hypomagnesemia    Vitamin D deficiency    Acute cystitis    Hyponatremia    GERD (gastroesophageal reflux disease)    Bradycardia    Weakness    Dehydration      77 yo women admitted with diarrhea, lactic acidosis and chronic idiopathic constipation, electrolyte disturbance and concern for bradyarrhythmia overnight (lowest documented 45 BPM from today 5/6/2024)     COPD  Hypothyroidism  Anxiety/depression     Plan:  Echocardiogram with preserved LVEF, mild Aov Regurg  TSH normal  Telemetry monitoring without significant bradycardia, 45-50 BPM  Avoid any AV stephanei blockers  14 day ZIO monitor as OP  Supportive treatment for underlying illness per primary team     Thank you  for the consult   Will sign off.  Please call or message with questions or concerns    The case was discussed with Veronique Bradshaw PA-C       Electronically signed by MIRTHA Avila on 5/7/2024 at 11:35 AM

## 2024-05-07 NOTE — PROGRESS NOTES
Occupational Therapy    Occupational Therapy Treatment    Name: Latha Quispe  MRN: [id number]  : 1945  Date: 24  Time Calculation  Start Time: 831  Stop Time: 843  Time Calculation (min): 12 min    Assessment:  OT Assessment: Good participiation in OT session today. Pt. continues to have difficulty with mobility and balance d/t varus knees which leads to unsteadiness. Pt. ambulated great distance today without fatigue and demo'd independence with grooming and LB dressing.  Prognosis: Good  Barriers to Discharge: None  Evaluation/Treatment Tolerance: Patient tolerated treatment well  Medical Staff Made Aware: Yes  End of Session Communication: Bedside nurse  End of Session Patient Position: Up in chair, Alarm off, not on at start of session  Plan:  Treatment Interventions: ADL retraining, Functional transfer training, Endurance training, Patient/family training  OT Frequency: 2 times per week  OT Discharge Recommendations: Low intensity level of continued care  OT Recommended Transfer Status: Stand by assist  OT - OK to Discharge: Yes    Subjective   Previous Visit Info:  OT Last Visit  OT Received On: 24  General:  General  Family/Caregiver Present: No  Prior to Session Communication: Bedside nurse  Patient Position Received: Up in chair, Alarm off, not on at start of session  Preferred Learning Style: verbal  General Comment: CANDIDA mohan  Precautions:  Medical Precautions: Fall precautions  Pain Assessment:  Pain Assessment  Pain Assessment: 0-10  Pain Score: 0 - No pain     Objective   Cognition:  Overall Cognitive Status: Within Functional Limits  Activities of Daily Living: Grooming  Grooming Level of Assistance: Independent  Grooming Where Assessed: Standing sinkside  Grooming Comments: hair care completed and put up in a pony tail with independence    LE Dressing  LE Dressing: Yes  Sock Level of Assistance: Independent  LE Dressing Where Assessed: Chair  LE Dressing Comments: don/doff  On 11/21/23, Jennifer Gonzalez scribed the services personally performed by Neha Jacobs MD    Chief Complaint   Patient presents with   â¢ Office Visit   â¢ Follow-up       Visit: initial    Referring Provider: Clifford Regalado PA-C    SUBJECTIVE:  Sue Santana is a 55year old female who presents to the clinic for evaluation of supraclavicular swelling/mass. She was started on Duricef on 10/04/2023. She states she has a lump around her left collarbone. It is painful. Patient has hx of thyroidectomy at age 7years old. She states it was benign. Patient reports she has been experiencing sinus and chest congestion. REVIEW OF SYSTEMS:   --General: Pt denies problems with fever, night sweats, weight changes, appetite changes and sleep problems  --HEET: as above  --Respiratory: Pt denies problems with coughing, wheezing, changes in voice,  nor shortness of breath    --Cardiovascular: Pt denies problems with chest pain, palpitations or other cardiac complaints  --Gastrointestinal: Pt denies problems with diarrhea, constipation, abdominal pain or other complaints  --Genitourinary: Pt denies problems with urinary pain, bleeding and voiding problems  --Musculoskeletal: Pt denies problems with pain, swelling, weakness or limitation of motion  --Neurologic:Pt denies problems with syncope, seizures, paralysis, involuntary movements or gait  --Psychiatric: Pt denies problems with sleep, anxiety, or depression  --Hematologic/Lymphatic/Immunologic: Pt. denies hematological, lymphatic and immunological problems  --Endocrine: Pt. denies thyroid and diabetic problems  --Skin: No problems with scalp lesions.  No rash    Past Medical History:   Diagnosis Date   â¢ Hiatal hernia    â¢ History of Graves' disease 1977    s/p thyroid resection   â¢ Lumbar stenosis     R foraminal L5-S1 and R L4-L5 lateral recess stenosis   â¢ Postsurgical hypothyroidism 1977   â¢ Seasonal allergies    â¢ Status post partial colectomy 12/2018 due to history of recurrent diverticulitis        Patient Active Problem List   Diagnosis   â¢ Lumbar radiculopathy   â¢ Postoperative hypothyroidism   â¢ Status post partial colectomy   â¢ S/P lumbar microdiscectomy   â¢ Lumbar foraminal stenosis   â¢ Mild intermittent asthma       Past Surgical History:   Procedure Laterality Date   â¢ Colectomy  12/06/2018    for recurrent diverticulitis   â¢ Colonoscopy  11/2018    diverticulosis   â¢ Esophagogastroduodenoscopy  2000s    hiatal hernia   â¢ Lumbar discectomy  12/2019    L5-S1 microdiscectomy   â¢ Lumbar spine surgery  08/20/2020    Transforaminal lumbar interbody fusion (TLIF)   â¢ Nasal septum surgery  1994   â¢ Salpingoophorectomy Left 1996    for recurrent ovarian cysts   â¢ Thyroidectomy  1977   â¢ Total laparoscopic hysterect  04/2017    for menorrhagia with uterine fibroids       Allergies:  ALLERGIES:   Allergen Reactions   â¢ Azithromycin RASH   â¢ Cyclobenzaprine Other (See Comments)     Chest pressure and headache  Chest pressure and headache  Chest pressure and headache  Chest pressure and headache  Chest pressure and headache  Chest pressure and headache  Chest pressure and headache   â¢ Diphenhydramine Other (See Comments)     TACHYCARDIA    Racing heart rate  TACHYCARDIA  Racing heart rate  TACHYCARDIA  TACHYCARDIA  Racing heart rate  Racing heart rate  TACHYCARDIA    Racing heart rate  TACHYCARDIA     â¢ Promethazine Other (See Comments), Hallucinations and DIZZINESS     Unknown      PSYCHOTIC EPISODE  PSYCHOTIC EPISODE  Unknown  Unknown    PSYCHOTIC EPISODE     â¢ Promethazine-Phenylephrine Other (See Comments)     hallucinations   â¢ Quinolones Other (See Comments) and RASH     UNK  Unknown  Unknown   â¢ Allergy Decongestant Other (See Comments)     racing heart,rapid pulse   â¢ Ciprofloxacin Other (See Comments)     Unknown         Medications:   Current Outpatient Medications   Medication Sig Dispense Refill   â¢ tiZANidine (ZANAFLEX) 4 MG tablet Take 1 tablet by socks without assist, declined other LB dressing  Bed Mobility/Transfers: Transfers  Transfer: Yes  Transfer 1  Transfer From 1: Chair with arms to  Transfer to 1: Stand, Sit  Technique 1: Sit to stand, Stand to sit  Transfer Level of Assistance 1: Close supervision  Functional Mobility:  Functional Mobility  Functional Mobility Performed: Yes  Functional Mobility 1  Surface 1: Level tile  Device 1: No device  Assistance 1: Close supervision  Quality of Functional Mobility 1: Narrow base of support (varus knees)  Comments 1: increased speed and inability to walk in a straight line cuieng to mind her feet walked greater than 500ft today.    Outcome Measures:  [facility] Daily Activity  Putting on and taking off regular lower body clothing: None  Bathing (including washing, rinsing, drying): A little  Putting on and taking off regular upper body clothing: None  Toileting, which includes using toilet, bedpan or urinal: A little  Taking care of personal grooming such as brushing teeth: None  Eating Meals: None  Daily Activity - Total Score: 22    Education Documentation  Precautions, taught by Miranda Edmondson OT at 5/7/2024  1:24 PM.  Learner: Patient  Readiness: Acceptance  Method: Explanation  Response: Verbalizes Understanding  Comment: Edu on POC and safety with ambulation.    ADL Training, taught by Miranda Edmondson OT at 5/7/2024  1:24 PM.  Learner: Patient  Readiness: Acceptance  Method: Explanation  Response: Verbalizes Understanding  Comment: Edu on POC and safety with ambulation.    Education Comments  No comments found.      Goals:  Encounter Problems       Encounter Problems (Active)       ADLs       Patient with complete upper body dressing with modified independent level of assistance donning and doffing all UE clothes including item retrieval       Start:  05/06/24    Expected End:  05/13/24            Patient with complete lower body dressing with modified independent level of assistance donning and doffing all LE  clothes including item retrieval (Progressing)       Start:  05/06/24    Expected End:  05/20/24         Goal Note       Did not do pants yet              Patient will complete daily grooming tasks brushing teeth and washing face/hair with modified independent level of assistance standing at sinkside (Met)       Start:  05/06/24    Expected End:  05/13/24    Resolved:  05/07/24         Patient will complete toileting including hygiene clothing management/hygiene with modified independent level of assistance and raised toilet seat.       Start:  05/06/24    Expected End:  05/13/24               TRANSFERS       Patient will complete functional transfer to toilet, chair, bed with least restrictive device with modified independent level of assistance. (Progressing)       Start:  05/06/24    Expected End:  05/13/24                  mouth at bedtime. Collaborating MD: Dr. Emelyn Bernardo MD 15 tablet 0   â¢ albuterol (VENTOLIN) (2.5 MG/3ML) 0.083% nebulizer solution Take 3 mLs by nebulization every 4 hours as needed for Wheezing or Shortness of Breath. Collaborating MD: Dr. Emelyn Bernardo MD 75 mL 0   â¢ butalbital-aspirin-caffeine [facility]) capsule Take 1 capsule by mouth. â¢ estradiol (ESTRACE) 0.1 MG/GM vaginal cream INSERT 0.5 GRAMS VAGINALLY TWICE A WEEK     â¢ naproxen (NAPROSYN) 500 MG tablet Take 1 tablet by mouth in the morning and 1 tablet in the evening. Take with meals. Collaborating MD: Dr. Emelyn Bernardo MD 30 tablet 0   â¢ levothyroxine 125 MCG tablet TAKE ONE TABLET BY MOUTH ONE TIME DAILY 90 tablet 0   â¢ Loratadine 10 MG Cap Take by mouth daily. â¢ nystatin (MYCOSTATIN) 853484 UNIT/GM cream Apply 1 application. topically in the morning and 1 application. in the evening. 30 g 1   â¢ esomeprazole (NexIUM) 40 MG capsule Take 1 capsule by mouth daily. 90 capsule 3   â¢ clobetasol (TEMOVATE) 0.05 % ointment Apply 4.58 Applications topically daily. â¢ ferrous sulfate 325 (65 FE) MG EC tablet Take 1 tablet by mouth daily (with breakfast). â¢ potassium CHLORIDE (KLOR-CON) 20 MEQ packet Take 1 packet by mouth daily. â¢ Garlic 10 MG Cap Take 10 mg by mouth daily. â¢ B Complex Vitamins (vitamin B complex) tablet Take 1 tablet by mouth daily. â¢ cholecalciferol (Vitamin D, Cholecalciferol,) 25 mcg (1,000 units) tablet Take 1 tablet by mouth in the morning and 1 tablet in the evening. 30 tablet 1   â¢ albuterol 108 (90 Base) MCG/ACT inhaler Inhale 2 puffs into the lungs every 4 hours as needed for Shortness of Breath or Wheezing. 1 each 12   â¢ acetaminophen-codeine (TYLENOL #3) 300-30 MG per tablet Take 1 tablet by mouth 2 times daily as needed. â¢ Omega-3 Fatty Acids (Fish Oil) 1000 MG capsule Take 2 g by mouth daily. â¢ gabapentin (NEURONTIN) 600 MG tablet Take 600 mg by mouth 3 times daily.        No current facility-administered medications for this visit. Social History:   Social History     Socioeconomic History   â¢ Marital status: /Civil Union     Spouse name: Not on file   â¢ Number of children: 4   â¢ Years of education: Not on file   â¢ Highest education level: Not on file   Occupational History   â¢ Occupation: unemployed currently - diability   Tobacco Use   â¢ Smoking status: Never   â¢ Smokeless tobacco: Never   Vaping Use   â¢ Vaping Use: never used   Substance and Sexual Activity   â¢ Alcohol use: Not Currently   â¢ Drug use: Yes     Types: Marijuana     Comment: occasional   â¢ Sexual activity: Yes     Partners: Male     Birth control/protection: Post-menopausal, Surgical   Other Topics Concern   â¢  Service Not Asked   â¢ Blood Transfusions Not Asked   â¢ Caffeine Concern Not Asked   â¢ Occupational Exposure Not Asked   â¢ Hobby Hazards Not Asked   â¢ Sleep Concern Not Asked   â¢ Stress Concern Not Asked   â¢ Weight Concern Not Asked   â¢ Special Diet Not Asked   â¢ Back Care Not Asked   â¢ Exercise No   â¢ Bike Helmet Not Asked   â¢ Seat Belt Not Asked   â¢ Self-Exams Not Asked   Social History Narrative    Lives     Pets: none     Social Determinants of Health     Financial Resource Strain: Not on file   Food Insecurity: Not on file   Transportation Needs: Not on file   Physical Activity: Inactive (1/4/2023)    Exercise Vital Sign    â¢ Days of Exercise per Week: 0 days    â¢ Minutes of Exercise per Session: 0 min   Stress: Not on file   Social Connections: Not on file   Intimate Partner Violence: Not on file       Family History   Problem Relation Age of Onset   â¢ Hypertension Mother    â¢ Genitourinary Mother         history of ovarian cancer cells.    â¢ Hypertension Father    â¢ Gastrointestinal Father         hiatal hernia   â¢ Cancer, Kidney Father         liver, prostate   â¢ Patient is unaware of any medical problems Sister    â¢ Patient is unaware of any medical problems Sister    â¢ Cancer Sister multiple GI cancers   â¢ Heart Sister         syncope   â¢ Heart Maternal Grandmother    â¢ Cancer, Esophageal Paternal Grandfather          No significant family history related to Head and neck complaints. PHYSICAL EXAMINATION:   There were no vitals taken for this visit. --General appearance: Well developed, well nourished, in no apparent distress  --Ability to communicate: Appropriate. Voice strong, no hoarseness or breathiness. --Head and scalp: No scalp lesions  --Eyes: No redness, swelling or drainage. --Ears:    R ear: EAC patent, tympanic membrane intact, no middle ear effusion    L ear: EAC patent, tympanic membrane intact, no middle ear effusion  --Oral Cavity/Oral Pharynx: No buccal mucosal lesions, palatal elevation symmetric, tongue motion intact, no floor of mouth palpable masses, no posterior pharyngeal wall fullness  --Nose: Nares patent, no rhinorrhea or epistaxis. Septum midline, +turbinate hypertrophy. --Neck: There is a fullness near the left trapezius tender with palpation. Trachea midline, Supple  --Skin: No pigmented lesions on face, neck  --Psychiatric: Oriented to time, place and person  --Lymphatic: No palpable cervical adenopathy   --CV: No JVD  --Resp: Breathing comfortably, no stridor or stertor noted  --Neuro: CN7 intact, appropriate affect. IMAGING: Xray Chest on 11/17/2023 personally reviewed with patient and images explained. See radiology interpretation below. FINDINGS: The heart, mediastinum, and mahesh have a normal appearance. There  are surgical clips involving the thyroid region. Â   There is a stable 11 mm nodule in the right midlung. Lungs are otherwise  clear. No consolidation or pleural fluid. Â   Â   IMPRESSION:  1. No acute cardiopulmonary abnormality is demonstrated. Â   2. There is a stable 11 mm nodule in the right midlung unchanged since  8/6/2020.  The stability over a 3-year interval would be most consistent  with a benign pulmonary nodule. Â   Electronically Signed by: Cong Marinelli MD   Signed on: 11/17/2023 2:48 PM     IMAGING: CT scan Neck on 11/03/2023 personally reviewed with patient and images explained. See radiology interpretation below  FINDINGS:     Orbits, paranasal, and skull base: Minimal mucosal thickening within the inferior right maxillary   sinus. The visualized paranasal sinuses are otherwise clear. The orbits and globes appear normal. No   abnormalities at the skull base. Parotid and submandibular glands: Normal and symmetric bilaterally. Suprahyoid Neck: The nasopharynx, oropharynx, oral cavity, parapharyngeal space, and retropharyngeal   space are normal.   Infrahyoid Neck: The larynx, hypopharynx, and supraglottis are normal.   Lymph Nodes: No Lymphadenopathy. No abnormalities within the left supraclavicular region. Thyroid gland: Thyroidectomy. Vascular Structures: Normal.   Paravertebral soft tissues: Within normal limits. Thoracic inlet: The lung apices and brachial plexus are normal.   Osseous structures: Mild cervical spondylosis. IMPRESSION:   CT neck is within normal limits. No evidence of mass or adenopathy. If symptoms persist, consider   targeted ultrasound. ASSESSMENT:  Neck pain, left   Neck fullness, left   Acute sinusitis     PLAN:  1. Neck pain  - Reviewed CT and Xray results with patient as stated above. - Differential diagnosis includes lymphadenopathy, neoplasm, cervical neck pain  - Will refer patient for U/S Neck Soft Tissue to further evaluate. 2. Acute sinusitis   - Start Doxycycline 100mg BID for 10 days.   - Start Medrol dosepak as directed. - Start Flonase nasal spray, 2 sprays EN once daily. Patient educated on proper administration.   - Start nasal hygiene regimen. This consists of using OTC nasal saline spray 4-5x daily for moisture, as well as nasal irrigations Cortney Robles Med Sinus rinse) daily to BID for cleansing.  Described proper use including the use of warm distilled water, and proper cleansing of the bottles to avoid bacterial buildup.    - RTC following imaging.   - Discussed with patient treatment plan as above, and patient agreeable. All patient's questions and concerns were addressed and answered. Ritu White MD    Scribe: Electronically signed: Tarah Ambrocio has scribed for Dr. Harjit Berry, 11/21/23  I have reviewed and edited the progress note and agree with what has been scribed.  Electronically signed by: Nandini rByan MD ,  11/21/23

## 2024-05-08 ENCOUNTER — APPOINTMENT (OUTPATIENT)
Dept: CARDIOLOGY | Facility: HOSPITAL | Age: 79
DRG: 391 | End: 2024-05-08
Payer: MEDICARE

## 2024-05-08 LAB
ANION GAP SERPL CALC-SCNC: 10 MMOL/L (ref 10–20)
BUN SERPL-MCNC: 3 MG/DL (ref 6–23)
CALCIUM SERPL-MCNC: 8.5 MG/DL (ref 8.6–10.3)
CHLORIDE SERPL-SCNC: 109 MMOL/L (ref 98–107)
CO2 SERPL-SCNC: 22 MMOL/L (ref 21–32)
CREAT SERPL-MCNC: 0.81 MG/DL (ref 0.5–1.05)
EGFRCR SERPLBLD CKD-EPI 2021: 75 ML/MIN/1.73M*2
ERYTHROCYTE [DISTWIDTH] IN BLOOD BY AUTOMATED COUNT: 12.8 % (ref 11.5–14.5)
GLUCOSE SERPL-MCNC: 88 MG/DL (ref 74–99)
HCT VFR BLD AUTO: 37.7 % (ref 36–46)
HGB BLD-MCNC: 12 G/DL (ref 12–16)
MAGNESIUM SERPL-MCNC: 1.42 MG/DL (ref 1.6–2.4)
MCH RBC QN AUTO: 30.2 PG (ref 26–34)
MCHC RBC AUTO-ENTMCNC: 31.8 G/DL (ref 32–36)
MCV RBC AUTO: 95 FL (ref 80–100)
NRBC BLD-RTO: ABNORMAL /100{WBCS}
PLATELET # BLD AUTO: 194 X10*3/UL (ref 150–450)
POTASSIUM SERPL-SCNC: 3.3 MMOL/L (ref 3.5–5.3)
RBC # BLD AUTO: 3.97 X10*6/UL (ref 4–5.2)
SODIUM SERPL-SCNC: 138 MMOL/L (ref 136–145)
WBC # BLD AUTO: 5.5 X10*3/UL (ref 4.4–11.3)

## 2024-05-08 PROCEDURE — 2500000001 HC RX 250 WO HCPCS SELF ADMINISTERED DRUGS (ALT 637 FOR MEDICARE OP): Mod: IPSPLIT

## 2024-05-08 PROCEDURE — 94640 AIRWAY INHALATION TREATMENT: CPT | Mod: IPSPLIT

## 2024-05-08 PROCEDURE — 1200000002 HC GENERAL ROOM WITH TELEMETRY DAILY: Mod: IPSPLIT

## 2024-05-08 PROCEDURE — 2500000004 HC RX 250 GENERAL PHARMACY W/ HCPCS (ALT 636 FOR OP/ED): Mod: IPSPLIT

## 2024-05-08 PROCEDURE — 83735 ASSAY OF MAGNESIUM: CPT | Mod: IPSPLIT

## 2024-05-08 PROCEDURE — 94664 DEMO&/EVAL PT USE INHALER: CPT | Mod: IPSPLIT

## 2024-05-08 PROCEDURE — 2500000004 HC RX 250 GENERAL PHARMACY W/ HCPCS (ALT 636 FOR OP/ED): Mod: IPSPLIT | Performed by: NURSE PRACTITIONER

## 2024-05-08 PROCEDURE — 82374 ASSAY BLOOD CARBON DIOXIDE: CPT | Mod: IPSPLIT

## 2024-05-08 PROCEDURE — 2500000005 HC RX 250 GENERAL PHARMACY W/O HCPCS: Mod: IPSPLIT

## 2024-05-08 PROCEDURE — 93005 ELECTROCARDIOGRAM TRACING: CPT | Mod: IPSPLIT

## 2024-05-08 PROCEDURE — 85027 COMPLETE CBC AUTOMATED: CPT | Mod: IPSPLIT

## 2024-05-08 PROCEDURE — 9420000001 HC RT PATIENT EDUCATION 5 MIN: Mod: IPSPLIT

## 2024-05-08 PROCEDURE — 36415 COLL VENOUS BLD VENIPUNCTURE: CPT | Mod: IPSPLIT

## 2024-05-08 PROCEDURE — 2500000002 HC RX 250 W HCPCS SELF ADMINISTERED DRUGS (ALT 637 FOR MEDICARE OP, ALT 636 FOR OP/ED): Mod: IPSPLIT,MUE

## 2024-05-08 PROCEDURE — 94760 N-INVAS EAR/PLS OXIMETRY 1: CPT | Mod: IPSPLIT

## 2024-05-08 PROCEDURE — C9113 INJ PANTOPRAZOLE SODIUM, VIA: HCPCS | Mod: IPSPLIT

## 2024-05-08 PROCEDURE — 2500000006 HC RX 250 W HCPCS SELF ADMINISTERED DRUGS (ALT 637 FOR ALL PAYERS): Mod: IPSPLIT | Performed by: NURSE PRACTITIONER

## 2024-05-08 RX ORDER — HYOSCYAMINE SULFATE 0.12 MG/1
0.12 TABLET, ORALLY DISINTEGRATING ORAL
Status: DISCONTINUED | OUTPATIENT
Start: 2024-05-08 | End: 2024-05-10 | Stop reason: HOSPADM

## 2024-05-08 RX ORDER — MAGNESIUM SULFATE HEPTAHYDRATE 40 MG/ML
4 INJECTION, SOLUTION INTRAVENOUS ONCE
Status: COMPLETED | OUTPATIENT
Start: 2024-05-08 | End: 2024-05-08

## 2024-05-08 RX ORDER — LANOLIN ALCOHOL/MO/W.PET/CERES
400 CREAM (GRAM) TOPICAL 2 TIMES DAILY
Status: DISCONTINUED | OUTPATIENT
Start: 2024-05-08 | End: 2024-05-10 | Stop reason: HOSPADM

## 2024-05-08 RX ORDER — POTASSIUM CHLORIDE 20 MEQ/1
40 TABLET, EXTENDED RELEASE ORAL ONCE
Status: COMPLETED | OUTPATIENT
Start: 2024-05-08 | End: 2024-05-08

## 2024-05-08 RX ADMIN — BUSPIRONE HYDROCHLORIDE 20 MG: 10 TABLET ORAL at 09:09

## 2024-05-08 RX ADMIN — SODIUM CHLORIDE 125 ML/HR: 9 INJECTION, SOLUTION INTRAVENOUS at 03:05

## 2024-05-08 RX ADMIN — SUCRALFATE 1 G: 1 TABLET ORAL at 22:57

## 2024-05-08 RX ADMIN — ESCITALOPRAM OXALATE 20 MG: 10 TABLET ORAL at 22:49

## 2024-05-08 RX ADMIN — POTASSIUM CHLORIDE 40 MEQ: 1500 TABLET, EXTENDED RELEASE ORAL at 13:02

## 2024-05-08 RX ADMIN — LEVOTHYROXINE SODIUM 50 MCG: 0.05 TABLET ORAL at 09:09

## 2024-05-08 RX ADMIN — HEPARIN SODIUM 5000 UNITS: 5000 INJECTION INTRAVENOUS; SUBCUTANEOUS at 22:54

## 2024-05-08 RX ADMIN — Medication 6 MG: at 22:50

## 2024-05-08 RX ADMIN — DICYCLOMINE HYDROCHLORIDE 10 MG: 10 CAPSULE ORAL at 10:59

## 2024-05-08 RX ADMIN — DICYCLOMINE HYDROCHLORIDE 10 MG: 10 CAPSULE ORAL at 06:17

## 2024-05-08 RX ADMIN — MORPHINE SULFATE 2 MG: 2 INJECTION, SOLUTION INTRAMUSCULAR; INTRAVENOUS at 20:14

## 2024-05-08 RX ADMIN — PANTOPRAZOLE SODIUM 40 MG: 40 INJECTION, POWDER, LYOPHILIZED, FOR SOLUTION INTRAVENOUS at 06:17

## 2024-05-08 RX ADMIN — MAGNESIUM SULFATE HEPTAHYDRATE 4 G: 40 INJECTION, SOLUTION INTRAVENOUS at 13:02

## 2024-05-08 RX ADMIN — SODIUM CHLORIDE 50 ML/HR: 9 INJECTION, SOLUTION INTRAVENOUS at 22:54

## 2024-05-08 RX ADMIN — SUCRALFATE 1 G: 1 TABLET ORAL at 16:11

## 2024-05-08 RX ADMIN — Medication 400 MG: at 09:09

## 2024-05-08 RX ADMIN — CHOLECALCIFEROL TAB 125 MCG (5000 UNIT) 5000 UNITS: 125 TAB at 09:09

## 2024-05-08 RX ADMIN — TRAZODONE HYDROCHLORIDE 150 MG: 100 TABLET ORAL at 22:57

## 2024-05-08 RX ADMIN — HEPARIN SODIUM 5000 UNITS: 5000 INJECTION INTRAVENOUS; SUBCUTANEOUS at 06:18

## 2024-05-08 RX ADMIN — BUSPIRONE HYDROCHLORIDE 20 MG: 10 TABLET ORAL at 15:21

## 2024-05-08 RX ADMIN — FORMOTEROL FUMARATE DIHYDRATE 20 MCG: 20 SOLUTION RESPIRATORY (INHALATION) at 21:45

## 2024-05-08 RX ADMIN — SODIUM CHLORIDE 125 ML/HR: 9 INJECTION, SOLUTION INTRAVENOUS at 10:54

## 2024-05-08 RX ADMIN — SUCRALFATE 1 G: 1 TABLET ORAL at 13:02

## 2024-05-08 RX ADMIN — SUCRALFATE 1 G: 1 TABLET ORAL at 06:17

## 2024-05-08 RX ADMIN — HYOSCYAMINE SULFATE 0.12 MG: 0.12 TABLET SUBLINGUAL at 22:50

## 2024-05-08 RX ADMIN — MORPHINE SULFATE 2 MG: 2 INJECTION, SOLUTION INTRAMUSCULAR; INTRAVENOUS at 10:54

## 2024-05-08 RX ADMIN — Medication 400 MG: at 22:51

## 2024-05-08 RX ADMIN — HYOSCYAMINE SULFATE 0.12 MG: 0.12 TABLET SUBLINGUAL at 15:21

## 2024-05-08 RX ADMIN — FORMOTEROL FUMARATE DIHYDRATE 20 MCG: 20 SOLUTION RESPIRATORY (INHALATION) at 09:42

## 2024-05-08 RX ADMIN — BUSPIRONE HYDROCHLORIDE 20 MG: 10 TABLET ORAL at 22:49

## 2024-05-08 RX ADMIN — HEPARIN SODIUM 5000 UNITS: 5000 INJECTION INTRAVENOUS; SUBCUTANEOUS at 13:02

## 2024-05-08 ASSESSMENT — PAIN - FUNCTIONAL ASSESSMENT
PAIN_FUNCTIONAL_ASSESSMENT: 0-10

## 2024-05-08 ASSESSMENT — PAIN DESCRIPTION - LOCATION
LOCATION: ABDOMEN
LOCATION: ABDOMEN

## 2024-05-08 ASSESSMENT — PAIN SCALES - GENERAL
PAINLEVEL_OUTOF10: 0 - NO PAIN
PAINLEVEL_OUTOF10: 4
PAINLEVEL_OUTOF10: 9
PAINLEVEL_OUTOF10: 0 - NO PAIN
PAINLEVEL_OUTOF10: 8

## 2024-05-08 NOTE — PROGRESS NOTES
Subjective   78 y.o. female on day 2 of admission presenting with Diarrhea.  Still having abdominal pain, feels like a tightness throughout abdomen, needed morphine yesterday and this morning for pain. Reports less diarrhea. Tolerating diet.      Objective   PHYSICAL EXAM:  Gen: NAD, alert and awake, thin  Head: Normocephalic, atraumatic  Eyes: Sclera anicteric, conjunctiva pink   ENMT: MMM  Lungs: Easy, non-labored breathing  Abd: Bowel sounds present, soft, non-distended, non-tender, no organomegaly, no ascites, no asterixis  Musculoskeletal: MAEx4  Ext: No BLE edema  Neuro: A/O x3, no gross focal deficits  Psych: Congruent mood and affect, appropriate insight and judgement  Skin: No jaundice, no pallor    Last Recorded Vitals  Blood pressure 127/52, pulse 55, temperature 37.2 °C (99 °F), temperature source Temporal, resp. rate 16, height 1.524 m (5'), weight (!) 38.8 kg (85 lb 8.6 oz), SpO2 99%.     Intake/Output last 3 Shifts:  I/O last 3 completed shifts:  In: 3213.8 (82.8 mL/kg) [P.O.:720; I.V.:2243.8 (57.8 mL/kg); IV Piggyback:250]  Out: 1 (0 mL/kg) [Stool:1]  Weight: 38.8 kg      Current medications during hospitalization  Scheduled medications  busPIRone, 20 mg, oral, TID  cholecalciferol, 5,000 Units, oral, Daily  escitalopram, 20 mg, oral, Nightly  formoterol, 20 mcg, nebulization, q12h  heparin (porcine), 5,000 Units, subcutaneous, q8h  hyoscyamine, 0.125 mg, oral, Before meals & nightly  levothyroxine, 50 mcg, oral, Daily  magnesium oxide, 400 mg, oral, BID  magnesium sulfate, 4 g, intravenous, Once  pantoprazole, 40 mg, intravenous, Daily before breakfast  sucralfate, 1 g, oral, 4x daily  traZODone, 150 mg, oral, Nightly      Continuous medications  sodium chloride 0.9%, 50 mL/hr, Last Rate: 50 mL/hr (05/08/24 1241)  sodium chloride 0.9%, 10 mL/hr      PRN medications  PRN medications: acetaminophen, bisacodyl, lactulose, magnesium hydroxide, melatonin, metoclopramide, morphine, ondansetron, oxygen,  polyethylene glycol, sodium chloride 0.9%    Relevant Results  Results for orders placed or performed during the hospital encounter of 05/05/24 (from the past 24 hour(s))   Magnesium   Result Value Ref Range    Magnesium 1.42 (L) 1.60 - 2.40 mg/dL   Basic metabolic panel   Result Value Ref Range    Glucose 88 74 - 99 mg/dL    Sodium 138 136 - 145 mmol/L    Potassium 3.3 (L) 3.5 - 5.3 mmol/L    Chloride 109 (H) 98 - 107 mmol/L    Bicarbonate 22 21 - 32 mmol/L    Anion Gap 10 10 - 20 mmol/L    Urea Nitrogen 3 (L) 6 - 23 mg/dL    Creatinine 0.81 0.50 - 1.05 mg/dL    eGFR 75 >60 mL/min/1.73m*2    Calcium 8.5 (L) 8.6 - 10.3 mg/dL   CBC   Result Value Ref Range    WBC 5.5 4.4 - 11.3 x10*3/uL    nRBC      RBC 3.97 (L) 4.00 - 5.20 x10*6/uL    Hemoglobin 12.0 12.0 - 16.0 g/dL    Hematocrit 37.7 36.0 - 46.0 %    MCV 95 80 - 100 fL    MCH 30.2 26.0 - 34.0 pg    MCHC 31.8 (L) 32.0 - 36.0 g/dL    RDW 12.8 11.5 - 14.5 %    Platelets 194 150 - 450 x10*3/uL         Radiology:   Transthoracic Echo (TTE) Complete   Final Result      CT abdomen pelvis w IV contrast   Final Result   1.  No acute findings in the abdomen and pelvis.   2. Few fluid-filled small bowel loops in the lower abdomen without   definite segmental distention to suggest obstruction.   3. Redemonstration of diffusely dilated main pancreatic duct, similar   compared to prior imaging dating back to 2021. This was worked up by   MRI and CT pancreas protocol in 2022. Recommend correlation with   prior reports.   4. Cholelithiasis without acute cholecystitis.   5. Diffuse osseous demineralization.        MACRO:   None        Signed by: Talia Cancino 5/5/2024 4:12 PM   Dictation workstation:   [id number]      XR chest 1 view   Final Result   1.  COPD. No acute pulmonary process             Signed by: Jevon Paz 5/5/2024 3:47 PM   Dictation workstation:   [id number]      Holter Or Event Cardiac Monitor    (Results Pending)           ASSESSMENT/PLAN  78 y.o. female  on day 2 of admission presenting with Diarrhea. Still having ongoing abdominal pain despite dicyclomine prior to meals.    Stop dicyclomine, start hyoscyamine AC/HS  Monitor CBC, BMP daily  Monitor stool output  GI soft diet  Supportive care per primary team  Can follow up as outpatient with me or Dr. Diez (she is established with him)        Cori Carrasco, GEO-CNP

## 2024-05-08 NOTE — PROGRESS NOTES
mathieu Quispe is a 78 y.o. female on day 2 of admission presenting with Diarrhea.      Subjective   Patient assessed at bedside; sitting up in bed. She complained of abdominal pain that she has been dealing with for the past several months. She had a large BM and now diarrhea. She reports she was to get a CT scan done tomorrow; she was wandering if it could be done here.       Objective     Last Recorded Vitals  /52 (BP Location: Left arm, Patient Position: Lying)   Pulse 55   Temp 37.2 °C (99 °F) (Temporal)   Resp 16   Wt (!) 38.8 kg (85 lb 8.6 oz)   SpO2 99%   Intake/Output last 3 Shifts:    Intake/Output Summary (Last 24 hours) at 5/8/2024 1234  Last data filed at 5/8/2024 0856  Gross per 24 hour   Intake 1661.67 ml   Output 1 ml   Net 1660.67 ml       Admission Weight  Weight: (!) 37.6 kg (83 lb) (05/05/24 1348)    Daily Weight  05/05/24 : (!) 38.8 kg (85 lb 8.6 oz)    Image Results      Physical Exam  Vitals reviewed.   Constitutional:       Appearance: Normal appearance.      Comments: Cachexia; underweight   HENT:      Head: Normocephalic and atraumatic.      Nose: Nose normal.      Mouth/Throat:      Mouth: Mucous membranes are moist.      Pharynx: Oropharynx is clear.   Eyes:      Conjunctiva/sclera: Conjunctivae normal.      Pupils: Pupils are equal, round, and reactive to light.   Cardiovascular:      Rate and Rhythm: Normal rate and regular rhythm.      Pulses: Normal pulses.      Heart sounds: Normal heart sounds.   Pulmonary:      Effort: Pulmonary effort is normal.      Breath sounds: Normal breath sounds.   Abdominal:      General: Abdomen is flat. Bowel sounds are normal.      Palpations: Abdomen is soft.   Musculoskeletal:         General: Normal range of motion.      Cervical back: Normal range of motion and neck supple.   Skin:     General: Skin is warm and dry.   Neurological:      General: No focal deficit present.      Mental Status: She is alert and oriented to person, place, and  time.   Psychiatric:         Mood and Affect: Mood normal.         Relevant Results    Scheduled medications  busPIRone, 20 mg, oral, TID  cholecalciferol, 5,000 Units, oral, Daily  dicyclomine, 10 mg, oral, TID AC  escitalopram, 20 mg, oral, Nightly  formoterol, 20 mcg, nebulization, q12h  heparin (porcine), 5,000 Units, subcutaneous, q8h  levothyroxine, 50 mcg, oral, Daily  magnesium oxide, 400 mg, oral, BID  magnesium sulfate, 4 g, intravenous, Once  pantoprazole, 40 mg, intravenous, Daily before breakfast  potassium chloride CR, 40 mEq, oral, Once  sucralfate, 1 g, oral, 4x daily  traZODone, 150 mg, oral, Nightly      Continuous medications  sodium chloride 0.9%, 125 mL/hr, Last Rate: 125 mL/hr (05/08/24 1054)  sodium chloride 0.9%, 10 mL/hr      PRN medications  PRN medications: acetaminophen, bisacodyl, lactulose, magnesium hydroxide, melatonin, metoclopramide, morphine, ondansetron, oxygen, polyethylene glycol, sodium chloride 0.9%    Results for orders placed or performed during the hospital encounter of 05/05/24 (from the past 24 hour(s))   Magnesium   Result Value Ref Range    Magnesium 1.42 (L) 1.60 - 2.40 mg/dL   Basic metabolic panel   Result Value Ref Range    Glucose 88 74 - 99 mg/dL    Sodium 138 136 - 145 mmol/L    Potassium 3.3 (L) 3.5 - 5.3 mmol/L    Chloride 109 (H) 98 - 107 mmol/L    Bicarbonate 22 21 - 32 mmol/L    Anion Gap 10 10 - 20 mmol/L    Urea Nitrogen 3 (L) 6 - 23 mg/dL    Creatinine 0.81 0.50 - 1.05 mg/dL    eGFR 75 >60 mL/min/1.73m*2    Calcium 8.5 (L) 8.6 - 10.3 mg/dL   CBC   Result Value Ref Range    WBC 5.5 4.4 - 11.3 x10*3/uL    nRBC      RBC 3.97 (L) 4.00 - 5.20 x10*6/uL    Hemoglobin 12.0 12.0 - 16.0 g/dL    Hematocrit 37.7 36.0 - 46.0 %    MCV 95 80 - 100 fL    MCH 30.2 26.0 - 34.0 pg    MCHC 31.8 (L) 32.0 - 36.0 g/dL    RDW 12.8 11.5 - 14.5 %    Platelets 194 150 - 450 x10*3/uL                   Assessment/Plan      Principal Problem:    Diarrhea  Active Problems:    Anxiety  and depression    Hypothyroid    IBS (irritable bowel syndrome)    Hypokalemia    Chronic idiopathic constipation    COPD (chronic obstructive pulmonary disease) (Multi)    Hypomagnesemia    Vitamin D deficiency    Acute cystitis    Hyponatremia    GERD (gastroesophageal reflux disease)    Bradycardia    Weakness    Dehydration  Diarrhea (improving)   GERD  Lactic acidosis (resolved)   Chronic idiopathic constipation  - Patient reports diarrhea for two days prior to admission; is normally constipated  - Lactate 2.1 > 1.2  - CRP < 0.10  - Lipase 10  - WBC 5.0 > 5.3 > 4.6   - CT Abd/pelvis:  No acute findings in the abdomen and pelvis. 2. Few fluid-filled small bowel loops in the lower abdomen without definite segmental distention to suggest obstruction. 3. Redemonstration of diffusely dilated main pancreatic duct, similar compared to prior imaging dating back to 2021. This was worked up by MRI and CT pancreas protocol in 2022. Recommend correlation with prior reports. 4. Cholelithiasis without acute cholecystitis.   - Stool pathogen panel, C. Diff PCR, occult stool negative   - Isolation protocol discontinued   - Antibiotics discontinued 5/7, received one day of ceftriaxone/metronidazole   - continue pantoprazole IVP every day  - Continue Carafate QID - home med   - Continue NS @ 125 ml/hr while having diarrhea/poor PO intake  - Diet advanced to GI soft today   - Dicyclomine changed from PRN to TID scheduled before meals   - Zofran PRN for nausea/vomiting   - Morphine 2 mg IVP q8h PRN for severe pain    - Nutrition consulted for low BMI, appreciate recs  - GI consulted, appreciate recs      Hypokalemia  Hypomagnesemia   Hyponatremia (resolved)  - Likely due to diarrhea   - Na 132 > 138 > 138   - K 3.2 > 3.0 > 3.3, repleted per protocol  - Mag 1.69 > 1.42 today, repleted per protocol   - Continue NS @ 50 ml/hr while PO intake is poor  - Continue Mag-Ox BID (home med)   - Daily BMP and mag level; replete electrolytes  per protocol     Bradycardia, asymptomatic   - Patient's HR reportedly in the 30s-40s overnight; lowest HR documented is 45 bpm   - TSH 1.83  - Echo: LVEF 60-65%, diastolic dysfunction, mild aortic valve regurgitation   - Telemetry monitoring  - Cardiology consulted, recommend 14 day Zio patch on discharge      COPD, not in acute exacerbation  - Currently on room air   - Patient denies cough or shortness of breath  - CXR: COPD. No acute pulmonary process   - Continue Perforomist  - Monitor SpO2 continuously     Hypothyroidism  - TSH 1.83   - Continue levothyroxine     Anxiety   Depression  - Continue buspirone, escitalopram, trazodone     DVT PPx: Heparin  GI PPx: Protonix       Code Status: Full      Disposition: Patient requires inpatient management at this time.      Total accumulated time spent face to face and not face to face preparing to see the patient, obtaining and reviewing separately obtained history; performing a medically appropriate examination and/or evaluation; counseling and educating the patient, family; ordering medications, tests, or procedures; referring and communicating with other health care professionals; documenting clinical information in the patient's medical record; independently interpreting results and communicating the results to the patient, family; and care coordination was 30 minutes.         Malnutrition Diagnosis Status: New  Malnutrition Diagnosis: Severe malnutrition related to starvation  As Evidenced by: moderate to severe muscle loss (temporal depression, pectoralis major, deltoids, interosseous, gastrocnemius) and severe subcutaneous fat loss (orbital, perioral, below triceps); poor nutritional intake meeting less than 50% of estimated needs for greater than or equal to one month.  I agree with the dietitian's malnutrition diagnosis.         Deja Yi, APRN-CNP

## 2024-05-09 ENCOUNTER — HOSPITAL ENCOUNTER (OUTPATIENT)
Dept: RADIOLOGY | Facility: HOSPITAL | Age: 79
Discharge: HOME | DRG: 391 | End: 2024-05-09
Payer: MEDICARE

## 2024-05-09 ENCOUNTER — APPOINTMENT (OUTPATIENT)
Dept: RADIOLOGY | Facility: HOSPITAL | Age: 79
DRG: 391 | End: 2024-05-09
Payer: MEDICARE

## 2024-05-09 PROBLEM — R10.13 EPIGASTRIC PAIN: Status: ACTIVE | Noted: 2024-05-09

## 2024-05-09 LAB
ANION GAP SERPL CALC-SCNC: 14 MMOL/L (ref 10–20)
BUN SERPL-MCNC: 6 MG/DL (ref 6–23)
CALCIUM SERPL-MCNC: 8.6 MG/DL (ref 8.6–10.3)
CHLORIDE SERPL-SCNC: 108 MMOL/L (ref 98–107)
CO2 SERPL-SCNC: 21 MMOL/L (ref 21–32)
CREAT SERPL-MCNC: 0.78 MG/DL (ref 0.5–1.05)
EGFRCR SERPLBLD CKD-EPI 2021: 78 ML/MIN/1.73M*2
ERYTHROCYTE [DISTWIDTH] IN BLOOD BY AUTOMATED COUNT: 12.8 % (ref 11.5–14.5)
GLUCOSE SERPL-MCNC: 100 MG/DL (ref 74–99)
HCT VFR BLD AUTO: 37.2 % (ref 36–46)
HGB BLD-MCNC: 11.5 G/DL (ref 12–16)
MAGNESIUM SERPL-MCNC: 2.03 MG/DL (ref 1.6–2.4)
MCH RBC QN AUTO: 29.4 PG (ref 26–34)
MCHC RBC AUTO-ENTMCNC: 30.9 G/DL (ref 32–36)
MCV RBC AUTO: 95 FL (ref 80–100)
NRBC BLD-RTO: 0 /100 WBCS (ref 0–0)
PLATELET # BLD AUTO: 188 X10*3/UL (ref 150–450)
POTASSIUM SERPL-SCNC: 3.8 MMOL/L (ref 3.5–5.3)
RBC # BLD AUTO: 3.91 X10*6/UL (ref 4–5.2)
SODIUM SERPL-SCNC: 139 MMOL/L (ref 136–145)
WBC # BLD AUTO: 4.4 X10*3/UL (ref 4.4–11.3)

## 2024-05-09 PROCEDURE — 2500000005 HC RX 250 GENERAL PHARMACY W/O HCPCS: Mod: IPSPLIT

## 2024-05-09 PROCEDURE — 1200000002 HC GENERAL ROOM WITH TELEMETRY DAILY: Mod: IPSPLIT

## 2024-05-09 PROCEDURE — 94640 AIRWAY INHALATION TREATMENT: CPT | Mod: IPSPLIT

## 2024-05-09 PROCEDURE — 97535 SELF CARE MNGMENT TRAINING: CPT | Mod: GO,IPSPLIT | Performed by: OCCUPATIONAL THERAPIST

## 2024-05-09 PROCEDURE — 80048 BASIC METABOLIC PNL TOTAL CA: CPT | Mod: IPSPLIT

## 2024-05-09 PROCEDURE — 85027 COMPLETE CBC AUTOMATED: CPT | Mod: IPSPLIT

## 2024-05-09 PROCEDURE — 9420000001 HC RT PATIENT EDUCATION 5 MIN: Mod: IPSPLIT

## 2024-05-09 PROCEDURE — 2500000004 HC RX 250 GENERAL PHARMACY W/ HCPCS (ALT 636 FOR OP/ED): Mod: IPSPLIT | Performed by: NURSE PRACTITIONER

## 2024-05-09 PROCEDURE — 76377 3D RENDER W/INTRP POSTPROCES: CPT | Mod: IPSPLIT

## 2024-05-09 PROCEDURE — 2500000001 HC RX 250 WO HCPCS SELF ADMINISTERED DRUGS (ALT 637 FOR MEDICARE OP): Mod: IPSPLIT

## 2024-05-09 PROCEDURE — 2500000004 HC RX 250 GENERAL PHARMACY W/ HCPCS (ALT 636 FOR OP/ED): Mod: IPSPLIT

## 2024-05-09 PROCEDURE — 2500000002 HC RX 250 W HCPCS SELF ADMINISTERED DRUGS (ALT 637 FOR MEDICARE OP, ALT 636 FOR OP/ED): Mod: IPSPLIT

## 2024-05-09 PROCEDURE — C9113 INJ PANTOPRAZOLE SODIUM, VIA: HCPCS | Mod: IPSPLIT

## 2024-05-09 PROCEDURE — 83735 ASSAY OF MAGNESIUM: CPT | Mod: IPSPLIT

## 2024-05-09 PROCEDURE — 36415 COLL VENOUS BLD VENIPUNCTURE: CPT | Mod: IPSPLIT

## 2024-05-09 PROCEDURE — 2550000001 HC RX 255 CONTRASTS: Mod: IPSPLIT | Performed by: NURSE PRACTITIONER

## 2024-05-09 RX ADMIN — BUSPIRONE HYDROCHLORIDE 20 MG: 10 TABLET ORAL at 08:08

## 2024-05-09 RX ADMIN — HYOSCYAMINE SULFATE 0.12 MG: 0.12 TABLET SUBLINGUAL at 06:13

## 2024-05-09 RX ADMIN — SUCRALFATE 1 G: 1 TABLET ORAL at 06:13

## 2024-05-09 RX ADMIN — FORMOTEROL FUMARATE DIHYDRATE 20 MCG: 20 SOLUTION RESPIRATORY (INHALATION) at 10:01

## 2024-05-09 RX ADMIN — HEPARIN SODIUM 5000 UNITS: 5000 INJECTION INTRAVENOUS; SUBCUTANEOUS at 12:38

## 2024-05-09 RX ADMIN — Medication 6 MG: at 20:04

## 2024-05-09 RX ADMIN — HYOSCYAMINE SULFATE 0.12 MG: 0.12 TABLET SUBLINGUAL at 20:03

## 2024-05-09 RX ADMIN — LEVOTHYROXINE SODIUM 50 MCG: 0.05 TABLET ORAL at 08:08

## 2024-05-09 RX ADMIN — BUSPIRONE HYDROCHLORIDE 20 MG: 10 TABLET ORAL at 14:52

## 2024-05-09 RX ADMIN — HEPARIN SODIUM 5000 UNITS: 5000 INJECTION INTRAVENOUS; SUBCUTANEOUS at 06:13

## 2024-05-09 RX ADMIN — MORPHINE SULFATE 2 MG: 2 INJECTION, SOLUTION INTRAMUSCULAR; INTRAVENOUS at 20:05

## 2024-05-09 RX ADMIN — SODIUM CHLORIDE 50 ML/HR: 9 INJECTION, SOLUTION INTRAVENOUS at 22:40

## 2024-05-09 RX ADMIN — SUCRALFATE 1 G: 1 TABLET ORAL at 12:37

## 2024-05-09 RX ADMIN — ESCITALOPRAM OXALATE 20 MG: 10 TABLET ORAL at 20:04

## 2024-05-09 RX ADMIN — TRAZODONE HYDROCHLORIDE 150 MG: 100 TABLET ORAL at 20:04

## 2024-05-09 RX ADMIN — FORMOTEROL FUMARATE DIHYDRATE 20 MCG: 20 SOLUTION RESPIRATORY (INHALATION) at 21:27

## 2024-05-09 RX ADMIN — PANTOPRAZOLE SODIUM 40 MG: 40 INJECTION, POWDER, LYOPHILIZED, FOR SOLUTION INTRAVENOUS at 06:14

## 2024-05-09 RX ADMIN — Medication 400 MG: at 20:04

## 2024-05-09 RX ADMIN — SUCRALFATE 1 G: 1 TABLET ORAL at 20:04

## 2024-05-09 RX ADMIN — SUCRALFATE 1 G: 1 TABLET ORAL at 16:01

## 2024-05-09 RX ADMIN — HYOSCYAMINE SULFATE 0.12 MG: 0.12 TABLET SUBLINGUAL at 10:49

## 2024-05-09 RX ADMIN — HEPARIN SODIUM 5000 UNITS: 5000 INJECTION INTRAVENOUS; SUBCUTANEOUS at 20:15

## 2024-05-09 RX ADMIN — HYOSCYAMINE SULFATE 0.12 MG: 0.12 TABLET SUBLINGUAL at 16:01

## 2024-05-09 RX ADMIN — IOHEXOL 45 ML: 350 INJECTION, SOLUTION INTRAVENOUS at 12:16

## 2024-05-09 RX ADMIN — Medication 400 MG: at 08:08

## 2024-05-09 RX ADMIN — BUSPIRONE HYDROCHLORIDE 20 MG: 10 TABLET ORAL at 20:04

## 2024-05-09 RX ADMIN — CHOLECALCIFEROL TAB 125 MCG (5000 UNIT) 5000 UNITS: 125 TAB at 08:08

## 2024-05-09 ASSESSMENT — COGNITIVE AND FUNCTIONAL STATUS - GENERAL
DAILY ACTIVITIY SCORE: 22
TOILETING: A LITTLE
DRESSING REGULAR UPPER BODY CLOTHING: A LITTLE
TOILETING: A LITTLE
DRESSING REGULAR UPPER BODY CLOTHING: A LITTLE
DAILY ACTIVITIY SCORE: 22

## 2024-05-09 ASSESSMENT — PAIN SCALES - GENERAL
PAINLEVEL_OUTOF10: 0 - NO PAIN
PAINLEVEL_OUTOF10: 8
PAINLEVEL_OUTOF10: 0 - NO PAIN
PAINLEVEL_OUTOF10: 5 - MODERATE PAIN

## 2024-05-09 ASSESSMENT — PAIN - FUNCTIONAL ASSESSMENT
PAIN_FUNCTIONAL_ASSESSMENT: 0-10

## 2024-05-09 ASSESSMENT — PAIN DESCRIPTION - LOCATION: LOCATION: ABDOMEN

## 2024-05-09 ASSESSMENT — PAIN DESCRIPTION - ORIENTATION: ORIENTATION: LEFT;RIGHT;UPPER

## 2024-05-09 ASSESSMENT — ACTIVITIES OF DAILY LIVING (ADL): HOME_MANAGEMENT_TIME_ENTRY: 19

## 2024-05-09 NOTE — PROGRESS NOTES
mathieu Quispe is a 78 y.o. female on day 3 of admission presenting with Diarrhea.      Subjective   Patient assessed at bedside; lying in bed. She complained of epigastric pain after she eats. Eventually she has diarrhea then the pain go away. She denies SOB, fever, chills.       Objective     Last Recorded Vitals  /54 (BP Location: Right arm, Patient Position: Lying)   Pulse 60   Temp 36.9 °C (98.4 °F) (Temporal)   Resp 16   Wt (!) 38.8 kg (85 lb 8.6 oz)   SpO2 95%   Intake/Output last 3 Shifts:    Intake/Output Summary (Last 24 hours) at 5/9/2024 1135  Last data filed at 5/9/2024 1001  Gross per 24 hour   Intake 840 ml   Output --   Net 840 ml       Admission Weight  Weight: (!) 37.6 kg (83 lb) (05/05/24 1348)    Daily Weight  05/05/24 : (!) 38.8 kg (85 lb 8.6 oz)    Image Results  ECG 12 lead  Sinus bradycardia with occasional Premature ventricular complexes  Left anterior fascicular block  Cannot rule out Anterior infarct , age undetermined  Abnormal ECG  When compared with ECG of 25-JAN-2024 21:12,  Premature ventricular complexes are now Present      Physical Exam  Vitals reviewed.   Constitutional:       Appearance: Normal appearance.      Comments: Cachexia, underweight   HENT:      Head: Normocephalic and atraumatic.      Nose: Nose normal.      Mouth/Throat:      Mouth: Mucous membranes are moist.      Pharynx: Oropharynx is clear.   Eyes:      Conjunctiva/sclera: Conjunctivae normal.      Pupils: Pupils are equal, round, and reactive to light.   Cardiovascular:      Rate and Rhythm: Normal rate and regular rhythm.      Pulses: Normal pulses.      Heart sounds: Normal heart sounds.   Pulmonary:      Effort: Pulmonary effort is normal.      Breath sounds: Normal breath sounds.   Abdominal:      General: Bowel sounds are normal.      Palpations: Abdomen is soft.      Tenderness: There is abdominal tenderness.   Musculoskeletal:         General: Normal range of motion.      Cervical back: Normal  range of motion and neck supple.   Skin:     General: Skin is warm and dry.   Neurological:      General: No focal deficit present.      Mental Status: She is alert and oriented to person, place, and time.   Psychiatric:         Mood and Affect: Mood normal.         Behavior: Behavior normal.         Relevant Results    Scheduled medications  busPIRone, 20 mg, oral, TID  cholecalciferol, 5,000 Units, oral, Daily  escitalopram, 20 mg, oral, Nightly  formoterol, 20 mcg, nebulization, q12h  heparin (porcine), 5,000 Units, subcutaneous, q8h  hyoscyamine, 0.125 mg, oral, Before meals & nightly  levothyroxine, 50 mcg, oral, Daily  magnesium oxide, 400 mg, oral, BID  pantoprazole, 40 mg, intravenous, Daily before breakfast  sucralfate, 1 g, oral, 4x daily  traZODone, 150 mg, oral, Nightly      Continuous medications  sodium chloride 0.9%, 50 mL/hr, Last Rate: 50 mL/hr (05/08/24 9964)  sodium chloride 0.9%, 10 mL/hr      PRN medications  PRN medications: acetaminophen, bisacodyl, lactulose, magnesium hydroxide, melatonin, metoclopramide, morphine, ondansetron, oxygen, polyethylene glycol, sodium chloride 0.9%    Results for orders placed or performed during the hospital encounter of 05/05/24 (from the past 24 hour(s))   ECG 12 lead   Result Value Ref Range    Ventricular Rate 52 BPM    Atrial Rate 52 BPM    NV Interval 142 ms    QRS Duration 98 ms    QT Interval 458 ms    QTC Calculation(Bazett) 425 ms    P Axis 51 degrees    R Axis -59 degrees    T Axis 66 degrees    QRS Count 9 beats    Q Onset 215 ms    P Onset 144 ms    P Offset 190 ms    T Offset 444 ms    QTC Fredericia 436 ms   Magnesium   Result Value Ref Range    Magnesium 2.03 1.60 - 2.40 mg/dL   Basic metabolic panel   Result Value Ref Range    Glucose 100 (H) 74 - 99 mg/dL    Sodium 139 136 - 145 mmol/L    Potassium 3.8 3.5 - 5.3 mmol/L    Chloride 108 (H) 98 - 107 mmol/L    Bicarbonate 21 21 - 32 mmol/L    Anion Gap 14 10 - 20 mmol/L    Urea Nitrogen 6 6 - 23  mg/dL    Creatinine 0.78 0.50 - 1.05 mg/dL    eGFR 78 >60 mL/min/1.73m*2    Calcium 8.6 8.6 - 10.3 mg/dL   CBC   Result Value Ref Range    WBC 4.4 4.4 - 11.3 x10*3/uL    nRBC 0.0 0.0 - 0.0 /100 WBCs    RBC 3.91 (L) 4.00 - 5.20 x10*6/uL    Hemoglobin 11.5 (L) 12.0 - 16.0 g/dL    Hematocrit 37.2 36.0 - 46.0 %    MCV 95 80 - 100 fL    MCH 29.4 26.0 - 34.0 pg    MCHC 30.9 (L) 32.0 - 36.0 g/dL    RDW 12.8 11.5 - 14.5 %    Platelets 188 150 - 450 x10*3/uL                   Assessment/Plan      Principal Problem:    Diarrhea  Active Problems:    Anxiety and depression    Hypothyroid    IBS (irritable bowel syndrome)    Hypokalemia    Chronic idiopathic constipation    COPD (chronic obstructive pulmonary disease) (Multi)    Hypomagnesemia    Vitamin D deficiency    Acute cystitis    Hyponatremia    GERD (gastroesophageal reflux disease)    Bradycardia    Weakness    Dehydration    Diarrhea (improving)   GERD  Lactic acidosis (resolved)   Chronic idiopathic constipation  Epigastric pain  - Patient reports diarrhea for two days prior to admission; is normally constipated  - Lactate 2.1 > 1.2  - CRP < 0.10  - Lipase 10  - WBC 5.0 > 5.3 > 4.6   - CT Abd/pelvis:  No acute findings in the abdomen and pelvis. 2. Few fluid-filled small bowel loops in the lower abdomen without definite segmental distention to suggest obstruction. 3. Redemonstration of diffusely dilated main pancreatic duct, similar compared to prior imaging dating back to 2021. This was worked up by MRI and CT pancreas protocol in 2022. Recommend correlation with prior reports. 4. Cholelithiasis without acute cholecystitis.   - Stool pathogen panel, C. Diff PCR, occult stool negative   - Isolation protocol discontinued   - Antibiotics discontinued 5/7, received one day of ceftriaxone/metronidazole   - continue pantoprazole IVP every day  - Continue Carafate QID - home med   - Continue NS @ 125 ml/hr while having diarrhea/poor PO intake  - Diet advanced to GI soft  today   - Dicyclomine changed from PRN to TID scheduled before meals   - Zofran PRN for nausea/vomiting   - Morphine 2 mg IVP q8h PRN for severe pain    - Nutrition consulted for low BMI, appreciate recs  - GI consulted, appreciate recs   - surgical consult for possible EGD     Hypokalemia  Hypomagnesemia   Hyponatremia (resolved)  - Likely due to diarrhea   - Na 132 > 138 > 138   - K 3.2 > 3.0 > 3.3, repleted per protocol  - Mag 1.69 > 1.42 today, repleted per protocol   - Continue NS @ 50 ml/hr while PO intake is poor  - Continue Mag-Ox BID (home med)   - Daily BMP and mag level; replete electrolytes per protocol     Bradycardia, asymptomatic   - Patient's HR reportedly in the 30s-40s overnight; lowest HR documented is 45 bpm   - TSH 1.83  - Echo: LVEF 60-65%, diastolic dysfunction, mild aortic valve regurgitation   - Telemetry monitoring  - Cardiology consulted, recommend 14 day Zio patch on discharge     Microhematuria  - follows with urology  - CT urogram pending     COPD, not in acute exacerbation  - Currently on room air   - Patient denies cough or shortness of breath  - CXR: COPD. No acute pulmonary process   - Continue Perforomist  - Monitor SpO2 continuously     Hypothyroidism  - TSH 1.83   - Continue levothyroxine     Anxiety   Depression  - Continue buspirone, escitalopram, trazodone     DVT PPx: Heparin  GI PPx: Protonix       Code Status: Full      Disposition: Patient requires inpatient management at this time.      Total accumulated time spent face to face and not face to face preparing to see the patient, obtaining and reviewing separately obtained history; performing a medically appropriate examination and/or evaluation; counseling and educating the patient, family; ordering medications, tests, or procedures; referring and communicating with other health care professionals; documenting clinical information in the patient's medical record; independently interpreting results and communicating the  results to the patient, family; and care coordination was 30 minutes.                  GEO Gallego-CNP

## 2024-05-09 NOTE — PROGRESS NOTES
Occupational Therapy    Occupational Therapy Treatment    Name: Latha Quispe  MRN: [id number]  : 1945  Date: 24  Time Calculation  Start Time: 1127  Stop Time: 1146  Time Calculation (min): 19 min    Assessment:  OT Assessment: Good participation in therapy at this time. Pt. appears very close to baseline at this time. Met LB dressing goal today and progressing on UB dressing  Prognosis: Good  Barriers to Discharge: None  Evaluation/Treatment Tolerance: Patient tolerated treatment well  Medical Staff Made Aware: Yes  End of Session Communication: Bedside nurse  End of Session Patient Position:  (Pt. with staff to go to CT.)  Plan:  Treatment Interventions: ADL retraining, Endurance training, Functional transfer training, Patient/family training  OT Frequency: 2 times per week  OT Discharge Recommendations: Low intensity level of continued care  OT Recommended Transfer Status: Stand by assist  OT - OK to Discharge: Yes    Subjective   Previous Visit Info:  OT Last Visit  OT Received On: 24  General:  General  Family/Caregiver Present: Yes (spouse, family member)  Prior to Session Communication: Bedside nurse  Patient Position Received: Bed, 2 rail up, Alarm off, not on at start of session  Preferred Learning Style: verbal  General Comment: CANDIDA mohan  Precautions:  Medical Precautions: Fall precautions  Pain Assessment:  Pain Assessment  Pain Assessment: 0-10  Pain Score: 0 - No pain     Objective   Cognition:  Overall Cognitive Status: Within Functional Limits  Activities of Daily Living: Grooming  Grooming Level of Assistance: Independent  Grooming Where Assessed: Standing sinkside  Grooming Comments: facial hygiene    UE Bathing  UE Bathing Level of Assistance: Setup, Independent  UE Bathing Where Assessed: Standing sinkside    UE Dressing  UE Dressing Level of Assistance: Minimum assistance  UE Dressing Where Assessed: Edge of bed  UE Dressing Comments: assist d/t IV line; reports she put on  her pullover shirt with independence today.    LE Dressing  LE Dressing: Yes  Pants Level of Assistance: Independent  Adult Briefs Level of Assistance: Independent  LE Dressing Where Assessed: Edge of bed  LE Dressing Comments: doffed brief in standing; requested pt. sit down to dress LB as she usually sits  at home  Bed Mobility/Transfers: Transfers  Transfer: Yes  Transfer 1  Transfer From 1: Bed to  Transfer to 1: Sit, Stand  Technique 1: Sit to stand, Stand to sit  Transfer Level of Assistance 1: Independent  Functional Mobility:  Functional Mobility  Functional Mobility Performed: Yes  Functional Mobility 1  Surface 1: Level tile  Device 1: No device  Assistance 1: Independent  Quality of Functional Mobility 1: Narrow base of support  Comments 1: minimally unsteady    Outcome Measures:  [facility] Daily Activity  Putting on and taking off regular lower body clothing: None  Bathing (including washing, rinsing, drying): None  Putting on and taking off regular upper body clothing: A little  Toileting, which includes using toilet, bedpan or urinal: A little  Taking care of personal grooming such as brushing teeth: None  Eating Meals: None  Daily Activity - Total Score: 22    Education Documentation  Precautions, taught by Miranda Edmondson OT at 5/9/2024 12:13 PM.  Learner: Patient  Readiness: Acceptance  Method: Explanation  Response: Verbalizes Understanding  Comment: Edu on POC. Edu on safety with LB dressing to reduce risk of falls.    ADL Training, taught by Miranda Edmondson OT at 5/9/2024 12:13 PM.  Learner: Patient  Readiness: Acceptance  Method: Explanation  Response: Verbalizes Understanding  Comment: Edu on POC. Edu on safety with LB dressing to reduce risk of falls.    Education Comments  No comments found.      Goals:  Encounter Problems       Encounter Problems (Active)       ADLs       Patient with complete upper body dressing with modified independent level of assistance donning and doffing all UE clothes  including item retrieval (Progressing)       Start:  05/06/24    Expected End:  05/13/24            Patient with complete lower body dressing with modified independent level of assistance donning and doffing all LE clothes including item retrieval (Met)       Start:  05/06/24    Expected End:  05/20/24    Resolved:  05/09/24         Patient will complete daily grooming tasks brushing teeth and washing face/hair with modified independent level of assistance standing at sinkside (Met)       Start:  05/06/24    Expected End:  05/13/24    Resolved:  05/07/24         Patient will complete toileting including hygiene clothing management/hygiene with modified independent level of assistance and raised toilet seat.       Start:  05/06/24    Expected End:  05/13/24                  Encounter Problems (Resolved)       TRANSFERS       Patient will complete functional transfer to toilet, chair, bed with least restrictive device with modified independent level of assistance. (Met)       Start:  05/06/24    Expected End:  05/13/24    Resolved:  05/09/24

## 2024-05-09 NOTE — CARE PLAN
The patient's goals for the shift include To get a good nights rest.    The clinical goals for the shift include decreased pain in abd    Over the shift, the patient did make progress toward the following goals. Pt reported abd pain after eating dinner 8/10 medicated with PRN morphine with positive effect. Tolerating IV fluids. Denied loose BM's, call light with in reach, no changes to POC.

## 2024-05-10 ENCOUNTER — ANESTHESIA EVENT (OUTPATIENT)
Dept: GASTROENTEROLOGY | Facility: HOSPITAL | Age: 79
DRG: 391 | End: 2024-05-10
Payer: MEDICARE

## 2024-05-10 ENCOUNTER — ANESTHESIA (OUTPATIENT)
Dept: GASTROENTEROLOGY | Facility: HOSPITAL | Age: 79
DRG: 391 | End: 2024-05-10
Payer: MEDICARE

## 2024-05-10 ENCOUNTER — APPOINTMENT (OUTPATIENT)
Dept: GASTROENTEROLOGY | Facility: HOSPITAL | Age: 79
DRG: 391 | End: 2024-05-10
Payer: MEDICARE

## 2024-05-10 VITALS
HEIGHT: 60 IN | OXYGEN SATURATION: 97 % | RESPIRATION RATE: 16 BRPM | TEMPERATURE: 97.3 F | SYSTOLIC BLOOD PRESSURE: 130 MMHG | BODY MASS INDEX: 16.79 KG/M2 | HEART RATE: 50 BPM | DIASTOLIC BLOOD PRESSURE: 61 MMHG | WEIGHT: 85.54 LBS

## 2024-05-10 LAB
ANION GAP SERPL CALC-SCNC: 9 MMOL/L (ref 10–20)
ATRIAL RATE: 52 BPM
BUN SERPL-MCNC: 11 MG/DL (ref 6–23)
CALCIUM SERPL-MCNC: 8.9 MG/DL (ref 8.6–10.3)
CHLORIDE SERPL-SCNC: 106 MMOL/L (ref 98–107)
CO2 SERPL-SCNC: 28 MMOL/L (ref 21–32)
CREAT SERPL-MCNC: 0.72 MG/DL (ref 0.5–1.05)
EGFRCR SERPLBLD CKD-EPI 2021: 86 ML/MIN/1.73M*2
ERYTHROCYTE [DISTWIDTH] IN BLOOD BY AUTOMATED COUNT: 12.8 % (ref 11.5–14.5)
GLUCOSE SERPL-MCNC: 89 MG/DL (ref 74–99)
HCT VFR BLD AUTO: 34.8 % (ref 36–46)
HGB BLD-MCNC: 10.9 G/DL (ref 12–16)
MCH RBC QN AUTO: 29.5 PG (ref 26–34)
MCHC RBC AUTO-ENTMCNC: 31.3 G/DL (ref 32–36)
MCV RBC AUTO: 94 FL (ref 80–100)
NRBC BLD-RTO: 0 /100 WBCS (ref 0–0)
P AXIS: 51 DEGREES
P OFFSET: 190 MS
P ONSET: 144 MS
PLATELET # BLD AUTO: 199 X10*3/UL (ref 150–450)
POTASSIUM SERPL-SCNC: 3.6 MMOL/L (ref 3.5–5.3)
PR INTERVAL: 142 MS
Q ONSET: 215 MS
QRS COUNT: 9 BEATS
QRS DURATION: 98 MS
QT INTERVAL: 458 MS
QTC CALCULATION(BAZETT): 425 MS
QTC FREDERICIA: 436 MS
R AXIS: -59 DEGREES
RBC # BLD AUTO: 3.7 X10*6/UL (ref 4–5.2)
SODIUM SERPL-SCNC: 139 MMOL/L (ref 136–145)
T AXIS: 66 DEGREES
T OFFSET: 444 MS
VENTRICULAR RATE: 52 BPM
WBC # BLD AUTO: 4.4 X10*3/UL (ref 4.4–11.3)

## 2024-05-10 PROCEDURE — 7100000009 HC PHASE TWO TIME - INITIAL BASE CHARGE: Mod: IPSPLIT

## 2024-05-10 PROCEDURE — 2500000004 HC RX 250 GENERAL PHARMACY W/ HCPCS (ALT 636 FOR OP/ED): Mod: IPSPLIT | Performed by: NURSE PRACTITIONER

## 2024-05-10 PROCEDURE — 2500000002 HC RX 250 W HCPCS SELF ADMINISTERED DRUGS (ALT 637 FOR MEDICARE OP, ALT 636 FOR OP/ED): Mod: IPSPLIT

## 2024-05-10 PROCEDURE — C9113 INJ PANTOPRAZOLE SODIUM, VIA: HCPCS | Mod: IPSPLIT

## 2024-05-10 PROCEDURE — 99222 1ST HOSP IP/OBS MODERATE 55: CPT | Performed by: SURGERY

## 2024-05-10 PROCEDURE — 82374 ASSAY BLOOD CARBON DIOXIDE: CPT | Mod: IPSPLIT

## 2024-05-10 PROCEDURE — 3700000002 HC GENERAL ANESTHESIA TIME - EACH INCREMENTAL 1 MINUTE: Mod: IPSPLIT

## 2024-05-10 PROCEDURE — 2500000004 HC RX 250 GENERAL PHARMACY W/ HCPCS (ALT 636 FOR OP/ED): Mod: IPSPLIT | Performed by: INTERNAL MEDICINE

## 2024-05-10 PROCEDURE — 94640 AIRWAY INHALATION TREATMENT: CPT | Mod: IPSPLIT

## 2024-05-10 PROCEDURE — 0DJ08ZZ INSPECTION OF UPPER INTESTINAL TRACT, VIA NATURAL OR ARTIFICIAL OPENING ENDOSCOPIC: ICD-10-PCS | Performed by: INTERNAL MEDICINE

## 2024-05-10 PROCEDURE — 43235 EGD DIAGNOSTIC BRUSH WASH: CPT | Mod: IPSPLIT | Performed by: INTERNAL MEDICINE

## 2024-05-10 PROCEDURE — 2500000001 HC RX 250 WO HCPCS SELF ADMINISTERED DRUGS (ALT 637 FOR MEDICARE OP): Mod: IPSPLIT

## 2024-05-10 PROCEDURE — 2500000004 HC RX 250 GENERAL PHARMACY W/ HCPCS (ALT 636 FOR OP/ED): Mod: IPSPLIT

## 2024-05-10 PROCEDURE — 85027 COMPLETE CBC AUTOMATED: CPT | Mod: IPSPLIT

## 2024-05-10 PROCEDURE — 36415 COLL VENOUS BLD VENIPUNCTURE: CPT | Mod: IPSPLIT

## 2024-05-10 PROCEDURE — 3700000001 HC GENERAL ANESTHESIA TIME - INITIAL BASE CHARGE: Mod: IPSPLIT

## 2024-05-10 PROCEDURE — 7100000010 HC PHASE TWO TIME - EACH INCREMENTAL 1 MINUTE: Mod: IPSPLIT

## 2024-05-10 RX ORDER — SODIUM CHLORIDE, SODIUM LACTATE, POTASSIUM CHLORIDE, CALCIUM CHLORIDE 600; 310; 30; 20 MG/100ML; MG/100ML; MG/100ML; MG/100ML
50 INJECTION, SOLUTION INTRAVENOUS CONTINUOUS
Status: DISCONTINUED | OUTPATIENT
Start: 2024-05-10 | End: 2024-05-10 | Stop reason: HOSPADM

## 2024-05-10 RX ORDER — PROPOFOL 10 MG/ML
INJECTION, EMULSION INTRAVENOUS AS NEEDED
Status: DISCONTINUED | OUTPATIENT
Start: 2024-05-10 | End: 2024-05-10

## 2024-05-10 RX ORDER — HYOSCYAMINE SULFATE 0.12 MG/1
0.12 TABLET, ORALLY DISINTEGRATING ORAL
Qty: 120 TABLET | Refills: 0 | Status: SHIPPED | OUTPATIENT
Start: 2024-05-10 | End: 2024-05-29 | Stop reason: SDUPTHER

## 2024-05-10 RX ADMIN — PROPOFOL 50 MG: 10 INJECTION, EMULSION INTRAVENOUS at 11:38

## 2024-05-10 RX ADMIN — SODIUM CHLORIDE: 9 INJECTION, SOLUTION INTRAVENOUS at 11:22

## 2024-05-10 RX ADMIN — SODIUM CHLORIDE, POTASSIUM CHLORIDE, SODIUM LACTATE AND CALCIUM CHLORIDE 50 ML/HR: 600; 310; 30; 20 INJECTION, SOLUTION INTRAVENOUS at 10:51

## 2024-05-10 RX ADMIN — PROPOFOL 50 MG: 10 INJECTION, EMULSION INTRAVENOUS at 11:36

## 2024-05-10 RX ADMIN — FORMOTEROL FUMARATE DIHYDRATE 20 MCG: 20 SOLUTION RESPIRATORY (INHALATION) at 10:04

## 2024-05-10 RX ADMIN — HEPARIN SODIUM 5000 UNITS: 5000 INJECTION INTRAVENOUS; SUBCUTANEOUS at 06:11

## 2024-05-10 RX ADMIN — PANTOPRAZOLE SODIUM 40 MG: 40 INJECTION, POWDER, LYOPHILIZED, FOR SOLUTION INTRAVENOUS at 06:11

## 2024-05-10 SDOH — HEALTH STABILITY: MENTAL HEALTH: CURRENT SMOKER: 0

## 2024-05-10 ASSESSMENT — COLUMBIA-SUICIDE SEVERITY RATING SCALE - C-SSRS
6. HAVE YOU EVER DONE ANYTHING, STARTED TO DO ANYTHING, OR PREPARED TO DO ANYTHING TO END YOUR LIFE?: NO
2. HAVE YOU ACTUALLY HAD ANY THOUGHTS OF KILLING YOURSELF?: NO
1. IN THE PAST MONTH, HAVE YOU WISHED YOU WERE DEAD OR WISHED YOU COULD GO TO SLEEP AND NOT WAKE UP?: NO

## 2024-05-10 ASSESSMENT — PAIN SCALES - GENERAL
PAINLEVEL_OUTOF10: 0 - NO PAIN
PAINLEVEL_OUTOF10: 8
PAINLEVEL_OUTOF10: 8
PAINLEVEL_OUTOF10: 0 - NO PAIN
PAIN_LEVEL: 0

## 2024-05-10 ASSESSMENT — PAIN DESCRIPTION - DESCRIPTORS: DESCRIPTORS: SQUEEZING;TIGHTNESS

## 2024-05-10 ASSESSMENT — PAIN - FUNCTIONAL ASSESSMENT
PAIN_FUNCTIONAL_ASSESSMENT: 0-10

## 2024-05-10 ASSESSMENT — ENCOUNTER SYMPTOMS: ABDOMINAL PAIN: 1

## 2024-05-10 NOTE — ANESTHESIA PREPROCEDURE EVALUATION
Patient: Latha Quispe    Procedure Information       Date/Time: 05/10/24 1600    Scheduled providers: Paul Courtney DO    Procedure: EGD    Location: [facility]            Relevant Problems   Anesthesia (within normal limits)      Cardiac (within normal limits)      Pulmonary   (+) COPD (chronic obstructive pulmonary disease) (Multi)      Neuro   (+) Anxiety and depression   (+) Moderate major depression (Multi)      GI   (+) GERD (gastroesophageal reflux disease)   (+) IBS (irritable bowel syndrome)      /Renal   (+) Acute cystitis   (+) Hyponatremia      Liver   (+) Gall stones      Endocrine   (+) Hypothyroid   (+) Vitamin D deficiency      Circulatory   (+) Bradycardia      Genitourinary and Reproductive   (+) Dehydration   (+) Hypokalemia   (+) Hypomagnesemia       Clinical information reviewed:   Tobacco  Allergies  Meds   Med Hx  Surg Hx  OB Status  Fam Hx  Soc   Hx        NPO Detail:  NPO/Void Status  Carbohydrate Drink Given Prior to Surgery? : N  Date of Last Liquid: 05/10/24  Time of Last Liquid: 2350  Date of Last Solid: 05/09/24  Time of Last Solid: 1700  Last Intake Type: Clear fluids         Physical Exam    Airway  Mallampati: I  Neck ROM: full     Cardiovascular - normal exam     Dental   (+) upper dentures, lower dentures     Pulmonary - normal exam     Abdominal            Anesthesia Plan    History of general anesthesia?: yes  History of complications of general anesthesia?: no    ASA 3     MAC     The patient is not a current smoker.  Patient did not smoke on day of procedure.    intravenous induction   Anesthetic plan and risks discussed with patient.

## 2024-05-10 NOTE — CONSULTS
Weakness, Gen  Associated symptoms include abdominal pain.     This is because requested for patient with a history of chronic upper abdominal pain.  This a patient known to me who I seen previously in July 2023.  The patient has a history of pain in the upper abdomen.  She indicates that she has pain intermittently.  This sometimes is worse with food.  She does not have any obvious nausea.  She has a history of a partial gastric resection for peptic ulcer in the past.  She had a colonoscopy approximately 5 years ago.  She had a right colon resection performed apparently for chronic constipation.  Since that time she has had intermittent diarrhea.  She been admitted to the hospital for low potassium and magnesium.  Complained of pain.  A CT scan performed which confirmed no obvious abnormalities except for gallstones and the postsurgical changes of the previous gastric resection and a right colectomy.  She did undergo an upper endoscopy in January 2, 2024.  There was food in the stomach.  The gastroenterostomy was noted.  The stomach was not completely visualized.  In view of her constipation she was started on Linzess and did well with these.  She had negative biopsies at this time.  She has a history of a chronic dilated pancreatic duct.  She had an endoscopic ultrasound on 15 March 2022 which did not confirm any obvious abnormalities except for the dilated pancreatic duct and gastroenterostomy.  She is also had a CT angiogram performed on December 30, 2023 to rule out mesenteric ischemia.  She had no evidence of aortic aneurysm or hemodynamically significant stenosis of the mesenteric vessels.  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  Past Medical History:   Diagnosis Date    Abdominal pain     Anxiety     Cat bite 03/11/2023    Cataract     Depression     Disease of thyroid gland     GERD (gastroesophageal reflux disease)     GERD (gastroesophageal reflux disease)      Hypothyroidism     Irritable bowel syndrome     Panic attacks     Personal history of malignant neoplasm of breast 2021    History of malignant neoplasm of breast    Personal history of other complications of pregnancy, childbirth and the puerperium     History of spontaneous           Current Facility-Administered Medications:     acetaminophen (Tylenol) tablet 650 mg, 650 mg, oral, q4h PRN, MIRTHA Simpson, 650 mg at 24 0257    bisacodyl (Dulcolax) suppository 10 mg, 10 mg, rectal, Daily PRN, MIRTHA Simpson    busPIRone (Buspar) tablet 20 mg, 20 mg, oral, TID, Veronique Bradshaw PA-C, 20 mg at 24    cholecalciferol (Vitamin D-3) tablet 5,000 Units, 5,000 Units, oral, Daily, MIRTHA Simpson, 5,000 Units at 24 0808    escitalopram (Lexapro) tablet 20 mg, 20 mg, oral, Nightly, MIRTHA Simpson, 20 mg at 24    formoterol (Perforomist) 20 mcg/2 mL nebulizer solution 20 mcg, 20 mcg, nebulization, q12h, MIRTHA Simpson, 20 mcg at 24    heparin (porcine) injection 5,000 Units, 5,000 Units, subcutaneous, q8h, MIRTHA Simpson, 5,000 Units at 05/10/24 0611    hyoscyamine (Anaspaz) disintegrating tablet 0.125 mg, 0.125 mg, oral, Before meals & nightly, MIRTHA Montana, 0.125 mg at 24    lactulose 20 gram/30 mL oral solution 10 g, 10 g, oral, BID PRN, MIRTHA Simpson    levothyroxine (Synthroid, Levoxyl) tablet 50 mcg, 50 mcg, oral, Daily, MIRTHA Simpson, 50 mcg at 24 0808    magnesium hydroxide (Milk of Magnesia) 2,400 mg/10 mL suspension 10 mL, 10 mL, oral, Daily PRN, MIRTHA Simpson    magnesium oxide (Mag-Ox) tablet 400 mg, 400 mg, oral, BID, MIRTHA Gallego, 400 mg at 24    melatonin tablet 6 mg, 6 mg, oral, Nightly PRN, GEO Simpson-CNP, 6 mg at 24    metoclopramide (Reglan) injection 5 mg, 5 mg, intravenous, q6h PRN, Akila  MIRTHA Covarrubias    morphine injection 2 mg, 2 mg, intravenous, q8h PRN, Veronique Bradshaw PA-C, 2 mg at 05/09/24 2005    ondansetron (Zofran) injection 4 mg, 4 mg, intravenous, q8h PRN, MIRTHA Simpson    oxygen (O2) therapy, , inhalation, Continuous PRN - O2/gases, MIRTHA Simpson    pantoprazole (ProtoNix) injection 40 mg, 40 mg, intravenous, Daily before breakfast, MIRTHA Simpson, 40 mg at 05/10/24 0611    polyethylene glycol (Glycolax, Miralax) packet 17 g, 17 g, oral, Daily PRN, MIRTHA Simpson    sodium chloride 0.9% infusion, 50 mL/hr, intravenous, Continuous, MIRTHA Gallego, Last Rate: 50 mL/hr at 05/09/24 2240, 50 mL/hr at 05/09/24 2240    sodium chloride 0.9% infusion, 10 mL/hr, intravenous, Continuous PRN, Javier Donnelly MD    sucralfate (Carafate) tablet 1 g, 1 g, oral, 4x daily, MIRTHA Simpson, 1 g at 05/09/24 2004    traZODone (Desyrel) tablet 150 mg, 150 mg, oral, Nightly, Veronique Bradshaw PA-C, 150 mg at 05/09/24 2004     Allergies   Allergen Reactions    Nsaids (Non-Steroidal Anti-Inflammatory Drug) Unknown        Review of Systems  Review of Systems   Gastrointestinal:  Positive for abdominal pain.       Objective     Vital signs for last 24 hours:  Temp:  [36.9 °C (98.4 °F)-37.1 °C (98.8 °F)] 36.9 °C (98.4 °F)  Heart Rate:  [48-60] 48  Resp:  [16-17] 16  BP: (122-142)/(53-78) 125/54    Intake/Output this shift:  No intake/output data recorded.    Physical Exam  Physical Exam  Vitals reviewed. Exam conducted with a chaperone present.   Constitutional:       Appearance: Normal appearance. She is cachectic.   HENT:      Head: Normocephalic.   Cardiovascular:      Rate and Rhythm: Normal rate and regular rhythm.      Heart sounds: Normal heart sounds.   Pulmonary:      Effort: Pulmonary effort is normal.      Breath sounds: Normal breath sounds.   Abdominal:      General: Abdomen is flat.      Palpations: Abdomen is soft. There is no mass.       Tenderness: There is no abdominal tenderness. There is no guarding.      Comments: Midline incision.  Nonspecific upper abdominal tenderness.   Musculoskeletal:         General: Normal range of motion.      Cervical back: Normal range of motion.   Skin:     General: Skin is warm.   Neurological:      General: No focal deficit present.   Psychiatric:         Mood and Affect: Mood normal.         Labs & Radiology      Labs Reviewed   CBC WITH AUTO DIFFERENTIAL - Abnormal       Result Value    WBC 5.0      nRBC 0.0      RBC 4.12      Hemoglobin 11.9 (*)     Hematocrit 36.2      MCV 88      MCH 28.9      MCHC 32.9      RDW 12.2      Platelets 257      Neutrophils % 72.0      Immature Granulocytes %, Automated 0.2      Lymphocytes % 18.4      Monocytes % 8.2      Eosinophils % 0.2      Basophils % 1.0      Neutrophils Absolute 3.61      Immature Granulocytes Absolute, Automated 0.01      Lymphocytes Absolute 0.92      Monocytes Absolute 0.41      Eosinophils Absolute 0.01      Basophils Absolute 0.05     COMPREHENSIVE METABOLIC PANEL - Abnormal    Glucose 151 (*)     Sodium 132 (*)     Potassium 3.2 (*)     Chloride 99      Bicarbonate 24      Anion Gap 12      Urea Nitrogen 7      Creatinine 0.87      eGFR 69      Calcium 8.8      Albumin 3.8      Alkaline Phosphatase 79      Total Protein 6.3 (*)     AST 17      Bilirubin, Total 0.6      ALT 10     MAGNESIUM - Abnormal    Magnesium 1.54 (*)    LACTATE - Abnormal    Lactate 2.1 (*)     Narrative:     Venipuncture immediately after or during the administration of Metamizole may lead to falsely low results. Testing should be performed immediately                  prior to Metamizole dosing.   PROTIME-INR - Abnormal    Protime 13.0 (*)     INR 1.2 (*)    URINALYSIS WITH REFLEX CULTURE AND MICROSCOPIC - Abnormal    Color, Urine Light-Yellow      Appearance, Urine Clear      Specific Gravity, Urine 1.002 (*)     pH, Urine 6.5      Protein, Urine NEGATIVE      Glucose, Urine  Normal      Blood, Urine 0.1 (1+) (*)     Ketones, Urine NEGATIVE      Bilirubin, Urine NEGATIVE      Urobilinogen, Urine Normal      Nitrite, Urine NEGATIVE      Leukocyte Esterase, Urine 75 William/µL (*)    MICROSCOPIC ONLY, URINE - Abnormal    WBC, Urine 1-5      RBC, Urine 3-5      Squamous Epithelial Cells, Urine 1-9 (SPARSE)      Bacteria, Urine 1+ (*)    CBC - Abnormal    WBC 5.3      nRBC 0.0      RBC 3.81 (*)     Hemoglobin 11.1 (*)     Hematocrit 33.9 (*)     MCV 89      MCH 29.1      MCHC 32.7      RDW 12.3      Platelets 230     BASIC METABOLIC PANEL - Abnormal    Glucose 110 (*)     Sodium 138      Potassium 3.2 (*)     Chloride 108 (*)     Bicarbonate 23      Anion Gap 10      Urea Nitrogen 6      Creatinine 0.77      eGFR 80      Calcium 8.3 (*)    VITAMIN D 25-HYDROXY,TOTAL - Abnormal    Vitamin D, 25-Hydroxy, Total 101 (*)     Narrative:     Deficiency:         < 20   ng/ml                  Insufficiency:      20-29  ng/ml                  Sufficiency:         ng/ml                  This assay accurately quantifies the sum of Vitamin D3, 25-Hydroxy and Vitamin D2,25-Hydroxy.   CBC - Abnormal    WBC 4.6      nRBC 0.0      RBC 4.00      Hemoglobin 11.9 (*)     Hematocrit 36.4      MCV 91      MCH 29.8      MCHC 32.7      RDW 12.6      Platelets 221     BASIC METABOLIC PANEL - Abnormal    Glucose 103 (*)     Sodium 138      Potassium 3.0 (*)     Chloride 108 (*)     Bicarbonate 22      Anion Gap 11      Urea Nitrogen 3 (*)     Creatinine 0.85      eGFR 71      Calcium 8.6     MAGNESIUM - Abnormal    Magnesium 1.15 (*)    MAGNESIUM - Abnormal    Magnesium 1.42 (*)    BASIC METABOLIC PANEL - Abnormal    Glucose 88      Sodium 138      Potassium 3.3 (*)     Chloride 109 (*)     Bicarbonate 22      Anion Gap 10      Urea Nitrogen 3 (*)     Creatinine 0.81      eGFR 75      Calcium 8.5 (*)    CBC - Abnormal    WBC 5.5      nRBC        RBC 3.97 (*)     Hemoglobin 12.0      Hematocrit 37.7      MCV 95       MCH 30.2      MCHC 31.8 (*)     RDW 12.8      Platelets 194     BASIC METABOLIC PANEL - Abnormal    Glucose 100 (*)     Sodium 139      Potassium 3.8      Chloride 108 (*)     Bicarbonate 21      Anion Gap 14      Urea Nitrogen 6      Creatinine 0.78      eGFR 78      Calcium 8.6     CBC - Abnormal    WBC 4.4      nRBC 0.0      RBC 3.91 (*)     Hemoglobin 11.5 (*)     Hematocrit 37.2      MCV 95      MCH 29.4      MCHC 30.9 (*)     RDW 12.8      Platelets 188     BASIC METABOLIC PANEL - Abnormal    Glucose 89      Sodium 139      Potassium 3.6      Chloride 106      Bicarbonate 28      Anion Gap 9 (*)     Urea Nitrogen 11      Creatinine 0.72      eGFR 86      Calcium 8.9     CBC - Abnormal    WBC 4.4      nRBC 0.0      RBC 3.70 (*)     Hemoglobin 10.9 (*)     Hematocrit 34.8 (*)     MCV 94      MCH 29.5      MCHC 31.3 (*)     RDW 12.8      Platelets 199     URINE CULTURE - Normal    Urine Culture No significant growth     OCCULT BLOOD X1, STOOL - Normal    Occult Blood, Stool X1 Negative     STOOL PATHOGEN PANEL, PCR - Normal    Campylobacter Group Not Detected      Salmonella species Not Detected      Shigella species Not Detected      Vibrio Group Not Detected      Yersinia Enterocolitica Not Detected      Shiga Toxin 1 Not Detected      Shiga Toxin 2 Not Detected      Norovirus GI/GII Not Detected      Rotavirus A Not Detected     C. DIFFICILE, PCR - Normal    C. difficile, PCR Not Detected      Narrative:     This test is an FDA-cleared real-time PCR assay for detection of toxigenic C. difficile DNA from unprocessed liquid or unformed stool specimens that have not undergone nucleic acid extraction in symptomatic patients with potential C. difficile infection (CDI). A positive result may indicate colonization, and clinical assessment is required for the diagnosis of CDI. This test cannot be performed on formed stools or used as a test of cure, and should not be performed more than once per 7 days.   PHOSPHORUS -  Normal    Phosphorus 3.1     LIPASE - Normal    Lipase 10      Narrative:     Venipuncture immediately after or during the administration of Metamizole may lead to falsely low results. Testing should be performed immediately prior to Metamizole dosing.   C-REACTIVE PROTEIN - Normal    C-Reactive Protein <0.10     B-TYPE NATRIURETIC PEPTIDE - Normal    BNP 61      Narrative:        <100 pg/mL - Heart failure unlikely                  100-299 pg/mL - Intermediate probability of acute heart                                  failure exacerbation. Correlate with clinical                                  context and patient history.                    >=300 pg/mL - Heart Failure likely. Correlate with clinical                                  context and patient history.                                    BNP testing is performed using different testing methodology at [facility] than at other [facility]. Direct result comparisons should only be made within the same method.                      SERIAL TROPONIN-INITIAL - Normal    Troponin I, High Sensitivity 7      Narrative:     Less than 99th percentile of normal range cutoff-                  Female and children under 18 years old <14 ng/L; Male <21 ng/L: Negative                  Repeat testing should be performed if clinically indicated.                                     Female and children under 18 years old 14-50 ng/L; Male 21-50 ng/L:                  Consistent with possible cardiac damage and possible increased clinical                   risk. Serial measurements may help to assess extent of myocardial damage.                                     >50 ng/L: Consistent with cardiac damage, increased clinical risk and                  myocardial infarction. Serial measurements may help assess extent of                   myocardial damage.                                      NOTE: Children less than 1 year old may have higher baseline troponin                    levels and results should be interpreted in conjunction with the overall                   clinical context.                                     NOTE: Troponin I testing is performed using a different                   testing methodology at [facility] than at other                   [facility]. Direct result comparisons should only                   be made within the same method.   SERIAL TROPONIN, 1 HOUR - Normal    Troponin I, High Sensitivity 8      Narrative:     Less than 99th percentile of normal range cutoff-                  Female and children under 18 years old <14 ng/L; Male <21 ng/L: Negative                  Repeat testing should be performed if clinically indicated.                                     Female and children under 18 years old 14-50 ng/L; Male 21-50 ng/L:                  Consistent with possible cardiac damage and possible increased clinical                   risk. Serial measurements may help to assess extent of myocardial damage.                                     >50 ng/L: Consistent with cardiac damage, increased clinical risk and                  myocardial infarction. Serial measurements may help assess extent of                   myocardial damage.                                      NOTE: Children less than 1 year old may have higher baseline troponin                   levels and results should be interpreted in conjunction with the overall                   clinical context.                                     NOTE: Troponin I testing is performed using a different                   testing methodology at [facility] than at other                   [facility]. Direct result comparisons should only                   be made within the same method.   LACTATE - Normal    Lactate 1.2      Narrative:     Venipuncture immediately after or during the administration of Metamizole may lead to falsely low results. Testing should be  performed immediately                  prior to Metamizole dosing.   D-DIMER, VTE EXCLUSION - Normal    D-Dimer, Quantitative VTE Exclusion 339      Narrative:     The VTE Exclusion D-Dimer assay is reported in ng/mL Fibrinogen Equivalent Units (FEU).                                    Per 's instructions for use, a value of less than 500 ng/mL (FEU) may help to exclude DVT or PE in outpatients when the assay is used with a clinical pretest probability assessment.([facility] must utilize and document eCalc 'Wells Score Deep Vein Thrombosis Risk' for DVT exclusion only. Emergency Department should utilize  Guidelines for Emergency Department Use of the VTE Exclusion D-Dimer and Clinical Pretest probability assessment model for DVT or PE exclusion.)   MAGNESIUM - Normal    Magnesium 1.69     TSH WITH REFLEX TO FREE T4 IF ABNORMAL - Normal    Thyroid Stimulating Hormone 1.83      Narrative:     TSH testing is performed using different testing methodology at [facility] than at other [facility]. Direct result comparisons should only be made within the same method.                     MAGNESIUM - Normal    Magnesium 2.03     URINALYSIS WITH REFLEX CULTURE AND MICROSCOPIC    Narrative:     The following orders were created for panel order Urinalysis with Reflex Culture and Microscopic.                  Procedure                               Abnormality         Status                                     ---------                               -----------         ------                                     Urinalysis with Reflex C...[[id number]]  Abnormal            Final result                               Extra Urine Gray Tube[[id number]]                            Final result                                                 Please view results for these tests on the individual orders.   TROPONIN SERIES- (INITIAL, 1 HR)    Narrative:     The following orders were created for panel order Troponin  I Series, High Sensitivity (0, 1 HR).                  Procedure                               Abnormality         Status                                     ---------                               -----------         ------                                     Troponin I, High Sensiti...[[id number]]  Normal              Final result                               Troponin, High Sensitivi...[[id number]]  Normal              Final result                                                 Please view results for these tests on the individual orders.   EXTRA URINE GRAY TUBE    Extra Tube Hold for add-ons.       CT abdomen pelvis w IV contrast 05/05/2024    Narrative  Interpreted By:  Talia Cancino,  STUDY:  CT ABDOMEN PELVIS W IV CONTRAST;  5/5/2024 3:43 pm    INDICATION:  Signs/Symptoms:Diarrhea weakness abdominal pain history of prior  colon surgery feels dehydrated.    COMPARISON:  CT abdomen pelvis with contrast dated 01/25/2024    ACCESSION NUMBER(S):  [id number]    ORDERING CLINICIAN:  KRISTIAN CASTRO    TECHNIQUE:  CT of the abdomen and pelvis was performed after administration of  intravenous contrast. Standard contiguous axial images were obtained  at 3 mm slice thickness through the abdomen and pelvis. Coronal and  sagittal reconstructions at 3 mm slice thickness were performed.    75 ml of contrast 350 mg iohexol were administered intravenously  without immediate complication.    FINDINGS:  LOWER CHEST:  The visualized lung base is unremarkable. The heart is normal in size  without pericardial effusion. No pleural effusion is present.  Visualized distal esophagus appears normal.    ABDOMEN:    LIVER:  Liver is normal in size and morphology. There is a 6 mm hypodense  lesion in the left lobe of liver similar compared to prior CT  examination.    BILE DUCTS:  The intrahepatic and extrahepatic ducts are not dilated.    GALLBLADDER:  Gallbladder is moderately distended and demonstrates a calcified  stone in the  lumen.    PANCREAS:  There is diffuse pancreatic ductal dilatation measuring up to 5 mm in  width. However pancreatic parenchyma appears grossly unremarkable  without significant atrophy. This is similar compared to prior CT  examination dating back to 2021.    SPLEEN:  The spleen is normal in size.    ADRENAL GLANDS:  Bilateral adrenal glands appear normal.    KIDNEYS AND URETERS:  The kidneys are normal in size and enhance symmetrically.  No  hydroureteronephrosis or nephroureterolithiasis is identified.    PELVIS:    BLADDER:  Urinary bladder is diffusely distended and appears grossly  unremarkable.    REPRODUCTIVE ORGANS:  No pelvic mass lesion.    BOWEL:  Postsurgical changes are noted at the gastroesophageal junction.  Otherwise stomach is not well distended limiting evaluation.  Postsurgical changes of colectomy with ileocolonic anastomosis in the  left lower quadrant are noted. There are few fluid-filled dilated  small bowel loops in the lower pelvis which are similar compared to  prior imaging. Otherwise rest of the bowel loops appear grossly  unremarkable.    VESSELS:  Mild atherosclerotic calcifications of the abdominal aorta without  aneurysmal dilatation.    PERITONEUM/RETROPERITONEUM/LYMPH NODES:  There is no free or loculated fluid collection, no free  intraperitoneal air. The retroperitoneum appears normal.  No evidence  of enlarged lymphadenopathy in the abdomen and pelvis.    BONES AND ABDOMINAL WALL:  No suspicious osseous lesions. Bones demonstrate diffuse osseous  demineralization.    Impression  1.  No acute findings in the abdomen and pelvis.  2. Few fluid-filled small bowel loops in the lower abdomen without  definite segmental distention to suggest obstruction.  3. Redemonstration of diffusely dilated main pancreatic duct, similar  compared to prior imaging dating back to 2021. This was worked up by  MRI and CT pancreas protocol in 2022. Recommend correlation with  prior reports.  4.  Cholelithiasis without acute cholecystitis.  5. Diffuse osseous demineralization.    MACRO:  None    Signed by: Talia Cancino 5/5/2024 4:12 PM  Dictation workstation:   [id number]      Impression  Chronic upper abdominal pain.  Normal CT angiogram, normal endoscopic ultrasound except for dilated pancreatic duct.  History of gastrojejunostomy due to peptic ulcer disease.  History of right colectomy due to chronic constipation.  History of some resolution of pain with Linzess.  Heme-negative stool.  Plan   I concur with a trial of Bentyl.  The patient has had a chronic history of this upper abdominal pain.  Is not inappropriate to repeat her EGD as at the EGD performed on January 2, 2024 in sufficient views of the remnant of the stomach were obtained due to the food in the stomach.  If this is unremarkable then as discussed previously the patient protein shakes to sustain her nutrition and eating small meals intermittently.  I do not believe that she has symptomatic cholelithiasis based on her history and exam.  Gastrology be kindly performed EGD today.  I will confer with them.

## 2024-05-10 NOTE — PROGRESS NOTES
Plan for EGD today. She will likely return home after EGD. IMM letter given and explained to patient. Consent/signature obtained and copy given to patient. Patient denies any need for further assistance after discharge home. They feel comfortable going home when medically ready.     Discharge plan: Home no needs.  DC Secure

## 2024-05-10 NOTE — POST-PROCEDURE NOTE
Esophagogastroduodenoscopy (EGD) with Dr. Courtney     Result Text  Result Text   Impression  The upper third of the esophagus, middle third of the esophagus and lower third of the esophagus appeared normal.  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  The afferent limb was difficult to visualize.        Findings  The upper third of the esophagus, middle third of the esophagus and lower third of the esophagus appeared normal.  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  The afferent limb was difficult to visualize.        Recommendation    Encourage caloric intake.

## 2024-05-10 NOTE — ANESTHESIA POSTPROCEDURE EVALUATION
Patient: Latha Quispe    Procedure Summary       Date: 05/10/24 Room / Location: [facility]    Anesthesia Start: 1121 Anesthesia Stop: 1141    Procedure: EGD Diagnosis: Epigastric pain    Scheduled Providers: Paul Courtney DO Responsible Provider: DINORAH Valadez    Anesthesia Type: MAC ASA Status: 3            Anesthesia Type: MAC    Vitals Value Taken Time   /91 05/10/24 1147   Temp 36.3 °C (97.3 °F) 05/10/24 1142   Pulse 57 05/10/24 1147   Resp 16 05/10/24 1147   SpO2 97 % 05/10/24 1147       Anesthesia Post Evaluation    Patient location during evaluation: PACU  Patient participation: complete - patient participated  Level of consciousness: awake and alert  Pain score: 0  Pain management: adequate  Airway patency: patent  Cardiovascular status: acceptable  Respiratory status: acceptable  Hydration status: acceptable  Postoperative Nausea and Vomiting: none        No notable events documented.

## 2024-05-10 NOTE — DISCHARGE INSTR - OTHER ORDERS
Thank you for choosing [facility] for your Health Care needs. As you transition from the hospital back to home, we hope we took your preferences into account on how you manage your health needs so you can manage your health at home.     You may receive a survey in the mail within the next couple weeks. Please take the time to complete it and return it. Your input is ALWAYS important to us. Thank you!  Your Care Transition Team - [employer] - [phone]    For questions about your medications listed on your discharge instructions, please call the Nurses Station at [phone].

## 2024-05-10 NOTE — PROGRESS NOTES
Nutrition Progress Note    Provided pt with resources to find protein powder at home. Provided samples of grape ProStat (unit number [id number]). Pt states this is the first ONS that she can tolerate. Also states that she has more energy after drinking one. She and her significant other did not have any other questions today.

## 2024-05-10 NOTE — CARE PLAN
The patient's goals for the shift include To get a good nights rest.    The clinical goals for the shift include pain control      Problem: Pain - Adult  Goal: Verbalizes/displays adequate comfort level or baseline comfort level  Outcome: Progressing     Problem: Discharge Planning  Goal: Discharge to home or other facility with appropriate resources  Outcome: Progressing     Problem: Chronic Conditions and Co-morbidities  Goal: Patient's chronic conditions and co-morbidity symptoms are monitored and maintained or improved  Outcome: Progressing     Problem: Skin  Goal: Decreased wound size/increased tissue granulation at next dressing change  Outcome: Progressing  Flowsheets (Taken 5/9/2024 2025)  Decreased wound size/increased tissue granulation at next dressing change:   Promote sleep for wound healing   Protective dressings over bony prominences  Goal: Participates in plan/prevention/treatment measures  Outcome: Progressing  Flowsheets (Taken 5/9/2024 2025)  Participates in plan/prevention/treatment measures:   Discuss with provider PT/OT consult   Elevate heels  Goal: Prevent/manage excess moisture  Outcome: Progressing  Flowsheets (Taken 5/9/2024 2025)  Prevent/manage excess moisture:   Cleanse incontinence/protect with barrier cream   Monitor for/manage infection if present   Follow provider orders for dressing changes   Use wicking fabric (obtain order)  Goal: Prevent/minimize sheer/friction injuries  Outcome: Progressing  Flowsheets (Taken 5/9/2024 2025)  Prevent/minimize sheer/friction injuries:   Complete micro-shifts as needed if patient unable. Adjust patient position to relieve pressure points, not a full turn   Increase activity/out of bed for meals   Use pull sheet   Turn/reposition every 2 hours/use positioning/transfer devices   HOB 30 degrees or less  Goal: Promote/optimize nutrition  Outcome: Progressing  Flowsheets (Taken 5/9/2024 2025)  Promote/optimize nutrition:   Assist with feeding    Monitor/record intake including meals   Consume > 50% meals/supplements   Offer water/supplements/favorite foods  Goal: Promote skin healing  Outcome: Progressing  Flowsheets (Taken 5/9/2024 2025)  Promote skin healing:   Assess skin/pad under line(s)/device(s)   Protective dressings over bony prominences   Turn/reposition every 2 hours/use positioning/transfer devices   Ensure correct size (line/device) and apply per  instructions     Problem: Fall/Injury  Goal: Verbalize understanding of personal risk factors for fall in the hospital  Outcome: Progressing  Goal: Verbalize understanding of risk factor reduction measures to prevent injury from fall in the home  Outcome: Progressing  Goal: Use assistive devices by end of the shift  Outcome: Progressing  Goal: Pace activities to prevent fatigue by end of the shift  Outcome: Progressing     Problem: Nutrition  Goal: Oral intake greater than 50%  Outcome: Progressing  Goal: Consume prescribed supplement  Outcome: Progressing  Goal: Adequate PO fluid intake  Outcome: Progressing  Goal: Gradual weight gain  Outcome: Progressing

## 2024-05-10 NOTE — H&P (VIEW-ONLY)
Weakness, Gen  Associated symptoms include abdominal pain.     This is because requested for patient with a history of chronic upper abdominal pain.  This a patient known to me who I seen previously in July 2023.  The patient has a history of pain in the upper abdomen.  She indicates that she has pain intermittently.  This sometimes is worse with food.  She does not have any obvious nausea.  She has a history of a partial gastric resection for peptic ulcer in the past.  She had a colonoscopy approximately 5 years ago.  She had a right colon resection performed apparently for chronic constipation.  Since that time she has had intermittent diarrhea.  She been admitted to the hospital for low potassium and magnesium.  Complained of pain.  A CT scan performed which confirmed no obvious abnormalities except for gallstones and the postsurgical changes of the previous gastric resection and a right colectomy.  She did undergo an upper endoscopy in January 2, 2024.  There was food in the stomach.  The gastroenterostomy was noted.  The stomach was not completely visualized.  In view of her constipation she was started on Linzess and did well with these.  She had negative biopsies at this time.  She has a history of a chronic dilated pancreatic duct.  She had an endoscopic ultrasound on 15 March 2022 which did not confirm any obvious abnormalities except for the dilated pancreatic duct and gastroenterostomy.  She is also had a CT angiogram performed on December 30, 2023 to rule out mesenteric ischemia.  She had no evidence of aortic aneurysm or hemodynamically significant stenosis of the mesenteric vessels.  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  Past Medical History:   Diagnosis Date    Abdominal pain     Anxiety     Cat bite 03/11/2023    Cataract     Depression     Disease of thyroid gland     GERD (gastroesophageal reflux disease)     GERD (gastroesophageal reflux disease)      Hypothyroidism     Irritable bowel syndrome     Panic attacks     Personal history of malignant neoplasm of breast 2021    History of malignant neoplasm of breast    Personal history of other complications of pregnancy, childbirth and the puerperium     History of spontaneous           Current Facility-Administered Medications:     acetaminophen (Tylenol) tablet 650 mg, 650 mg, oral, q4h PRN, MIRTHA Simpson, 650 mg at 24 0257    bisacodyl (Dulcolax) suppository 10 mg, 10 mg, rectal, Daily PRN, MIRTHA Simpson    busPIRone (Buspar) tablet 20 mg, 20 mg, oral, TID, Veronique Bradshaw PA-C, 20 mg at 24    cholecalciferol (Vitamin D-3) tablet 5,000 Units, 5,000 Units, oral, Daily, MIRTHA Simpson, 5,000 Units at 24 0808    escitalopram (Lexapro) tablet 20 mg, 20 mg, oral, Nightly, MIRTHA Simpson, 20 mg at 24    formoterol (Perforomist) 20 mcg/2 mL nebulizer solution 20 mcg, 20 mcg, nebulization, q12h, MIRTHA Simpson, 20 mcg at 24    heparin (porcine) injection 5,000 Units, 5,000 Units, subcutaneous, q8h, MIRTHA Simpson, 5,000 Units at 05/10/24 0611    hyoscyamine (Anaspaz) disintegrating tablet 0.125 mg, 0.125 mg, oral, Before meals & nightly, MIRTHA Montana, 0.125 mg at 24    lactulose 20 gram/30 mL oral solution 10 g, 10 g, oral, BID PRN, MIRTHA Simpson    levothyroxine (Synthroid, Levoxyl) tablet 50 mcg, 50 mcg, oral, Daily, MIRTHA Simpson, 50 mcg at 24 0808    magnesium hydroxide (Milk of Magnesia) 2,400 mg/10 mL suspension 10 mL, 10 mL, oral, Daily PRN, MIRTHA Simpson    magnesium oxide (Mag-Ox) tablet 400 mg, 400 mg, oral, BID, MIRTHA Gallego, 400 mg at 24    melatonin tablet 6 mg, 6 mg, oral, Nightly PRN, GEO Simpson-CNP, 6 mg at 24    metoclopramide (Reglan) injection 5 mg, 5 mg, intravenous, q6h PRN, Akila  MIRTHA Covarrubias    morphine injection 2 mg, 2 mg, intravenous, q8h PRN, Veronique Bradshaw PA-C, 2 mg at 05/09/24 2005    ondansetron (Zofran) injection 4 mg, 4 mg, intravenous, q8h PRN, MIRTHA Simpson    oxygen (O2) therapy, , inhalation, Continuous PRN - O2/gases, MIRTHA Simpson    pantoprazole (ProtoNix) injection 40 mg, 40 mg, intravenous, Daily before breakfast, MIRTHA Simpson, 40 mg at 05/10/24 0611    polyethylene glycol (Glycolax, Miralax) packet 17 g, 17 g, oral, Daily PRN, MIRTHA Simpson    sodium chloride 0.9% infusion, 50 mL/hr, intravenous, Continuous, MIRTHA Gallego, Last Rate: 50 mL/hr at 05/09/24 2240, 50 mL/hr at 05/09/24 2240    sodium chloride 0.9% infusion, 10 mL/hr, intravenous, Continuous PRN, Javier Donnelly MD    sucralfate (Carafate) tablet 1 g, 1 g, oral, 4x daily, MIRTHA Simpson, 1 g at 05/09/24 2004    traZODone (Desyrel) tablet 150 mg, 150 mg, oral, Nightly, Veronique Bradshaw PA-C, 150 mg at 05/09/24 2004     Allergies   Allergen Reactions    Nsaids (Non-Steroidal Anti-Inflammatory Drug) Unknown        Review of Systems  Review of Systems   Gastrointestinal:  Positive for abdominal pain.       Objective     Vital signs for last 24 hours:  Temp:  [36.9 °C (98.4 °F)-37.1 °C (98.8 °F)] 36.9 °C (98.4 °F)  Heart Rate:  [48-60] 48  Resp:  [16-17] 16  BP: (122-142)/(53-78) 125/54    Intake/Output this shift:  No intake/output data recorded.    Physical Exam  Physical Exam  Vitals reviewed. Exam conducted with a chaperone present.   Constitutional:       Appearance: Normal appearance. She is cachectic.   HENT:      Head: Normocephalic.   Cardiovascular:      Rate and Rhythm: Normal rate and regular rhythm.      Heart sounds: Normal heart sounds.   Pulmonary:      Effort: Pulmonary effort is normal.      Breath sounds: Normal breath sounds.   Abdominal:      General: Abdomen is flat.      Palpations: Abdomen is soft. There is no mass.       Tenderness: There is no abdominal tenderness. There is no guarding.      Comments: Midline incision.  Nonspecific upper abdominal tenderness.   Musculoskeletal:         General: Normal range of motion.      Cervical back: Normal range of motion.   Skin:     General: Skin is warm.   Neurological:      General: No focal deficit present.   Psychiatric:         Mood and Affect: Mood normal.         Labs & Radiology      Labs Reviewed   CBC WITH AUTO DIFFERENTIAL - Abnormal       Result Value    WBC 5.0      nRBC 0.0      RBC 4.12      Hemoglobin 11.9 (*)     Hematocrit 36.2      MCV 88      MCH 28.9      MCHC 32.9      RDW 12.2      Platelets 257      Neutrophils % 72.0      Immature Granulocytes %, Automated 0.2      Lymphocytes % 18.4      Monocytes % 8.2      Eosinophils % 0.2      Basophils % 1.0      Neutrophils Absolute 3.61      Immature Granulocytes Absolute, Automated 0.01      Lymphocytes Absolute 0.92      Monocytes Absolute 0.41      Eosinophils Absolute 0.01      Basophils Absolute 0.05     COMPREHENSIVE METABOLIC PANEL - Abnormal    Glucose 151 (*)     Sodium 132 (*)     Potassium 3.2 (*)     Chloride 99      Bicarbonate 24      Anion Gap 12      Urea Nitrogen 7      Creatinine 0.87      eGFR 69      Calcium 8.8      Albumin 3.8      Alkaline Phosphatase 79      Total Protein 6.3 (*)     AST 17      Bilirubin, Total 0.6      ALT 10     MAGNESIUM - Abnormal    Magnesium 1.54 (*)    LACTATE - Abnormal    Lactate 2.1 (*)     Narrative:     Venipuncture immediately after or during the administration of Metamizole may lead to falsely low results. Testing should be performed immediately                  prior to Metamizole dosing.   PROTIME-INR - Abnormal    Protime 13.0 (*)     INR 1.2 (*)    URINALYSIS WITH REFLEX CULTURE AND MICROSCOPIC - Abnormal    Color, Urine Light-Yellow      Appearance, Urine Clear      Specific Gravity, Urine 1.002 (*)     pH, Urine 6.5      Protein, Urine NEGATIVE      Glucose, Urine  Normal      Blood, Urine 0.1 (1+) (*)     Ketones, Urine NEGATIVE      Bilirubin, Urine NEGATIVE      Urobilinogen, Urine Normal      Nitrite, Urine NEGATIVE      Leukocyte Esterase, Urine 75 William/µL (*)    MICROSCOPIC ONLY, URINE - Abnormal    WBC, Urine 1-5      RBC, Urine 3-5      Squamous Epithelial Cells, Urine 1-9 (SPARSE)      Bacteria, Urine 1+ (*)    CBC - Abnormal    WBC 5.3      nRBC 0.0      RBC 3.81 (*)     Hemoglobin 11.1 (*)     Hematocrit 33.9 (*)     MCV 89      MCH 29.1      MCHC 32.7      RDW 12.3      Platelets 230     BASIC METABOLIC PANEL - Abnormal    Glucose 110 (*)     Sodium 138      Potassium 3.2 (*)     Chloride 108 (*)     Bicarbonate 23      Anion Gap 10      Urea Nitrogen 6      Creatinine 0.77      eGFR 80      Calcium 8.3 (*)    VITAMIN D 25-HYDROXY,TOTAL - Abnormal    Vitamin D, 25-Hydroxy, Total 101 (*)     Narrative:     Deficiency:         < 20   ng/ml                  Insufficiency:      20-29  ng/ml                  Sufficiency:         ng/ml                  This assay accurately quantifies the sum of Vitamin D3, 25-Hydroxy and Vitamin D2,25-Hydroxy.   CBC - Abnormal    WBC 4.6      nRBC 0.0      RBC 4.00      Hemoglobin 11.9 (*)     Hematocrit 36.4      MCV 91      MCH 29.8      MCHC 32.7      RDW 12.6      Platelets 221     BASIC METABOLIC PANEL - Abnormal    Glucose 103 (*)     Sodium 138      Potassium 3.0 (*)     Chloride 108 (*)     Bicarbonate 22      Anion Gap 11      Urea Nitrogen 3 (*)     Creatinine 0.85      eGFR 71      Calcium 8.6     MAGNESIUM - Abnormal    Magnesium 1.15 (*)    MAGNESIUM - Abnormal    Magnesium 1.42 (*)    BASIC METABOLIC PANEL - Abnormal    Glucose 88      Sodium 138      Potassium 3.3 (*)     Chloride 109 (*)     Bicarbonate 22      Anion Gap 10      Urea Nitrogen 3 (*)     Creatinine 0.81      eGFR 75      Calcium 8.5 (*)    CBC - Abnormal    WBC 5.5      nRBC        RBC 3.97 (*)     Hemoglobin 12.0      Hematocrit 37.7      MCV 95       MCH 30.2      MCHC 31.8 (*)     RDW 12.8      Platelets 194     BASIC METABOLIC PANEL - Abnormal    Glucose 100 (*)     Sodium 139      Potassium 3.8      Chloride 108 (*)     Bicarbonate 21      Anion Gap 14      Urea Nitrogen 6      Creatinine 0.78      eGFR 78      Calcium 8.6     CBC - Abnormal    WBC 4.4      nRBC 0.0      RBC 3.91 (*)     Hemoglobin 11.5 (*)     Hematocrit 37.2      MCV 95      MCH 29.4      MCHC 30.9 (*)     RDW 12.8      Platelets 188     BASIC METABOLIC PANEL - Abnormal    Glucose 89      Sodium 139      Potassium 3.6      Chloride 106      Bicarbonate 28      Anion Gap 9 (*)     Urea Nitrogen 11      Creatinine 0.72      eGFR 86      Calcium 8.9     CBC - Abnormal    WBC 4.4      nRBC 0.0      RBC 3.70 (*)     Hemoglobin 10.9 (*)     Hematocrit 34.8 (*)     MCV 94      MCH 29.5      MCHC 31.3 (*)     RDW 12.8      Platelets 199     URINE CULTURE - Normal    Urine Culture No significant growth     OCCULT BLOOD X1, STOOL - Normal    Occult Blood, Stool X1 Negative     STOOL PATHOGEN PANEL, PCR - Normal    Campylobacter Group Not Detected      Salmonella species Not Detected      Shigella species Not Detected      Vibrio Group Not Detected      Yersinia Enterocolitica Not Detected      Shiga Toxin 1 Not Detected      Shiga Toxin 2 Not Detected      Norovirus GI/GII Not Detected      Rotavirus A Not Detected     C. DIFFICILE, PCR - Normal    C. difficile, PCR Not Detected      Narrative:     This test is an FDA-cleared real-time PCR assay for detection of toxigenic C. difficile DNA from unprocessed liquid or unformed stool specimens that have not undergone nucleic acid extraction in symptomatic patients with potential C. difficile infection (CDI). A positive result may indicate colonization, and clinical assessment is required for the diagnosis of CDI. This test cannot be performed on formed stools or used as a test of cure, and should not be performed more than once per 7 days.   PHOSPHORUS -  Normal    Phosphorus 3.1     LIPASE - Normal    Lipase 10      Narrative:     Venipuncture immediately after or during the administration of Metamizole may lead to falsely low results. Testing should be performed immediately prior to Metamizole dosing.   C-REACTIVE PROTEIN - Normal    C-Reactive Protein <0.10     B-TYPE NATRIURETIC PEPTIDE - Normal    BNP 61      Narrative:        <100 pg/mL - Heart failure unlikely                  100-299 pg/mL - Intermediate probability of acute heart                                  failure exacerbation. Correlate with clinical                                  context and patient history.                    >=300 pg/mL - Heart Failure likely. Correlate with clinical                                  context and patient history.                                    BNP testing is performed using different testing methodology at [facility] than at other [facility]. Direct result comparisons should only be made within the same method.                      SERIAL TROPONIN-INITIAL - Normal    Troponin I, High Sensitivity 7      Narrative:     Less than 99th percentile of normal range cutoff-                  Female and children under 18 years old <14 ng/L; Male <21 ng/L: Negative                  Repeat testing should be performed if clinically indicated.                                     Female and children under 18 years old 14-50 ng/L; Male 21-50 ng/L:                  Consistent with possible cardiac damage and possible increased clinical                   risk. Serial measurements may help to assess extent of myocardial damage.                                     >50 ng/L: Consistent with cardiac damage, increased clinical risk and                  myocardial infarction. Serial measurements may help assess extent of                   myocardial damage.                                      NOTE: Children less than 1 year old may have higher baseline troponin                    levels and results should be interpreted in conjunction with the overall                   clinical context.                                     NOTE: Troponin I testing is performed using a different                   testing methodology at [facility] than at other                   [facility]. Direct result comparisons should only                   be made within the same method.   SERIAL TROPONIN, 1 HOUR - Normal    Troponin I, High Sensitivity 8      Narrative:     Less than 99th percentile of normal range cutoff-                  Female and children under 18 years old <14 ng/L; Male <21 ng/L: Negative                  Repeat testing should be performed if clinically indicated.                                     Female and children under 18 years old 14-50 ng/L; Male 21-50 ng/L:                  Consistent with possible cardiac damage and possible increased clinical                   risk. Serial measurements may help to assess extent of myocardial damage.                                     >50 ng/L: Consistent with cardiac damage, increased clinical risk and                  myocardial infarction. Serial measurements may help assess extent of                   myocardial damage.                                      NOTE: Children less than 1 year old may have higher baseline troponin                   levels and results should be interpreted in conjunction with the overall                   clinical context.                                     NOTE: Troponin I testing is performed using a different                   testing methodology at [facility] than at other                   [facility]. Direct result comparisons should only                   be made within the same method.   LACTATE - Normal    Lactate 1.2      Narrative:     Venipuncture immediately after or during the administration of Metamizole may lead to falsely low results. Testing should be  performed immediately                  prior to Metamizole dosing.   D-DIMER, VTE EXCLUSION - Normal    D-Dimer, Quantitative VTE Exclusion 339      Narrative:     The VTE Exclusion D-Dimer assay is reported in ng/mL Fibrinogen Equivalent Units (FEU).                                    Per 's instructions for use, a value of less than 500 ng/mL (FEU) may help to exclude DVT or PE in outpatients when the assay is used with a clinical pretest probability assessment.([facility] must utilize and document eCalc 'Wells Score Deep Vein Thrombosis Risk' for DVT exclusion only. Emergency Department should utilize  Guidelines for Emergency Department Use of the VTE Exclusion D-Dimer and Clinical Pretest probability assessment model for DVT or PE exclusion.)   MAGNESIUM - Normal    Magnesium 1.69     TSH WITH REFLEX TO FREE T4 IF ABNORMAL - Normal    Thyroid Stimulating Hormone 1.83      Narrative:     TSH testing is performed using different testing methodology at [facility] than at other [facility]. Direct result comparisons should only be made within the same method.                     MAGNESIUM - Normal    Magnesium 2.03     URINALYSIS WITH REFLEX CULTURE AND MICROSCOPIC    Narrative:     The following orders were created for panel order Urinalysis with Reflex Culture and Microscopic.                  Procedure                               Abnormality         Status                                     ---------                               -----------         ------                                     Urinalysis with Reflex C...[[id number]]  Abnormal            Final result                               Extra Urine Gray Tube[[id number]]                            Final result                                                 Please view results for these tests on the individual orders.   TROPONIN SERIES- (INITIAL, 1 HR)    Narrative:     The following orders were created for panel order Troponin  I Series, High Sensitivity (0, 1 HR).                  Procedure                               Abnormality         Status                                     ---------                               -----------         ------                                     Troponin I, High Sensiti...[[id number]]  Normal              Final result                               Troponin, High Sensitivi...[[id number]]  Normal              Final result                                                 Please view results for these tests on the individual orders.   EXTRA URINE GRAY TUBE    Extra Tube Hold for add-ons.       CT abdomen pelvis w IV contrast 05/05/2024    Narrative  Interpreted By:  Talia Cancino,  STUDY:  CT ABDOMEN PELVIS W IV CONTRAST;  5/5/2024 3:43 pm    INDICATION:  Signs/Symptoms:Diarrhea weakness abdominal pain history of prior  colon surgery feels dehydrated.    COMPARISON:  CT abdomen pelvis with contrast dated 01/25/2024    ACCESSION NUMBER(S):  [id number]    ORDERING CLINICIAN:  KRISTIAN CASTRO    TECHNIQUE:  CT of the abdomen and pelvis was performed after administration of  intravenous contrast. Standard contiguous axial images were obtained  at 3 mm slice thickness through the abdomen and pelvis. Coronal and  sagittal reconstructions at 3 mm slice thickness were performed.    75 ml of contrast 350 mg iohexol were administered intravenously  without immediate complication.    FINDINGS:  LOWER CHEST:  The visualized lung base is unremarkable. The heart is normal in size  without pericardial effusion. No pleural effusion is present.  Visualized distal esophagus appears normal.    ABDOMEN:    LIVER:  Liver is normal in size and morphology. There is a 6 mm hypodense  lesion in the left lobe of liver similar compared to prior CT  examination.    BILE DUCTS:  The intrahepatic and extrahepatic ducts are not dilated.    GALLBLADDER:  Gallbladder is moderately distended and demonstrates a calcified  stone in the  lumen.    PANCREAS:  There is diffuse pancreatic ductal dilatation measuring up to 5 mm in  width. However pancreatic parenchyma appears grossly unremarkable  without significant atrophy. This is similar compared to prior CT  examination dating back to 2021.    SPLEEN:  The spleen is normal in size.    ADRENAL GLANDS:  Bilateral adrenal glands appear normal.    KIDNEYS AND URETERS:  The kidneys are normal in size and enhance symmetrically.  No  hydroureteronephrosis or nephroureterolithiasis is identified.    PELVIS:    BLADDER:  Urinary bladder is diffusely distended and appears grossly  unremarkable.    REPRODUCTIVE ORGANS:  No pelvic mass lesion.    BOWEL:  Postsurgical changes are noted at the gastroesophageal junction.  Otherwise stomach is not well distended limiting evaluation.  Postsurgical changes of colectomy with ileocolonic anastomosis in the  left lower quadrant are noted. There are few fluid-filled dilated  small bowel loops in the lower pelvis which are similar compared to  prior imaging. Otherwise rest of the bowel loops appear grossly  unremarkable.    VESSELS:  Mild atherosclerotic calcifications of the abdominal aorta without  aneurysmal dilatation.    PERITONEUM/RETROPERITONEUM/LYMPH NODES:  There is no free or loculated fluid collection, no free  intraperitoneal air. The retroperitoneum appears normal.  No evidence  of enlarged lymphadenopathy in the abdomen and pelvis.    BONES AND ABDOMINAL WALL:  No suspicious osseous lesions. Bones demonstrate diffuse osseous  demineralization.    Impression  1.  No acute findings in the abdomen and pelvis.  2. Few fluid-filled small bowel loops in the lower abdomen without  definite segmental distention to suggest obstruction.  3. Redemonstration of diffusely dilated main pancreatic duct, similar  compared to prior imaging dating back to 2021. This was worked up by  MRI and CT pancreas protocol in 2022. Recommend correlation with  prior reports.  4.  Cholelithiasis without acute cholecystitis.  5. Diffuse osseous demineralization.    MACRO:  None    Signed by: Talia Cancino 5/5/2024 4:12 PM  Dictation workstation:   [id number]      Impression  Chronic upper abdominal pain.  Normal CT angiogram, normal endoscopic ultrasound except for dilated pancreatic duct.  History of gastrojejunostomy due to peptic ulcer disease.  History of right colectomy due to chronic constipation.  History of some resolution of pain with Linzess.  Heme-negative stool.  Plan   I concur with a trial of Bentyl.  The patient has had a chronic history of this upper abdominal pain.  Is not inappropriate to repeat her EGD as at the EGD performed on January 2, 2024 in sufficient views of the remnant of the stomach were obtained due to the food in the stomach.  If this is unremarkable then as discussed previously the patient protein shakes to sustain her nutrition and eating small meals intermittently.  I do not believe that she has symptomatic cholelithiasis based on her history and exam.  Gastrology be kindly performed EGD today.  I will confer with them.

## 2024-05-10 NOTE — INTERVAL H&P NOTE
H&P reviewed. The patient was examined and there are no changes to the H&P.      EGD with Dr. Courtney.

## 2024-05-10 NOTE — DISCHARGE SUMMARY
Discharge Diagnosis  Diarrhea  GERD  Epigastric pain  Hypokalemia  Hypomagnesemia  Hyponatremia  Bradycardia  Microhematuria    Issues Requiring Follow-Up      Discharge Meds     Your medication list        START taking these medications        Instructions Last Dose Given Next Dose Due   hyoscyamine 0.125 mg disintegrating tablet  Commonly known as: Anaspaz      Take 1 tablet (0.125 mg) by mouth before breakfast, before lunch, before evening meal, and at bedtime.              CONTINUE taking these medications        Instructions Last Dose Given Next Dose Due   busPIRone 10 mg tablet  Commonly known as: Buspar           escitalopram 20 mg tablet  Commonly known as: Lexapro           lactulose 20 gram/30 mL oral solution      Take 15 mL (10 g) by mouth 2 times a day as needed (Constipation).       levothyroxine 50 mcg tablet  Commonly known as: Synthroid, Levoxyl      Take 1 tablet (50 mcg) by mouth once daily.       magnesium oxide 400 mg tablet  Commonly known as: Mag-Ox           melatonin 3 mg tablet           ondansetron ODT 4 mg disintegrating tablet  Commonly known as: Zofran-ODT      Take 1 tablet (4 mg) by mouth every 12 hours if needed for nausea or vomiting.       pantoprazole 40 mg EC tablet  Commonly known as: ProtoNix      Take 1 tablet (40 mg) by mouth once daily.       POTASSIUM ACETATE (BULK) MISC           sucralfate 1 gram tablet  Commonly known as: Carafate      Take 1 tablet (1 g) by mouth 4 times a day. Before meals and at bedtime       traZODone 150 mg tablet  Commonly known as: Desyrel           Vitamin D3 50 mcg (2,000 unit) capsule  Generic drug: cholecalciferol                  STOP taking these medications      dicyclomine 10 mg capsule  Commonly known as: Bentyl                  Where to Get Your Medications        These medications were sent to [id number] - [place], [place] - [address] [id number]      Phone: [phone]   hyoscyamine  0.125 mg disintegrating tablet         Test Results Pending At Discharge  Pending Labs       No current pending labs.            Hospital Course    Diarrhea (improving)   GERD  Lactic acidosis (resolved)   Chronic idiopathic constipation  Epigastric pain  - Patient reports diarrhea for two days prior to admission; is normally constipated  - Lactate 2.1 > 1.2  - CRP < 0.10  - Lipase 10  - WBC 5.0 > 5.3 > 4.6   - CT Abd/pelvis:  No acute findings in the abdomen and pelvis. 2. Few fluid-filled small bowel loops in the lower abdomen without definite segmental distention to suggest obstruction. 3. Redemonstration of diffusely dilated main pancreatic duct, similar compared to prior imaging dating back to 2021. This was worked up by MRI and CT pancreas protocol in 2022. Recommend correlation with prior reports. 4. Cholelithiasis without acute cholecystitis.   - Stool pathogen panel, C. Diff PCR, occult stool negative   - Isolation protocol discontinued   - Antibiotics discontinued 5/7, received one day of ceftriaxone/metronidazole   - continue pantoprazole IVP every day  - Continue Carafate QID - home med   - Continue NS @ 125 ml/hr while having diarrhea/poor PO intake  - Diet advanced to GI soft today   - Dicyclomine changed from PRN to TID scheduled before meals   - Zofran PRN for nausea/vomiting   - Morphine 2 mg IVP q8h PRN for severe pain    - Nutrition consulted for low BMI, appreciate recs  - GI consulted, appreciate recs   - surgical consult for possible EGD     Hypokalemia  Hypomagnesemia   Hyponatremia (resolved)  - Likely due to diarrhea   - Na 132 > 138 > 138   - K 3.2 > 3.0 > 3.3, repleted per protocol  - Mag 1.69 > 1.42 today, repleted per protocol   - Continue NS @ 50 ml/hr while PO intake is poor  - Continue Mag-Ox BID (home med)   - Daily BMP and mag level; replete electrolytes per protocol     Bradycardia, asymptomatic   - Patient's HR reportedly in the 30s-40s overnight; lowest HR documented is 45 bpm    - TSH 1.83  - Echo: LVEF 60-65%, diastolic dysfunction, mild aortic valve regurgitation   - Telemetry monitoring  - Cardiology consulted, recommend 14 day Zio patch on discharge      Microhematuria  - follows with urology  - CT urogram pending     COPD, not in acute exacerbation  - Currently on room air   - Patient denies cough or shortness of breath  - CXR: COPD. No acute pulmonary process   - Continue Perforomist  - Monitor SpO2 continuously     Hypothyroidism  - TSH 1.83   - Continue levothyroxine     Anxiety   Depression  - Continue buspirone, escitalopram, trazodone     DVT PPx: Heparin  GI PPx: Protonix       Code Status: Full      Disposition: Patient was stable to be discharged to home. She was discharged on [place]. She will follow up with cardiology for a zio patch; and she will follow up with her PCP.    .dis    Pertinent Physical Exam At Time of Discharge  Physical Exam  Vitals reviewed.   Constitutional:       Appearance: Normal appearance.      Comments: Cachexia, underweight   HENT:      Head: Normocephalic and atraumatic.      Nose: Nose normal.      Mouth/Throat:      Mouth: Mucous membranes are moist.      Pharynx: Oropharynx is clear.   Eyes:      Conjunctiva/sclera: Conjunctivae normal.      Pupils: Pupils are equal, round, and reactive to light.   Cardiovascular:      Rate and Rhythm: Normal rate and regular rhythm.      Pulses: Normal pulses.      Heart sounds: Normal heart sounds.   Pulmonary:      Effort: Pulmonary effort is normal.      Breath sounds: Normal breath sounds.   Abdominal:      General: Bowel sounds are normal.      Palpations: Abdomen is soft.      Tenderness: There is abdominal tenderness.   Musculoskeletal:         General: Normal range of motion.      Cervical back: Normal range of motion and neck supple.   Skin:     General: Skin is warm and dry.   Neurological:      General: No focal deficit present.      Mental Status: She is alert and oriented to person, place, and  time.   Psychiatric:         Mood and Affect: Mood normal.         Behavior: Behavior normal.   Outpatient Follow-Up  Future Appointments   Date Time Provider Department Center   5/10/2024  4:00 PM Paul Courtney DO [address]   6/6/2024  1:20 PM Abhilash Rodriguez MD [id number]   6/18/2024  9:30 AM Melanie Andrade MD [id number] [facility]   6/24/2024  9:40 AM Cori Carrasco APRN-CNP [id number]   7/31/2024 10:40 AM PALLAVI Ruiz [facility]         Deja Yi, APRN-CNP

## 2024-05-13 ENCOUNTER — PATIENT OUTREACH (OUTPATIENT)
Dept: PRIMARY CARE | Facility: CLINIC | Age: 79
End: 2024-05-13
Payer: MEDICARE

## 2024-05-14 ENCOUNTER — OFFICE VISIT (OUTPATIENT)
Dept: PRIMARY CARE | Facility: CLINIC | Age: 79
End: 2024-05-14
Payer: MEDICARE

## 2024-05-14 VITALS
DIASTOLIC BLOOD PRESSURE: 63 MMHG | HEIGHT: 60 IN | BODY MASS INDEX: 16.1 KG/M2 | OXYGEN SATURATION: 97 % | HEART RATE: 61 BPM | WEIGHT: 82 LBS | SYSTOLIC BLOOD PRESSURE: 116 MMHG | TEMPERATURE: 97 F

## 2024-05-14 DIAGNOSIS — R00.1 BRADYCARDIA: ICD-10-CM

## 2024-05-14 DIAGNOSIS — K58.0 IRRITABLE BOWEL SYNDROME WITH DIARRHEA: ICD-10-CM

## 2024-05-14 DIAGNOSIS — E86.0 DEHYDRATION: Primary | ICD-10-CM

## 2024-05-14 PROCEDURE — 1159F MED LIST DOCD IN RCRD: CPT | Performed by: FAMILY MEDICINE

## 2024-05-14 PROCEDURE — 1111F DSCHRG MED/CURRENT MED MERGE: CPT | Performed by: FAMILY MEDICINE

## 2024-05-14 PROCEDURE — 99495 TRANSJ CARE MGMT MOD F2F 14D: CPT | Performed by: FAMILY MEDICINE

## 2024-05-14 ASSESSMENT — PATIENT HEALTH QUESTIONNAIRE - PHQ9
2. FEELING DOWN, DEPRESSED OR HOPELESS: NOT AT ALL
1. LITTLE INTEREST OR PLEASURE IN DOING THINGS: NOT AT ALL
SUM OF ALL RESPONSES TO PHQ9 QUESTIONS 1 AND 2: 0

## 2024-05-14 ASSESSMENT — ENCOUNTER SYMPTOMS
DEPRESSION: 0
OCCASIONAL FEELINGS OF UNSTEADINESS: 0
LOSS OF SENSATION IN FEET: 0

## 2024-05-14 NOTE — PROGRESS NOTES
Subjective   Patient ID: Latha Quispe is a 78 y.o. female who presents for Follow-up (From hospital/A couple questions).  HPI  TCM   Here for follow up of hospitalization 5/5-5/10/24 for epigastric pain, dehydration 2/2 to diarrhea. CT showed cholelithiasis without acute cholecystitis. Stool tests negative for infection. GI started patient on hyoscyamine. Usually patient has constipation and abdominal pain. Has follow up with GI.   HR was low, cardiology recommended zio patch, has not yet made follow up cardiology appt.       Review of Systems  General: no fever  Eyes: no blurry vision  ENT: no sore throat, no ear pain  Resp: no cough, sob or wheezing  Cardio: no chest pain, no palpitations  Abd: see HPI  : no dysuria, no increased urinary frequency      /63   Pulse 61   Temp 36.1 °C (97 °F)   Ht 1.524 m (5')   Wt (!) 37.2 kg (82 lb)   SpO2 97%   BMI 16.01 kg/m²       Objective   Physical Exam  Gen: NAD, alert  Head: normocephalic/atraumatic  Eyes: conjunctivae normal  Ears: canals clear bilaterally, TM normal   Nose: external nose normal   Oropharynx: clear   Resp: Clear to auscultation  CVS: Regular rate and rhythm  Abdomen: soft, NT, ND  Ext: no edema, NT of lower extremities       Assessment/Plan   Problem List Items Addressed This Visit       IBS (irritable bowel syndrome)  Continue current regimen     Bradycardia  Today HR normal, was low during hospitalization and patient had reported at night has been running low, to follow up with cardiology to get zio patch    Dehydration - Primary  Resolved with IV fluids

## 2024-05-22 ENCOUNTER — PATIENT OUTREACH (OUTPATIENT)
Dept: PRIMARY CARE | Facility: CLINIC | Age: 79
End: 2024-05-22
Payer: MEDICARE

## 2024-05-22 NOTE — PROGRESS NOTES
Unable to reach patient for call back after patient's follow up appointment with PCP. ( 5-)  If no voicemail available call attempts x 3 were made to contact the patient to assist with any questions or concerns patient may have.

## 2024-05-29 DIAGNOSIS — K58.0 IRRITABLE BOWEL SYNDROME WITH DIARRHEA: ICD-10-CM

## 2024-05-29 RX ORDER — HYOSCYAMINE SULFATE 0.12 MG/1
0.12 TABLET, ORALLY DISINTEGRATING ORAL
Qty: 120 TABLET | Refills: 1 | Status: SHIPPED | OUTPATIENT
Start: 2024-05-29

## 2024-06-06 ENCOUNTER — PROCEDURE VISIT (OUTPATIENT)
Dept: UROLOGY | Facility: CLINIC | Age: 79
End: 2024-06-06
Payer: MEDICARE

## 2024-06-06 DIAGNOSIS — N32.81 OAB (OVERACTIVE BLADDER): ICD-10-CM

## 2024-06-06 DIAGNOSIS — R31.29 MICROHEMATURIA: Primary | ICD-10-CM

## 2024-06-06 LAB
POC APPEARANCE, URINE: CLEAR
POC BILIRUBIN, URINE: NEGATIVE
POC BLOOD, URINE: ABNORMAL
POC COLOR, URINE: YELLOW
POC GLUCOSE, URINE: NEGATIVE MG/DL
POC KETONES, URINE: NEGATIVE MG/DL
POC LEUKOCYTES, URINE: ABNORMAL
POC NITRITE,URINE: NEGATIVE
POC PH, URINE: 6 PH
POC PROTEIN, URINE: NEGATIVE MG/DL
POC SPECIFIC GRAVITY, URINE: 1.02
POC UROBILINOGEN, URINE: 0.2 EU/DL

## 2024-06-06 PROCEDURE — 52000 CYSTOURETHROSCOPY: CPT | Performed by: STUDENT IN AN ORGANIZED HEALTH CARE EDUCATION/TRAINING PROGRAM

## 2024-06-06 PROCEDURE — 99204 OFFICE O/P NEW MOD 45 MIN: CPT | Performed by: STUDENT IN AN ORGANIZED HEALTH CARE EDUCATION/TRAINING PROGRAM

## 2024-06-06 PROCEDURE — 87086 URINE CULTURE/COLONY COUNT: CPT

## 2024-06-06 NOTE — PROGRESS NOTES
HISTORY OF PRESENT ILLNESS:  Latha Quispe is a 78 y.o. female who presents today for a cystoscopy. She has been noticing hematuria. She reports that she does have incontinence and would like not treatment for it because she is worried about treatment. She states she has a history of breast cancer. She was referred to GI for her epigastric pain and has an upcoming appointment.          Past Medical History  She has a past medical history of Abdominal pain, Anxiety, Cat bite (03/11/2023), Cataract, Depression, Disease of thyroid gland, GERD (gastroesophageal reflux disease), GERD (gastroesophageal reflux disease), Hypothyroidism, Irritable bowel syndrome, Panic attacks, Personal history of malignant neoplasm of breast (06/30/2021), and Personal history of other complications of pregnancy, childbirth and the puerperium.    Surgical History  She has a past surgical history that includes Other surgical history (06/30/2021); Breast surgery; Gastric bypass; Colon surgery; Hysterectomy; Cataract extraction; Mastectomy, partial (Left); and CT angio abdomen w and or wo IV IV contrast (12/28/2023).     Social History  She reports that she has never smoked. She has never been exposed to tobacco smoke. She has never used smokeless tobacco. She reports that she does not drink alcohol and does not use drugs.    Family History  Family History   Problem Relation Name Age of Onset    Osteoporosis Mother      Alcohol abuse Father      Cancer Sister          breast    Osteoporosis Sister      Cancer Brother          colon. prostate    Hypothyroidism Other          Allergies  Nsaids (non-steroidal anti-inflammatory drug)      A comprehensive 10+ review of systems was negative except for: see hpi                          PHYSICAL EXAMINATION:  BP Readings from Last 3 Encounters:   05/14/24 116/63   05/10/24 130/61   04/09/24 148/70      Wt Readings from Last 3 Encounters:   05/14/24 (!) 37.2 kg (82 lb)   05/10/24 (!) 38.8 kg (85 lb 8.6  oz)   24 (!) 38.8 kg (85 lb 8 oz)      BMI: Estimated body mass index is 16.01 kg/m² as calculated from the following:    Height as of 24: 1.524 m (5').    Weight as of 24: 37.2 kg (82 lb).  BSA: Estimated body surface area is 1.25 meters squared as calculated from the following:    Height as of 24: 1.524 m (5').    Weight as of 24: 37.2 kg (82 lb).  HEENT: Normocephalic, atraumatic, PER EOMI, nonicteric, trachea normal, thyroid normal, oropharynx normal.  CARDIAC: regular rate & rhythm, S1 & S2 normal.  No heaves, thrills, gallops or murmurs.  LUNGS: Clear to auscultation, no spinal or CV tenderness.  EXTREMITIES: No evidence of cyanosis, clubbing or edema.    Stage 0 POP     Indication  Lower urinary tract symptoms, hematuria     POST-OP DIAGNOSIS:   Same.  ANESTHESIA:    2% Lidocaine gel.  PROCEDURE:    Cystoscopy.  SURGEON:     gilma    FINDINGS    Bladder:  normal  Trigone & Ureteral Orifices: normal.  Vesical Neck:  normal.  Urethra: normal.    PROCEDURE PHYSICIAN NOTE  I was present in the exam room  for the entire procedure as above. We reviewed procedure with patient along with the indications, options, alternatives,  risks of having and not having procedure,  benefits, and probability of success.  We answered the patients questions. We explained the expected post procedure symptoms including possible dysuria and infection.  Pt consented to have procedure.  The patient will follow up for discussion of her results.              Assessment:  Latha Quispe is a 78 y.o.  who presents for microhematuria and incontinence     Microhematuria:   -Cystoscopy done 24  CTU shows small nonbstructing L kidney stone   Repeat UA in 1 year   Urine culture ordered       Incontinence:   3 months of gemtesa samples provided  Follow up in 6 weeks virtually       All questions and concerns were answered and addressed.  The patient expressed understanding and agrees with the plan.     Abhilash  MD Michael    Scribe Attestation  By signing my name below, I, Shayla Liang, Scribe   attest that this documentation has been prepared under the direction and in the presence of Abhilash Rodriguez MD.

## 2024-06-08 LAB — BACTERIA UR CULT: ABNORMAL

## 2024-06-14 ENCOUNTER — LAB (OUTPATIENT)
Dept: LAB | Facility: LAB | Age: 79
End: 2024-06-14
Payer: MEDICARE

## 2024-06-14 DIAGNOSIS — R31.21 ASYMPTOMATIC MICROSCOPIC HEMATURIA: ICD-10-CM

## 2024-06-14 PROCEDURE — 87186 SC STD MICRODIL/AGAR DIL: CPT

## 2024-06-14 PROCEDURE — 87086 URINE CULTURE/COLONY COUNT: CPT

## 2024-06-16 LAB — BACTERIA UR CULT: ABNORMAL

## 2024-06-17 DIAGNOSIS — N39.0 RECURRENT UTI: Primary | ICD-10-CM

## 2024-06-17 RX ORDER — NITROFURANTOIN 25; 75 MG/1; MG/1
CAPSULE ORAL
Qty: 90 CAPSULE | Refills: 0 | Status: SHIPPED | OUTPATIENT
Start: 2024-06-17 | End: 2024-06-22

## 2024-06-17 RX ORDER — NITROFURANTOIN 25; 75 MG/1; MG/1
100 CAPSULE ORAL 2 TIMES DAILY
Qty: 10 CAPSULE | Refills: 0 | Status: SHIPPED | OUTPATIENT
Start: 2024-06-17 | End: 2024-06-22

## 2024-06-17 NOTE — PROGRESS NOTES
Positive urine culture 6/14/2024 growing Klebsiella pneumoniae. Macrobid BID for 5 days sent to pharmacy. Positive urine culture 4/9/2024 growing Klebsiella pneumoniae and Enterobacter cloacae. After that prescription is complete she will start a daily Macrobid prescription once daily for 90 days.

## 2024-06-18 ENCOUNTER — APPOINTMENT (OUTPATIENT)
Dept: PRIMARY CARE | Facility: CLINIC | Age: 79
End: 2024-06-18
Payer: MEDICARE

## 2024-06-18 VITALS
WEIGHT: 82.2 LBS | BODY MASS INDEX: 16.14 KG/M2 | DIASTOLIC BLOOD PRESSURE: 59 MMHG | HEIGHT: 60 IN | TEMPERATURE: 97 F | SYSTOLIC BLOOD PRESSURE: 116 MMHG | OXYGEN SATURATION: 98 % | HEART RATE: 56 BPM

## 2024-06-18 DIAGNOSIS — R31.9 URINARY TRACT INFECTION WITH HEMATURIA, SITE UNSPECIFIED: ICD-10-CM

## 2024-06-18 DIAGNOSIS — K59.00 CONSTIPATION, UNSPECIFIED CONSTIPATION TYPE: Primary | ICD-10-CM

## 2024-06-18 DIAGNOSIS — N39.0 URINARY TRACT INFECTION WITH HEMATURIA, SITE UNSPECIFIED: ICD-10-CM

## 2024-06-18 PROCEDURE — 99213 OFFICE O/P EST LOW 20 MIN: CPT | Performed by: FAMILY MEDICINE

## 2024-06-18 PROCEDURE — 1159F MED LIST DOCD IN RCRD: CPT | Performed by: FAMILY MEDICINE

## 2024-06-18 PROCEDURE — 1160F RVW MEDS BY RX/DR IN RCRD: CPT | Performed by: FAMILY MEDICINE

## 2024-06-18 PROCEDURE — 1036F TOBACCO NON-USER: CPT | Performed by: FAMILY MEDICINE

## 2024-06-18 RX ORDER — LACTULOSE 10 G/15ML
10 SOLUTION ORAL 2 TIMES DAILY PRN
Qty: 473 ML | Refills: 1 | Status: SHIPPED | OUTPATIENT
Start: 2024-06-18 | End: 2024-09-16

## 2024-06-18 ASSESSMENT — ENCOUNTER SYMPTOMS
LOSS OF SENSATION IN FEET: 0
OCCASIONAL FEELINGS OF UNSTEADINESS: 0
DEPRESSION: 0

## 2024-06-18 NOTE — PROGRESS NOTES
Subjective   Patient ID: Latha Quispe is a 79 y.o. female who presents for Follow-up (Pt states no concerns today).    HPI  Here for follow up  Having diffuse abdominal pain, has UTI, did see urologist and is on day 1 of nitrofurantoin. Has also been constipated, needs refill of lactulose. No vomiting. No fever.   Last CT abdomen/pelvis done last month, has appt with GI for follow up    Review of Systems  General: no fever  Eyes: no blurry vision  ENT: no sore throat, no ear pain  Resp: no cough, sob or wheezing  Cardio: no chest pain, no palpitations  Abd: see HPI  : see HPI    /59   Pulse 56   Temp 36.1 °C (97 °F)   Ht 1.524 m (5')   Wt (!) 37.3 kg (82 lb 3.2 oz)   SpO2 98%   BMI 16.05 kg/m²       Objective   Physical Exam  Gen: NAD, alert  Head: normocephalic/atraumatic  Eyes: conjunctivae normal  Ears: canals clear bilaterally, TM normal   Nose: external nose normal   Oropharynx: clear   Resp: Clear to auscultation  CVS: Regular rate and rhythm  Abdomen: soft, mild diffuse tenderness  Ext: no edema, NT of lower extremities  Neuro: gait normal     Assessment/Plan   Problem List Items Addressed This Visit    None  Visit Diagnoses       Urinary tract infection with hematuria, site unspecified      Currently on nitrofurantoin    Constipation, unspecified constipation type        Relevant Medications    lactulose 20 gram/30 mL oral solution

## 2024-06-19 NOTE — PROGRESS NOTES
History Of Present Illness  Latha Quispe is a 79 y.o. female presenting to GI clinic with a chief complaint of abdominal pain. She has hx slow transit constipation and chronic abdominal pain. 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  She was seen by me during hospitalization for abdominal pain, EGD was performed and unremarkable.    Pt reports frequent constipation, continued lower abdominal pain, improves with bm.  She notes that the abdominal pain worsens when she is more constipated. She is using lactulose 4-5 days per week. Moving bowels 5x per week. Has BSS 1 without lactulose, BSS 6 when taking lactulose. Has different abdominal pain after BMs due to straining.  Patient has seldom nausea, no vomiting. Feels full often and does not have much of an appetite.     Social History  She reports that she has never smoked. She has never been exposed to tobacco smoke. She has never used smokeless tobacco. She reports that she does not currently use alcohol. She reports that she does not use drugs.  She does not take NSAIDs on a regular basis    Family History  Family History   Problem Relation Name Age of Onset    Osteoporosis Mother      Alcohol abuse Father      Cancer Sister          breast    Osteoporosis Sister      Other (bladder cancer) Sister      Cancer Brother          colon. prostate    Colon cancer Brother      Colon cancer Brother      Hypothyroidism Other       The patient does have a FH of CRC. she does not have a FH of IBD   She had a brother who had colon cancer at 63 and another brother at 67.   Review of Systems   Gastrointestinal:  Positive for abdominal pain, constipation and nausea. Negative for anal bleeding and blood in stool.        See HPI   All other systems reviewed and are negative.        Physical Exam  Constitutional:       Appearance: She is cachectic.   HENT:      Head: Normocephalic  "and atraumatic.   Eyes:      Conjunctiva/sclera: Conjunctivae normal.      Pupils: Pupils are equal, round, and reactive to light.   Pulmonary:      Effort: Pulmonary effort is normal.   Abdominal:      General: Bowel sounds are normal. There is no distension.      Palpations: Abdomen is soft. There is no mass.      Tenderness: There is no abdominal tenderness. There is no guarding or rebound.      Hernia: No hernia is present.   Musculoskeletal:         General: Normal range of motion.      Cervical back: Normal range of motion.   Skin:     General: Skin is warm and dry.      Coloration: Skin is not jaundiced.   Neurological:      Mental Status: She is alert and oriented to person, place, and time. Mental status is at baseline.   Psychiatric:         Mood and Affect: Mood normal.         Behavior: Behavior normal.          Last Vital Signs  /90 (BP Location: Left leg)   Pulse 50   Temp 36.4 °C (97.5 °F)   Resp 18   Ht 1.524 m (5')   Wt (!) 36 kg (79 lb 6.4 oz)   SpO2 97%   BMI 15.51 kg/m²      Relevant Results  Lab Results   Component Value Date    WBC 4.4 05/10/2024    HGB 10.9 (L) 05/10/2024    HCT 34.8 (L) 05/10/2024    MCV 94 05/10/2024     05/10/2024      Lab Results   Component Value Date    GLUCOSE 89 05/10/2024    CALCIUM 8.9 05/10/2024     05/10/2024    K 3.6 05/10/2024    CO2 28 05/10/2024     05/10/2024    BUN 11 05/10/2024    CREATININE 0.72 05/10/2024      Lab Results   Component Value Date    ALT 10 05/05/2024    AST 17 05/05/2024    ALKPHOS 79 05/05/2024    BILITOT 0.6 05/05/2024    INR 1.2 (H) 05/05/2024    No results found for: \"IRON\", \"TIBC\", \"IRONSAT\", \"FERRITIN\", \"[id number]\", \"FOLATE\"    Lab Results   Component Value Date    CRP <0.10 05/05/2024      Lab Results   Component Value Date    LIPASE 10 05/05/2024    LIPASE 41 01/25/2024    LIPASE 18 07/08/2023    LIPASE 11 04/26/2021    No results found for: \"HGBA1C\"     EGD/COLONOSCOPY  EGD 05/05/24 with Dr. Courtney- " Findings  The upper third of the esophagus, middle third of the esophagus and lower third of the esophagus appeared normal.  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  The afferent limb was difficult to visualize.    EGD 01/02/24 with Dr. Diez- Findings  Performed forceps biopsies in the stomach  Regular Z-line 32 cm from the incisors  FOOD in the greater curve of the stomach   FINAL DIAGNOSIS   A. STOMACH ANTRUM BIOPSY:    -- GASTRIC MUCOSA WITH MILD CHRONIC NONSPECIFIC GASTRITIS AND REACTIVE GASTROPATHY  -- HELICOBACTER PYLORI NOT IDENTIFIED      EUS 3/15/2022 with Dr. Caba- Impression:            EGD:                         - Normal esophagus.                         - Z-line, 36 cm from the incisors.                         - Erythematous mucosa in the stomach. Biopsied.                         - A gastroenterostomy was found, characterized by an                          intact appearance and healthy appearing mucosa. See                          Findings above.                         - Normal ampulla and examined duodenal limb.                         - Duodenal limb was examined in its entirety. At end,                          mucosal changes with altered texture and friability                          noted. Biopsied.                         - Normal examined jejunal limb.                         EUS:                         - The pancreatic duct had a dilated endosonographic                          appearance, had a tortuous/ectatic appearance and had                          hyperechoic walls in the main pancreatic duct. The                          pancreatic duct measured up to 5 mm in diameter in the                          neck and 3 mm in the tail.                         - Pancreatic parenchymal abnormalities consisting of                          hyperechoic foci were noted in the genu of the                          pancreas,  pancreatic body and pancreatic tail.                         - No pancreatic mass was seen, however head of                          pancreas incompletely visualized due to patient's                          post-surgical anatomy.                         - There was no evidence of significant pathology in                          the visualized portion of the liver.  FINAL DIAGNOSIS  A.  SMALL BOWEL, COLD FORCEPS:    --SMALL INTESTINAL MUCOSA WITH FOCAL ACUTE INFLAMMATION, GASTRIC MUCIN CELL METAPLASIA AND HISTIOCYTIC INFILTRATION, SEE NOTE.    Note: Immunohistochemical study shows the histiocytes are positive for CD 68,  and are negative for S100.  GMS, PAS/D and AFB stains are negative for microorganisms.    B.  STOMACH BIOPSY, COLD FORCEPS:    --GASTRIC OXYNTIC MUCOSA WITH MILD CHRONIC INFLAMMATION AND FEATURES SUGGESTIVE OF REACTIVE GASTROPATHY.  --NO HELICOBACTER PYLORI-LIKE ORGANISMS SEEN IN ROUTINE STAINED SECTIONS.      EGD 12/31/2020 with Dr. Guzmán- moderate food residue in stomach, hx Vadim en Y due to previous ulcers and negative biopsies for h.pylori.     Colonoscopy 12/31/2020 with Dr. Guzmán (modified due to altered anatomy) which noted friable anastomosis but otherwise WNL. 5 year follow up recommended.    EGD 12/23/2020 with Dr. Guzmán- ESOPHAGUS: The entire examined esophagus appeared normal.  GE JUNCTION: The Z-line was located at 34 cm from the incissors and appeared regular.  STOMACH: There was a large amount of food debris in the stomach which limited views. There was no gross blood seen. There appeared to be postoperative changes with a possible diverticulum in the stomach. The gastrojejunal anastomosis appeared to be at 45 cm from the incisors without any ulceration seen. Because of amount of food present in the stomach the procedure was abbreviated for patient safety to avoid aspiration and the jejunum was not intubated.     EGD 10/17/2016 with Dr. Huerta- ESOPHAGUS: The distal  esophagus had several concentric rings but there were nonobstructing.  STOMACH: There was evidence of previous gastric surgery (Vadim-en-Y). The gastrojejunal anastomosis was friable with 2 small punctate ulcerations identified. The mucosa bled easily on contact and with biopsy. The jejunal loop beyond the anastomosis appeared normal. The gastric mucosa along the suture line in the stomach was edematous and erythematous. Multiple cold biopsies were also obtained from the stomach to rule out H. pylori infection.     EGD 8/15/2016 with Dr. Huerta- ESOPHAGUS: The distal esophagus was noted to be tortuous with a nonobstructing Schatzki's ring at the GE junction.  STOMACH: There was evidence of previous gastric resection secondary to peptic ulcer disease. A Billroth II anastomosis was identified. Both the limbs were entered and examined. He fair in limb demonstrated some edema and erythema which was biopsied using cold biopsy forceps to rule out celiac disease. At the anastomosis there was a small benign-appearing ulcer identified. Cold biopsies were obtained to rule out malignancy. In addition, the proximal body of the stomach demonstrated a linear erythema and edema which was biopsied using cold forceps to rule out the presence of H. pylori infection. Retroflexion of the gastroscope in the antrum demonstrated a small sliding-type hiatal hernia.   FINAL DIAGNOSIS  A.  Anastomosis Site, Biopsy - CHRONIC AND ACTIVE GASTRITIS WITH ACUTE  ULCERATION AND FOCAL INTESTINAL METAPLASIA.  Comment:  Giemsa stain is negative for Helicobacter pylori.  An Alcian Blue/PAS stain confirms the presence of intestinal metaplasia.  There is no evidence of dysplasia or malignancy.    B.  Small Intestine, Biopsy - NO SIGNIFICANT PATHOLOGIC CHANGE.  Comment:  The villous architecture is preserved and there is no evidence of  intraepithelial lymphocytosis. No granulomas or parasites are seen.    C.  Stomach, Body, Biopsy - CHRONIC ACTIVE  GASTRITIS.  Comment:  Giemsa stain is negative for Helicobacter pylori.      Colonoscopy (flex sig due to altered anatomy) 8/15/2016 with Dr. Huerta- Findings:  RECTAL EXAM: Normal sphincter tone, no palpable masses.  COLON: The colonoscope was inserted into the rectum and advanced to approximately 20 cm from the anal verge where the ileocolonic anastomosis was identified. This was an end-to-side anastomosis. The anastomosis had some friable mucosa along its margin. No active bleeding. No ulcer identified. Residual vegetable debris was noted near the anastomotic region. The scope was then retracted back in the rectum is retroflexed demonstrating grade 2 nonbleeding internal hemorrhoids.     EGD 4/21/2015 with Dr. Garrison-Findings:  ESOPHAGUS: There was a very small sliding (1cm) hiatal hernia. The esophagus appeared otherwise normal.   GE JUNCTION: The Z-line was located at cm from the gums and appeared irregular. No erosive esophagitis was noted at the GE junction. Also no lesion of the lower esophagus mentioned in the upper GI series was seen.  STOMACH: The antrum of the stomach is missing (previous gastrectomy). There was slight dilation of the stomach ending in a double barrel opening. The more proximal opening is blind ended while the second one is slightly narrower than the expected, accommodating the gastroscope easily through it. Based on the se findings the previous procedure must have been Vagotomy with antrectomy and Vadim en Y anastomosis. There were two lesions, one in the stomach closure line and a second one at the G-J anastomosis. Both were biopsied/removed with biopsy forceps.   FINAL DIAGNOSIS  A.  Gastric Lesion, Biopsy - SMALL FRAGMENT OF GASTRIC FUNDIC/BODY TYPE MUCOSA WITH FOCAL ACUTE EROSIVE GASTRITIS.  Comment: Focal fibrinoinflammatory exudate is present. There is minimal chronic inflammation. Giemsa stain is negative for Helicobacter pylori.  An Alcian blue/PAS stain reveals no evidence of  intestinal metaplasia.    B.  Gastrojejunal Junction Polyp, Biopsy - ACUTE INFLAMMATION WITH GRANULATION TISSUE.  -  NEGATIVE FOR DYSPLASIA OR MALIGNANCY.      Colonoscopy 4/21/2015 with Dr. Garrison- Findings: 1) Extensive melanosis coli, due chronic abuse of laxatives  2) Redundancy and dilation of a multi-curved colon. No anatomic abnormalities, causing obstruction were seen though    IMAGING  === 05/05/24 ===  CT ABDOMEN PELVIS W IV CONTRAST  - Impression -  1.  No acute findings in the abdomen and pelvis.  2. Few fluid-filled small bowel loops in the lower abdomen without definite segmental distention to suggest obstruction.  3. Redemonstration of diffusely dilated main pancreatic duct, similar compared to prior imaging dating back to 2021. This was worked up by MRI and CT pancreas protocol in 2022. Recommend correlation with prior reports.  4. Cholelithiasis without acute cholecystitis.  5. Diffuse osseous demineralization      Assessment/Plan   79 y.o. female presenting to GI clinic after hospitalization for abdominal pain. Has persistent abdominal pain worsening with chronic constipation. Note food in stomach on multiple former EGDs, consider gastroparesis in differential. It appears that she has been on Linzess in the past, she does not remember this, but per documentation had improvement of symptoms.    Restart Linzess daily  Gastric emptying study  Continue Levsin PRN  Follow up in 2-3 months  Consider Remeron, may stimulate both appetite and improve abdominal pain, nausea    Problem List Items Addressed This Visit       Abdominal pain    Relevant Orders    NM gastric emptying solid    Chronic idiopathic constipation - Primary    Relevant Medications    linaCLOtide (Linzess) 145 mcg capsule    Other Relevant Orders    NM gastric emptying solid    Epigastric pain       Cori Carrasco, APRN-CNP

## 2024-06-20 ENCOUNTER — PATIENT OUTREACH (OUTPATIENT)
Dept: PRIMARY CARE | Facility: CLINIC | Age: 79
End: 2024-06-20
Payer: MEDICARE

## 2024-06-20 NOTE — PROGRESS NOTES
Unable to reach patient for discharge follow up call.   LVM with call back number for patient to call if needed.

## 2024-06-24 ENCOUNTER — TELEPHONE (OUTPATIENT)
Dept: GASTROENTEROLOGY | Facility: CLINIC | Age: 79
End: 2024-06-24

## 2024-06-24 ENCOUNTER — APPOINTMENT (OUTPATIENT)
Dept: GASTROENTEROLOGY | Facility: CLINIC | Age: 79
End: 2024-06-24
Payer: MEDICARE

## 2024-06-24 VITALS
DIASTOLIC BLOOD PRESSURE: 90 MMHG | HEIGHT: 60 IN | TEMPERATURE: 97.5 F | WEIGHT: 79.4 LBS | RESPIRATION RATE: 18 BRPM | SYSTOLIC BLOOD PRESSURE: 160 MMHG | HEART RATE: 50 BPM | OXYGEN SATURATION: 97 % | BODY MASS INDEX: 15.59 KG/M2

## 2024-06-24 DIAGNOSIS — R10.13 EPIGASTRIC PAIN: ICD-10-CM

## 2024-06-24 DIAGNOSIS — R10.30 LOWER ABDOMINAL PAIN: ICD-10-CM

## 2024-06-24 DIAGNOSIS — K59.04 CHRONIC IDIOPATHIC CONSTIPATION: Primary | ICD-10-CM

## 2024-06-24 PROCEDURE — 1036F TOBACCO NON-USER: CPT

## 2024-06-24 PROCEDURE — 1160F RVW MEDS BY RX/DR IN RCRD: CPT

## 2024-06-24 PROCEDURE — 1125F AMNT PAIN NOTED PAIN PRSNT: CPT

## 2024-06-24 PROCEDURE — 99214 OFFICE O/P EST MOD 30 MIN: CPT

## 2024-06-24 PROCEDURE — 1159F MED LIST DOCD IN RCRD: CPT

## 2024-06-24 RX ORDER — PRUCALOPRIDE 2 MG/1
2 TABLET, FILM COATED ORAL DAILY
Qty: 30 TABLET | Refills: 2 | Status: CANCELLED | OUTPATIENT
Start: 2024-06-24 | End: 2024-09-22

## 2024-06-24 RX ORDER — FORMOTEROL FUMARATE DIHYDRATE 20 UG/2ML
20 SOLUTION RESPIRATORY (INHALATION) EVERY 12 HOURS
COMMUNITY
Start: 2024-05-06

## 2024-06-24 RX ORDER — ACETAMINOPHEN 325 MG/1
650 TABLET ORAL EVERY 4 HOURS PRN
COMMUNITY
Start: 2024-01-26

## 2024-06-24 RX ORDER — MAGNESIUM HYDROXIDE 2400 MG/10ML
10 SUSPENSION ORAL DAILY PRN
COMMUNITY
Start: 2024-05-05

## 2024-06-24 ASSESSMENT — ENCOUNTER SYMPTOMS
CONSTIPATION: 1
BLOOD IN STOOL: 0
ABDOMINAL PAIN: 1
ROS GI COMMENTS: SEE HPI
ANAL BLEEDING: 0
NAUSEA: 1

## 2024-06-24 ASSESSMENT — PAIN SCALES - GENERAL: PAINLEVEL: 10-WORST PAIN EVER

## 2024-06-24 NOTE — PATIENT INSTRUCTIONS
Gastric emptying study    LINZESS is a once-daily pill that treats chronic idiopathic constipation (CIC) and multiple symptoms of IBS-C. It helps you have more frequent and complete bowel movements and helps relieve constipation and overall abdominal symptoms (belly pain, discomfort, and bloating) associated with IBS-C.    For LINZESS to work best, take it every day. This is different from over-the-counter laxatives, which are often taken as needed.    When LINZESS is taken daily, constipation relief is typically felt in about 1 week. IBS-C patients may begin to experience relief of belly pain and overall abdominal symptoms (pain, discomfort, and bloating) in about 1 week, with symptoms typically improving over 12 weeks.    Take LINZESS at least 30 minutes before your first meal at about the same time of day.  If you have trouble swallowing capsules, open the LINZESS capsule and sprinkle all of the beads onto applesauce or into 1 oz. (30mL) of water.    If you miss a dose, take your dose as usual the following day. Do not take 2 doses at the same time to make up for a missed dose.    Diarrhea is a common side effect and often begins within the first 2 weeks of LINZESS treatment. This normally improves with continued daily use.   Go to the nearest emergency room right away if you develop unusual or severe abdominal pain, severe diarrhea, severe dehydration, or if you have bright red, bloody stools or black stools that look like tar.     Follow up in 2-3 months

## 2024-06-25 NOTE — TELEPHONE ENCOUNTER
Patient's  called, he did  the 2 month supply from the pharmacy. Can we do anything to start the assistance process or do they need to go to website?   Please advise.

## 2024-06-25 NOTE — TELEPHONE ENCOUNTER
TC to patient and , [place] regarding Patient Assistance Program for abbvie. I notified them that I did get the paperwork to fill out regarding patient assistance, but that It would require a signature. Patient and  stated they would give it a few weeks to see how well it was working and then will inform us so we may get the process started.

## 2024-07-08 ENCOUNTER — HOSPITAL ENCOUNTER (OUTPATIENT)
Dept: RADIOLOGY | Facility: HOSPITAL | Age: 79
Discharge: HOME | End: 2024-07-08
Payer: MEDICARE

## 2024-07-08 DIAGNOSIS — R10.30 LOWER ABDOMINAL PAIN: ICD-10-CM

## 2024-07-08 DIAGNOSIS — K59.04 CHRONIC IDIOPATHIC CONSTIPATION: ICD-10-CM

## 2024-07-08 PROCEDURE — 78264 GASTRIC EMPTYING IMG STUDY: CPT | Performed by: STUDENT IN AN ORGANIZED HEALTH CARE EDUCATION/TRAINING PROGRAM

## 2024-07-08 PROCEDURE — 3430000001 HC RX 343 DIAGNOSTIC RADIOPHARMACEUTICALS

## 2024-07-08 PROCEDURE — 78264 GASTRIC EMPTYING IMG STUDY: CPT

## 2024-07-10 ENCOUNTER — TELEPHONE (OUTPATIENT)
Dept: GASTROENTEROLOGY | Facility: CLINIC | Age: 79
End: 2024-07-10
Payer: MEDICARE

## 2024-07-10 DIAGNOSIS — K58.1 IRRITABLE BOWEL SYNDROME WITH CONSTIPATION: Primary | ICD-10-CM

## 2024-07-10 NOTE — TELEPHONE ENCOUNTER
Result Communication    Resulted Orders   NM gastric emptying solid    Narrative    Interpreted By:  Rigo Cannon and Liu Scott   STUDY:  NM GASTRIC EMPTYING SOLID;  7/8/2024 10:57 am      INDICATION:  Signs/Symptoms:food on multiple EGDs.      COMPARISON:  None.      ACCESSION NUMBER(S):  [id number]      ORDERING CLINICIAN:  SAUL PERSON      TECHNIQUE:  DIVISION OF NUCLEAR MEDICINE  GASTRIC EMPTYING QUANTIFICATION      The patient received an oral radiolabeled solid-phase meal utilizing  1.1 mCi of Tc-99m sulfur colloid in cooked egg.  Sequential anterior  and posterior images of the abdomen were then acquired over the next  four hours. Computer quantification of gastric emptying (using  geometric mean activity) was performed.      FINDINGS:  The image series shows  rapid anterograde radiotracer transit from  the stomach into small bowel. Computer quantification demonstrates  gastric retention as follows:  12% %  at 1 hr    (normal max 90%)  5% %  at 2 hr    (normal max 60%)      IMPRESSION  1. Rapid gastric emptying without evidence of gastroparesis      I personally reviewed the images/study and I agree with the findings  as stated by resident physician Mika Candelario MD. This study was  interpreted at [facility],  [place], [place].      MACRO:  None      Signed by: Rigo Cannon 7/8/2024 11:31 AM  Dictation workstation:   [id number]       1:13 PM      Results were successfully communicated with the patient and she acknowledged their understanding.  Pt reports that she is doing well and having daily bms (watery). She has been taking Linzess 145 mcg as prescribed. Has not needed lactulose.     Her abdominal pain has decreased, pt notes that she still gets abdominal pain when she experiments with different foods and with stress.     She is happy with her current regimen, plan for follow up at scheduled time at end of August.

## 2024-07-18 ENCOUNTER — APPOINTMENT (OUTPATIENT)
Dept: UROLOGY | Facility: CLINIC | Age: 79
End: 2024-07-18
Payer: MEDICARE

## 2024-07-18 DIAGNOSIS — K59.04 CHRONIC IDIOPATHIC CONSTIPATION: ICD-10-CM

## 2024-07-18 DIAGNOSIS — N32.81 OAB (OVERACTIVE BLADDER): Primary | ICD-10-CM

## 2024-07-18 PROCEDURE — 99214 OFFICE O/P EST MOD 30 MIN: CPT | Performed by: STUDENT IN AN ORGANIZED HEALTH CARE EDUCATION/TRAINING PROGRAM

## 2024-07-18 NOTE — PROGRESS NOTES
Virtual or Telephone Consent    An interactive audio and video telecommunication system which permits real time communications between the patient (at the originating site) and provider (at the distant site) was utilized to provide this telehealth service.   Verbal consent was requested and obtained from Latha Quispe on this date, 07/18/24 for a telehealth visit.       HISTORY OF PRESENT ILLNESS:  Latha Quispe is a 79 y.o. female who presents today for a virtual follow up visit. She reports she has some constipation. Stopped taking gemetesa, because constipation is main issue.          Past Medical History  She has a past medical history of Abdominal pain, Anxiety, Cat bite (03/11/2023), Cataract, Depression, Disease of thyroid gland, GERD (gastroesophageal reflux disease), GERD (gastroesophageal reflux disease), Hypothyroidism, Irritable bowel syndrome, Panic attacks, Personal history of malignant neoplasm of breast (06/30/2021), and Personal history of other complications of pregnancy, childbirth and the puerperium.    Surgical History  She has a past surgical history that includes Other surgical history (06/30/2021); Breast surgery; Gastric bypass; Colon surgery; Hysterectomy; Cataract extraction; Mastectomy, partial (Left); CT angio abdomen w and or wo IV IV contrast (12/28/2023); and Colectomy (1992).     Social History  She reports that she has never smoked. She has never been exposed to tobacco smoke. She has never used smokeless tobacco. She reports that she does not currently use alcohol. She reports that she does not use drugs.    Family History  Family History   Problem Relation Name Age of Onset    Osteoporosis Mother      Alcohol abuse Father      Cancer Sister          breast    Osteoporosis Sister      Other (bladder cancer) Sister      Cancer Brother          colon. prostate    Colon cancer Brother      Colon cancer Brother      Hypothyroidism Other          Allergies  Nsaids (non-steroidal  anti-inflammatory drug)      A comprehensive 10+ review of systems was negative except for: see hpi                      Assessment:  Latha Quispe is a 78 y.o.  who presents for microhematuria and incontinence      Microhematuria:   -Cystoscopy done 24  CTU shows small nonbstructing L kidney stone   Repeat UA in 1 year   Urine culture ordered         Incontinence:   Stopped gemtesa for now       Constipation:  Smooth move tea recommended       Follow up in 3 months     All questions and concerns were answered and addressed.  The patient expressed understanding and agrees with the plan.     Abhilash Rodriguez MD    Scribe Attestation  By signing my name below, I, Andra Fry   attest that this documentation has been prepared under the direction and in the presence of Abhilash Rodriguez MD.

## 2024-07-19 ENCOUNTER — APPOINTMENT (OUTPATIENT)
Dept: CARDIOLOGY | Facility: HOSPITAL | Age: 79
End: 2024-07-19
Payer: MEDICARE

## 2024-07-19 ENCOUNTER — APPOINTMENT (OUTPATIENT)
Dept: PRIMARY CARE | Facility: CLINIC | Age: 79
End: 2024-07-19
Payer: MEDICARE

## 2024-07-19 ENCOUNTER — APPOINTMENT (OUTPATIENT)
Dept: RADIOLOGY | Facility: HOSPITAL | Age: 79
End: 2024-07-19
Payer: MEDICARE

## 2024-07-19 ENCOUNTER — HOSPITAL ENCOUNTER (OUTPATIENT)
Facility: HOSPITAL | Age: 79
Setting detail: OBSERVATION
Discharge: HOME | End: 2024-07-20
Attending: STUDENT IN AN ORGANIZED HEALTH CARE EDUCATION/TRAINING PROGRAM | Admitting: INTERNAL MEDICINE
Payer: MEDICARE

## 2024-07-19 VITALS
TEMPERATURE: 97.5 F | WEIGHT: 79.2 LBS | OXYGEN SATURATION: 97 % | HEIGHT: 60 IN | SYSTOLIC BLOOD PRESSURE: 102 MMHG | DIASTOLIC BLOOD PRESSURE: 60 MMHG | HEART RATE: 40 BPM | BODY MASS INDEX: 15.55 KG/M2

## 2024-07-19 DIAGNOSIS — G47.00 INSOMNIA, UNSPECIFIED TYPE: ICD-10-CM

## 2024-07-19 DIAGNOSIS — R00.1 BRADYCARDIA: Primary | ICD-10-CM

## 2024-07-19 DIAGNOSIS — N39.0 ACUTE UTI: ICD-10-CM

## 2024-07-19 DIAGNOSIS — E87.6 HYPOKALEMIA: ICD-10-CM

## 2024-07-19 DIAGNOSIS — E83.42 HYPOMAGNESEMIA: ICD-10-CM

## 2024-07-19 LAB
ALBUMIN SERPL BCP-MCNC: 3.8 G/DL (ref 3.4–5)
ALP SERPL-CCNC: 96 U/L (ref 33–136)
ALT SERPL W P-5'-P-CCNC: 14 U/L (ref 7–45)
ANION GAP BLDV CALCULATED.4IONS-SCNC: 13 MMOL/L (ref 10–25)
ANION GAP SERPL CALC-SCNC: 12 MMOL/L (ref 10–20)
APPEARANCE UR: CLEAR
AST SERPL W P-5'-P-CCNC: 25 U/L (ref 9–39)
BASE EXCESS BLDV CALC-SCNC: -1.7 MMOL/L (ref -2–3)
BASOPHILS # BLD AUTO: 0.05 X10*3/UL (ref 0–0.1)
BASOPHILS NFR BLD AUTO: 0.9 %
BILIRUB SERPL-MCNC: 0.4 MG/DL (ref 0–1.2)
BILIRUB UR STRIP.AUTO-MCNC: NEGATIVE MG/DL
BNP SERPL-MCNC: 70 PG/ML (ref 0–99)
BODY TEMPERATURE: ABNORMAL
BUN SERPL-MCNC: 10 MG/DL (ref 6–23)
CA-I BLDV-SCNC: 1.28 MMOL/L (ref 1.1–1.33)
CALCIUM SERPL-MCNC: 9.2 MG/DL (ref 8.6–10.3)
CARDIAC TROPONIN I PNL SERPL HS: 6 NG/L (ref 0–13)
CARDIAC TROPONIN I PNL SERPL HS: 6 NG/L (ref 0–13)
CHLORIDE BLDV-SCNC: 99 MMOL/L (ref 98–107)
CHLORIDE SERPL-SCNC: 101 MMOL/L (ref 98–107)
CO2 SERPL-SCNC: 25 MMOL/L (ref 21–32)
COLOR UR: COLORLESS
CREAT SERPL-MCNC: 0.87 MG/DL (ref 0.5–1.05)
EGFRCR SERPLBLD CKD-EPI 2021: 68 ML/MIN/1.73M*2
EOSINOPHIL # BLD AUTO: 0.1 X10*3/UL (ref 0–0.4)
EOSINOPHIL NFR BLD AUTO: 1.7 %
ERYTHROCYTE [DISTWIDTH] IN BLOOD BY AUTOMATED COUNT: 13 % (ref 11.5–14.5)
GLUCOSE BLDV-MCNC: 82 MG/DL (ref 74–99)
GLUCOSE SERPL-MCNC: 81 MG/DL (ref 74–99)
GLUCOSE UR STRIP.AUTO-MCNC: NORMAL MG/DL
HCO3 BLDV-SCNC: 24.3 MMOL/L (ref 22–26)
HCT VFR BLD AUTO: 39.1 % (ref 36–46)
HCT VFR BLD EST: 36 % (ref 36–46)
HGB BLD-MCNC: 12.8 G/DL (ref 12–16)
HGB BLDV-MCNC: 12 G/DL (ref 12–16)
IMM GRANULOCYTES # BLD AUTO: 0.03 X10*3/UL (ref 0–0.5)
IMM GRANULOCYTES NFR BLD AUTO: 0.5 % (ref 0–0.9)
INHALED O2 CONCENTRATION: 21 %
KETONES UR STRIP.AUTO-MCNC: NEGATIVE MG/DL
LACTATE BLDV-SCNC: 2.5 MMOL/L (ref 0.4–2)
LACTATE SERPL-SCNC: 1.8 MMOL/L (ref 0.4–2)
LEUKOCYTE ESTERASE UR QL STRIP.AUTO: NEGATIVE
LIPASE SERPL-CCNC: 14 U/L (ref 9–82)
LYMPHOCYTES # BLD AUTO: 1.35 X10*3/UL (ref 0.8–3)
LYMPHOCYTES NFR BLD AUTO: 23.1 %
MAGNESIUM SERPL-MCNC: 1.55 MG/DL (ref 1.6–2.4)
MCH RBC QN AUTO: 29.4 PG (ref 26–34)
MCHC RBC AUTO-ENTMCNC: 32.7 G/DL (ref 32–36)
MCV RBC AUTO: 90 FL (ref 80–100)
MONOCYTES # BLD AUTO: 0.58 X10*3/UL (ref 0.05–0.8)
MONOCYTES NFR BLD AUTO: 9.9 %
NEUTROPHILS # BLD AUTO: 3.74 X10*3/UL (ref 1.6–5.5)
NEUTROPHILS NFR BLD AUTO: 63.9 %
NITRITE UR QL STRIP.AUTO: NEGATIVE
NRBC BLD-RTO: 0 /100 WBCS (ref 0–0)
OXYHGB MFR BLDV: 42.1 % (ref 45–75)
PCO2 BLDV: 45 MM HG (ref 41–51)
PH BLDV: 7.34 PH (ref 7.33–7.43)
PH UR STRIP.AUTO: 5.5 [PH]
PHOSPHATE SERPL-MCNC: 3.6 MG/DL (ref 2.5–4.9)
PLATELET # BLD AUTO: 192 X10*3/UL (ref 150–450)
PO2 BLDV: 30 MM HG (ref 35–45)
POC APPEARANCE, URINE: CLEAR
POC BILIRUBIN, URINE: NEGATIVE
POC BLOOD, URINE: ABNORMAL
POC COLOR, URINE: YELLOW
POC GLUCOSE, URINE: NEGATIVE MG/DL
POC KETONES, URINE: NEGATIVE MG/DL
POC LEUKOCYTES, URINE: ABNORMAL
POC NITRITE,URINE: NEGATIVE
POC PH, URINE: 5.5 PH
POC PROTEIN, URINE: NEGATIVE MG/DL
POC SPECIFIC GRAVITY, URINE: 1.02
POC UROBILINOGEN, URINE: 0.2 EU/DL
POTASSIUM BLDV-SCNC: 3.4 MMOL/L (ref 3.5–5.3)
POTASSIUM SERPL-SCNC: 3.6 MMOL/L (ref 3.5–5.3)
PROT SERPL-MCNC: 6.6 G/DL (ref 6.4–8.2)
PROT UR STRIP.AUTO-MCNC: NEGATIVE MG/DL
RBC # BLD AUTO: 4.36 X10*6/UL (ref 4–5.2)
RBC # UR STRIP.AUTO: ABNORMAL /UL
RBC #/AREA URNS AUTO: NORMAL /HPF
SAO2 % BLDV: 42 % (ref 45–75)
SODIUM BLDV-SCNC: 133 MMOL/L (ref 136–145)
SODIUM SERPL-SCNC: 134 MMOL/L (ref 136–145)
SP GR UR STRIP.AUTO: 1.03
SQUAMOUS #/AREA URNS AUTO: NORMAL /HPF
TSH SERPL-ACNC: 2.08 MIU/L (ref 0.44–3.98)
UROBILINOGEN UR STRIP.AUTO-MCNC: NORMAL MG/DL
WBC # BLD AUTO: 5.9 X10*3/UL (ref 4.4–11.3)
WBC #/AREA URNS AUTO: NORMAL /HPF

## 2024-07-19 PROCEDURE — 93005 ELECTROCARDIOGRAM TRACING: CPT

## 2024-07-19 PROCEDURE — 84484 ASSAY OF TROPONIN QUANT: CPT | Performed by: STUDENT IN AN ORGANIZED HEALTH CARE EDUCATION/TRAINING PROGRAM

## 2024-07-19 PROCEDURE — 1036F TOBACCO NON-USER: CPT | Performed by: FAMILY MEDICINE

## 2024-07-19 PROCEDURE — 81001 URINALYSIS AUTO W/SCOPE: CPT | Performed by: STUDENT IN AN ORGANIZED HEALTH CARE EDUCATION/TRAINING PROGRAM

## 2024-07-19 PROCEDURE — 2500000004 HC RX 250 GENERAL PHARMACY W/ HCPCS (ALT 636 FOR OP/ED): Performed by: NURSE PRACTITIONER

## 2024-07-19 PROCEDURE — 99214 OFFICE O/P EST MOD 30 MIN: CPT | Performed by: FAMILY MEDICINE

## 2024-07-19 PROCEDURE — 84132 ASSAY OF SERUM POTASSIUM: CPT | Performed by: STUDENT IN AN ORGANIZED HEALTH CARE EDUCATION/TRAINING PROGRAM

## 2024-07-19 PROCEDURE — 83880 ASSAY OF NATRIURETIC PEPTIDE: CPT | Performed by: STUDENT IN AN ORGANIZED HEALTH CARE EDUCATION/TRAINING PROGRAM

## 2024-07-19 PROCEDURE — 2500000001 HC RX 250 WO HCPCS SELF ADMINISTERED DRUGS (ALT 637 FOR MEDICARE OP): Performed by: PHYSICIAN ASSISTANT

## 2024-07-19 PROCEDURE — 99285 EMERGENCY DEPT VISIT HI MDM: CPT | Mod: 25

## 2024-07-19 PROCEDURE — 2500000004 HC RX 250 GENERAL PHARMACY W/ HCPCS (ALT 636 FOR OP/ED)

## 2024-07-19 PROCEDURE — 96366 THER/PROPH/DIAG IV INF ADDON: CPT

## 2024-07-19 PROCEDURE — 71260 CT THORAX DX C+: CPT | Performed by: RADIOLOGY

## 2024-07-19 PROCEDURE — 2500000005 HC RX 250 GENERAL PHARMACY W/O HCPCS: Performed by: PHYSICIAN ASSISTANT

## 2024-07-19 PROCEDURE — 2500000004 HC RX 250 GENERAL PHARMACY W/ HCPCS (ALT 636 FOR OP/ED): Performed by: PHYSICIAN ASSISTANT

## 2024-07-19 PROCEDURE — G0378 HOSPITAL OBSERVATION PER HR: HCPCS

## 2024-07-19 PROCEDURE — 84443 ASSAY THYROID STIM HORMONE: CPT | Performed by: STUDENT IN AN ORGANIZED HEALTH CARE EDUCATION/TRAINING PROGRAM

## 2024-07-19 PROCEDURE — 84100 ASSAY OF PHOSPHORUS: CPT | Performed by: STUDENT IN AN ORGANIZED HEALTH CARE EDUCATION/TRAINING PROGRAM

## 2024-07-19 PROCEDURE — 1159F MED LIST DOCD IN RCRD: CPT | Performed by: FAMILY MEDICINE

## 2024-07-19 PROCEDURE — 2550000001 HC RX 255 CONTRASTS: Performed by: STUDENT IN AN ORGANIZED HEALTH CARE EDUCATION/TRAINING PROGRAM

## 2024-07-19 PROCEDURE — 99223 1ST HOSP IP/OBS HIGH 75: CPT | Performed by: PHYSICIAN ASSISTANT

## 2024-07-19 PROCEDURE — 81003 URINALYSIS AUTO W/O SCOPE: CPT | Performed by: FAMILY MEDICINE

## 2024-07-19 PROCEDURE — 96365 THER/PROPH/DIAG IV INF INIT: CPT | Mod: 59

## 2024-07-19 PROCEDURE — 85025 COMPLETE CBC W/AUTO DIFF WBC: CPT | Performed by: STUDENT IN AN ORGANIZED HEALTH CARE EDUCATION/TRAINING PROGRAM

## 2024-07-19 PROCEDURE — 83690 ASSAY OF LIPASE: CPT | Performed by: STUDENT IN AN ORGANIZED HEALTH CARE EDUCATION/TRAINING PROGRAM

## 2024-07-19 PROCEDURE — 74177 CT ABD & PELVIS W/CONTRAST: CPT | Performed by: RADIOLOGY

## 2024-07-19 PROCEDURE — 36415 COLL VENOUS BLD VENIPUNCTURE: CPT | Performed by: STUDENT IN AN ORGANIZED HEALTH CARE EDUCATION/TRAINING PROGRAM

## 2024-07-19 PROCEDURE — 74177 CT ABD & PELVIS W/CONTRAST: CPT

## 2024-07-19 PROCEDURE — 83735 ASSAY OF MAGNESIUM: CPT | Performed by: STUDENT IN AN ORGANIZED HEALTH CARE EDUCATION/TRAINING PROGRAM

## 2024-07-19 PROCEDURE — 96372 THER/PROPH/DIAG INJ SC/IM: CPT | Mod: 59 | Performed by: NURSE PRACTITIONER

## 2024-07-19 PROCEDURE — 2500000001 HC RX 250 WO HCPCS SELF ADMINISTERED DRUGS (ALT 637 FOR MEDICARE OP): Performed by: NURSE PRACTITIONER

## 2024-07-19 PROCEDURE — 83605 ASSAY OF LACTIC ACID: CPT | Performed by: STUDENT IN AN ORGANIZED HEALTH CARE EDUCATION/TRAINING PROGRAM

## 2024-07-19 RX ORDER — CEPHALEXIN 500 MG/1
500 CAPSULE ORAL 2 TIMES DAILY
Qty: 14 CAPSULE | Refills: 0 | Status: ON HOLD | OUTPATIENT
Start: 2024-07-19 | End: 2024-07-20

## 2024-07-19 RX ORDER — HYOSCYAMINE SULFATE 0.12 MG/1
0.12 TABLET, ORALLY DISINTEGRATING ORAL
Status: DISCONTINUED | OUTPATIENT
Start: 2024-07-19 | End: 2024-07-20 | Stop reason: HOSPADM

## 2024-07-19 RX ORDER — TRAZODONE HYDROCHLORIDE 150 MG/1
150 TABLET ORAL NIGHTLY
Qty: 30 TABLET | Refills: 11 | Status: SHIPPED | OUTPATIENT
Start: 2024-07-19

## 2024-07-19 RX ORDER — SODIUM CHLORIDE 9 MG/ML
INJECTION, SOLUTION INTRAVENOUS
Status: COMPLETED
Start: 2024-07-19 | End: 2024-07-19

## 2024-07-19 RX ORDER — ACETAMINOPHEN 325 MG/1
650 TABLET ORAL EVERY 4 HOURS PRN
Status: DISCONTINUED | OUTPATIENT
Start: 2024-07-19 | End: 2024-07-20 | Stop reason: HOSPADM

## 2024-07-19 RX ORDER — ACETAMINOPHEN 160 MG/5ML
650 SOLUTION ORAL EVERY 4 HOURS PRN
Status: DISCONTINUED | OUTPATIENT
Start: 2024-07-19 | End: 2024-07-19

## 2024-07-19 RX ORDER — MAGNESIUM SULFATE HEPTAHYDRATE 40 MG/ML
2 INJECTION, SOLUTION INTRAVENOUS ONCE
Status: COMPLETED | OUTPATIENT
Start: 2024-07-19 | End: 2024-07-19

## 2024-07-19 RX ORDER — PANTOPRAZOLE SODIUM 40 MG/1
40 TABLET, DELAYED RELEASE ORAL DAILY
Status: DISCONTINUED | OUTPATIENT
Start: 2024-07-19 | End: 2024-07-20 | Stop reason: HOSPADM

## 2024-07-19 RX ORDER — LACTULOSE 10 G/15ML
10 SOLUTION ORAL 2 TIMES DAILY PRN
Status: DISCONTINUED | OUTPATIENT
Start: 2024-07-19 | End: 2024-07-20 | Stop reason: HOSPADM

## 2024-07-19 RX ORDER — SUCRALFATE 1 G/1
1 TABLET ORAL 4 TIMES DAILY
Status: DISCONTINUED | OUTPATIENT
Start: 2024-07-19 | End: 2024-07-20 | Stop reason: HOSPADM

## 2024-07-19 RX ORDER — ACETAMINOPHEN 325 MG/1
650 TABLET ORAL EVERY 4 HOURS PRN
Status: DISCONTINUED | OUTPATIENT
Start: 2024-07-19 | End: 2024-07-19

## 2024-07-19 RX ORDER — ONDANSETRON 4 MG/1
4 TABLET, FILM COATED ORAL EVERY 8 HOURS PRN
Status: DISCONTINUED | OUTPATIENT
Start: 2024-07-19 | End: 2024-07-20 | Stop reason: HOSPADM

## 2024-07-19 RX ORDER — CEPHALEXIN 250 MG/1
500 CAPSULE ORAL 2 TIMES DAILY
Status: DISCONTINUED | OUTPATIENT
Start: 2024-07-19 | End: 2024-07-20 | Stop reason: HOSPADM

## 2024-07-19 RX ORDER — PANTOPRAZOLE SODIUM 40 MG/10ML
40 INJECTION, POWDER, LYOPHILIZED, FOR SOLUTION INTRAVENOUS
Status: DISCONTINUED | OUTPATIENT
Start: 2024-07-20 | End: 2024-07-20

## 2024-07-19 RX ORDER — CALCIUM CARBONATE 200(500)MG
500 TABLET,CHEWABLE ORAL 4 TIMES DAILY PRN
Status: DISCONTINUED | OUTPATIENT
Start: 2024-07-19 | End: 2024-07-20 | Stop reason: HOSPADM

## 2024-07-19 RX ORDER — MAGNESIUM HYDROXIDE 2400 MG/10ML
10 SUSPENSION ORAL DAILY PRN
Status: DISCONTINUED | OUTPATIENT
Start: 2024-07-19 | End: 2024-07-20 | Stop reason: HOSPADM

## 2024-07-19 RX ORDER — AMOXICILLIN 250 MG
1 CAPSULE ORAL 2 TIMES DAILY
Status: DISCONTINUED | OUTPATIENT
Start: 2024-07-19 | End: 2024-07-20 | Stop reason: HOSPADM

## 2024-07-19 RX ORDER — ACETAMINOPHEN 650 MG/1
650 SUPPOSITORY RECTAL EVERY 4 HOURS PRN
Status: DISCONTINUED | OUTPATIENT
Start: 2024-07-19 | End: 2024-07-20 | Stop reason: HOSPADM

## 2024-07-19 RX ORDER — ONDANSETRON HYDROCHLORIDE 2 MG/ML
4 INJECTION, SOLUTION INTRAVENOUS EVERY 8 HOURS PRN
Status: DISCONTINUED | OUTPATIENT
Start: 2024-07-19 | End: 2024-07-20 | Stop reason: HOSPADM

## 2024-07-19 RX ORDER — HEPARIN SODIUM 5000 [USP'U]/ML
5000 INJECTION, SOLUTION INTRAVENOUS; SUBCUTANEOUS EVERY 8 HOURS SCHEDULED
Status: DISCONTINUED | OUTPATIENT
Start: 2024-07-19 | End: 2024-07-20 | Stop reason: HOSPADM

## 2024-07-19 RX ORDER — PANTOPRAZOLE SODIUM 40 MG/1
40 TABLET, DELAYED RELEASE ORAL
Status: DISCONTINUED | OUTPATIENT
Start: 2024-07-20 | End: 2024-07-19

## 2024-07-19 RX ORDER — TRAZODONE HYDROCHLORIDE 150 MG/1
150 TABLET ORAL NIGHTLY
Qty: 30 TABLET | Refills: 1 | Status: CANCELLED | OUTPATIENT
Start: 2024-07-19

## 2024-07-19 RX ORDER — ONDANSETRON 4 MG/1
4 TABLET, ORALLY DISINTEGRATING ORAL EVERY 12 HOURS PRN
Status: DISCONTINUED | OUTPATIENT
Start: 2024-07-19 | End: 2024-07-20 | Stop reason: HOSPADM

## 2024-07-19 RX ORDER — LEVOTHYROXINE SODIUM 50 UG/1
50 TABLET ORAL DAILY
Status: DISCONTINUED | OUTPATIENT
Start: 2024-07-19 | End: 2024-07-20 | Stop reason: HOSPADM

## 2024-07-19 RX ORDER — CHOLECALCIFEROL (VITAMIN D3) 25 MCG
2000 TABLET ORAL DAILY
Status: DISCONTINUED | OUTPATIENT
Start: 2024-07-19 | End: 2024-07-20 | Stop reason: HOSPADM

## 2024-07-19 RX ORDER — LANOLIN ALCOHOL/MO/W.PET/CERES
200 CREAM (GRAM) TOPICAL DAILY
Status: DISCONTINUED | OUTPATIENT
Start: 2024-07-19 | End: 2024-07-20 | Stop reason: HOSPADM

## 2024-07-19 RX ORDER — HYDROCODONE BITARTRATE AND ACETAMINOPHEN 5; 325 MG/1; MG/1
1 TABLET ORAL EVERY 6 HOURS PRN
Status: DISCONTINUED | OUTPATIENT
Start: 2024-07-19 | End: 2024-07-20

## 2024-07-19 RX ORDER — TALC
3 POWDER (GRAM) TOPICAL NIGHTLY PRN
Status: DISCONTINUED | OUTPATIENT
Start: 2024-07-19 | End: 2024-07-20 | Stop reason: HOSPADM

## 2024-07-19 RX ORDER — ACETAMINOPHEN 500 MG
5 TABLET ORAL NIGHTLY PRN
Status: DISCONTINUED | OUTPATIENT
Start: 2024-07-19 | End: 2024-07-20 | Stop reason: HOSPADM

## 2024-07-19 SDOH — SOCIAL STABILITY: SOCIAL INSECURITY: HAS ANYONE EVER THREATENED TO HURT YOUR FAMILY OR YOUR PETS?: NO

## 2024-07-19 SDOH — SOCIAL STABILITY: SOCIAL INSECURITY: ABUSE: ADULT

## 2024-07-19 SDOH — SOCIAL STABILITY: SOCIAL INSECURITY: ARE THERE ANY APPARENT SIGNS OF INJURIES/BEHAVIORS THAT COULD BE RELATED TO ABUSE/NEGLECT?: NO

## 2024-07-19 SDOH — SOCIAL STABILITY: SOCIAL INSECURITY: ARE YOU OR HAVE YOU BEEN THREATENED OR ABUSED PHYSICALLY, EMOTIONALLY, OR SEXUALLY BY ANYONE?: NO

## 2024-07-19 SDOH — SOCIAL STABILITY: SOCIAL INSECURITY: DO YOU FEEL UNSAFE GOING BACK TO THE PLACE WHERE YOU ARE LIVING?: NO

## 2024-07-19 SDOH — SOCIAL STABILITY: SOCIAL INSECURITY: DO YOU FEEL ANYONE HAS EXPLOITED OR TAKEN ADVANTAGE OF YOU FINANCIALLY OR OF YOUR PERSONAL PROPERTY?: NO

## 2024-07-19 SDOH — SOCIAL STABILITY: SOCIAL INSECURITY: WERE YOU ABLE TO COMPLETE ALL THE BEHAVIORAL HEALTH SCREENINGS?: YES

## 2024-07-19 SDOH — SOCIAL STABILITY: SOCIAL INSECURITY: DOES ANYONE TRY TO KEEP YOU FROM HAVING/CONTACTING OTHER FRIENDS OR DOING THINGS OUTSIDE YOUR HOME?: NO

## 2024-07-19 SDOH — SOCIAL STABILITY: SOCIAL INSECURITY: HAVE YOU HAD THOUGHTS OF HARMING ANYONE ELSE?: NO

## 2024-07-19 SDOH — SOCIAL STABILITY: SOCIAL INSECURITY: HAVE YOU HAD ANY THOUGHTS OF HARMING ANYONE ELSE?: NO

## 2024-07-19 ASSESSMENT — ACTIVITIES OF DAILY LIVING (ADL)
PATIENT'S MEMORY ADEQUATE TO SAFELY COMPLETE DAILY ACTIVITIES?: YES
HEARING - LEFT EAR: DIFFICULTY WITH NOISE
GROOMING: INDEPENDENT
HEARING - RIGHT EAR: DIFFICULTY WITH NOISE
LACK_OF_TRANSPORTATION: NO
FEEDING YOURSELF: INDEPENDENT
BATHING: INDEPENDENT
JUDGMENT_ADEQUATE_SAFELY_COMPLETE_DAILY_ACTIVITIES: YES
WALKS IN HOME: INDEPENDENT
DRESSING YOURSELF: INDEPENDENT
TOILETING: INDEPENDENT
ADEQUATE_TO_COMPLETE_ADL: YES

## 2024-07-19 ASSESSMENT — COGNITIVE AND FUNCTIONAL STATUS - GENERAL
DAILY ACTIVITIY SCORE: 24
WALKING IN HOSPITAL ROOM: A LITTLE
PATIENT BASELINE BEDBOUND: NO
CLIMB 3 TO 5 STEPS WITH RAILING: A LITTLE
MOBILITY SCORE: 22

## 2024-07-19 ASSESSMENT — ENCOUNTER SYMPTOMS
CONSTIPATION: 1
FEVER: 0
MUSCULOSKELETAL NEGATIVE: 1
PSYCHIATRIC NEGATIVE: 1
EYES NEGATIVE: 1
CHILLS: 0
UNEXPECTED WEIGHT CHANGE: 1
NEUROLOGICAL NEGATIVE: 1
DIAPHORESIS: 0
FATIGUE: 0
ACTIVITY CHANGE: 1
NAUSEA: 1
CARDIOVASCULAR NEGATIVE: 1
APPETITE CHANGE: 1
ABDOMINAL PAIN: 1
DIARRHEA: 1
RESPIRATORY NEGATIVE: 1

## 2024-07-19 ASSESSMENT — PATIENT HEALTH QUESTIONNAIRE - PHQ9
SUM OF ALL RESPONSES TO PHQ9 QUESTIONS 1 & 2: 0
1. LITTLE INTEREST OR PLEASURE IN DOING THINGS: NOT AT ALL
SUM OF ALL RESPONSES TO PHQ9 QUESTIONS 1 AND 2: 0
1. LITTLE INTEREST OR PLEASURE IN DOING THINGS: NOT AT ALL
2. FEELING DOWN, DEPRESSED OR HOPELESS: NOT AT ALL
1. LITTLE INTEREST OR PLEASURE IN DOING THINGS: NOT AT ALL
2. FEELING DOWN, DEPRESSED OR HOPELESS: NOT AT ALL
2. FEELING DOWN, DEPRESSED OR HOPELESS: NOT AT ALL
SUM OF ALL RESPONSES TO PHQ9 QUESTIONS 1 AND 2: 0

## 2024-07-19 ASSESSMENT — LIFESTYLE VARIABLES
SKIP TO QUESTIONS 9-10: 1
HOW OFTEN DO YOU HAVE A DRINK CONTAINING ALCOHOL: NEVER
SUBSTANCE_ABUSE_PAST_12_MONTHS: NO
HOW MANY STANDARD DRINKS CONTAINING ALCOHOL DO YOU HAVE ON A TYPICAL DAY: PATIENT DOES NOT DRINK
HOW OFTEN DO YOU HAVE 6 OR MORE DRINKS ON ONE OCCASION: NEVER
PRESCIPTION_ABUSE_PAST_12_MONTHS: NO
AUDIT-C TOTAL SCORE: 0
AUDIT-C TOTAL SCORE: 0

## 2024-07-19 ASSESSMENT — PAIN - FUNCTIONAL ASSESSMENT: PAIN_FUNCTIONAL_ASSESSMENT: 0-10

## 2024-07-19 ASSESSMENT — PAIN SCALES - GENERAL
PAINLEVEL_OUTOF10: 0 - NO PAIN
PAINLEVEL_OUTOF10: 5 - MODERATE PAIN

## 2024-07-19 ASSESSMENT — COLUMBIA-SUICIDE SEVERITY RATING SCALE - C-SSRS
2. HAVE YOU ACTUALLY HAD ANY THOUGHTS OF KILLING YOURSELF?: NO
1. IN THE PAST MONTH, HAVE YOU WISHED YOU WERE DEAD OR WISHED YOU COULD GO TO SLEEP AND NOT WAKE UP?: NO
6. HAVE YOU EVER DONE ANYTHING, STARTED TO DO ANYTHING, OR PREPARED TO DO ANYTHING TO END YOUR LIFE?: NO

## 2024-07-19 NOTE — H&P
"History Of Present Illness  Latha Quispe is a 79 y.o. female presenting with dizziness. Patient states that she is dizzy \"all the time\". She clarifies that when she stands up she gets dizzy, which will resolve if she sits back down. Also if she is standing up for too long she will become dizzy. She states it feels like she may pass out  but she has not lost consciousness. In addition patient states that this all began when she began to lose weight. She states that due to IBS she is scared to eat and often has significant GI upset when she does eat.      Past Medical History  Past Medical History:   Diagnosis Date    Abdominal pain     Anxiety     Cat bite 2023    Cataract     Depression     Disease of thyroid gland     GERD (gastroesophageal reflux disease)     GERD (gastroesophageal reflux disease)     Hypothyroidism     Irritable bowel syndrome     Panic attacks     Personal history of malignant neoplasm of breast 2021    History of malignant neoplasm of breast    Personal history of other complications of pregnancy, childbirth and the puerperium     History of spontaneous        Surgical History  Past Surgical History:   Procedure Laterality Date    BREAST SURGERY      CATARACT EXTRACTION      COLECTOMY      COLON SURGERY      CT ABDOMEN ANGIOGRAM W AND/OR WO IV CONTRAST  2023    CT ABDOMEN ANGIOGRAM W AND/OR WO IV CONTRAST 2023 CON CT    GASTRIC BYPASS      HYSTERECTOMY      MASTECTOMY, PARTIAL Left     OTHER SURGICAL HISTORY  2021    Varicose vein ligation        Social History  She reports that she has never smoked. She has never been exposed to tobacco smoke. She has never used smokeless tobacco. She reports that she does not currently use alcohol. She reports that she does not use drugs.    Family History  Family History   Problem Relation Name Age of Onset    Osteoporosis Mother      Alcohol abuse Father      Cancer Sister          breast    Osteoporosis Sister   "    Other (bladder cancer) Sister      Cancer Brother          colon. prostate    Colon cancer Brother      Colon cancer Brother      Hypothyroidism Other          Allergies  Nsaids (non-steroidal anti-inflammatory drug)    Review of Systems   Constitutional:  Positive for activity change, appetite change and unexpected weight change. Negative for chills, diaphoresis, fatigue and fever.   HENT: Negative.     Eyes: Negative.    Respiratory: Negative.     Cardiovascular: Negative.    Gastrointestinal:  Positive for abdominal pain, constipation, diarrhea and nausea.   Genitourinary: Negative.    Musculoskeletal: Negative.    Skin: Negative.    Neurological: Negative.    Psychiatric/Behavioral: Negative.          Physical Exam  Constitutional:       Appearance: She is ill-appearing.      Comments: Cachectic female lying in bed, in NAD.    HENT:      Head: Normocephalic.      Nose: Nose normal.      Mouth/Throat:      Mouth: Mucous membranes are moist.   Eyes:      Conjunctiva/sclera: Conjunctivae normal.   Cardiovascular:      Rate and Rhythm: Normal rate and regular rhythm.      Heart sounds: No murmur heard.  Pulmonary:      Effort: Pulmonary effort is normal.      Breath sounds: Normal breath sounds.   Abdominal:      General: Abdomen is flat. Bowel sounds are normal.      Palpations: Abdomen is soft.   Musculoskeletal:         General: Normal range of motion.      Cervical back: Normal range of motion.      Right lower leg: No edema.      Left lower leg: No edema.   Skin:     General: Skin is dry.   Neurological:      General: No focal deficit present.      Mental Status: She is alert. Mental status is at baseline.   Psychiatric:         Mood and Affect: Mood normal.         Behavior: Behavior normal.          Last Recorded Vitals  Blood pressure 116/61, pulse (!) 44, temperature 36.2 °C (97.2 °F), temperature source Temporal, resp. rate 18, height 1.524 m (5'), weight (!) 37.9 kg (83 lb 8.9 oz), SpO2  98%.    Relevant Results        Scheduled medications  cephalexin, 500 mg, oral, BID  cholecalciferol, 2,000 Units, oral, Daily  heparin (porcine), 5,000 Units, subcutaneous, q8h BRIANNA  hyoscyamine, 0.125 mg, oral, Before meals & nightly  levothyroxine, 50 mcg, oral, Daily  [START ON 7/20/2024] linaCLOtide, 145 mcg, oral, Daily before breakfast  magnesium oxide, 200 mg, oral, Daily  pantoprazole, 40 mg, oral, Daily  [START ON 7/20/2024] pantoprazole, 40 mg, intravenous, Daily before breakfast  sennosides-docusate sodium, 1 tablet, oral, BID  sucralfate, 1 g, oral, 4x daily  traZODone, 150 mg, oral, Nightly      Continuous medications     PRN medications  PRN medications: acetaminophen **OR** [DISCONTINUED] acetaminophen **OR** acetaminophen, [DISCONTINUED] acetaminophen **OR** [DISCONTINUED] acetaminophen **OR** acetaminophen, acetaminophen, calcium carbonate, lactulose, magnesium hydroxide, [Held by provider] melatonin, melatonin, ondansetron **OR** ondansetron, ondansetron ODT    Results for orders placed or performed during the hospital encounter of 07/19/24 (from the past 24 hour(s))   CBC and Auto Differential   Result Value Ref Range    WBC 5.9 4.4 - 11.3 x10*3/uL    nRBC 0.0 0.0 - 0.0 /100 WBCs    RBC 4.36 4.00 - 5.20 x10*6/uL    Hemoglobin 12.8 12.0 - 16.0 g/dL    Hematocrit 39.1 36.0 - 46.0 %    MCV 90 80 - 100 fL    MCH 29.4 26.0 - 34.0 pg    MCHC 32.7 32.0 - 36.0 g/dL    RDW 13.0 11.5 - 14.5 %    Platelets 192 150 - 450 x10*3/uL    Neutrophils % 63.9 40.0 - 80.0 %    Immature Granulocytes %, Automated 0.5 0.0 - 0.9 %    Lymphocytes % 23.1 13.0 - 44.0 %    Monocytes % 9.9 2.0 - 10.0 %    Eosinophils % 1.7 0.0 - 6.0 %    Basophils % 0.9 0.0 - 2.0 %    Neutrophils Absolute 3.74 1.60 - 5.50 x10*3/uL    Immature Granulocytes Absolute, Automated 0.03 0.00 - 0.50 x10*3/uL    Lymphocytes Absolute 1.35 0.80 - 3.00 x10*3/uL    Monocytes Absolute 0.58 0.05 - 0.80 x10*3/uL    Eosinophils Absolute 0.10 0.00 - 0.40  x10*3/uL    Basophils Absolute 0.05 0.00 - 0.10 x10*3/uL   Phosphorus   Result Value Ref Range    Phosphorus 3.6 2.5 - 4.9 mg/dL   Magnesium   Result Value Ref Range    Magnesium 1.55 (L) 1.60 - 2.40 mg/dL   Comprehensive metabolic panel   Result Value Ref Range    Glucose 81 74 - 99 mg/dL    Sodium 134 (L) 136 - 145 mmol/L    Potassium 3.6 3.5 - 5.3 mmol/L    Chloride 101 98 - 107 mmol/L    Bicarbonate 25 21 - 32 mmol/L    Anion Gap 12 10 - 20 mmol/L    Urea Nitrogen 10 6 - 23 mg/dL    Creatinine 0.87 0.50 - 1.05 mg/dL    eGFR 68 >60 mL/min/1.73m*2    Calcium 9.2 8.6 - 10.3 mg/dL    Albumin 3.8 3.4 - 5.0 g/dL    Alkaline Phosphatase 96 33 - 136 U/L    Total Protein 6.6 6.4 - 8.2 g/dL    AST 25 9 - 39 U/L    Bilirubin, Total 0.4 0.0 - 1.2 mg/dL    ALT 14 7 - 45 U/L   Lipase   Result Value Ref Range    Lipase 14 9 - 82 U/L   Lactate   Result Value Ref Range    Lactate 1.8 0.4 - 2.0 mmol/L   B-Type Natriuretic Peptide   Result Value Ref Range    BNP 70 0 - 99 pg/mL   TSH with reflex to Free T4 if abnormal   Result Value Ref Range    Thyroid Stimulating Hormone 2.08 0.44 - 3.98 mIU/L   Blood Gas Venous Full Panel   Result Value Ref Range    POCT pH, Venous 7.34 7.33 - 7.43 pH    POCT pCO2, Venous 45 41 - 51 mm Hg    POCT pO2, Venous 30 (L) 35 - 45 mm Hg    POCT SO2, Venous 42 (L) 45 - 75 %    POCT Oxy Hemoglobin, Venous 42.1 (L) 45.0 - 75.0 %    POCT Hematocrit Calculated, Venous 36.0 36.0 - 46.0 %    POCT Sodium, Venous 133 (L) 136 - 145 mmol/L    POCT Potassium, Venous 3.4 (L) 3.5 - 5.3 mmol/L    POCT Chloride, Venous 99 98 - 107 mmol/L    POCT Ionized Calicum, Venous 1.28 1.10 - 1.33 mmol/L    POCT Glucose, Venous 82 74 - 99 mg/dL    POCT Lactate, Venous 2.5 (H) 0.4 - 2.0 mmol/L    POCT Base Excess, Venous -1.7 -2.0 - 3.0 mmol/L    POCT HCO3 Calculated, Venous 24.3 22.0 - 26.0 mmol/L    POCT Hemoglobin, Venous 12.0 12.0 - 16.0 g/dL    POCT Anion Gap, Venous 13.0 10.0 - 25.0 mmol/L    Patient Temperature      FiO2 21  %   Troponin I, High Sensitivity, Initial   Result Value Ref Range    Troponin I, High Sensitivity 6 0 - 13 ng/L   Troponin, High Sensitivity, 1 Hour   Result Value Ref Range    Troponin I, High Sensitivity 6 0 - 13 ng/L   Urinalysis with Reflex Culture and Microscopic   Result Value Ref Range    Color, Urine Colorless (N) Light-Yellow, Yellow, Dark-Yellow    Appearance, Urine Clear Clear    Specific Gravity, Urine 1.033 1.005 - 1.035    pH, Urine 5.5 5.0, 5.5, 6.0, 6.5, 7.0, 7.5, 8.0    Protein, Urine NEGATIVE NEGATIVE, 10 (TRACE), 20 (TRACE) mg/dL    Glucose, Urine Normal Normal mg/dL    Blood, Urine 0.03 (TRACE) (A) NEGATIVE    Ketones, Urine NEGATIVE NEGATIVE mg/dL    Bilirubin, Urine NEGATIVE NEGATIVE    Urobilinogen, Urine Normal Normal mg/dL    Nitrite, Urine NEGATIVE NEGATIVE    Leukocyte Esterase, Urine NEGATIVE NEGATIVE   Urinalysis Microscopic   Result Value Ref Range    WBC, Urine 1-5 1-5, NONE /HPF    RBC, Urine 1-2 NONE, 1-2, 3-5 /HPF    Squamous Epithelial Cells, Urine 1-9 (SPARSE) Reference range not established. /HPF     CT chest abdomen pelvis w IV contrast    Result Date: 7/19/2024  Interpreted By:  Maranda Denny, STUDY: CT CHEST ABDOMEN PELVIS W IV CONTRAST;  7/19/2024 2:38 pm   INDICATION: Signs/Symptoms:significant weight loss, bradycardia.   COMPARISON: CT abdomen pelvis dated 05/09/2024; CT chest dated 03/27/2024   ACCESSION NUMBER(S): [id number]   ORDERING CLINICIAN: JUAN ANTONIO SNOWDEN   TECHNIQUE: CT of the chest, abdomen, and pelvis was performed following the intravenous administration 50 mL Omnipaque 350. Sagittal and coronal reconstructions were generated   FINDINGS: CHEST:   LUNG/PLEURA/LARGE AIRWAYS: The lungs are hyperinflated.   There is apical pleural disease. There is no discrete consolidation or pleural fluid.   VESSELS: There are atherosclerotic changes of the aorta. The cava and main pulmonary arteries are unremarkable.   HEART: Heart is normal size.   MEDIASTINUM AND BRUNO:  There are small mediastinal lymph nodes.   CHEST WALL AND LOWER NECK: Thyroid is not enlarged.   There is minimal subcutaneous fat.   Patient is markedly kyphotic and scoliotic.. There are degenerative changes of the spine.   ABDOMEN:   LIVER: There is a tiny cyst in the left lobe of liver.   BILE DUCTS: Unremarkable   GALLBLADDER: Gallbladder appears contracted.   PANCREAS: Pancreas is not well seen due to the paucity of fat.   SPLEEN: Unremarkable   ADRENAL GLANDS: There are no obvious adrenal masses.   KIDNEYS AND URETERS: Kidneys are not obstructed. There are no renal calculi   PELVIS:   BLADDER: Bladder is empty.   REPRODUCTIVE ORGANS: Patient appears to be status post hysterectomy.   BOWEL: There is no significant bowel distention. There are numerous surgical clips related loops in the pelvis.   VESSELS: There are atherosclerotic changes of the aorta. Cava is unremarkable.   PERITONEUM/RETROPERITONEUM/LYMPH NODES: There is no free air or free fluid. There is no obvious lymphadenopathy   BONE AND SOFT TISSUE: There are degenerative changes of the spine and hips.   COMPARISON OF FINDINGS: The left pleural effusion present previously has resolved.       CHEST: Moderately severe emphysema. Moderate kyphosis. No acute intrathoracic abnormality.   ABDOMEN-PELVIS: Postop changes. No acute abnormality of the abdomen or pelvis. The exam is limited by the paucity of fat   Signed by: Maranda Denny 7/19/2024 3:15 PM Dictation workstation:   [id number]        Assessment/Plan   Principal Problem:    Symptomatic bradycardia      Latha Quispe is a 78 yo female with a PMH of IBS, Chronic idiopathic constipation, GERD, Anxiety, Depression, hypothyroidism and COPD who presented with dizziness.     #Dizziness  #Unintentional weight loss  #Sinus bradycardia  -TSH w/ reflex pending, recently normal  -EKG reviewed sinus bradycardia, no evidence of AV block.   -Recommend orthostatic VS  -Cardiology consulted, appreciate recs  -On no  AV stephanie blocking agents. HR inc from 55 @ rest to 87 when ambulating to the bathrom   -Nutrition consult       #Hypomagnesemia  -c/w home mg  -Give additional 2g IV Mg    #UTI  -Pt started on Keflex today for UTI @ PCP office  -Continue     Chronic issues:   #IBS  #Chronic idiopathic constipation  -home bowel regimen continued including Linzess, Senokot, lactulose  #Anxiety  #Depression      #GERD w/ hx of bleeding ulcer  -c/w PPI, Carafate     #COPD not in acute exacerbation  -BD PRN  -On RA    #Hypothyroidism  -c/w home synthroid    F: PRN  E: Replete per protocol. 2g Mg today   N: Regular diet   A: PIV  Disposition: Patient requires inpatient mgmt at this time.   Code Status: FULL CODE        I spent  minutes in the professional and overall care of this patient.      BRYNN LouisC    Attending Attestation:    I was present with the APRN-CNP who participated in the documentation of this note. I have personally seen and re-examined the patient and performed the medical decision-making components (assessment and plan of care). I have reviewed the documentation and verified the findings in the note as written with additions or exceptions as stated in the body of this note.    Dr. Javier Donnelly MD  Internal Medicine

## 2024-07-19 NOTE — PROGRESS NOTES
Subjective   Patient ID: Latha Quispe is a 79 y.o. female who presents for Follow-up and UTI (3 days. Burning, urgency, frequ.).  HPI  Here for follow up  Has been having urinary frequency and dysuria x 3 days. No fever. No suprapubic pain.   HR low today, been feeling dizzy. EKG done today, HR 37. Sent to ED for evaluation.   Patient not on any beta blockers or anti-hypertensives. Is on levothyroxine, compliant. Most recent TSH normal   No chest pain, says she keeps herself hydrated  Needs refill of trazadone    Review of Systems  General: no fever  Eyes: no blurry vision  ENT: no sore throat, no ear pain  Resp: no cough, sob or wheezing  Cardio: no chest pain, no palpitations  Abd: no nausea/vomiting  : see HPI      /60   Pulse (!) 40   Temp 36.4 °C (97.5 °F)   Ht 1.524 m (5')   Wt (!) 35.9 kg (79 lb 3.2 oz)   SpO2 97%   BMI 15.47 kg/m²       Objective   Physical Exam  Gen: NAD, alert  Head: normocephalic/atraumatic  Eyes: conjunctivae normal  Ears: canals clear bilaterally, TM normal   Nose: external nose normal   Oropharynx: clear   Resp: Clear to auscultation  CVS: bradycardic  Abdomen: soft, NT, ND  Ext: no edema, NT of lower extremities       Assessment/Plan   Problem List Items Addressed This Visit       Bradycardia - Primary  HR below 40s, sent to the ED for evaluation, report called     Other Visit Diagnoses       Insomnia, unspecified type        Relevant Medications    traZODone (Desyrel) 150 mg tablet    Acute UTI        Relevant Medications    cephalexin (Keflex) 500 mg capsule    Other Relevant Orders    Urine Culture    POCT UA Automated manually resulted (Completed)

## 2024-07-19 NOTE — CARE PLAN
The patient's goals for the shift include      The clinical goals for the shift include cardiology consult; free from falls/ injury this shift     New ED admit, sinus bradycardia on telemetry, +dizziness, intermittent nausea.  Maintain safety precautions.

## 2024-07-19 NOTE — ED PROVIDER NOTES
Chief Complaint: Symptomatic bradycardia  HPI: This is a 79-year-old female, sent to the emergency department by her primary care provider for evaluation of symptomatic bradycardia which was found during a regularly scheduled outpatient appointment.  Patient states that she has had some generalized weakness over the last few weeks, and states that she has had significant weight loss over the last few months, and thought this generalized weakness was related to the weight loss.  She was at her primary care doctor appointment for regular checkup when she was found to be bradycardic, and was advised to come to the emergency department.  Apart from the generalized weakness, the patient does not have any specific complaints at this time.    Past Medical History:   Diagnosis Date    Abdominal pain     Anxiety     Cat bite 2023    Cataract     Depression     Disease of thyroid gland     GERD (gastroesophageal reflux disease)     GERD (gastroesophageal reflux disease)     Hypothyroidism     Irritable bowel syndrome     Panic attacks     Personal history of malignant neoplasm of breast 2021    History of malignant neoplasm of breast    Personal history of other complications of pregnancy, childbirth and the puerperium     History of spontaneous       Past Surgical History:   Procedure Laterality Date    BREAST SURGERY      CATARACT EXTRACTION      COLECTOMY      COLON SURGERY      CT ABDOMEN ANGIOGRAM W AND/OR WO IV CONTRAST  2023    CT ABDOMEN ANGIOGRAM W AND/OR WO IV CONTRAST 2023 CON CT    GASTRIC BYPASS      HYSTERECTOMY      MASTECTOMY, PARTIAL Left     OTHER SURGICAL HISTORY  2021    Varicose vein ligation       Physical Exam  Constitutional:       Appearance: Normal appearance.      Comments: Thin, cachectic, appears much older than stated age.   HENT:      Head: Normocephalic and atraumatic.      Mouth/Throat:      Mouth: Mucous membranes are moist.   Eyes:       Conjunctiva/sclera: Conjunctivae normal.   Cardiovascular:      Rate and Rhythm: Regular rhythm. Bradycardia present.   Pulmonary:      Effort: Pulmonary effort is normal.   Abdominal:      General: Abdomen is flat.      Palpations: Abdomen is soft.      Tenderness: There is no abdominal tenderness.   Musculoskeletal:         General: Normal range of motion.      Cervical back: Normal range of motion.   Skin:     General: Skin is warm and dry.   Neurological:      General: No focal deficit present.      Mental Status: She is alert.   Psychiatric:         Mood and Affect: Mood normal.            ED Course/MDM  Diagnoses as of 07/19/24 3128   Bradycardia     EKG interpreted by myself (ED attending physician): Sinus bradycardia, rate of 39, left axis deviation, normal intervals, no evidence of heart block, nonspecific ST segment changes, nonischemic EKG    Discussion of Management with Other Providers:   I discussed the patient/results with:   Cardiology, discussed case over the phone, they reviewed that he EKG, and feel that the patient is safe for admission to [facility] for further observation.      This is a 79 y.o. female presenting to the ED for evaluation of symptomatic bradycardia.  Patient states that she has had generalized weakness for a few weeks.  She states she has had a large weight loss, and thought that the generalized weakness was from the weight loss, however at her regularly scheduled primary care appointment, she was found to be bradycardic, and was advised to come to the emergency department.  Patient does not have any other specific complaints at this time.  On physical exam, the patient is resting comfortably in the bed, no acute distress, however is thin, cachectic, and appears much older than stated age.  Heart is bradycardic however is regular.  EKG is consistent with sinus bradycardia without any evidence for heart block.  Lab work was overall grossly unremarkable.  CT chest abdomen pelvis  was also ordered for concern of possible malignancy given her recent significant weight loss, however was overall grossly unremarkable for acute findings.  Blood pressure remained stable.  I did discuss the case with cardiology who would like the patient to be admitted to this facility for observation.  Hospitalist was consulted, discussed case over the phone, they accept the patient in admission.    Final Impression  1.  Symptomatic bradycardia  Disposition/Plan: Admit to medicine  Condition at disposition: Stable.     Felecia Michael DO  Emergency Medicine Physician     Felecia Michael DO  07/19/24 0319

## 2024-07-20 VITALS
TEMPERATURE: 97.1 F | HEIGHT: 60 IN | BODY MASS INDEX: 16.4 KG/M2 | HEART RATE: 52 BPM | SYSTOLIC BLOOD PRESSURE: 123 MMHG | OXYGEN SATURATION: 98 % | RESPIRATION RATE: 18 BRPM | WEIGHT: 83.55 LBS | DIASTOLIC BLOOD PRESSURE: 57 MMHG

## 2024-07-20 LAB
ANION GAP SERPL CALC-SCNC: 12 MMOL/L (ref 10–20)
ANION GAP SERPL CALC-SCNC: 14 MMOL/L (ref 10–20)
BUN SERPL-MCNC: 9 MG/DL (ref 6–23)
BUN SERPL-MCNC: 9 MG/DL (ref 6–23)
CALCIUM SERPL-MCNC: 9.1 MG/DL (ref 8.6–10.3)
CALCIUM SERPL-MCNC: 9.2 MG/DL (ref 8.6–10.3)
CHLORIDE SERPL-SCNC: 103 MMOL/L (ref 98–107)
CHLORIDE SERPL-SCNC: 105 MMOL/L (ref 98–107)
CO2 SERPL-SCNC: 22 MMOL/L (ref 21–32)
CO2 SERPL-SCNC: 24 MMOL/L (ref 21–32)
CREAT SERPL-MCNC: 0.81 MG/DL (ref 0.5–1.05)
CREAT SERPL-MCNC: 0.87 MG/DL (ref 0.5–1.05)
EGFRCR SERPLBLD CKD-EPI 2021: 68 ML/MIN/1.73M*2
EGFRCR SERPLBLD CKD-EPI 2021: 74 ML/MIN/1.73M*2
ERYTHROCYTE [DISTWIDTH] IN BLOOD BY AUTOMATED COUNT: 13 % (ref 11.5–14.5)
GLUCOSE SERPL-MCNC: 89 MG/DL (ref 74–99)
GLUCOSE SERPL-MCNC: 96 MG/DL (ref 74–99)
HCT VFR BLD AUTO: 40.2 % (ref 36–46)
HGB BLD-MCNC: 12.9 G/DL (ref 12–16)
HOLD SPECIMEN: NORMAL
MAGNESIUM SERPL-MCNC: 1.75 MG/DL (ref 1.6–2.4)
MCH RBC QN AUTO: 29.4 PG (ref 26–34)
MCHC RBC AUTO-ENTMCNC: 32.1 G/DL (ref 32–36)
MCV RBC AUTO: 92 FL (ref 80–100)
NRBC BLD-RTO: 0 /100 WBCS (ref 0–0)
PLATELET # BLD AUTO: 222 X10*3/UL (ref 150–450)
POTASSIUM SERPL-SCNC: 2.9 MMOL/L (ref 3.5–5.3)
POTASSIUM SERPL-SCNC: 4.8 MMOL/L (ref 3.5–5.3)
RBC # BLD AUTO: 4.39 X10*6/UL (ref 4–5.2)
SODIUM SERPL-SCNC: 134 MMOL/L (ref 136–145)
SODIUM SERPL-SCNC: 138 MMOL/L (ref 136–145)
WBC # BLD AUTO: 6.1 X10*3/UL (ref 4.4–11.3)

## 2024-07-20 PROCEDURE — 83735 ASSAY OF MAGNESIUM: CPT | Performed by: NURSE PRACTITIONER

## 2024-07-20 PROCEDURE — 96372 THER/PROPH/DIAG INJ SC/IM: CPT | Mod: 76 | Performed by: NURSE PRACTITIONER

## 2024-07-20 PROCEDURE — 94760 N-INVAS EAR/PLS OXIMETRY 1: CPT

## 2024-07-20 PROCEDURE — 80048 BASIC METABOLIC PNL TOTAL CA: CPT | Performed by: NURSE PRACTITIONER

## 2024-07-20 PROCEDURE — 96367 TX/PROPH/DG ADDL SEQ IV INF: CPT

## 2024-07-20 PROCEDURE — 82374 ASSAY BLOOD CARBON DIOXIDE: CPT | Performed by: NURSE PRACTITIONER

## 2024-07-20 PROCEDURE — 2500000001 HC RX 250 WO HCPCS SELF ADMINISTERED DRUGS (ALT 637 FOR MEDICARE OP): Performed by: PHYSICIAN ASSISTANT

## 2024-07-20 PROCEDURE — 2500000002 HC RX 250 W HCPCS SELF ADMINISTERED DRUGS (ALT 637 FOR MEDICARE OP, ALT 636 FOR OP/ED): Performed by: NURSE PRACTITIONER

## 2024-07-20 PROCEDURE — 99222 1ST HOSP IP/OBS MODERATE 55: CPT | Performed by: NURSE PRACTITIONER

## 2024-07-20 PROCEDURE — 2500000001 HC RX 250 WO HCPCS SELF ADMINISTERED DRUGS (ALT 637 FOR MEDICARE OP): Performed by: NURSE PRACTITIONER

## 2024-07-20 PROCEDURE — 2500000005 HC RX 250 GENERAL PHARMACY W/O HCPCS: Performed by: PHYSICIAN ASSISTANT

## 2024-07-20 PROCEDURE — G0378 HOSPITAL OBSERVATION PER HR: HCPCS

## 2024-07-20 PROCEDURE — 85027 COMPLETE CBC AUTOMATED: CPT | Performed by: NURSE PRACTITIONER

## 2024-07-20 PROCEDURE — 2500000004 HC RX 250 GENERAL PHARMACY W/ HCPCS (ALT 636 FOR OP/ED): Performed by: NURSE PRACTITIONER

## 2024-07-20 PROCEDURE — 2500000004 HC RX 250 GENERAL PHARMACY W/ HCPCS (ALT 636 FOR OP/ED): Performed by: PHYSICIAN ASSISTANT

## 2024-07-20 PROCEDURE — 36415 COLL VENOUS BLD VENIPUNCTURE: CPT | Performed by: NURSE PRACTITIONER

## 2024-07-20 PROCEDURE — 96366 THER/PROPH/DIAG IV INF ADDON: CPT

## 2024-07-20 RX ORDER — CEPHALEXIN 500 MG/1
500 CAPSULE ORAL 2 TIMES DAILY
Qty: 14 CAPSULE | Refills: 0 | Status: SHIPPED | OUTPATIENT
Start: 2024-07-20 | End: 2024-07-27

## 2024-07-20 RX ORDER — POTASSIUM CHLORIDE 14.9 MG/ML
20 INJECTION INTRAVENOUS
Status: COMPLETED | OUTPATIENT
Start: 2024-07-20 | End: 2024-07-20

## 2024-07-20 RX ORDER — POTASSIUM CHLORIDE 20 MEQ/1
40 TABLET, EXTENDED RELEASE ORAL ONCE
Status: COMPLETED | OUTPATIENT
Start: 2024-07-20 | End: 2024-07-20

## 2024-07-20 RX ORDER — POTASSIUM CHLORIDE 750 MG/1
10 TABLET, FILM COATED, EXTENDED RELEASE ORAL DAILY
Qty: 30 TABLET | Refills: 0 | Status: SHIPPED | OUTPATIENT
Start: 2024-07-20 | End: 2024-08-19

## 2024-07-20 RX ORDER — SODIUM CHLORIDE 9 MG/ML
10 INJECTION, SOLUTION INTRAVENOUS CONTINUOUS PRN
Status: DISCONTINUED | OUTPATIENT
Start: 2024-07-20 | End: 2024-07-20 | Stop reason: HOSPADM

## 2024-07-20 ASSESSMENT — PAIN DESCRIPTION - LOCATION: LOCATION: ABDOMEN

## 2024-07-20 ASSESSMENT — PAIN SCALES - GENERAL
PAINLEVEL_OUTOF10: 3
PAINLEVEL_OUTOF10: 7

## 2024-07-20 ASSESSMENT — PAIN - FUNCTIONAL ASSESSMENT: PAIN_FUNCTIONAL_ASSESSMENT: 0-10

## 2024-07-20 NOTE — CARE PLAN
The patient's goals for the shift include      The clinical goals for the shift include eat 50% of all 3 meals today    Goal met for breakfast and lunch. Patient discharging home before dinner. Potasium level is within normal limits now.

## 2024-07-20 NOTE — CONSULTS
Cardiology Consult Note  Patient: Latha Quispe  Unit/Bed: 229/229-A  YOB: 1945    Admitting Diagnosis: Bradycardia [R00.1]  Symptomatic bradycardia [R00.1]  Date:  7/19/2024  Hospital Day: 0  Attending: Cardiology consulting at the request of  Javier Donnelly MD      Chief Complaint   Patient presents with    Bradycardia     Pt sent from dr office where she was for routine check up and found to have HR in the 30's, pt with no complaints             History of Present Illness:  Latha Quispe is a 79 y.o. year old female patient presented via [facility] ED with c/o bradycardia.  She was sent to the ER by her primary care provider.  She has had weakness and weight loss over the past 7 months      SUBJECTIVE:   She describes generalized dizziness, particularly while standing.  She has not been eating well with GI symptoms.  Her heart rate is responsive to ambulating while she has been hospitalized.  She has a longstanding history of sinus bradycardia.  She has been ordered for an extended Holter monitor to be placed as an outpatient from a prior hospitalization in May 2024  Lowest documented heart rate 38 bpm while she was in the emergency department.  Overnight heart rate ranges from 44-87.  She does not take any AV stephanie blocking agents               Past medical history significant for hypothyroidism on replacement, GERD, irritable bowel syndrome.  Primary Cardiology outpatient: None      Allergies:  Allergies   Allergen Reactions    Nsaids (Non-Steroidal Anti-Inflammatory Drug) Unknown      Home Medication:  Medications Prior to Admission   Medication Sig Dispense Refill Last Dose    cholecalciferol (Vitamin D3) 50 mcg (2,000 unit) capsule Take 1 capsule (50 mcg) by mouth once daily.   7/18/2024    hyoscyamine (Anaspaz) 0.125 mg disintegrating tablet Take 1 tablet (0.125 mg) by mouth before breakfast, before lunch, before evening meal, and at bedtime. 120 tablet 1 7/19/2024    levothyroxine  (Synthroid, Levoxyl) 50 mcg tablet Take 1 tablet (50 mcg) by mouth once daily. 90 tablet 3 7/19/2024    linaCLOtide (Linzess) 145 mcg capsule Take 1 capsule (145 mcg) by mouth once daily in the morning. Take before meals. Do not crush or chew. 90 capsule 3 7/19/2024    magnesium oxide (Mag-Ox) 400 mg tablet Take by mouth.   7/19/2024    melatonin 3 mg tablet Take 1 tablet (3 mg) by mouth as needed at bedtime.   7/18/2024    pantoprazole (ProtoNix) 40 mg EC tablet Take 1 tablet (40 mg) by mouth once daily. 90 tablet 3 7/19/2024    POTASSIUM ACETATE, BULK, MISC Take by mouth.   7/18/2024    sucralfate (Carafate) 1 gram tablet Take 1 tablet (1 g) by mouth 4 times a day. Before meals and at bedtime 120 tablet 11 7/19/2024    traZODone (Desyrel) 150 mg tablet Take 1 tablet (150 mg) by mouth once daily at bedtime. 30 tablet 11 7/18/2024    acetaminophen (Tylenol) 325 mg tablet Take 2 tablets (650 mg) by mouth every 4 hours if needed for mild pain (1 - 3).   Unknown    cephalexin (Keflex) 500 mg capsule Take 1 capsule (500 mg) by mouth 2 times a day for 7 days. 14 capsule 0     lactulose 20 gram/30 mL oral solution Take 15 mL (10 g) by mouth 2 times a day as needed (Constipation). 473 mL 1     magnesium hydroxide (Milk of Magnesia) 2,400 mg/10 mL suspension suspension Take 10 mL by mouth once daily as needed.   Unknown    ondansetron ODT (Zofran-ODT) 4 mg disintegrating tablet Take 1 tablet (4 mg) by mouth every 12 hours if needed for nausea or vomiting. 20 tablet 0 Unknown      PMHx:  Past Medical History:   Diagnosis Date    Abdominal pain     Anxiety     Cat bite 03/11/2023    Cataract     Depression     Disease of thyroid gland     GERD (gastroesophageal reflux disease)     GERD (gastroesophageal reflux disease)     Hypothyroidism     Irritable bowel syndrome     Panic attacks     Personal history of malignant neoplasm of breast 06/30/2021    History of malignant neoplasm of breast    Personal history of other  complications of pregnancy, childbirth and the puerperium     History of spontaneous        PSHx:  Past Surgical History:   Procedure Laterality Date    BREAST SURGERY      CATARACT EXTRACTION      COLECTOMY  1992    COLON SURGERY      CT ABDOMEN ANGIOGRAM W AND/OR WO IV CONTRAST  2023    CT ABDOMEN ANGIOGRAM W AND/OR WO IV CONTRAST 2023 CON CT    GASTRIC BYPASS      HYSTERECTOMY      MASTECTOMY, PARTIAL Left     OTHER SURGICAL HISTORY  2021    Varicose vein ligation       Social Hx:  Social History     Socioeconomic History    Marital status:    Tobacco Use    Smoking status: Never     Passive exposure: Never    Smokeless tobacco: Never   Vaping Use    Vaping status: Never Used   Substance and Sexual Activity    Alcohol use: Not Currently    Drug use: Never    Sexual activity: Defer     Social Determinants of Health     Financial Resource Strain: Low Risk  (2024)    Overall Financial Resource Strain (CARDIA)     Difficulty of Paying Living Expenses: Not hard at all   Food Insecurity: No Food Insecurity (2024)    Hunger Vital Sign     Worried About Running Out of Food in the Last Year: Never true     Ran Out of Food in the Last Year: Never true   Transportation Needs: No Transportation Needs (2024)    PRAPARE - Transportation     Lack of Transportation (Medical): No     Lack of Transportation (Non-Medical): No   Stress: No Stress Concern Present (2024)    [place] [place] [employer] - Occupational Stress Questionnaire     Feeling of Stress : Not at all   Social Connections: Socially Isolated (2024)    Social Connection and Isolation Panel [NHANES]     Frequency of Communication with Friends and Family: Never     Frequency of Social Gatherings with Friends and Family: Once a week     Attends Latter day Services: Never     Active Member of Clubs or Organizations: No     Attends Club or Organization Meetings: Never     Marital Status:     Intimate Partner Violence: Not At Risk (1/25/2024)    Humiliation, Afraid, Rape, and Kick questionnaire     Fear of Current or Ex-Partner: No     Emotionally Abused: No     Physically Abused: No     Sexually Abused: No   Housing Stability: Low Risk  (7/19/2024)    Housing Stability Vital Sign     Unable to Pay for Housing in the Last Year: No     Number of Times Moved in the Last Year: 1     Homeless in the Last Year: No       Family Hx:  Family History   Problem Relation Name Age of Onset    Osteoporosis Mother      Alcohol abuse Father      Cancer Sister          breast    Osteoporosis Sister      Other (bladder cancer) Sister      Cancer Brother          colon. prostate    Colon cancer Brother      Colon cancer Brother      Hypothyroidism Other         Review of Systems:   12 system review of symptoms is negative except as noted above          OBJECTIVE  Vitals:   Visit Vitals  /58 (BP Location: Right arm, Patient Position: 3 minute standing)   Pulse 79   Temp 36.6 °C (97.9 °F) (Temporal)   Resp 18        Wt Readings from Last 5 Encounters:   07/19/24 (!) 37.9 kg (83 lb 8.9 oz)   07/19/24 (!) 35.9 kg (79 lb 3.2 oz)   06/24/24 (!) 36 kg (79 lb 6.4 oz)   06/18/24 (!) 37.3 kg (82 lb 3.2 oz)   05/14/24 (!) 37.2 kg (82 lb)       Intake / Output:   I/O last 3 completed shifts:  In: 380 (10 mL/kg) [P.O.:240; I.V.:140 (3.7 mL/kg)]  Out: - (0 mL/kg)   Weight: 37.9 kg      Tele monitoring: SB 60    Physical Examination:    Constitutional:       Appearance: Healthy appearance. Not in distress.   Eyes:      Conjunctiva/sclera: Conjunctivae normal.   Neck:      Vascular: JVD normal.   Pulmonary:      Effort: Pulmonary effort is normal.      Breath sounds: Normal breath sounds.   Cardiovascular:      PMI at left midclavicular line. Normal rate. Regular rhythm. Normal S1. Normal S2.       Murmurs: There is no murmur.      No rub.   Pulses:     Intact distal pulses.   Edema:     Peripheral edema absent.   Abdominal:       "General: Bowel sounds are normal.   Musculoskeletal:      Cervical back: Neck supple. Skin:     General: Skin is warm and dry.   Neurological:      Mental Status: Alert and oriented to person, place and time.            LABS:  CMP:   Recent Labs     07/20/24  0624 07/19/24  1334 05/10/24  0552 05/09/24  0639 05/08/24  0749 05/07/24  0814 05/06/24  0555 05/05/24  1444 01/27/24  0840 01/26/24  0714 01/25/24 1957    134* 139 139 138 138 138 132* 134* 137 135*   K 2.9* 3.6 3.6 3.8 3.3* 3.0* 3.2* 3.2* 3.7 3.7 2.8*    101 106 108* 109* 108* 108* 99 104 108* 103   CO2 24 25 28 21 22 22 23 24 21 22 22   ANIONGAP 14 12 9* 14 10 11 10 12 13 11 13   BUN 9 10 11 6 3* 3* 6 7 9 12 17   CREATININE 0.81 0.87 0.72 0.78 0.81 0.85 0.77 0.87 0.71 0.81 1.00   EGFR 74 68 86 78 75 71 80 69 88 75 58*   MG  --  1.55*  --  2.03 1.42* 1.15* 1.69 1.54*  --  1.65 1.20*     LIVER ENZYMES:   Recent Labs     07/19/24  1334 05/05/24  1444 01/25/24 1957 11/30/23  0938 07/08/23 2152 07/13/22  0952 04/26/21 2009   ALBUMIN 3.8 3.8 4.1 3.9 4.3 4.2 4.2   ALT 14 10 13 14 10 7 13   AST 25 17 21 21 23 16 22   BILITOT 0.4 0.6 0.4 0.5 0.5 0.4 0.8   LIPASE 14 10 41  --  18  --  11     CBC:  Recent Labs     07/20/24 0624 07/19/24  1334 05/10/24  0552 05/09/24  0639 05/08/24  0749 05/07/24  0814 05/06/24  0555 05/05/24  1444   WBC 6.1 5.9 4.4 4.4 5.5 4.6 5.3 5.0   HGB 12.9 12.8 10.9* 11.5* 12.0 11.9* 11.1* 11.9*   HCT 40.2 39.1 34.8* 37.2 37.7 36.4 33.9* 36.2    192 199 188 194 221 230 257   MCV 92 90 94 95 95 91 89 88     COAG:   Recent Labs     05/05/24  1444 04/26/21 2009   INR 1.2* 1.2*     ABO: No results for input(s): \"ABO\" in the last 49958 hours.  HEME/ENDO:  Recent Labs     07/19/24  1334 05/06/24  0555   TSH 2.08 1.83      CARDIAC:   Recent Labs     07/19/24  1548 07/19/24  1334 05/05/24  1625 05/05/24  1444   TROPHS 6 6 8 7   BNP  --  70  --  61       Lipid Panel:  No results found for: \"HDL\", \"CHHDL\", \"VLDL\", \"TRIG\", \"NHDL\" "       Current Medications:   MEDICATIONS  Infusions:     Scheduled:  cephalexin, 500 mg, BID  cholecalciferol, 2,000 Units, Daily  heparin (porcine), 5,000 Units, q8h BRIANNA  hyoscyamine, 0.125 mg, Before meals & nightly  levothyroxine, 50 mcg, Daily  [START ON 7/21/2024] linaCLOtide, 144 mcg, Daily before breakfast  magnesium oxide, 200 mg, Daily  pantoprazole, 40 mg, Daily  pantoprazole, 40 mg, Daily before breakfast  sennosides-docusate sodium, 1 tablet, BID  sucralfate, 1 g, 4x daily  traZODone, 150 mg, Nightly      PRN:  acetaminophen, 650 mg, q4h PRN   Or  acetaminophen, 650 mg, q4h PRN  acetaminophen, 650 mg, q4h PRN  acetaminophen, 650 mg, q4h PRN  calcium carbonate, 500 mg, 4x daily PRN  HYDROcodone-acetaminophen, 1 tablet, q6h PRN  lactulose, 10 g, BID PRN  magnesium hydroxide, 10 mL, Daily PRN  [Held by provider] melatonin, 3 mg, Nightly PRN  melatonin, 5 mg, Nightly PRN  ondansetron, 4 mg, q8h PRN   Or  ondansetron, 4 mg, q8h PRN  ondansetron ODT, 4 mg, q12h PRN          LAST IMAGING RESULTS   CT chest abdomen pelvis w IV contrast  Narrative: Interpreted By:  Maranda Denny,   STUDY:  CT CHEST ABDOMEN PELVIS W IV CONTRAST;  7/19/2024 2:38 pm      INDICATION:  Signs/Symptoms:significant weight loss, bradycardia.      COMPARISON:  CT abdomen pelvis dated 05/09/2024; CT chest dated 03/27/2024      ACCESSION NUMBER(S):  [id number]      ORDERING CLINICIAN:  JUAN ANTONIO SNOWDEN      TECHNIQUE:  CT of the chest, abdomen, and pelvis was performed following the  intravenous administration 50 mL Omnipaque 350. Sagittal and coronal  reconstructions were generated      FINDINGS:  CHEST:      LUNG/PLEURA/LARGE AIRWAYS:  The lungs are hyperinflated.      There is apical pleural disease. There is no discrete consolidation  or pleural fluid.      VESSELS:  There are atherosclerotic changes of the aorta. The cava and main  pulmonary arteries are unremarkable.      HEART:  Heart is normal size.      MEDIASTINUM AND  BRUNO:  There are small mediastinal lymph nodes.      CHEST WALL AND LOWER NECK:  Thyroid is not enlarged.      There is minimal subcutaneous fat.      Patient is markedly kyphotic and scoliotic.. There are degenerative  changes of the spine.      ABDOMEN:      LIVER:  There is a tiny cyst in the left lobe of liver.      BILE DUCTS:  Unremarkable      GALLBLADDER:  Gallbladder appears contracted.      PANCREAS:  Pancreas is not well seen due to the paucity of fat.      SPLEEN:  Unremarkable      ADRENAL GLANDS:  There are no obvious adrenal masses.      KIDNEYS AND URETERS:  Kidneys are not obstructed. There are no renal calculi      PELVIS:      BLADDER:  Bladder is empty.      REPRODUCTIVE ORGANS:  Patient appears to be status post hysterectomy.      BOWEL:  There is no significant bowel distention. There are numerous surgical  clips related loops in the pelvis.      VESSELS:  There are atherosclerotic changes of the aorta. Cava is unremarkable.      PERITONEUM/RETROPERITONEUM/LYMPH NODES:  There is no free air or free fluid. There is no obvious  lymphadenopathy      BONE AND SOFT TISSUE:  There are degenerative changes of the spine and hips.      COMPARISON OF FINDINGS:  The left pleural effusion present previously has resolved.      Impression: CHEST:  Moderately severe emphysema. Moderate kyphosis. No acute  intrathoracic abnormality.      ABDOMEN-PELVIS:  Postop changes. No acute abnormality of the abdomen or pelvis. The  exam is limited by the paucity of fat      Signed by: Maranda Denny 7/19/2024 3:15 PM  Dictation workstation:   [id number]               EKG dated 7/19/2024 independently reviewed   Sinus bradycardia 39       Cardiovascular studies:      Echocardiogram 5/06/2024  CONCLUSIONS:   1. Left ventricular systolic function is normal with a 60-65% estimated ejection fraction.   2. Spectral Doppler shows an impaired relaxation pattern of left ventricular diastolic filling.   3. Mild aortic valve  regurgitation          Assessment:   79 YOF with chronic sinus saloni cardia.  It is unclear whether dizziness is related to the bradycardia or acute illness.  HR is responsive to activity   Preserved LV systolic function  Hypothyroidism on replacement  GERD,    Plan:  Extended Holter to be placed as an outpatient, as previously ordered   Continue hold AV stephanie blocking agents   Ambulate and if she remains asymptomatic with HR responsive to exercise,  ok to DC from a CV perspective            Thank you  for the consult   Will sign off   Please call or message with questions or concerns   The case was discussed with MIRTHA Vance              Electronically signed by MIRTHA Conti on 7/20/2024 at 9:06 AM

## 2024-07-20 NOTE — CARE PLAN
The patient's goals for the shift include      The clinical goals for the shift include PT WILL REPORT DECREASED NAUSEA, DECREASED ABDOMINAL PAIN, AND WILL TOLERATE MAGNESIUM SULFATE IV FLUIDS AS REPLACEMENT, WILL HAVE INCREASED MAGNESIUM BLOOD LEVEL IN AM.    PT COMPLAINT OF ABDOMINAL PAIN, REQUESTED MORPHINE BUT WAS NOT ABLE TO OBTAIN ORDER DUE TO HEALTH CONDITION, DID OBTAIN TRAMADOL  PRN ORDER, PT WAS ASLEEP AND DID NOT WAKE OR REQUEST PAIN MEDICATIONS AGAIN, PT TOLERATED IV INFUSION WITHOUT COMPLAINT/DISTRESS. AWAITING REPEAT BLOOD DRAW THIS AM FOR MAGNESIUM LEVEL.

## 2024-07-20 NOTE — DISCHARGE SUMMARY
Discharge Diagnosis  Symptomatic bradycardia    Discharge Meds     Your medication list        ASK your doctor about these medications        Instructions Last Dose Given Next Dose Due   acetaminophen 325 mg tablet  Commonly known as: Tylenol           cephalexin 500 mg capsule  Commonly known as: Keflex      Take 1 capsule (500 mg) by mouth 2 times a day for 7 days.       hyoscyamine 0.125 mg disintegrating tablet  Commonly known as: Anaspaz      Take 1 tablet (0.125 mg) by mouth before breakfast, before lunch, before evening meal, and at bedtime.       lactulose 20 gram/30 mL oral solution      Take 15 mL (10 g) by mouth 2 times a day as needed (Constipation).       levothyroxine 50 mcg tablet  Commonly known as: Synthroid, Levoxyl      Take 1 tablet (50 mcg) by mouth once daily.       linaCLOtide 145 mcg capsule  Commonly known as: Linzess      Take 1 capsule (145 mcg) by mouth once daily in the morning. Take before meals. Do not crush or chew.       magnesium hydroxide 2,400 mg/10 mL suspension suspension  Commonly known as: Milk of Magnesia           magnesium oxide 400 mg tablet  Commonly known as: Mag-Ox           melatonin 3 mg tablet           ondansetron ODT 4 mg disintegrating tablet  Commonly known as: Zofran-ODT      Take 1 tablet (4 mg) by mouth every 12 hours if needed for nausea or vomiting.       pantoprazole 40 mg EC tablet  Commonly known as: ProtoNix      Take 1 tablet (40 mg) by mouth once daily.       POTASSIUM ACETATE (BULK) MISC           sucralfate 1 gram tablet  Commonly known as: Carafate      Take 1 tablet (1 g) by mouth 4 times a day. Before meals and at bedtime       traZODone 150 mg tablet  Commonly known as: Desyrel      Take 1 tablet (150 mg) by mouth once daily at bedtime.       Vitamin D3 50 mcg (2,000 unit) capsule  Generic drug: cholecalciferol                    Test Results Pending At Discharge  Pending Labs       No current pending labs.            Hospital Course   Latha SPAULDING  Jose Enrique is a 79 y.o. female with PMH of anxiety, depression, GERD, hypothyroidism, IBS, breast ca who was admitted with dizziness, bradycardia and UTI. She has been having dizziness for several weeks, she has been seen for IBS and loss of appetite. She received IVF. She was found to have UTI prior to coming in and her Keflex was continued. She was seen by Cardiology and is recommended to have a Cardiac follow up outpatient. Her potassium and magnesium were corrected while inpatient. Her dizziness resolved after IVF and electrolyte replacements. She was discharged home in stable condition.    Problem List:  #Dizziness  #Unintentional weight loss  #Sinus bradycardia  -TSH w/ reflex pending, 2.08  -EKG reviewed sinus bradycardia, no evidence of AV block.   -IVF bolus  -Cardiology consulted, appreciate recs  -On no AV stephanie blocking agents. HR inc from 55 @ rest to 87 when ambulating to the bathrom   -Nutrition consult   ---follow up outpatient with Cardiology      #Hypokalemia  #Hypomagnesemia  -K 2.9  -Mag 1.55  -increase home mag  -DC on oral potassium  -Give additional 2g IV Mg  ---stable     #UTI  -Pt started on Keflex today for UTI @ PCP office  ---continue Keflex  ---Follow up as needed with PCP     Chronic issues:   #IBS  #Chronic idiopathic constipation  #Anxiety  #Depression  #GERD w/ hx of bleeding ulcer  #COPD not in acute exacerbation  #Hypothyroidism  ---Continue all chronic meds  ---Follow up as needed with PCP    Pertinent Physical Exam At Time of Discharge  Physical Exam  Constitutional:       General: She is not in acute distress.     Appearance: Normal appearance. She is not toxic-appearing.      Comments: cachectic   HENT:      Head: Normocephalic and atraumatic.      Mouth/Throat:      Mouth: Mucous membranes are moist.   Eyes:      Extraocular Movements: Extraocular movements intact.      Pupils: Pupils are equal, round, and reactive to light.   Cardiovascular:      Rate and Rhythm: Regular rhythm.  Bradycardia present.      Pulses: Normal pulses.      Heart sounds: Normal heart sounds. No murmur heard.     No gallop.   Pulmonary:      Effort: Pulmonary effort is normal. No respiratory distress.      Breath sounds: Normal breath sounds. No wheezing, rhonchi or rales.   Abdominal:      General: Bowel sounds are normal. There is no distension.      Palpations: Abdomen is soft.      Tenderness: There is no abdominal tenderness. There is no guarding or rebound.   Musculoskeletal:         General: No swelling, tenderness, deformity or signs of injury. Normal range of motion.      Cervical back: Normal range of motion and neck supple.   Skin:     General: Skin is warm and dry.      Capillary Refill: Capillary refill takes less than 2 seconds.      Coloration: Skin is pale. Skin is not jaundiced.      Findings: No bruising or rash.   Neurological:      General: No focal deficit present.      Mental Status: She is alert and oriented to person, place, and time.      Cranial Nerves: No cranial nerve deficit.      Sensory: No sensory deficit.      Motor: No weakness.      Gait: Gait normal.   Psychiatric:         Mood and Affect: Mood normal.         Behavior: Behavior normal.         Thought Content: Thought content normal.         Judgment: Judgment normal.       Stable for discharge.  Total cumulative time spent in preparation of this discharge including documentation review, coordination of care with the medical team including PT/SW/care coordinators and treating consultants, discussion with patient and pertinent family members and finalization of prescriptions, follow-up appointments, and this discharge summary was approximately 45 minutes.    Outpatient Follow-Up  Future Appointments   Date Time Provider Department Center   7/31/2024 10:40 AM PALLAVI Ruiz [facility]   8/26/2024 10:00 AM GEO [place]-CNP [id number]   10/17/2024 10:40 AM Abhilash Rodriguez MD [id number]         Sasha  LUZ MARIA Jeffrey, APRN-CNP

## 2024-07-21 LAB — BACTERIA UR CULT: ABNORMAL

## 2024-07-22 ENCOUNTER — PATIENT OUTREACH (OUTPATIENT)
Dept: PRIMARY CARE | Facility: CLINIC | Age: 79
End: 2024-07-22
Payer: MEDICARE

## 2024-07-22 LAB
ATRIAL RATE: 39 BPM
P AXIS: 68 DEGREES
P OFFSET: 185 MS
P ONSET: 138 MS
PR INTERVAL: 146 MS
Q ONSET: 211 MS
QRS COUNT: 7 BEATS
QRS DURATION: 98 MS
QT INTERVAL: 506 MS
QTC CALCULATION(BAZETT): 407 MS
QTC FREDERICIA: 438 MS
R AXIS: -61 DEGREES
T AXIS: 76 DEGREES
T OFFSET: 464 MS
VENTRICULAR RATE: 39 BPM

## 2024-07-22 NOTE — PROGRESS NOTES
Discharge Facility:[facility]  Discharge Diagnosis:Symptomatic Bradycardia   Admission Date:7/19/24  Discharge Date: 7/20/24    PCP Appointment Date:7/23/24  Specialist Appointment Date: Urology GI  Hospital Encounter and Summary Linked: Yes      This patient has a follow up scheduled with PCP within 2 business days of DC  This visit qualifies for TCM billing.    Genevieve Thornton LPN

## 2024-07-23 ENCOUNTER — OFFICE VISIT (OUTPATIENT)
Dept: PRIMARY CARE | Facility: CLINIC | Age: 79
End: 2024-07-23
Payer: MEDICARE

## 2024-07-23 VITALS
HEART RATE: 41 BPM | HEIGHT: 60 IN | TEMPERATURE: 97 F | OXYGEN SATURATION: 99 % | WEIGHT: 79 LBS | DIASTOLIC BLOOD PRESSURE: 63 MMHG | SYSTOLIC BLOOD PRESSURE: 117 MMHG | BODY MASS INDEX: 15.51 KG/M2

## 2024-07-23 DIAGNOSIS — R00.1 BRADYCARDIA: Primary | ICD-10-CM

## 2024-07-23 DIAGNOSIS — E87.6 HYPOKALEMIA: ICD-10-CM

## 2024-07-23 PROCEDURE — 1159F MED LIST DOCD IN RCRD: CPT | Performed by: FAMILY MEDICINE

## 2024-07-23 PROCEDURE — 99495 TRANSJ CARE MGMT MOD F2F 14D: CPT | Performed by: FAMILY MEDICINE

## 2024-07-23 ASSESSMENT — ENCOUNTER SYMPTOMS
DEPRESSION: 0
OCCASIONAL FEELINGS OF UNSTEADINESS: 0
LOSS OF SENSATION IN FEET: 0

## 2024-07-23 NOTE — PROGRESS NOTES
Subjective   Patient ID: Latha Quispe is a 79 y.o. female who presents for Follow-up (Hospital follow up /Slow heart beat, low heart rate).    HPI  TCM   Here for follow up of hospitalization 7/19-7/20 for symptomatic bradycardia. Received IVF, potassium and magnesium was corrected. Dizziness resolved, but still having low HR. Was told to follow up with cardiology as outpatient, has not yet made appt.     Review of Systems  General: no fever  Eyes: no blurry vision  ENT: no sore throat, no ear pain  Resp: no cough, sob or wheezing  Cardio: see HPI  Abd: no nausea/vomiting  : no dysuria, no increased urinary frequency      /63   Pulse (!) 41   Temp 36.1 °C (97 °F)   Ht 1.524 m (5')   Wt (!) 35.8 kg (79 lb)   SpO2 99%   BMI 15.43 kg/m²       Objective   Physical Exam  Gen: NAD, alert  Head: normocephalic/atraumatic  Eyes: conjunctivae normal  Ears: canals clear bilaterally, TM normal   Nose: external nose normal   Oropharynx: clear   Resp: Clear to auscultation  CVS: sinus bradycardia  Abdomen: soft, NT, ND  Ext: no edema, NT of lower extremities  Neuro: gait normal     Assessment/Plan   Problem List Items Addressed This Visit       Hypokalemia    Relevant Orders    Basic metabolic panel    Magnesium    Bradycardia - Primary    Relevant Orders    Referral to Cardiology

## 2024-07-31 ENCOUNTER — APPOINTMENT (OUTPATIENT)
Dept: UROLOGY | Facility: CLINIC | Age: 79
End: 2024-07-31
Payer: MEDICARE

## 2024-08-07 ENCOUNTER — PATIENT OUTREACH (OUTPATIENT)
Dept: PRIMARY CARE | Facility: CLINIC | Age: 79
End: 2024-08-07
Payer: MEDICARE

## 2024-08-21 NOTE — PROGRESS NOTES
History Of Present Illness  Latha Quispe is a 79 y.o. female presenting to GI clinic for follow up abdominal pain, constipation. She is here with her .    Patient states she is still having lower abdominal pain, sometimes severe. Pain is increased after having a BM until the afternoon. She has pain again in the evening. Tylenol helps the cramping a little.     She is currently taking Linzess daily about an hour before breakfast.  Patient is only needing lactulose once in awhile , mainly if she has pain going down her legs.     She is having approx 7 bms in the am, BSS 5-6. She is happy that she is moving her bowels regularly and does not want to adjust the dose to a lower dose.     She is careful with seeded foods, salads, spicy foods. Doesn't eat a lot of dairy.    She feels like she is better overall but not where she wants to be yet.     Social History  She reports that she has never smoked. She has never been exposed to tobacco smoke. She has never used smokeless tobacco. She reports that she does not currently use alcohol. She reports that she does not use drugs.  She does not take NSAIDs on a regular basis    Family History  Family History   Problem Relation Name Age of Onset    Osteoporosis Mother      Alcohol abuse Father      Cancer Sister          breast    Osteoporosis Sister      Other (bladder cancer) Sister      Cancer Brother          colon. prostate    Colon cancer Brother      Colon cancer Brother      Hypothyroidism Other       The patient does have a FH of CRC. she does not have a FH of IBD    Review of Systems   Gastrointestinal:  Positive for abdominal pain, constipation (occasional) and diarrhea (from Linzess). Negative for anal bleeding, blood in stool, nausea and vomiting.        See HPI   All other systems reviewed and are negative.        Physical Exam  Constitutional:       Appearance: She is underweight.   HENT:      Head: Normocephalic and atraumatic.   Eyes:       "Conjunctiva/sclera: Conjunctivae normal.      Pupils: Pupils are equal, round, and reactive to light.   Pulmonary:      Effort: Pulmonary effort is normal.   Abdominal:      General: Bowel sounds are normal. There is no distension.      Palpations: Abdomen is soft. There is no mass.      Tenderness: There is no abdominal tenderness. There is no guarding or rebound.      Hernia: No hernia is present.   Musculoskeletal:         General: Normal range of motion.      Cervical back: Normal range of motion.   Skin:     General: Skin is warm and dry.      Coloration: Skin is not jaundiced.   Neurological:      Mental Status: She is alert and oriented to person, place, and time. Mental status is at baseline.   Psychiatric:         Mood and Affect: Mood normal.         Behavior: Behavior normal.          Last Vital Signs  /64 (BP Location: Left arm, Patient Position: Sitting, BP Cuff Size: Small adult)   Pulse (!) 48   Temp 36.6 °C (97.9 °F)   Resp 16   Ht 1.524 m (5')   Wt (!) 35.3 kg (77 lb 12.8 oz)   SpO2 98%   BMI 15.19 kg/m²      Relevant Results  Lab Results   Component Value Date    WBC 6.1 07/20/2024    HGB 12.9 07/20/2024    HCT 40.2 07/20/2024    MCV 92 07/20/2024     07/20/2024      Lab Results   Component Value Date    GLUCOSE 96 07/20/2024    CALCIUM 9.2 07/20/2024     (L) 07/20/2024    K 4.8 07/20/2024    CO2 22 07/20/2024     07/20/2024    BUN 9 07/20/2024    CREATININE 0.87 07/20/2024      Lab Results   Component Value Date    ALT 14 07/19/2024    AST 25 07/19/2024    ALKPHOS 96 07/19/2024    BILITOT 0.4 07/19/2024    INR 1.2 (H) 05/05/2024    No results found for: \"IRON\", \"TIBC\", \"IRONSAT\", \"FERRITIN\", \"EJASMRHK98\", \"FOLATE\"    Lab Results   Component Value Date    CRP <0.10 05/05/2024      Lab Results   Component Value Date    LIPASE 14 07/19/2024    LIPASE 10 05/05/2024    LIPASE 41 01/25/2024    LIPASE 18 07/08/2023    LIPASE 11 04/26/2021    No results found for: \"HGBA1C\" "   History of gastrojejunostomy in 1992 for treatment of ulcers and 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    EGD/COLONOSCOPY  EGD 05/05/24 with Dr. Courtney- Findings  The upper third of the esophagus, middle third of the esophagus and lower third of the esophagus appeared normal.  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  The afferent limb was difficult to visualize.     EGD 01/02/24 with Dr. Diez- Findings  Performed forceps biopsies in the stomach  Regular Z-line 32 cm from the incisors  FOOD in the greater curve of the stomach   FINAL DIAGNOSIS   A. STOMACH ANTRUM BIOPSY:    -- GASTRIC MUCOSA WITH MILD CHRONIC NONSPECIFIC GASTRITIS AND REACTIVE GASTROPATHY  -- HELICOBACTER PYLORI NOT IDENTIFIED       EUS 3/15/2022 with Dr. Caba- Impression:            EGD:                         - Normal esophagus.                         - Z-line, 36 cm from the incisors.                         - Erythematous mucosa in the stomach. Biopsied.                         - A gastroenterostomy was found, characterized by an                          intact appearance and healthy appearing mucosa. See                          Findings above.                         - Normal ampulla and examined duodenal limb.                         - Duodenal limb was examined in its entirety. At end,                          mucosal changes with altered texture and friability                          noted. Biopsied.                         - Normal examined jejunal limb.                         EUS:                         - The pancreatic duct had a dilated endosonographic                          appearance, had a tortuous/ectatic appearance and had                          hyperechoic walls in the main pancreatic duct. The                          pancreatic duct measured up to 5 mm in diameter in the                          neck and 3 mm  in the tail.                         - Pancreatic parenchymal abnormalities consisting of                          hyperechoic foci were noted in the genu of the                          pancreas, pancreatic body and pancreatic tail.                         - No pancreatic mass was seen, however head of                          pancreas incompletely visualized due to patient's                          post-surgical anatomy.                         - There was no evidence of significant pathology in                          the visualized portion of the liver.  FINAL DIAGNOSIS  A.  SMALL BOWEL, COLD FORCEPS:    --SMALL INTESTINAL MUCOSA WITH FOCAL ACUTE INFLAMMATION, GASTRIC MUCIN CELL METAPLASIA AND HISTIOCYTIC INFILTRATION, SEE NOTE.    Note: Immunohistochemical study shows the histiocytes are positive for CD 68,  and are negative for S100.  GMS, PAS/D and AFB stains are negative for microorganisms.    B.  STOMACH BIOPSY, COLD FORCEPS:    --GASTRIC OXYNTIC MUCOSA WITH MILD CHRONIC INFLAMMATION AND FEATURES SUGGESTIVE OF REACTIVE GASTROPATHY.  --NO HELICOBACTER PYLORI-LIKE ORGANISMS SEEN IN ROUTINE STAINED SECTIONS.       EGD 12/31/2020 with Dr. Guzmán- moderate food residue in stomach, hx Vadim en Y due to previous ulcers and negative biopsies for h.pylori.      Colonoscopy 12/31/2020 with Dr. Guzmán (modified due to altered anatomy) which noted friable anastomosis but otherwise WNL. 5 year follow up recommended.     EGD 12/23/2020 with Dr. Guzmán- ESOPHAGUS: The entire examined esophagus appeared normal.  GE JUNCTION: The Z-line was located at 34 cm from the incissors and appeared regular.  STOMACH: There was a large amount of food debris in the stomach which limited views. There was no gross blood seen. There appeared to be postoperative changes with a possible diverticulum in the stomach. The gastrojejunal anastomosis appeared to be at 45 cm from the incisors without any ulceration seen. Because of  amount of food present in the stomach the procedure was abbreviated for patient safety to avoid aspiration and the jejunum was not intubated.      EGD 10/17/2016 with Dr. Huerta- ESOPHAGUS: The distal esophagus had several concentric rings but there were nonobstructing.  STOMACH: There was evidence of previous gastric surgery (Vadim-en-Y). The gastrojejunal anastomosis was friable with 2 small punctate ulcerations identified. The mucosa bled easily on contact and with biopsy. The jejunal loop beyond the anastomosis appeared normal. The gastric mucosa along the suture line in the stomach was edematous and erythematous. Multiple cold biopsies were also obtained from the stomach to rule out H. pylori infection.      EGD 8/15/2016 with Dr. Celis ESOPHAGUS: The distal esophagus was noted to be tortuous with a nonobstructing Schatzki's ring at the GE junction.  STOMACH: There was evidence of previous gastric resection secondary to peptic ulcer disease. A Billroth II anastomosis was identified. Both the limbs were entered and examined. He fair in limb demonstrated some edema and erythema which was biopsied using cold biopsy forceps to rule out celiac disease. At the anastomosis there was a small benign-appearing ulcer identified. Cold biopsies were obtained to rule out malignancy. In addition, the proximal body of the stomach demonstrated a linear erythema and edema which was biopsied using cold forceps to rule out the presence of H. pylori infection. Retroflexion of the gastroscope in the antrum demonstrated a small sliding-type hiatal hernia.   FINAL DIAGNOSIS  A.  Anastomosis Site, Biopsy - CHRONIC AND ACTIVE GASTRITIS WITH ACUTE  ULCERATION AND FOCAL INTESTINAL METAPLASIA.  Comment:  Giemsa stain is negative for Helicobacter pylori.  An Alcian Blue/PAS stain confirms the presence of intestinal metaplasia.  There is no evidence of dysplasia or malignancy.    B.  Small Intestine, Biopsy - NO SIGNIFICANT PATHOLOGIC  CHANGE.  Comment:  The villous architecture is preserved and there is no evidence of  intraepithelial lymphocytosis. No granulomas or parasites are seen.    C.  Stomach, Body, Biopsy - CHRONIC ACTIVE GASTRITIS.  Comment:  Giemsa stain is negative for Helicobacter pylori.       Colonoscopy (flex sig due to altered anatomy) 8/15/2016 with Dr. Huerta- Findings:  RECTAL EXAM: Normal sphincter tone, no palpable masses.  COLON: The colonoscope was inserted into the rectum and advanced to approximately 20 cm from the anal verge where the ileocolonic anastomosis was identified. This was an end-to-side anastomosis. The anastomosis had some friable mucosa along its margin. No active bleeding. No ulcer identified. Residual vegetable debris was noted near the anastomotic region. The scope was then retracted back in the rectum is retroflexed demonstrating grade 2 nonbleeding internal hemorrhoids.      EGD 4/21/2015 with Dr. Garrison-Findings:  ESOPHAGUS: There was a very small sliding (1cm) hiatal hernia. The esophagus appeared otherwise normal.   GE JUNCTION: The Z-line was located at cm from the gums and appeared irregular. No erosive esophagitis was noted at the GE junction. Also no lesion of the lower esophagus mentioned in the upper GI series was seen.  STOMACH: The antrum of the stomach is missing (previous gastrectomy). There was slight dilation of the stomach ending in a double barrel opening. The more proximal opening is blind ended while the second one is slightly narrower than the expected, accommodating the gastroscope easily through it. Based on the se findings the previous procedure must have been Vagotomy with antrectomy and Vadim en Y anastomosis. There were two lesions, one in the stomach closure line and a second one at the G-J anastomosis. Both were biopsied/removed with biopsy forceps.   FINAL DIAGNOSIS  A.  Gastric Lesion, Biopsy - SMALL FRAGMENT OF GASTRIC FUNDIC/BODY TYPE MUCOSA WITH FOCAL ACUTE EROSIVE  GASTRITIS.  Comment: Focal fibrinoinflammatory exudate is present. There is minimal chronic inflammation. Giemsa stain is negative for Helicobacter pylori.  An Alcian blue/PAS stain reveals no evidence of intestinal metaplasia.    B.  Gastrojejunal Junction Polyp, Biopsy - ACUTE INFLAMMATION WITH GRANULATION TISSUE.  -  NEGATIVE FOR DYSPLASIA OR MALIGNANCY.       Colonoscopy 4/21/2015 with Dr. Garrison- Findings: 1) Extensive melanosis coli, due chronic abuse of laxatives  2) Redundancy and dilation of a multi-curved colon. No anatomic abnormalities, causing obstruction were seen though       IMAGING  === 07/19/24 ===  CT CHEST ABDOMEN PELVIS W IV CONTRAST  - Impression -  CHEST:  Moderately severe emphysema. Moderate kyphosis. No acute intrathoracic abnormality.    ABDOMEN-PELVIS:  Postop changes. No acute abnormality of the abdomen or pelvis. The exam is limited by the paucity of fat    === 07/08/24 ===  NM GASTRIC EMPTYING SOLID   12% %  at 1 hr    (normal max 90%)  5% %  at 2 hr    (normal max 60%)      IMPRESSION  1. Rapid gastric emptying without evidence of gastroparesis (Pt was taking prescribed Linzess)    === 05/05/24 ===  CT ABDOMEN PELVIS W IV CONTRAST  - Impression -  1.  No acute findings in the abdomen and pelvis.  2. Few fluid-filled small bowel loops in the lower abdomen without definite segmental distention to suggest obstruction.  3. Redemonstration of diffusely dilated main pancreatic duct, similar compared to prior imaging dating back to 2021. This was worked up by MRI and CT pancreas protocol in 2022. Recommend correlation with prior reports.  4. Cholelithiasis without acute cholecystitis.  5. Diffuse osseous demineralization  Assessment & Plan  Irritable bowel syndrome with diarrhea  Having diarrhea, currently on Linzess.  Only complaint is abdominal pain in the mornings and evenings.  Will increase hyoscyamine to 2 tabs before meals and at bedtime  Patient to notify me if this is  ineffective, if so, will trial low-dose TCA and decrease hyoscyamine back to 1 pill ACHS  Orders:    hyoscyamine 0.125 mg disintegrating tablet; Place 2 tablets (0.25 mg) under the tongue 4 times a day before meals.    Follow-up in approximately 2 months    GEO [place]-CNP

## 2024-08-22 ENCOUNTER — PATIENT OUTREACH (OUTPATIENT)
Dept: PRIMARY CARE | Facility: CLINIC | Age: 79
End: 2024-08-22
Payer: MEDICARE

## 2024-08-23 DIAGNOSIS — K58.0 IRRITABLE BOWEL SYNDROME WITH DIARRHEA: ICD-10-CM

## 2024-08-23 RX ORDER — HYOSCYAMINE SULFATE 0.12 MG/1
TABLET, ORALLY DISINTEGRATING ORAL
Qty: 120 TABLET | Refills: 1 | Status: SHIPPED | OUTPATIENT
Start: 2024-08-23

## 2024-08-26 ENCOUNTER — APPOINTMENT (OUTPATIENT)
Dept: GASTROENTEROLOGY | Facility: CLINIC | Age: 79
End: 2024-08-26
Payer: MEDICARE

## 2024-08-26 VITALS
DIASTOLIC BLOOD PRESSURE: 64 MMHG | WEIGHT: 77.8 LBS | BODY MASS INDEX: 15.27 KG/M2 | SYSTOLIC BLOOD PRESSURE: 124 MMHG | OXYGEN SATURATION: 98 % | HEIGHT: 60 IN | HEART RATE: 48 BPM | TEMPERATURE: 97.9 F | RESPIRATION RATE: 16 BRPM

## 2024-08-26 DIAGNOSIS — K58.0 IRRITABLE BOWEL SYNDROME WITH DIARRHEA: ICD-10-CM

## 2024-08-26 PROCEDURE — 99214 OFFICE O/P EST MOD 30 MIN: CPT

## 2024-08-26 PROCEDURE — 1125F AMNT PAIN NOTED PAIN PRSNT: CPT

## 2024-08-26 PROCEDURE — 1036F TOBACCO NON-USER: CPT

## 2024-08-26 PROCEDURE — 1160F RVW MEDS BY RX/DR IN RCRD: CPT

## 2024-08-26 PROCEDURE — 1159F MED LIST DOCD IN RCRD: CPT

## 2024-08-26 RX ORDER — HYOSCYAMINE SULFATE 0.12 MG/1
0.25 TABLET, ORALLY DISINTEGRATING ORAL
Qty: 720 TABLET | Refills: 3 | Status: SHIPPED | OUTPATIENT
Start: 2024-08-26 | End: 2025-08-26

## 2024-08-26 ASSESSMENT — ENCOUNTER SYMPTOMS
ABDOMINAL PAIN: 1
VOMITING: 0
ANAL BLEEDING: 0
DIARRHEA: 1
NAUSEA: 0
BLOOD IN STOOL: 0
CONSTIPATION: 1
ROS GI COMMENTS: SEE HPI

## 2024-08-26 ASSESSMENT — PAIN SCALES - GENERAL: PAINLEVEL: 8

## 2024-08-26 NOTE — ASSESSMENT & PLAN NOTE
Having diarrhea, currently on Linzess.  Only complaint is abdominal pain in the mornings and evenings.  Will increase hyoscyamine to 2 tabs before meals and at bedtime  Patient to notify me if this is ineffective, if so, will trial low-dose TCA and decrease hyoscyamine back to 1 pill ACHS  Orders:    hyoscyamine 0.125 mg disintegrating tablet; Place 2 tablets (0.25 mg) under the tongue 4 times a day before meals.

## 2024-08-26 NOTE — PATIENT INSTRUCTIONS
Continue the Linzess 145 mcg daily on an empty stomach at least 30 mins before breakfast  Continue lactulose as needed for breakthrough constipation  Continue pantoprazole and Carafate    Increase hyoscyamine under tongue to 2 pills before meals and at bedtime.   Let me know if the increase works or if you want to try something different     Follow up in 2 month

## 2024-08-29 ENCOUNTER — APPOINTMENT (OUTPATIENT)
Dept: PRIMARY CARE | Facility: CLINIC | Age: 79
End: 2024-08-29
Payer: MEDICARE

## 2024-08-29 VITALS
HEIGHT: 60 IN | DIASTOLIC BLOOD PRESSURE: 60 MMHG | SYSTOLIC BLOOD PRESSURE: 114 MMHG | WEIGHT: 76.4 LBS | HEART RATE: 47 BPM | BODY MASS INDEX: 15 KG/M2 | TEMPERATURE: 97 F | OXYGEN SATURATION: 99 %

## 2024-08-29 DIAGNOSIS — R13.10 DYSPHAGIA, UNSPECIFIED TYPE: ICD-10-CM

## 2024-08-29 DIAGNOSIS — R00.1 BRADYCARDIA: Primary | ICD-10-CM

## 2024-08-29 PROCEDURE — 99212 OFFICE O/P EST SF 10 MIN: CPT | Performed by: FAMILY MEDICINE

## 2024-08-29 PROCEDURE — 1036F TOBACCO NON-USER: CPT | Performed by: FAMILY MEDICINE

## 2024-08-29 PROCEDURE — 1159F MED LIST DOCD IN RCRD: CPT | Performed by: FAMILY MEDICINE

## 2024-08-29 ASSESSMENT — PATIENT HEALTH QUESTIONNAIRE - PHQ9
SUM OF ALL RESPONSES TO PHQ9 QUESTIONS 1 AND 2: 0
2. FEELING DOWN, DEPRESSED OR HOPELESS: NOT AT ALL
1. LITTLE INTEREST OR PLEASURE IN DOING THINGS: NOT AT ALL
SUM OF ALL RESPONSES TO PHQ9 QUESTIONS 1 AND 2: 0
1. LITTLE INTEREST OR PLEASURE IN DOING THINGS: NOT AT ALL
2. FEELING DOWN, DEPRESSED OR HOPELESS: NOT AT ALL

## 2024-08-29 NOTE — PROGRESS NOTES
Subjective   Patient ID: Latha Quispe is a 79 y.o. female who presents for Follow-up (Stress test results.).  HPI  Following with cardiology (Dr. Hernandez) for bradycardia. Got stress test done, normal. Awaiting results of her 3 day event monitor.   Has appt with cardiology next week.   Patient also has been having trouble eating, choking on food, following with GI. Had EGD a few months ago. Has appt again in a few months. Having issues with her dentures, so not sure chewing properly, will be talking with her dentist.    Review of Systems  General: no fever  Eyes: no blurry vision  ENT: no sore throat, no ear pain  Resp: no cough, sob or wheezing  Cardio: no chest pain, no palpitations  Abd: no nausea/vomiting  : no dysuria, no increased urinary frequency      /60   Pulse (!) 46   Temp 36.1 °C (97 °F)   Ht 1.524 m (5')   Wt (!) 34.7 kg (76 lb 6.4 oz)   SpO2 99%   BMI 14.92 kg/m²       Objective   Physical Exam  Gen: NAD, alert  Head: normocephalic/atraumatic  Eyes: conjunctivae normal  Ears: canals clear bilaterally, TM normal   Nose: external nose normal   Oropharynx: clear   Resp: Clear to auscultation  CVS: Regular rate and rhythm  Abdomen: soft, NT, ND  Ext: no edema, NT of lower extremities    Assessment/Plan   Problem List Items Addressed This Visit       Bradycardia  Continue follow up with cardiology     Other Visit Diagnoses       Dysphagia, unspecified type      Continue follow up with GI

## 2024-09-03 ENCOUNTER — TELEPHONE (OUTPATIENT)
Dept: PRIMARY CARE | Facility: CLINIC | Age: 79
End: 2024-09-03
Payer: MEDICARE

## 2024-09-03 DIAGNOSIS — R39.9 URINARY SYMPTOM OR SIGN: Primary | ICD-10-CM

## 2024-09-03 RX ORDER — PHENAZOPYRIDINE HYDROCHLORIDE 100 MG/1
100 TABLET, FILM COATED ORAL 3 TIMES DAILY PRN
Qty: 21 TABLET | Refills: 0 | Status: SHIPPED | OUTPATIENT
Start: 2024-09-03 | End: 2024-09-05 | Stop reason: WASHOUT

## 2024-09-03 NOTE — TELEPHONE ENCOUNTER
Patient called having urinary symptoms of dysuria, started taking cephalexin at home, wanting pyridium for the dysuria. Sent. Also ordered culture

## 2024-09-05 ENCOUNTER — TELEPHONE (OUTPATIENT)
Dept: PRIMARY CARE | Facility: CLINIC | Age: 79
End: 2024-09-05
Payer: MEDICARE

## 2024-09-06 ENCOUNTER — LAB (OUTPATIENT)
Dept: LAB | Facility: LAB | Age: 79
End: 2024-09-06
Payer: MEDICARE

## 2024-09-06 ENCOUNTER — HOSPITAL ENCOUNTER (INPATIENT)
Facility: HOSPITAL | Age: 79
End: 2024-09-06
Attending: INTERNAL MEDICINE | Admitting: INTERNAL MEDICINE
Payer: MEDICARE

## 2024-09-06 ENCOUNTER — TELEPHONE (OUTPATIENT)
Dept: PRIMARY CARE | Facility: CLINIC | Age: 79
End: 2024-09-06

## 2024-09-06 ENCOUNTER — APPOINTMENT (OUTPATIENT)
Dept: CARDIOLOGY | Facility: HOSPITAL | Age: 79
End: 2024-09-06
Payer: MEDICARE

## 2024-09-06 ENCOUNTER — APPOINTMENT (OUTPATIENT)
Dept: RADIOLOGY | Facility: HOSPITAL | Age: 79
End: 2024-09-06
Payer: MEDICARE

## 2024-09-06 DIAGNOSIS — R11.0 NAUSEA: Primary | ICD-10-CM

## 2024-09-06 DIAGNOSIS — N39.0 UTI (URINARY TRACT INFECTION) WITH PYURIA: ICD-10-CM

## 2024-09-06 DIAGNOSIS — N39.0 URINARY TRACT INFECTION WITHOUT HEMATURIA, SITE UNSPECIFIED: ICD-10-CM

## 2024-09-06 DIAGNOSIS — R39.9 URINARY SYMPTOM OR SIGN: ICD-10-CM

## 2024-09-06 DIAGNOSIS — E87.6 HYPOKALEMIA: ICD-10-CM

## 2024-09-06 DIAGNOSIS — R31.21 ASYMPTOMATIC MICROSCOPIC HEMATURIA: ICD-10-CM

## 2024-09-06 DIAGNOSIS — E87.1 HYPONATREMIA: ICD-10-CM

## 2024-09-06 LAB
ALBUMIN SERPL BCP-MCNC: 3.6 G/DL (ref 3.4–5)
ALP SERPL-CCNC: 83 U/L (ref 33–136)
ALT SERPL W P-5'-P-CCNC: 14 U/L (ref 7–45)
AMYLASE SERPL-CCNC: 34 U/L (ref 29–103)
ANION GAP SERPL CALC-SCNC: 12 MMOL/L (ref 10–20)
ANION GAP SERPL CALC-SCNC: 15 MMOL/L (ref 10–20)
APPEARANCE UR: ABNORMAL
AST SERPL W P-5'-P-CCNC: 23 U/L (ref 9–39)
BACTERIA #/AREA URNS AUTO: ABNORMAL /HPF
BASOPHILS # BLD AUTO: 0.04 X10*3/UL (ref 0–0.1)
BASOPHILS NFR BLD AUTO: 0.5 %
BILIRUB SERPL-MCNC: 0.5 MG/DL (ref 0–1.2)
BILIRUB UR STRIP.AUTO-MCNC: NEGATIVE MG/DL
BUN SERPL-MCNC: 7 MG/DL (ref 6–23)
BUN SERPL-MCNC: 7 MG/DL (ref 6–23)
CALCIUM SERPL-MCNC: 8.7 MG/DL (ref 8.6–10.3)
CALCIUM SERPL-MCNC: 9.3 MG/DL (ref 8.6–10.3)
CARDIAC TROPONIN I PNL SERPL HS: 5 NG/L (ref 0–13)
CARDIAC TROPONIN I PNL SERPL HS: 7 NG/L (ref 0–13)
CHLORIDE SERPL-SCNC: 95 MMOL/L (ref 98–107)
CHLORIDE SERPL-SCNC: 98 MMOL/L (ref 98–107)
CO2 SERPL-SCNC: 21 MMOL/L (ref 21–32)
CO2 SERPL-SCNC: 27 MMOL/L (ref 21–32)
COLOR UR: ABNORMAL
CREAT SERPL-MCNC: 0.9 MG/DL (ref 0.5–1.05)
CREAT SERPL-MCNC: 0.9 MG/DL (ref 0.5–1.05)
EGFRCR SERPLBLD CKD-EPI 2021: 66 ML/MIN/1.73M*2
EGFRCR SERPLBLD CKD-EPI 2021: 66 ML/MIN/1.73M*2
EOSINOPHIL # BLD AUTO: 0.01 X10*3/UL (ref 0–0.4)
EOSINOPHIL NFR BLD AUTO: 0.1 %
ERYTHROCYTE [DISTWIDTH] IN BLOOD BY AUTOMATED COUNT: 13.3 % (ref 11.5–14.5)
GLUCOSE SERPL-MCNC: 129 MG/DL (ref 74–99)
GLUCOSE SERPL-MCNC: 163 MG/DL (ref 74–99)
GLUCOSE UR STRIP.AUTO-MCNC: NORMAL MG/DL
HCT VFR BLD AUTO: 38.2 % (ref 36–46)
HGB BLD-MCNC: 12.5 G/DL (ref 12–16)
HOLD SPECIMEN: NORMAL
HYALINE CASTS #/AREA URNS AUTO: ABNORMAL /LPF
IMM GRANULOCYTES # BLD AUTO: 0.02 X10*3/UL (ref 0–0.5)
IMM GRANULOCYTES NFR BLD AUTO: 0.3 % (ref 0–0.9)
KETONES UR STRIP.AUTO-MCNC: NEGATIVE MG/DL
LEUKOCYTE ESTERASE UR QL STRIP.AUTO: ABNORMAL
LIPASE SERPL-CCNC: 10 U/L (ref 9–82)
LYMPHOCYTES # BLD AUTO: 0.35 X10*3/UL (ref 0.8–3)
LYMPHOCYTES NFR BLD AUTO: 4.5 %
MAGNESIUM SERPL-MCNC: 1.44 MG/DL (ref 1.6–2.4)
MCH RBC QN AUTO: 28.9 PG (ref 26–34)
MCHC RBC AUTO-ENTMCNC: 32.7 G/DL (ref 32–36)
MCV RBC AUTO: 88 FL (ref 80–100)
MONOCYTES # BLD AUTO: 0.49 X10*3/UL (ref 0.05–0.8)
MONOCYTES NFR BLD AUTO: 6.3 %
MUCOUS THREADS #/AREA URNS AUTO: ABNORMAL /LPF
NEUTROPHILS # BLD AUTO: 6.87 X10*3/UL (ref 1.6–5.5)
NEUTROPHILS NFR BLD AUTO: 88.3 %
NITRITE UR QL STRIP.AUTO: NEGATIVE
NRBC BLD-RTO: 0 /100 WBCS (ref 0–0)
PH UR STRIP.AUTO: 6 [PH]
PLATELET # BLD AUTO: 242 X10*3/UL (ref 150–450)
POTASSIUM SERPL-SCNC: 3.2 MMOL/L (ref 3.5–5.3)
POTASSIUM SERPL-SCNC: 3.8 MMOL/L (ref 3.5–5.3)
PROT SERPL-MCNC: 6.4 G/DL (ref 6.4–8.2)
PROT UR STRIP.AUTO-MCNC: ABNORMAL MG/DL
RBC # BLD AUTO: 4.32 X10*6/UL (ref 4–5.2)
RBC # UR STRIP.AUTO: ABNORMAL /UL
RBC #/AREA URNS AUTO: ABNORMAL /HPF
SODIUM SERPL-SCNC: 130 MMOL/L (ref 136–145)
SODIUM SERPL-SCNC: 131 MMOL/L (ref 136–145)
SP GR UR STRIP.AUTO: 1.01
SQUAMOUS #/AREA URNS AUTO: ABNORMAL /HPF
UROBILINOGEN UR STRIP.AUTO-MCNC: NORMAL MG/DL
WBC # BLD AUTO: 7.8 X10*3/UL (ref 4.4–11.3)
WBC #/AREA URNS AUTO: ABNORMAL /HPF

## 2024-09-06 PROCEDURE — 80048 BASIC METABOLIC PNL TOTAL CA: CPT | Performed by: PHYSICIAN ASSISTANT

## 2024-09-06 PROCEDURE — 36415 COLL VENOUS BLD VENIPUNCTURE: CPT | Performed by: PHYSICIAN ASSISTANT

## 2024-09-06 PROCEDURE — 2500000004 HC RX 250 GENERAL PHARMACY W/ HCPCS (ALT 636 FOR OP/ED): Performed by: PHYSICIAN ASSISTANT

## 2024-09-06 PROCEDURE — 82150 ASSAY OF AMYLASE: CPT | Performed by: PHYSICIAN ASSISTANT

## 2024-09-06 PROCEDURE — 96365 THER/PROPH/DIAG IV INF INIT: CPT | Mod: 59

## 2024-09-06 PROCEDURE — 81001 URINALYSIS AUTO W/SCOPE: CPT | Performed by: PHYSICIAN ASSISTANT

## 2024-09-06 PROCEDURE — 93005 ELECTROCARDIOGRAM TRACING: CPT

## 2024-09-06 PROCEDURE — G0378 HOSPITAL OBSERVATION PER HR: HCPCS

## 2024-09-06 PROCEDURE — 87186 SC STD MICRODIL/AGAR DIL: CPT

## 2024-09-06 PROCEDURE — 2500000005 HC RX 250 GENERAL PHARMACY W/O HCPCS: Performed by: NURSE PRACTITIONER

## 2024-09-06 PROCEDURE — 74176 CT ABD & PELVIS W/O CONTRAST: CPT

## 2024-09-06 PROCEDURE — 96361 HYDRATE IV INFUSION ADD-ON: CPT

## 2024-09-06 PROCEDURE — 84484 ASSAY OF TROPONIN QUANT: CPT | Performed by: PHYSICIAN ASSISTANT

## 2024-09-06 PROCEDURE — 2500000002 HC RX 250 W HCPCS SELF ADMINISTERED DRUGS (ALT 637 FOR MEDICARE OP, ALT 636 FOR OP/ED): Performed by: NURSE PRACTITIONER

## 2024-09-06 PROCEDURE — 2500000005 HC RX 250 GENERAL PHARMACY W/O HCPCS

## 2024-09-06 PROCEDURE — 74176 CT ABD & PELVIS W/O CONTRAST: CPT | Mod: FOREIGN READ | Performed by: RADIOLOGY

## 2024-09-06 PROCEDURE — 94760 N-INVAS EAR/PLS OXIMETRY 1: CPT

## 2024-09-06 PROCEDURE — 96375 TX/PRO/DX INJ NEW DRUG ADDON: CPT

## 2024-09-06 PROCEDURE — 87040 BLOOD CULTURE FOR BACTERIA: CPT | Mod: GENLAB | Performed by: PHYSICIAN ASSISTANT

## 2024-09-06 PROCEDURE — 2500000001 HC RX 250 WO HCPCS SELF ADMINISTERED DRUGS (ALT 637 FOR MEDICARE OP): Performed by: NURSE PRACTITIONER

## 2024-09-06 PROCEDURE — 96372 THER/PROPH/DIAG INJ SC/IM: CPT

## 2024-09-06 PROCEDURE — 2500000004 HC RX 250 GENERAL PHARMACY W/ HCPCS (ALT 636 FOR OP/ED)

## 2024-09-06 PROCEDURE — 83690 ASSAY OF LIPASE: CPT | Performed by: PHYSICIAN ASSISTANT

## 2024-09-06 PROCEDURE — 96367 TX/PROPH/DG ADDL SEQ IV INF: CPT

## 2024-09-06 PROCEDURE — 87086 URINE CULTURE/COLONY COUNT: CPT | Mod: GENLAB | Performed by: PHYSICIAN ASSISTANT

## 2024-09-06 PROCEDURE — 85025 COMPLETE CBC W/AUTO DIFF WBC: CPT | Performed by: PHYSICIAN ASSISTANT

## 2024-09-06 PROCEDURE — 2500000001 HC RX 250 WO HCPCS SELF ADMINISTERED DRUGS (ALT 637 FOR MEDICARE OP)

## 2024-09-06 PROCEDURE — 99285 EMERGENCY DEPT VISIT HI MDM: CPT | Mod: 25

## 2024-09-06 PROCEDURE — 80053 COMPREHEN METABOLIC PANEL: CPT

## 2024-09-06 PROCEDURE — 87086 URINE CULTURE/COLONY COUNT: CPT

## 2024-09-06 PROCEDURE — 71045 X-RAY EXAM CHEST 1 VIEW: CPT | Performed by: RADIOLOGY

## 2024-09-06 PROCEDURE — 71045 X-RAY EXAM CHEST 1 VIEW: CPT

## 2024-09-06 PROCEDURE — 96366 THER/PROPH/DIAG IV INF ADDON: CPT

## 2024-09-06 RX ORDER — GUAIFENESIN 600 MG/1
600 TABLET, EXTENDED RELEASE ORAL EVERY 12 HOURS PRN
Status: ACTIVE | OUTPATIENT
Start: 2024-09-06

## 2024-09-06 RX ORDER — CEFTRIAXONE 1 G/50ML
1 INJECTION, SOLUTION INTRAVENOUS ONCE
Status: COMPLETED | OUTPATIENT
Start: 2024-09-06 | End: 2024-09-06

## 2024-09-06 RX ORDER — ACETAMINOPHEN 325 MG/1
650 TABLET ORAL EVERY 4 HOURS PRN
Status: ACTIVE | OUTPATIENT
Start: 2024-09-06

## 2024-09-06 RX ORDER — ONDANSETRON HYDROCHLORIDE 2 MG/ML
4 INJECTION, SOLUTION INTRAVENOUS EVERY 8 HOURS PRN
Status: DISCONTINUED | OUTPATIENT
Start: 2024-09-06 | End: 2024-09-07

## 2024-09-06 RX ORDER — PANTOPRAZOLE SODIUM 40 MG/10ML
40 INJECTION, POWDER, LYOPHILIZED, FOR SOLUTION INTRAVENOUS
Status: DISPENSED | OUTPATIENT
Start: 2024-09-07

## 2024-09-06 RX ORDER — ACETAMINOPHEN 160 MG/5ML
650 SOLUTION ORAL EVERY 4 HOURS PRN
Status: DISCONTINUED | OUTPATIENT
Start: 2024-09-06 | End: 2024-09-06

## 2024-09-06 RX ORDER — ONDANSETRON 4 MG/1
4 TABLET, ORALLY DISINTEGRATING ORAL EVERY 8 HOURS PRN
Status: DISCONTINUED | OUTPATIENT
Start: 2024-09-06 | End: 2024-09-07

## 2024-09-06 RX ORDER — HEPARIN SODIUM 5000 [USP'U]/ML
5000 INJECTION, SOLUTION INTRAVENOUS; SUBCUTANEOUS EVERY 8 HOURS SCHEDULED
Status: DISPENSED | OUTPATIENT
Start: 2024-09-06

## 2024-09-06 RX ORDER — HYOSCYAMINE SULFATE 0.12 MG/1
0.25 TABLET, ORALLY DISINTEGRATING ORAL
Status: DISPENSED | OUTPATIENT
Start: 2024-09-06

## 2024-09-06 RX ORDER — SUCRALFATE 1 G/1
1 TABLET ORAL 4 TIMES DAILY
Status: DISPENSED | OUTPATIENT
Start: 2024-09-06

## 2024-09-06 RX ORDER — LEVOTHYROXINE SODIUM 50 UG/1
50 TABLET ORAL DAILY
Status: DISPENSED | OUTPATIENT
Start: 2024-09-07

## 2024-09-06 RX ORDER — TRAZODONE HYDROCHLORIDE 50 MG/1
75 TABLET ORAL NIGHTLY
Status: DISPENSED | OUTPATIENT
Start: 2024-09-06

## 2024-09-06 RX ORDER — ACETAMINOPHEN 650 MG/1
650 SUPPOSITORY RECTAL EVERY 4 HOURS PRN
Status: DISCONTINUED | OUTPATIENT
Start: 2024-09-06 | End: 2024-09-06

## 2024-09-06 RX ORDER — ONDANSETRON 4 MG/1
4 TABLET, ORALLY DISINTEGRATING ORAL EVERY 12 HOURS PRN
Status: DISCONTINUED | OUTPATIENT
Start: 2024-09-06 | End: 2024-09-06

## 2024-09-06 RX ORDER — LACTULOSE 10 G/15ML
10 SOLUTION ORAL 2 TIMES DAILY PRN
Status: DISCONTINUED | OUTPATIENT
Start: 2024-09-06 | End: 2024-09-08

## 2024-09-06 RX ORDER — LANOLIN ALCOHOL/MO/W.PET/CERES
400 CREAM (GRAM) TOPICAL DAILY
Status: DISPENSED | OUTPATIENT
Start: 2024-09-07

## 2024-09-06 RX ORDER — PANTOPRAZOLE SODIUM 40 MG/1
40 TABLET, DELAYED RELEASE ORAL DAILY
Status: DISCONTINUED | OUTPATIENT
Start: 2024-09-06 | End: 2024-09-06

## 2024-09-06 RX ORDER — TALC
6 POWDER (GRAM) TOPICAL NIGHTLY PRN
Status: DISPENSED | OUTPATIENT
Start: 2024-09-06

## 2024-09-06 RX ORDER — PANTOPRAZOLE SODIUM 40 MG/1
40 TABLET, DELAYED RELEASE ORAL
Status: ACTIVE | OUTPATIENT
Start: 2024-09-07

## 2024-09-06 RX ORDER — POTASSIUM CHLORIDE 14.9 MG/ML
20 INJECTION INTRAVENOUS ONCE
Status: DISCONTINUED | OUTPATIENT
Start: 2024-09-06 | End: 2024-09-06

## 2024-09-06 RX ORDER — SODIUM CHLORIDE 9 MG/ML
100 INJECTION, SOLUTION INTRAVENOUS CONTINUOUS
Status: DISCONTINUED | OUTPATIENT
Start: 2024-09-06 | End: 2024-09-06

## 2024-09-06 RX ORDER — POLYETHYLENE GLYCOL 3350 17 G/17G
17 POWDER, FOR SOLUTION ORAL DAILY
Status: DISPENSED | OUTPATIENT
Start: 2024-09-06

## 2024-09-06 RX ORDER — GUAIFENESIN/DEXTROMETHORPHAN 100-10MG/5
5 SYRUP ORAL EVERY 4 HOURS PRN
Status: ACTIVE | OUTPATIENT
Start: 2024-09-06

## 2024-09-06 RX ORDER — POTASSIUM CHLORIDE 750 MG/1
10 TABLET, FILM COATED, EXTENDED RELEASE ORAL DAILY
Status: DISPENSED | OUTPATIENT
Start: 2024-09-06

## 2024-09-06 RX ORDER — ONDANSETRON HYDROCHLORIDE 2 MG/ML
4 INJECTION, SOLUTION INTRAVENOUS ONCE
Status: COMPLETED | OUTPATIENT
Start: 2024-09-06 | End: 2024-09-06

## 2024-09-06 RX ORDER — POTASSIUM CHLORIDE 20 MEQ/1
TABLET, EXTENDED RELEASE ORAL
Status: COMPLETED
Start: 2024-09-06 | End: 2024-09-06

## 2024-09-06 RX ORDER — SODIUM CHLORIDE 9 MG/ML
100 INJECTION, SOLUTION INTRAVENOUS CONTINUOUS
Status: DISCONTINUED | OUTPATIENT
Start: 2024-09-06 | End: 2024-09-07

## 2024-09-06 RX ORDER — POTASSIUM CHLORIDE 750 MG/1
10 TABLET, FILM COATED, EXTENDED RELEASE ORAL DAILY
Status: ON HOLD | COMMUNITY

## 2024-09-06 RX ORDER — MAGNESIUM HYDROXIDE 2400 MG/10ML
10 SUSPENSION ORAL DAILY PRN
Status: DISCONTINUED | OUTPATIENT
Start: 2024-09-06 | End: 2024-09-08

## 2024-09-06 RX ADMIN — SODIUM CHLORIDE 100 ML/HR: 9 INJECTION, SOLUTION INTRAVENOUS at 17:29

## 2024-09-06 RX ADMIN — SODIUM CHLORIDE 500 ML: 9 INJECTION, SOLUTION INTRAVENOUS at 15:41

## 2024-09-06 RX ADMIN — ONDANSETRON 4 MG: 4 TABLET, ORALLY DISINTEGRATING ORAL at 20:28

## 2024-09-06 RX ADMIN — SUCRALFATE 1 G: 1 TABLET ORAL at 20:28

## 2024-09-06 RX ADMIN — CEFTRIAXONE 1 G: 1 INJECTION, SOLUTION INTRAVENOUS at 17:28

## 2024-09-06 RX ADMIN — ONDANSETRON 4 MG: 2 INJECTION, SOLUTION INTRAMUSCULAR; INTRAVENOUS at 15:41

## 2024-09-06 RX ADMIN — HYOSCYAMINE SULFATE 0.25 MG: 0.12 TABLET SUBLINGUAL at 20:28

## 2024-09-06 RX ADMIN — Medication 6 MG: at 20:28

## 2024-09-06 RX ADMIN — PIPERACILLIN SODIUM AND TAZOBACTAM SODIUM 3.38 G: 3; .375 INJECTION, SOLUTION INTRAVENOUS at 22:29

## 2024-09-06 RX ADMIN — HEPARIN SODIUM 5000 UNITS: 5000 INJECTION INTRAVENOUS; SUBCUTANEOUS at 22:29

## 2024-09-06 RX ADMIN — POTASSIUM CHLORIDE 10 MEQ: 750 TABLET, FILM COATED, EXTENDED RELEASE ORAL at 20:32

## 2024-09-06 RX ADMIN — TRAZODONE HYDROCHLORIDE 75 MG: 50 TABLET ORAL at 20:28

## 2024-09-06 SDOH — SOCIAL STABILITY: SOCIAL INSECURITY: DO YOU FEEL ANYONE HAS EXPLOITED OR TAKEN ADVANTAGE OF YOU FINANCIALLY OR OF YOUR PERSONAL PROPERTY?: NO

## 2024-09-06 SDOH — SOCIAL STABILITY: SOCIAL INSECURITY: HAVE YOU HAD THOUGHTS OF HARMING ANYONE ELSE?: NO

## 2024-09-06 SDOH — SOCIAL STABILITY: SOCIAL INSECURITY: DOES ANYONE TRY TO KEEP YOU FROM HAVING/CONTACTING OTHER FRIENDS OR DOING THINGS OUTSIDE YOUR HOME?: NO

## 2024-09-06 SDOH — SOCIAL STABILITY: SOCIAL INSECURITY: WERE YOU ABLE TO COMPLETE ALL THE BEHAVIORAL HEALTH SCREENINGS?: YES

## 2024-09-06 SDOH — SOCIAL STABILITY: SOCIAL INSECURITY: ARE THERE ANY APPARENT SIGNS OF INJURIES/BEHAVIORS THAT COULD BE RELATED TO ABUSE/NEGLECT?: NO

## 2024-09-06 SDOH — SOCIAL STABILITY: SOCIAL INSECURITY: HAS ANYONE EVER THREATENED TO HURT YOUR FAMILY OR YOUR PETS?: NO

## 2024-09-06 SDOH — SOCIAL STABILITY: SOCIAL INSECURITY: ARE YOU OR HAVE YOU BEEN THREATENED OR ABUSED PHYSICALLY, EMOTIONALLY, OR SEXUALLY BY ANYONE?: NO

## 2024-09-06 SDOH — SOCIAL STABILITY: SOCIAL INSECURITY: DO YOU FEEL UNSAFE GOING BACK TO THE PLACE WHERE YOU ARE LIVING?: NO

## 2024-09-06 SDOH — SOCIAL STABILITY: SOCIAL INSECURITY: ABUSE: ADULT

## 2024-09-06 ASSESSMENT — ACTIVITIES OF DAILY LIVING (ADL)
HEARING - LEFT EAR: DIFFICULTY WITH NOISE
JUDGMENT_ADEQUATE_SAFELY_COMPLETE_DAILY_ACTIVITIES: YES
BATHING: INDEPENDENT
PATIENT'S MEMORY ADEQUATE TO SAFELY COMPLETE DAILY ACTIVITIES?: YES
DRESSING YOURSELF: INDEPENDENT
ADEQUATE_TO_COMPLETE_ADL: YES
TOILETING: INDEPENDENT
HEARING - RIGHT EAR: DIFFICULTY WITH NOISE
WALKS IN HOME: NEEDS ASSISTANCE
LACK_OF_TRANSPORTATION: NO
GROOMING: INDEPENDENT
FEEDING YOURSELF: INDEPENDENT
ASSISTIVE_DEVICE: DENTURES LOWER;DENTURES UPPER;EYEGLASSES

## 2024-09-06 ASSESSMENT — COGNITIVE AND FUNCTIONAL STATUS - GENERAL
WALKING IN HOSPITAL ROOM: A LITTLE
WALKING IN HOSPITAL ROOM: A LITTLE
STANDING UP FROM CHAIR USING ARMS: A LITTLE
STANDING UP FROM CHAIR USING ARMS: A LITTLE
CLIMB 3 TO 5 STEPS WITH RAILING: A LITTLE
PATIENT BASELINE BEDBOUND: NO
MOBILITY SCORE: 20
DAILY ACTIVITIY SCORE: 24
MOBILITY SCORE: 20
CLIMB 3 TO 5 STEPS WITH RAILING: A LITTLE
DAILY ACTIVITIY SCORE: 24
MOVING TO AND FROM BED TO CHAIR: A LITTLE
MOVING TO AND FROM BED TO CHAIR: A LITTLE

## 2024-09-06 ASSESSMENT — LIFESTYLE VARIABLES
AUDIT-C TOTAL SCORE: 0
AUDIT-C TOTAL SCORE: 0
HOW OFTEN DO YOU HAVE 6 OR MORE DRINKS ON ONE OCCASION: NEVER
HOW OFTEN DO YOU HAVE A DRINK CONTAINING ALCOHOL: NEVER
SKIP TO QUESTIONS 9-10: 1
HOW MANY STANDARD DRINKS CONTAINING ALCOHOL DO YOU HAVE ON A TYPICAL DAY: PATIENT DOES NOT DRINK

## 2024-09-06 ASSESSMENT — PATIENT HEALTH QUESTIONNAIRE - PHQ9
SUM OF ALL RESPONSES TO PHQ9 QUESTIONS 1 & 2: 2
2. FEELING DOWN, DEPRESSED OR HOPELESS: SEVERAL DAYS
1. LITTLE INTEREST OR PLEASURE IN DOING THINGS: SEVERAL DAYS

## 2024-09-06 ASSESSMENT — PAIN SCALES - GENERAL
PAINLEVEL_OUTOF10: 0 - NO PAIN
PAINLEVEL_OUTOF10: 0 - NO PAIN

## 2024-09-06 ASSESSMENT — PAIN - FUNCTIONAL ASSESSMENT
PAIN_FUNCTIONAL_ASSESSMENT: 0-10
PAIN_FUNCTIONAL_ASSESSMENT: 0-10

## 2024-09-06 NOTE — TELEPHONE ENCOUNTER
Patient called, still having severe nausea for the past day, took zofran did not help, feeling weak. Stopped the pyridium, didn't help. Advised to go to the ED, may need IV zofran and fluids, may also need covid tested.

## 2024-09-06 NOTE — ED PROVIDER NOTES
"HPI   Chief Complaint   Patient presents with   • Nausea     Ambulatory to treatment area c/o nausea x approx 2-3 weeks after starting abx for UTI. Denies vomiting. Denies urinary complaints. Last BM this AM.       79-year-old female with past medical history positive for IBS depression GERD hypothyroidism and remote history of breast cancer who was recently put on Pyridium and an antibiotic for UTI with complaints of nausea after 5 days of the antibiotic the nausea became severe but no vomiting and no \"worsening diarrhea(per patient has chronic diarrhea 2/2 IBS)     She stopped the antibiotic and Pyridium yesterday but states the nausea has become severe to where she has not eaten anything  Does have abdominal discomfort/bloating when she attempts to eat                  Patient History   Past Medical History:   Diagnosis Date   • Abdominal pain    • Anxiety    • Cat bite 2023   • Cataract    • Depression    • Disease of thyroid gland    • GERD (gastroesophageal reflux disease)    • GERD (gastroesophageal reflux disease)    • Hypothyroidism    • Irritable bowel syndrome    • Panic attacks    • Personal history of malignant neoplasm of breast 2021    History of malignant neoplasm of breast   • Personal history of other complications of pregnancy, childbirth and the puerperium     History of spontaneous      Past Surgical History:   Procedure Laterality Date   • BREAST SURGERY     • CATARACT EXTRACTION     • COLECTOMY     • COLON SURGERY     • CT ABDOMEN ANGIOGRAM W AND/OR WO IV CONTRAST  2023    CT ABDOMEN ANGIOGRAM W AND/OR WO IV CONTRAST 2023 CON CT   • GASTRIC BYPASS     • HYSTERECTOMY     • MASTECTOMY, PARTIAL Left    • OTHER SURGICAL HISTORY  2021    Varicose vein ligation     Family History   Problem Relation Name Age of Onset   • Osteoporosis Mother     • Alcohol abuse Father     • Cancer Sister          breast   • Osteoporosis Sister     • Other (bladder cancer) " Sister     • Cancer Brother          colon. prostate   • Colon cancer Brother     • Colon cancer Brother     • Hypothyroidism Other       Social History     Tobacco Use   • Smoking status: Never     Passive exposure: Never   • Smokeless tobacco: Never   Vaping Use   • Vaping status: Never Used   Substance Use Topics   • Alcohol use: Never   • Drug use: Never       Physical Exam   ED Triage Vitals [09/06/24 1353]   Temperature Heart Rate Respirations BP   36.9 °C (98.4 °F) 64 18 135/68      Pulse Ox Temp Source Heart Rate Source Patient Position   96 % Temporal -- Sitting      BP Location FiO2 (%)     Right arm --       Physical Exam  Vitals and nursing note reviewed.   Constitutional:       General: She is not in acute distress.     Appearance: She is well-developed.   HENT:      Head: Normocephalic and atraumatic.      Right Ear: Tympanic membrane, ear canal and external ear normal.      Left Ear: Tympanic membrane, ear canal and external ear normal.   Eyes:      Conjunctiva/sclera: Conjunctivae normal.   Cardiovascular:      Rate and Rhythm: Normal rate and regular rhythm.      Heart sounds: No murmur heard.  Pulmonary:      Effort: Pulmonary effort is normal. No respiratory distress.      Breath sounds: Normal breath sounds.   Abdominal:      Palpations: Abdomen is soft.      Tenderness: There is no abdominal tenderness.   Musculoskeletal:         General: No swelling.      Cervical back: Neck supple.   Skin:     General: Skin is warm and dry.      Capillary Refill: Capillary refill takes less than 2 seconds.   Neurological:      Mental Status: She is alert.   Psychiatric:         Mood and Affect: Mood normal.           ED Course & MDM   Diagnoses as of 09/11/24 1236   Hyponatremia   UTI (urinary tract infection) with pyuria   Nausea                 No data recorded     Marlyn Coma Scale Score: 15 (09/06/24 1357 : Bety Munguia, VIRIDIANA)                           Medical Decision Making  CT does show questionable  pancreatitis lipase is pending, lab work shows sodium of 130 and continued urinary tract infection    Given IV fluids IV antibiotic  Patient will be admitted to the floor for further eval and management of her symptoms  Symptoms have improved after the IV fluids per the patient    Labs Reviewed  CBC WITH AUTO DIFFERENTIAL - Abnormal     WBC                           7.8                    nRBC                          0.0                    RBC                           4.32                   Hemoglobin                    12.5                   Hematocrit                    38.2                   MCV                           88                     MCH                           28.9                   MCHC                          32.7                   RDW                           13.3                   Platelets                     242                    Neutrophils %                 88.3                   Immature Granulocytes %, Automated   0.3                    Lymphocytes %                 4.5                    Monocytes %                   6.3                    Eosinophils %                 0.1                    Basophils %                   0.5                    Neutrophils Absolute          6.87 (*)               Immature Granulocytes Absolute, Au*   0.02                   Lymphocytes Absolute          0.35 (*)               Monocytes Absolute            0.49                   Eosinophils Absolute          0.01                   Basophils Absolute            0.04                COMPREHENSIVE METABOLIC PANEL - Abnormal     Glucose                       129 (*)                Sodium                        130 (*)                Potassium                     3.8                    Chloride                      98                     Bicarbonate                   21                     Anion Gap                     15                     Urea Nitrogen                 7                      Creatinine                     0.90                   eGFR                          66                     Calcium                       8.7                    Albumin                       3.6                    Alkaline Phosphatase          83                     Total Protein                 6.4                    AST                           23                     Bilirubin, Total              0.5                    ALT                           14                  URINALYSIS WITH REFLEX CULTURE AND MICROSCOPIC - Abnormal     Color, Urine                  Dark-Yellow                Appearance, Urine             Turbid (*)               Specific Gravity, Urine       1.009                  pH, Urine                     6.0                    Protein, Urine                20 (TRACE)                Glucose, Urine                Normal                 Blood, Urine                  0.1 (1+) (*)               Ketones, Urine                NEGATIVE                Bilirubin, Urine              NEGATIVE                Urobilinogen, Urine           Normal                 Nitrite, Urine                NEGATIVE                Leukocyte Esterase, Urine     250 William/µL (*)            MICROSCOPIC ONLY, URINE - Abnormal     WBC, Urine                    1-5                    RBC, Urine                    3-5                    Squamous Epithelial Cells, Urine                          Bacteria, Urine               1+ (*)                 Mucus, Urine                  FEW                    Hyaline Casts, Urine          2+ (*)              SERIAL TROPONIN-INITIAL - Normal     Troponin I, High Sensitivity   5                          Narrative: Less than 99th percentile of normal range cutoff-                  Female and children under 18 years old <14 ng/L; Male <21 ng/L: Negative                  Repeat testing should be performed if clinically indicated.                                     Female and children under 18 years old 14-50 ng/L; Male  21-50 ng/L:                  Consistent with possible cardiac damage and possible increased clinical                   risk. Serial measurements may help to assess extent of myocardial damage.                                     >50 ng/L: Consistent with cardiac damage, increased clinical risk and                  myocardial infarction. Serial measurements may help assess extent of                   myocardial damage.                                      NOTE: Children less than 1 year old may have higher baseline troponin                   levels and results should be interpreted in conjunction with the overall                   clinical context.                                     NOTE: Troponin I testing is performed using a different                   testing methodology at [facility] than at other                   [facility]. Direct result comparisons should only                   be made within the same method.  URINE CULTURE  BLOOD CULTURE  BLOOD CULTURE  URINALYSIS WITH REFLEX CULTURE AND MICROSCOPIC         Narrative: The following orders were created for panel order Urinalysis with Reflex Culture and Microscopic.                  Procedure                               Abnormality         Status                                     ---------                               -----------         ------                                     Urinalysis with Reflex C...[[id number]]  Abnormal            Final result                               Extra Urine Gray Tube[[id number]]                            In process                                                   Please view results for these tests on the individual orders.  TROPONIN SERIES- (INITIAL, 1 HR)         Narrative: The following orders were created for panel order Troponin I Series, High Sensitivity (0, 1 HR).                  Procedure                               Abnormality         Status                                      ---------                               -----------         ------                                     Troponin I, High Sensiti...[[id number]]  Normal              Final result                               Troponin, High Sensitivi...[[id number]]                      In process                                                   Please view results for these tests on the individual orders.  EXTRA URINE GRAY TUBE  SERIAL TROPONIN, 1 HOUR  LIPASE  AMYLASE  XR chest 1 view   Final Result    Emphysematous changes in the lungs. No evidence of acute    cardiopulmonary process.                MACRO:    None          Signed by: Julio Hernandez 9/6/2024 3:07 PM    Dictation workstation:   [id number]     CT abdomen pelvis wo IV contrast   Final Result    Limited evaluation due to lack of intravenous contrast and paucity of    intra-abdominal fat.  Questionable edema in the pancreas.  Please    clinically for acute pancreatitis.    Mild edema in the distal sigmoid colon/rectum concerning for component    of colitis/proctitis.    Cholelithiasis.    Punctate nonobstructive calculus in the upper pole of the left kidney.    Mild periportal edema.    Moderate colonic stool.    Postsurgical changes in the distal colon.    Signed by Abhilash Esquivel DO         Amount and/or Complexity of Data Reviewed  ECG/medicine tests: independent interpretation performed.     Details: EKG done at 314 shows sinus bradycardia rate of 58 with PVCs left anterior fascicular block QT/QTc 458/449 unchanged compared to previous EKG        Procedure  Procedures     Lana Nicole PA-C  09/06/24 1430       Lana Nicole PA-C  09/06/24 1536       Lana Nicole PA-C  09/06/24 1644

## 2024-09-06 NOTE — NURSING NOTE
Home med rec completed with spouse Dameon from home on phone.  Bedside nurse Sammie KEMP and LISANDRA KASSIDY Jeffrey CNP updated

## 2024-09-07 ENCOUNTER — HOSPITAL ENCOUNTER (OUTPATIENT)
Dept: CARDIOLOGY | Facility: HOSPITAL | Age: 79
Discharge: HOME | End: 2024-09-07
Payer: MEDICARE

## 2024-09-07 PROBLEM — R10.9 ABDOMINAL PAIN: Status: RESOLVED | Noted: 2023-03-11 | Resolved: 2024-09-07

## 2024-09-07 PROBLEM — F32.1 MODERATE MAJOR DEPRESSION (MULTI): Status: RESOLVED | Noted: 2023-03-11 | Resolved: 2024-09-07

## 2024-09-07 PROBLEM — M21.169 BOWLEGGED: Status: RESOLVED | Noted: 2023-03-11 | Resolved: 2024-09-07

## 2024-09-07 PROBLEM — E83.42 HYPOMAGNESEMIA: Status: RESOLVED | Noted: 2024-05-06 | Resolved: 2024-09-07

## 2024-09-07 PROBLEM — R53.1 WEAKNESS: Status: RESOLVED | Noted: 2024-05-06 | Resolved: 2024-09-07

## 2024-09-07 PROBLEM — R19.7 DIARRHEA: Status: RESOLVED | Noted: 2024-05-05 | Resolved: 2024-09-07

## 2024-09-07 PROBLEM — R00.1 BRADYCARDIA: Status: RESOLVED | Noted: 2024-05-06 | Resolved: 2024-09-07

## 2024-09-07 PROBLEM — M81.0 OSTEOPOROSIS: Status: RESOLVED | Noted: 2023-03-11 | Resolved: 2024-09-07

## 2024-09-07 PROBLEM — R00.1 SYMPTOMATIC BRADYCARDIA: Status: RESOLVED | Noted: 2024-07-19 | Resolved: 2024-09-07

## 2024-09-07 PROBLEM — R63.4 WEIGHT LOSS: Status: RESOLVED | Noted: 2023-03-11 | Resolved: 2024-09-07

## 2024-09-07 PROBLEM — E55.9 VITAMIN D DEFICIENCY: Status: RESOLVED | Noted: 2024-05-06 | Resolved: 2024-09-07

## 2024-09-07 PROBLEM — K80.20 GALL STONES: Status: RESOLVED | Noted: 2023-03-11 | Resolved: 2024-09-07

## 2024-09-07 PROBLEM — E87.1 HYPONATREMIA: Status: RESOLVED | Noted: 2024-05-06 | Resolved: 2024-09-07

## 2024-09-07 PROBLEM — N30.00 ACUTE CYSTITIS: Status: RESOLVED | Noted: 2024-05-06 | Resolved: 2024-09-07

## 2024-09-07 PROBLEM — R10.13 EPIGASTRIC PAIN: Status: RESOLVED | Noted: 2024-05-09 | Resolved: 2024-09-07

## 2024-09-07 LAB
ANION GAP SERPL CALC-SCNC: 15 MMOL/L (ref 10–20)
BUN SERPL-MCNC: 6 MG/DL (ref 6–23)
CALCIUM SERPL-MCNC: 8.3 MG/DL (ref 8.6–10.3)
CHLORIDE SERPL-SCNC: 100 MMOL/L (ref 98–107)
CHOLEST SERPL-MCNC: 114 MG/DL (ref 0–199)
CHOLESTEROL/HDL RATIO: 2.4
CO2 SERPL-SCNC: 26 MMOL/L (ref 21–32)
CREAT SERPL-MCNC: 0.79 MG/DL (ref 0.5–1.05)
EGFRCR SERPLBLD CKD-EPI 2021: 77 ML/MIN/1.73M*2
ERYTHROCYTE [DISTWIDTH] IN BLOOD BY AUTOMATED COUNT: 13.6 % (ref 11.5–14.5)
GLUCOSE SERPL-MCNC: 105 MG/DL (ref 74–99)
HCT VFR BLD AUTO: 33.5 % (ref 36–46)
HDLC SERPL-MCNC: 46.6 MG/DL
HGB BLD-MCNC: 11.3 G/DL (ref 12–16)
HOLD SPECIMEN: NORMAL
LDLC SERPL CALC-MCNC: 49 MG/DL
MCH RBC QN AUTO: 29.4 PG (ref 26–34)
MCHC RBC AUTO-ENTMCNC: 33.7 G/DL (ref 32–36)
MCV RBC AUTO: 87 FL (ref 80–100)
NON HDL CHOLESTEROL: 67 MG/DL (ref 0–149)
NRBC BLD-RTO: 0 /100 WBCS (ref 0–0)
PLATELET # BLD AUTO: 200 X10*3/UL (ref 150–450)
POTASSIUM SERPL-SCNC: 2.8 MMOL/L (ref 3.5–5.3)
RBC # BLD AUTO: 3.84 X10*6/UL (ref 4–5.2)
SODIUM SERPL-SCNC: 138 MMOL/L (ref 136–145)
TRIGL SERPL-MCNC: 94 MG/DL (ref 0–149)
VLDL: 19 MG/DL (ref 0–40)
WBC # BLD AUTO: 4.7 X10*3/UL (ref 4.4–11.3)

## 2024-09-07 PROCEDURE — 2580000001 HC RX 258 IV SOLUTIONS: Performed by: NURSE PRACTITIONER

## 2024-09-07 PROCEDURE — 99223 1ST HOSP IP/OBS HIGH 75: CPT | Performed by: NURSE PRACTITIONER

## 2024-09-07 PROCEDURE — 96376 TX/PRO/DX INJ SAME DRUG ADON: CPT | Mod: IPSPLIT

## 2024-09-07 PROCEDURE — 96361 HYDRATE IV INFUSION ADD-ON: CPT | Mod: IPSPLIT

## 2024-09-07 PROCEDURE — 1200000002 HC GENERAL ROOM WITH TELEMETRY DAILY: Mod: IPSPLIT

## 2024-09-07 PROCEDURE — 2500000004 HC RX 250 GENERAL PHARMACY W/ HCPCS (ALT 636 FOR OP/ED): Mod: IPSPLIT | Performed by: NURSE PRACTITIONER

## 2024-09-07 PROCEDURE — 99222 1ST HOSP IP/OBS MODERATE 55: CPT | Performed by: SURGERY

## 2024-09-07 PROCEDURE — 2500000002 HC RX 250 W HCPCS SELF ADMINISTERED DRUGS (ALT 637 FOR MEDICARE OP, ALT 636 FOR OP/ED): Performed by: NURSE PRACTITIONER

## 2024-09-07 PROCEDURE — 2500000004 HC RX 250 GENERAL PHARMACY W/ HCPCS (ALT 636 FOR OP/ED): Mod: IPSPLIT

## 2024-09-07 PROCEDURE — 2500000005 HC RX 250 GENERAL PHARMACY W/O HCPCS: Performed by: NURSE PRACTITIONER

## 2024-09-07 PROCEDURE — 2500000004 HC RX 250 GENERAL PHARMACY W/ HCPCS (ALT 636 FOR OP/ED)

## 2024-09-07 PROCEDURE — 2500000004 HC RX 250 GENERAL PHARMACY W/ HCPCS (ALT 636 FOR OP/ED): Mod: IPSPLIT | Performed by: INTERNAL MEDICINE

## 2024-09-07 PROCEDURE — 80061 LIPID PANEL: CPT

## 2024-09-07 PROCEDURE — 2500000001 HC RX 250 WO HCPCS SELF ADMINISTERED DRUGS (ALT 637 FOR MEDICARE OP): Performed by: NURSE PRACTITIONER

## 2024-09-07 PROCEDURE — 94760 N-INVAS EAR/PLS OXIMETRY 1: CPT | Mod: IPSPLIT

## 2024-09-07 PROCEDURE — 96372 THER/PROPH/DIAG INJ SC/IM: CPT

## 2024-09-07 PROCEDURE — 85027 COMPLETE CBC AUTOMATED: CPT

## 2024-09-07 PROCEDURE — 82374 ASSAY BLOOD CARBON DIOXIDE: CPT

## 2024-09-07 PROCEDURE — 2500000004 HC RX 250 GENERAL PHARMACY W/ HCPCS (ALT 636 FOR OP/ED): Performed by: SURGERY

## 2024-09-07 PROCEDURE — 2500000001 HC RX 250 WO HCPCS SELF ADMINISTERED DRUGS (ALT 637 FOR MEDICARE OP)

## 2024-09-07 PROCEDURE — 96366 THER/PROPH/DIAG IV INF ADDON: CPT | Mod: IPSPLIT

## 2024-09-07 PROCEDURE — 36415 COLL VENOUS BLD VENIPUNCTURE: CPT

## 2024-09-07 PROCEDURE — 93005 ELECTROCARDIOGRAM TRACING: CPT | Mod: IPSPLIT

## 2024-09-07 RX ORDER — SODIUM CHLORIDE 9 MG/ML
INJECTION, SOLUTION INTRAVENOUS
Status: COMPLETED
Start: 2024-09-07 | End: 2024-09-07

## 2024-09-07 RX ORDER — ONDANSETRON HYDROCHLORIDE 2 MG/ML
4 INJECTION, SOLUTION INTRAVENOUS EVERY 8 HOURS PRN
Status: DISCONTINUED | OUTPATIENT
Start: 2024-09-07 | End: 2024-09-07

## 2024-09-07 RX ORDER — SODIUM CHLORIDE AND POTASSIUM CHLORIDE 300; 900 MG/100ML; MG/100ML
125 INJECTION, SOLUTION INTRAVENOUS CONTINUOUS
Status: DISPENSED | OUTPATIENT
Start: 2024-09-07

## 2024-09-07 RX ORDER — ONDANSETRON HYDROCHLORIDE 2 MG/ML
4 INJECTION, SOLUTION INTRAVENOUS EVERY 4 HOURS PRN
Status: DISPENSED | OUTPATIENT
Start: 2024-09-07

## 2024-09-07 RX ORDER — ONDANSETRON 4 MG/1
4 TABLET, ORALLY DISINTEGRATING ORAL EVERY 6 HOURS PRN
Status: DISCONTINUED | OUTPATIENT
Start: 2024-09-07 | End: 2024-09-07

## 2024-09-07 RX ORDER — POTASSIUM CHLORIDE 20 MEQ/1
40 TABLET, EXTENDED RELEASE ORAL ONCE
Status: COMPLETED | OUTPATIENT
Start: 2024-09-07 | End: 2024-09-07

## 2024-09-07 RX ORDER — SCOLOPAMINE TRANSDERMAL SYSTEM 1 MG/1
1 PATCH, EXTENDED RELEASE TRANSDERMAL
Status: DISPENSED | OUTPATIENT
Start: 2024-09-07

## 2024-09-07 RX ORDER — ONDANSETRON 4 MG/1
4 TABLET, ORALLY DISINTEGRATING ORAL EVERY 6 HOURS PRN
Status: ACTIVE | OUTPATIENT
Start: 2024-09-07

## 2024-09-07 RX ORDER — SODIUM CHLORIDE 9 MG/ML
10 INJECTION, SOLUTION INTRAVENOUS CONTINUOUS PRN
Status: DISPENSED | OUTPATIENT
Start: 2024-09-07

## 2024-09-07 RX ORDER — PROCHLORPERAZINE EDISYLATE 5 MG/ML
10 INJECTION INTRAMUSCULAR; INTRAVENOUS ONCE
Status: COMPLETED | OUTPATIENT
Start: 2024-09-07 | End: 2024-09-07

## 2024-09-07 RX ADMIN — HEPARIN SODIUM 5000 UNITS: 5000 INJECTION INTRAVENOUS; SUBCUTANEOUS at 06:32

## 2024-09-07 RX ADMIN — LEVOTHYROXINE SODIUM 50 MCG: 50 TABLET ORAL at 09:24

## 2024-09-07 RX ADMIN — SODIUM CHLORIDE 100 ML/HR: 9 INJECTION, SOLUTION INTRAVENOUS at 03:52

## 2024-09-07 RX ADMIN — PROCHLORPERAZINE EDISYLATE 10 MG: 5 INJECTION INTRAMUSCULAR; INTRAVENOUS at 17:22

## 2024-09-07 RX ADMIN — ONDANSETRON 4 MG: 2 INJECTION INTRAMUSCULAR; INTRAVENOUS at 13:08

## 2024-09-07 RX ADMIN — SCOPALAMINE 1 PATCH: 1 PATCH, EXTENDED RELEASE TRANSDERMAL at 06:48

## 2024-09-07 RX ADMIN — POTASSIUM CHLORIDE AND SODIUM CHLORIDE 125 ML/HR: 900; 300 INJECTION, SOLUTION INTRAVENOUS at 17:56

## 2024-09-07 RX ADMIN — PIPERACILLIN SODIUM AND TAZOBACTAM SODIUM 3.38 G: 3; .375 INJECTION, SOLUTION INTRAVENOUS at 18:54

## 2024-09-07 RX ADMIN — POTASSIUM CHLORIDE AND SODIUM CHLORIDE 75 ML/HR: 900; 300 INJECTION, SOLUTION INTRAVENOUS at 07:26

## 2024-09-07 RX ADMIN — PIPERACILLIN SODIUM AND TAZOBACTAM SODIUM 3.38 G: 3; .375 INJECTION, SOLUTION INTRAVENOUS at 13:00

## 2024-09-07 RX ADMIN — ONDANSETRON 4 MG: 2 INJECTION INTRAMUSCULAR; INTRAVENOUS at 20:59

## 2024-09-07 RX ADMIN — SODIUM CHLORIDE 10 ML/HR: 9 INJECTION, SOLUTION INTRAVENOUS at 12:59

## 2024-09-07 RX ADMIN — SUCRALFATE 1 G: 1 TABLET ORAL at 06:33

## 2024-09-07 RX ADMIN — POTASSIUM CHLORIDE 40 MEQ: 1500 TABLET, EXTENDED RELEASE ORAL at 07:26

## 2024-09-07 RX ADMIN — SUCRALFATE 1 G: 1 TABLET ORAL at 16:40

## 2024-09-07 RX ADMIN — SUCRALFATE 1 G: 1 TABLET ORAL at 13:00

## 2024-09-07 RX ADMIN — LINACLOTIDE 145 MCG: 72 CAPSULE, GELATIN COATED ORAL at 06:33

## 2024-09-07 RX ADMIN — Medication 400 MG: at 09:24

## 2024-09-07 RX ADMIN — HEPARIN SODIUM 5000 UNITS: 5000 INJECTION INTRAVENOUS; SUBCUTANEOUS at 22:34

## 2024-09-07 RX ADMIN — PIPERACILLIN SODIUM AND TAZOBACTAM SODIUM 3.38 G: 3; .375 INJECTION, SOLUTION INTRAVENOUS at 05:08

## 2024-09-07 RX ADMIN — ONDANSETRON 4 MG: 2 INJECTION INTRAMUSCULAR; INTRAVENOUS at 06:23

## 2024-09-07 RX ADMIN — SUCRALFATE 1 G: 1 TABLET ORAL at 22:34

## 2024-09-07 RX ADMIN — HYOSCYAMINE SULFATE 0.25 MG: 0.12 TABLET SUBLINGUAL at 06:33

## 2024-09-07 RX ADMIN — PANTOPRAZOLE SODIUM 40 MG: 40 TABLET, DELAYED RELEASE ORAL at 06:44

## 2024-09-07 ASSESSMENT — ENCOUNTER SYMPTOMS
DIARRHEA: 1
MUSCULOSKELETAL NEGATIVE: 1
FATIGUE: 1
PSYCHIATRIC NEGATIVE: 1
RESPIRATORY NEGATIVE: 1
ABDOMINAL PAIN: 0
ALLERGIC/IMMUNOLOGIC NEGATIVE: 1
CARDIOVASCULAR NEGATIVE: 1
ENDOCRINE NEGATIVE: 1
APPETITE CHANGE: 1
VOMITING: 0
CONSTIPATION: 1
NAUSEA: 1
EYES NEGATIVE: 1
WEAKNESS: 1
HEMATOLOGIC/LYMPHATIC NEGATIVE: 1

## 2024-09-07 ASSESSMENT — COGNITIVE AND FUNCTIONAL STATUS - GENERAL
HELP NEEDED FOR BATHING: A LITTLE
STANDING UP FROM CHAIR USING ARMS: A LITTLE
PERSONAL GROOMING: A LITTLE
WALKING IN HOSPITAL ROOM: A LITTLE
DAILY ACTIVITIY SCORE: 22
MOBILITY SCORE: 20
MOVING TO AND FROM BED TO CHAIR: A LITTLE
CLIMB 3 TO 5 STEPS WITH RAILING: A LITTLE

## 2024-09-07 ASSESSMENT — PAIN SCALES - GENERAL: PAINLEVEL_OUTOF10: 0 - NO PAIN

## 2024-09-07 ASSESSMENT — PAIN - FUNCTIONAL ASSESSMENT: PAIN_FUNCTIONAL_ASSESSMENT: 0-10

## 2024-09-07 NOTE — CONSULTS
HPI  Because of a patient with nausea and abnormal CT scan confirmed possible edema of the pancreas, and sigmoid colon.  This patient was admitted after having persistent nausea.  She had developed urinary tract infection and was being treated with antibiotics.  She has a long history of prolonged nausea and has had an upper endoscopy.  She has chronic constipation has been treated with Linzess.  She has had a previous partial gastrectomy for ulcers.  She indicated that her nausea started after taking antibiotics for her urinary tract infection.  Past Medical History:   Diagnosis Date    Abdominal pain     Anxiety     Cat bite 2023    Cataract     Depression     Disease of thyroid gland     GERD (gastroesophageal reflux disease)     GERD (gastroesophageal reflux disease)     Hypothyroidism     Irritable bowel syndrome     Panic attacks     Personal history of malignant neoplasm of breast 2021    History of malignant neoplasm of breast    Personal history of other complications of pregnancy, childbirth and the puerperium     History of spontaneous           Current Facility-Administered Medications:     acetaminophen (Tylenol) tablet 650 mg, 650 mg, oral, q4h PRN **OR** [DISCONTINUED] acetaminophen (Tylenol) oral liquid 650 mg, 650 mg, nasogastric tube, q4h PRN **OR** [DISCONTINUED] acetaminophen (Tylenol) suppository 650 mg, 650 mg, rectal, q4h PRN, Veronique Bradshaw PA-C    acetaminophen (Tylenol) tablet 650 mg, 650 mg, oral, q4h PRN **OR** [DISCONTINUED] acetaminophen (Tylenol) oral liquid 650 mg, 650 mg, oral, q4h PRN **OR** [DISCONTINUED] acetaminophen (Tylenol) suppository 650 mg, 650 mg, rectal, q4h PRN, Veronique Bradshaw PA-C    benzocaine-menthol (Cepastat Sore Throat) lozenge 1 lozenge, 1 lozenge, Mouth/Throat, q2h PRN, Veronique Bradshaw PA-C    dextromethorphan-guaifenesin (Robitussin DM)  mg/5 mL oral liquid 5 mL, 5 mL, oral, q4h PRN, Veronique Bradshaw PA-C    guaiFENesin (Mucinex) 12 hr  tablet 600 mg, 600 mg, oral, q12h PRN, Veronique Bradshaw PA-C    heparin (porcine) injection 5,000 Units, 5,000 Units, subcutaneous, q8h BRIANNA, Veronique Bradshaw PA-C, 5,000 Units at 09/06/24 2229    hyoscyamine (Anaspaz) disintegrating tablet 0.25 mg, 0.25 mg, sublingual, Before meals & nightly, Sasha L Rachele, APRN-CNP, 0.25 mg at 09/06/24 2028    lactulose 20 gram/30 mL oral solution 10 g, 10 g, oral, BID PRN, Sasha L Rachele, APRN-CNP    levothyroxine (Synthroid, Levoxyl) tablet 50 mcg, 50 mcg, oral, Daily, Sasha L Rachele, APRN-CNP    linaCLOtide (Linzess) capsule 145 mcg, 145 mcg, oral, Daily before breakfast, Sasha L Rachele, APRN-CNP    magnesium hydroxide (Milk of Magnesia) 2,400 mg/10 mL suspension 10 mL, 10 mL, oral, Daily PRN, Sasha L Rachele, APRN-CNP    magnesium oxide (Mag-Ox) tablet 400 mg, 400 mg, oral, Daily, Sasha L Rachele, APRN-CNP    melatonin tablet 6 mg, 6 mg, oral, Nightly PRN, Veronique Bradshaw PA-C, 6 mg at 09/06/24 2028    ondansetron ODT (Zofran-ODT) disintegrating tablet 4 mg, 4 mg, oral, q8h PRN, 4 mg at 09/06/24 2028 **OR** ondansetron (Zofran) injection 4 mg, 4 mg, intravenous, q8h PRN, Veronique Bradshaw PA-C, 4 mg at 09/07/24 0623    pantoprazole (ProtoNix) EC tablet 40 mg, 40 mg, oral, Daily before breakfast **OR** pantoprazole (ProtoNix) injection 40 mg, 40 mg, intravenous, Daily before breakfast, Veronique Bradshaw PA-C    piperacillin-tazobactam (Zosyn) 3.375 g in dextrose (iso) IV 50 mL, 3.375 g, intravenous, q6h, Veronique Bradshaw PA-C, Stopped at 09/07/24 0545    polyethylene glycol (Glycolax, Miralax) packet 17 g, 17 g, oral, Daily, Veronique Bradshaw PA-C    potassium chloride CR (Klor-Con) ER tablet 10 mEq, 10 mEq, oral, Daily, Sasha L Rachele, APRN-CNP, 10 mEq at 09/06/24 2032    scopolamine (Transderm-Scop) patch 1 patch, 1 patch, transdermal, q72h, Cam Diez MD    sodium chloride 0.9% infusion, 100 mL/hr, intravenous, Continuous, Veronique Bradshaw PA-C, Last  Rate: 100 mL/hr at 09/07/24 0352, 100 mL/hr at 09/07/24 0352    sucralfate (Carafate) tablet 1 g, 1 g, oral, 4x daily, Sasha Jeffrey APRN-CNP, 1 g at 09/06/24 2028    traZODone (Desyrel) tablet 75 mg, 75 mg, oral, Nightly, Sasha Jeffrey, APRN-CNP, 75 mg at 09/06/24 2028     Allergies   Allergen Reactions    Nsaids (Non-Steroidal Anti-Inflammatory Drug) Unknown        Review of Systems  Review of Systems   Gastrointestinal:  Positive for nausea.   All other systems reviewed and are negative.      Objective     Vital signs for last 24 hours:  Temp:  [36.9 °C (98.4 °F)-37.3 °C (99.1 °F)] 37.3 °C (99.1 °F)  Heart Rate:  [48-66] 48  Resp:  [16-18] 16  BP: (113-136)/(50-80) 113/50    Intake/Output this shift:  I/O this shift:  In: 160 [P.O.:60; IV Piggyback:100]  Out: -     Physical Exam  Physical Exam  Vitals reviewed. Exam conducted with a chaperone present.   Constitutional:       Appearance: Normal appearance. She is underweight.   HENT:      Head: Normocephalic.   Cardiovascular:      Rate and Rhythm: Normal rate and regular rhythm.      Heart sounds: Normal heart sounds.   Pulmonary:      Effort: Pulmonary effort is normal.      Breath sounds: Normal breath sounds.   Abdominal:      General: Abdomen is flat.      Palpations: Abdomen is soft. There is no mass.      Tenderness: There is no abdominal tenderness. There is no guarding.   Musculoskeletal:         General: Normal range of motion.      Cervical back: Normal range of motion.   Skin:     General: Skin is warm.   Neurological:      General: No focal deficit present.   Psychiatric:         Mood and Affect: Mood normal.         Labs & Radiology      Labs Reviewed   CBC WITH AUTO DIFFERENTIAL - Abnormal       Result Value    WBC 7.8      nRBC 0.0      RBC 4.32      Hemoglobin 12.5      Hematocrit 38.2      MCV 88      MCH 28.9      MCHC 32.7      RDW 13.3      Platelets 242      Neutrophils % 88.3      Immature Granulocytes %, Automated 0.3       Lymphocytes % 4.5      Monocytes % 6.3      Eosinophils % 0.1      Basophils % 0.5      Neutrophils Absolute 6.87 (*)     Immature Granulocytes Absolute, Automated 0.02      Lymphocytes Absolute 0.35 (*)     Monocytes Absolute 0.49      Eosinophils Absolute 0.01      Basophils Absolute 0.04     COMPREHENSIVE METABOLIC PANEL - Abnormal    Glucose 129 (*)     Sodium 130 (*)     Potassium 3.8      Chloride 98      Bicarbonate 21      Anion Gap 15      Urea Nitrogen 7      Creatinine 0.90      eGFR 66      Calcium 8.7      Albumin 3.6      Alkaline Phosphatase 83      Total Protein 6.4      AST 23      Bilirubin, Total 0.5      ALT 14     URINALYSIS WITH REFLEX CULTURE AND MICROSCOPIC - Abnormal    Color, Urine Dark-Yellow      Appearance, Urine Turbid (*)     Specific Gravity, Urine 1.009      pH, Urine 6.0      Protein, Urine 20 (TRACE)      Glucose, Urine Normal      Blood, Urine 0.1 (1+) (*)     Ketones, Urine NEGATIVE      Bilirubin, Urine NEGATIVE      Urobilinogen, Urine Normal      Nitrite, Urine NEGATIVE      Leukocyte Esterase, Urine 250 William/µL (*)    MICROSCOPIC ONLY, URINE - Abnormal    WBC, Urine 1-5      RBC, Urine 3-5      Squamous Epithelial Cells, Urine 1-9 (SPARSE)      Bacteria, Urine 1+ (*)     Mucus, Urine FEW      Hyaline Casts, Urine 2+ (*)    CBC - Abnormal    WBC 4.7      nRBC 0.0      RBC 3.84 (*)     Hemoglobin 11.3 (*)     Hematocrit 33.5 (*)     MCV 87      MCH 29.4      MCHC 33.7      RDW 13.6      Platelets 200     BLOOD CULTURE - Normal    Blood Culture Loaded on Instrument - Culture in progress     BLOOD CULTURE - Normal    Blood Culture Loaded on Instrument - Culture in progress     SERIAL TROPONIN-INITIAL - Normal    Troponin I, High Sensitivity 5      Narrative:     Less than 99th percentile of normal range cutoff-                  Female and children under 18 years old <14 ng/L; Male <21 ng/L: Negative                  Repeat testing should be performed if clinically indicated.                                      Female and children under 18 years old 14-50 ng/L; Male 21-50 ng/L:                  Consistent with possible cardiac damage and possible increased clinical                   risk. Serial measurements may help to assess extent of myocardial damage.                                     >50 ng/L: Consistent with cardiac damage, increased clinical risk and                  myocardial infarction. Serial measurements may help assess extent of                   myocardial damage.                                      NOTE: Children less than 1 year old may have higher baseline troponin                   levels and results should be interpreted in conjunction with the overall                   clinical context.                                     NOTE: Troponin I testing is performed using a different                   testing methodology at [facility] than at other                   [facility]. Direct result comparisons should only                   be made within the same method.   SERIAL TROPONIN, 1 HOUR - Normal    Troponin I, High Sensitivity 7      Narrative:     Less than 99th percentile of normal range cutoff-                  Female and children under 18 years old <14 ng/L; Male <21 ng/L: Negative                  Repeat testing should be performed if clinically indicated.                                     Female and children under 18 years old 14-50 ng/L; Male 21-50 ng/L:                  Consistent with possible cardiac damage and possible increased clinical                   risk. Serial measurements may help to assess extent of myocardial damage.                                     >50 ng/L: Consistent with cardiac damage, increased clinical risk and                  myocardial infarction. Serial measurements may help assess extent of                   myocardial damage.                                      NOTE: Children less than 1 year old may have higher  baseline troponin                   levels and results should be interpreted in conjunction with the overall                   clinical context.                                     NOTE: Troponin I testing is performed using a different                   testing methodology at [facility] than at other                   [facility]. Direct result comparisons should only                   be made within the same method.   LIPASE - Normal    Lipase 10      Narrative:     Venipuncture immediately after or during the administration of Metamizole may lead to falsely low results. Testing should be performed immediately prior to Metamizole dosing.   AMYLASE - Normal    Amylase 34     URINE CULTURE   URINALYSIS WITH REFLEX CULTURE AND MICROSCOPIC    Narrative:     The following orders were created for panel order Urinalysis with Reflex Culture and Microscopic.                  Procedure                               Abnormality         Status                                     ---------                               -----------         ------                                     Urinalysis with Reflex C...[[id number]]  Abnormal            Final result                               Extra Urine Gray Tube[[id number]]                            Final result                                                 Please view results for these tests on the individual orders.   TROPONIN SERIES- (INITIAL, 1 HR)    Narrative:     The following orders were created for panel order Troponin I Series, High Sensitivity (0, 1 HR).                  Procedure                               Abnormality         Status                                     ---------                               -----------         ------                                     Troponin I, High Sensiti...[[id number]]  Normal              Final result                               Troponin, High Sensitivi...[[id number]]  Normal              Final result                                                  Please view results for these tests on the individual orders.   EXTRA URINE GRAY TUBE    Extra Tube Hold for add-ons.     BASIC METABOLIC PANEL   LIPID PANEL     Review of CT scan performed 9/6/2024  IMPRESSION:  Limited evaluation due to lack of intravenous contrast and paucity of  intra-abdominal fat.  Questionable edema in the pancreas.  Please  clinically for acute pancreatitis.  Mild edema in the distal sigmoid colon/rectum concerning for component  of colitis/proctitis.  Cholelithiasis.  Punctate nonobstructive calculus in the upper pole of the left kidney.  Mild periportal edema.  Moderate colonic stool.  Postsurgical changes in the distal colon.  Signed by Abhilash Esquivel, DO  Impression  Nonspecific CT scan findings with pancreatic edema and: Edema with nausea.  Normal lipase  Plan   Follow conservatively.  Trial of scopolamine patch for persistent nausea enhance nutrition

## 2024-09-07 NOTE — H&P
History Of Present Illness  Latha Quispe is a 79 y.o. female with PMH of anxiety, depression, GERD, hypothyroidism, IBS, breast ca who presented to ED with nausea for the past 5 days. She stated that she was on an antibiotic for UTI for 5 days and became nauseated after that. Her nausea became severe and she could not eat or drink anything. Lab workup showed Na 131, K 3.2, mag 1.44, CT a/p showed pancreatic edema and possible colitis. CXR showed emphysema. She was admitted to Medicine for further evaluation and treatment for ongoing nausea.      Past Medical History  Past Medical History:   Diagnosis Date    Abdominal pain     Anxiety     Cat bite 03/11/2023    Cataract     Depression     Disease of thyroid gland     GERD (gastroesophageal reflux disease)     Hypothyroidism     Irritable bowel syndrome     Panic attacks     Personal history of malignant neoplasm of breast 06/30/2021    Personal history of other complications of pregnancy, childbirth and the puerperium     miscarriage     Surgical History  Past Surgical History:   Procedure Laterality Date    BREAST SURGERY      CATARACT EXTRACTION      COLECTOMY  1992    COLON SURGERY      CT ABDOMEN ANGIOGRAM W AND/OR WO IV CONTRAST  12/28/2023    GASTRIC BYPASS      HYSTERECTOMY      MASTECTOMY, PARTIAL Left     OTHER SURGICAL HISTORY  06/30/2021    Varicose vein ligation      Social History  She reports that she has never smoked. She has never been exposed to tobacco smoke. She has never used smokeless tobacco. She reports that she does not drink alcohol and does not use drugs.    Family History  Family History   Problem Relation Name Age of Onset    Osteoporosis Mother      Alcohol abuse Father      Cancer Sister          breast    Osteoporosis Sister      Other (bladder cancer) Sister      Cancer Brother          colon. prostate    Colon cancer Brother      Colon cancer Brother      Hypothyroidism Other        Allergies  Nsaids (non-steroidal  anti-inflammatory drug)    Review of Systems   Constitutional:  Positive for appetite change and fatigue.   HENT: Negative.     Eyes: Negative.    Respiratory: Negative.     Cardiovascular: Negative.    Gastrointestinal:  Positive for constipation, diarrhea and nausea. Negative for abdominal pain and vomiting.   Endocrine: Negative.    Genitourinary: Negative.    Musculoskeletal: Negative.    Skin: Negative.    Allergic/Immunologic: Negative.    Neurological:  Positive for weakness.   Hematological: Negative.    Psychiatric/Behavioral: Negative.          Physical Exam  Constitutional:       General: She is not in acute distress.     Appearance: She is ill-appearing. She is not toxic-appearing.      Comments: cachectic   HENT:      Head: Normocephalic and atraumatic.      Mouth/Throat:      Mouth: Mucous membranes are dry.   Eyes:      Extraocular Movements: Extraocular movements intact.      Pupils: Pupils are equal, round, and reactive to light.   Cardiovascular:      Rate and Rhythm: Normal rate and regular rhythm.      Pulses: Normal pulses.      Heart sounds: Normal heart sounds. No murmur heard.     No gallop.   Pulmonary:      Effort: Pulmonary effort is normal. No respiratory distress.      Breath sounds: Normal breath sounds. No wheezing, rhonchi or rales.   Abdominal:      General: Bowel sounds are normal. There is no distension.      Palpations: Abdomen is soft.      Tenderness: There is no abdominal tenderness. There is no guarding or rebound.   Musculoskeletal:         General: No swelling, tenderness, deformity or signs of injury. Normal range of motion.      Cervical back: Normal range of motion and neck supple.   Skin:     General: Skin is warm and dry.      Capillary Refill: Capillary refill takes less than 2 seconds.      Coloration: Skin is pale. Skin is not jaundiced.      Findings: No bruising or rash.   Neurological:      General: No focal deficit present.      Mental Status: She is alert and  oriented to person, place, and time.      Cranial Nerves: No cranial nerve deficit.      Sensory: No sensory deficit.      Motor: No weakness.      Gait: Gait normal.   Psychiatric:         Mood and Affect: Mood normal.         Behavior: Behavior normal.         Thought Content: Thought content normal.         Judgment: Judgment normal.       Last Recorded Vitals  Blood pressure 113/50, pulse (!) 48, temperature 37.3 °C (99.1 °F), temperature source Temporal, resp. rate 16, height 1.524 m (5'), weight (!) 32.8 kg (72 lb 5 oz), SpO2 94%.    Scheduled medications  heparin (porcine), 5,000 Units, subcutaneous, q8h BRIANNA  [Held by provider] hyoscyamine, 0.25 mg, sublingual, Before meals & nightly  levothyroxine, 50 mcg, oral, Daily  linaCLOtide, 145 mcg, oral, Daily before breakfast  magnesium oxide, 400 mg, oral, Daily  pantoprazole, 40 mg, oral, Daily before breakfast   Or  pantoprazole, 40 mg, intravenous, Daily before breakfast  piperacillin-tazobactam, 3.375 g, intravenous, q6h  polyethylene glycol, 17 g, oral, Daily  potassium chloride CR, 40 mEq, oral, Once  [Held by provider] potassium chloride CR, 10 mEq, oral, Daily  scopolamine, 1 patch, transdermal, q72h  sucralfate, 1 g, oral, 4x daily  traZODone, 75 mg, oral, Nightly    Continuous medications  potassium chloride in 0.9%NaCl, 75 mL/hr    PRN medications  PRN medications: acetaminophen **OR** [DISCONTINUED] acetaminophen **OR** [DISCONTINUED] acetaminophen, acetaminophen **OR** [DISCONTINUED] acetaminophen **OR** [DISCONTINUED] acetaminophen, benzocaine-menthol, dextromethorphan-guaifenesin, guaiFENesin, lactulose, magnesium hydroxide, melatonin, ondansetron ODT **OR** ondansetron    Relevant Results  CT abdomen pelvis wo IV contrast  Result Date: 9/6/2024  Limited evaluation due to lack of intravenous contrast and paucity of intra-abdominal fat.  Questionable edema in the pancreas.  Please clinically for acute pancreatitis. Mild edema in the distal sigmoid  colon/rectum concerning for component of colitis/proctitis. Cholelithiasis. Punctate nonobstructive calculus in the upper pole of the left kidney. Mild periportal edema. Moderate colonic stool. Postsurgical changes in the distal colon. Signed by Abhilash Esquivel DO    XR chest 1 view  Result Date: 9/6/2024  Emphysematous changes in the lungs. No evidence of acute cardiopulmonary process.     MACRO: None   Signed by: Julio Hernandez 9/6/2024 3:07 PM Dictation workstation:   [id number]         Latest Reference Range & Units 09/06/24 09:50 09/06/24 14:37 09/07/24 06:01   GLUCOSE 74 - 99 mg/dL 163 (H) 129 (H) 105 (H)   SODIUM 136 - 145 mmol/L 131 (L) 130 (L) 138   POTASSIUM 3.5 - 5.3 mmol/L 3.2 (L) 3.8 2.8 (LL)   CHLORIDE 98 - 107 mmol/L 95 (L) 98 100   Bicarbonate 21 - 32 mmol/L 27 21 26   Anion Gap 10 - 20 mmol/L 12 15 15   Blood Urea Nitrogen 6 - 23 mg/dL 7 7 6   Creatinine 0.50 - 1.05 mg/dL 0.90 0.90 0.79   EGFR >60 mL/min/1.73m*2 66 66 77   Calcium 8.6 - 10.3 mg/dL 9.3 8.7 8.3 (L)      Latest Reference Range & Units 09/06/24 14:37 09/07/24 06:01   WBC 4.4 - 11.3 x10*3/uL 7.8 4.7   nRBC 0.0 - 0.0 /100 WBCs 0.0 0.0   RBC 4.00 - 5.20 x10*6/uL 4.32 3.84 (L)   HEMOGLOBIN 12.0 - 16.0 g/dL 12.5 11.3 (L)   HEMATOCRIT 36.0 - 46.0 % 38.2 33.5 (L)   MCV 80 - 100 fL 88 87   MCH 26.0 - 34.0 pg 28.9 29.4   MCHC 32.0 - 36.0 g/dL 32.7 33.7   RED CELL DISTRIBUTION WIDTH 11.5 - 14.5 % 13.3 13.6   Platelets 150 - 450 x10*3/uL 242 200     Assessment/Plan   Assessment & Plan  UTI (urinary tract infection)    Chronic gastritis    Chronic insomnia    Hypokalemia    Chronic idiopathic constipation    GERD (gastroesophageal reflux disease)    Dehydration    Hypothyroid    IBS (irritable bowel syndrome)    Nausea    Nausea  UTI (urinary tract infection)  Dehydration  - was on 5 days abx for UTI, severe nausea since then  - UA: 250 LE, 1+ bacteria  - Ucx pending  - IV zosyn started in ED, continue, day 2  - continue IVF  - continue  scopolamine patch, prn zofran  - follow culture  - diet as tolerated    Chronic gastritis  IBS (irritable bowel syndrome)  Chronic idiopathic constipation  GERD (gastroesophageal reflux disease)  - sees GI  - continue linaclotide, PPI, miralax, sucralfate    Hypothyroid  - TSH 2.08  - continue levothyroxine    Chronic insomnia  - continue trazodone    Hypokalemia  - K 3.8 > 2.8  - repleted with IV, PO  - trend BMP, replete as needed  - telemetry    GI ppx: PPI  DVT ppx: ambulation  Fluids: maintenance  Electrolytes: replace as needed  Nutrition: clears, adat  Adjuncts: PIV  Code Status: full  Pt requires inpatient stay at this time.    Sasha Jeffrey APRN-CNP  Attending Attestation:    I was present with the APRN-CNP who participated in the documentation of this note. I have personally seen and re-examined the patient and performed the medical decision-making components (assessment and plan of care). I have reviewed the documentation and verified the findings in the note as written with additions or exceptions as stated in the body of this note.     Dr. Javier Donnelly MD  Internal Medicine

## 2024-09-07 NOTE — CARE PLAN
The patient's goals for the shift include      The clinical goals for the shift include decreased nausea    Over the shift, the patient did make progress toward the following goals.

## 2024-09-08 VITALS
HEIGHT: 60 IN | HEART RATE: 48 BPM | OXYGEN SATURATION: 97 % | SYSTOLIC BLOOD PRESSURE: 124 MMHG | WEIGHT: 72.31 LBS | DIASTOLIC BLOOD PRESSURE: 60 MMHG | TEMPERATURE: 97 F | RESPIRATION RATE: 16 BRPM | BODY MASS INDEX: 14.2 KG/M2

## 2024-09-08 LAB
ANION GAP SERPL CALC-SCNC: 17 MMOL/L (ref 10–20)
BACTERIA BLD CULT: NORMAL
BACTERIA BLD CULT: NORMAL
BACTERIA UR CULT: ABNORMAL
BUN SERPL-MCNC: 7 MG/DL (ref 6–23)
CALCIUM SERPL-MCNC: 8.4 MG/DL (ref 8.6–10.3)
CHLORIDE SERPL-SCNC: 104 MMOL/L (ref 98–107)
CO2 SERPL-SCNC: 22 MMOL/L (ref 21–32)
CREAT SERPL-MCNC: 0.71 MG/DL (ref 0.5–1.05)
EGFRCR SERPLBLD CKD-EPI 2021: 87 ML/MIN/1.73M*2
ERYTHROCYTE [DISTWIDTH] IN BLOOD BY AUTOMATED COUNT: 13.9 % (ref 11.5–14.5)
GLUCOSE SERPL-MCNC: 88 MG/DL (ref 74–99)
HCT VFR BLD AUTO: 36.8 % (ref 36–46)
HGB BLD-MCNC: 12.1 G/DL (ref 12–16)
MAGNESIUM SERPL-MCNC: 1 MG/DL (ref 1.6–2.4)
MCH RBC QN AUTO: 29.4 PG (ref 26–34)
MCHC RBC AUTO-ENTMCNC: 32.9 G/DL (ref 32–36)
MCV RBC AUTO: 89 FL (ref 80–100)
NRBC BLD-RTO: 0 /100 WBCS (ref 0–0)
PLATELET # BLD AUTO: 222 X10*3/UL (ref 150–450)
POTASSIUM SERPL-SCNC: 3.7 MMOL/L (ref 3.5–5.3)
RBC # BLD AUTO: 4.12 X10*6/UL (ref 4–5.2)
SODIUM SERPL-SCNC: 139 MMOL/L (ref 136–145)
WBC # BLD AUTO: 4.7 X10*3/UL (ref 4.4–11.3)

## 2024-09-08 PROCEDURE — 83735 ASSAY OF MAGNESIUM: CPT | Mod: IPSPLIT

## 2024-09-08 PROCEDURE — 2500000004 HC RX 250 GENERAL PHARMACY W/ HCPCS (ALT 636 FOR OP/ED): Mod: IPSPLIT

## 2024-09-08 PROCEDURE — 36415 COLL VENOUS BLD VENIPUNCTURE: CPT | Mod: IPSPLIT

## 2024-09-08 PROCEDURE — 1200000002 HC GENERAL ROOM WITH TELEMETRY DAILY: Mod: IPSPLIT

## 2024-09-08 PROCEDURE — 2500000004 HC RX 250 GENERAL PHARMACY W/ HCPCS (ALT 636 FOR OP/ED): Mod: IPSPLIT | Performed by: NURSE PRACTITIONER

## 2024-09-08 PROCEDURE — 85027 COMPLETE CBC AUTOMATED: CPT | Mod: IPSPLIT

## 2024-09-08 PROCEDURE — 2500000001 HC RX 250 WO HCPCS SELF ADMINISTERED DRUGS (ALT 637 FOR MEDICARE OP): Mod: IPSPLIT | Performed by: NURSE PRACTITIONER

## 2024-09-08 PROCEDURE — 2580000001 HC RX 258 IV SOLUTIONS: Mod: IPSPLIT | Performed by: NURSE PRACTITIONER

## 2024-09-08 PROCEDURE — 2500000004 HC RX 250 GENERAL PHARMACY W/ HCPCS (ALT 636 FOR OP/ED): Mod: IPSPLIT | Performed by: INTERNAL MEDICINE

## 2024-09-08 PROCEDURE — 99232 SBSQ HOSP IP/OBS MODERATE 35: CPT | Performed by: SURGERY

## 2024-09-08 PROCEDURE — 99233 SBSQ HOSP IP/OBS HIGH 50: CPT | Performed by: NURSE PRACTITIONER

## 2024-09-08 PROCEDURE — 94760 N-INVAS EAR/PLS OXIMETRY 1: CPT | Mod: IPSPLIT

## 2024-09-08 PROCEDURE — 2500000002 HC RX 250 W HCPCS SELF ADMINISTERED DRUGS (ALT 637 FOR MEDICARE OP, ALT 636 FOR OP/ED): Mod: IPSPLIT | Performed by: NURSE PRACTITIONER

## 2024-09-08 PROCEDURE — 80048 BASIC METABOLIC PNL TOTAL CA: CPT | Mod: IPSPLIT

## 2024-09-08 RX ORDER — MAGNESIUM SULFATE HEPTAHYDRATE 40 MG/ML
2 INJECTION, SOLUTION INTRAVENOUS ONCE
Status: COMPLETED | OUTPATIENT
Start: 2024-09-08 | End: 2024-09-08

## 2024-09-08 RX ORDER — LOPERAMIDE HYDROCHLORIDE 2 MG/1
2 CAPSULE ORAL 4 TIMES DAILY PRN
Status: DISPENSED | OUTPATIENT
Start: 2024-09-08

## 2024-09-08 RX ORDER — METOCLOPRAMIDE 10 MG/1
5 TABLET ORAL
Status: DISPENSED | OUTPATIENT
Start: 2024-09-08

## 2024-09-08 RX ORDER — METOCLOPRAMIDE HYDROCHLORIDE 5 MG/ML
5 INJECTION INTRAMUSCULAR; INTRAVENOUS
Status: DISPENSED | OUTPATIENT
Start: 2024-09-08

## 2024-09-08 RX ADMIN — Medication 400 MG: at 08:28

## 2024-09-08 RX ADMIN — PIPERACILLIN SODIUM AND TAZOBACTAM SODIUM 3.38 G: 3; .375 INJECTION, SOLUTION INTRAVENOUS at 20:48

## 2024-09-08 RX ADMIN — PIPERACILLIN SODIUM AND TAZOBACTAM SODIUM 3.38 G: 3; .375 INJECTION, SOLUTION INTRAVENOUS at 01:02

## 2024-09-08 RX ADMIN — LEVOTHYROXINE SODIUM 50 MCG: 50 TABLET ORAL at 08:28

## 2024-09-08 RX ADMIN — POTASSIUM CHLORIDE AND SODIUM CHLORIDE 125 ML/HR: 900; 300 INJECTION, SOLUTION INTRAVENOUS at 04:20

## 2024-09-08 RX ADMIN — SUCRALFATE 1 G: 1 TABLET ORAL at 20:48

## 2024-09-08 RX ADMIN — MAGNESIUM SULFATE IN WATER 2 G: 40 INJECTION, SOLUTION INTRAVENOUS at 09:21

## 2024-09-08 RX ADMIN — HEPARIN SODIUM 5000 UNITS: 5000 INJECTION INTRAVENOUS; SUBCUTANEOUS at 07:04

## 2024-09-08 RX ADMIN — SODIUM CHLORIDE 10 ML/HR: 9 INJECTION, SOLUTION INTRAVENOUS at 04:20

## 2024-09-08 RX ADMIN — PIPERACILLIN SODIUM AND TAZOBACTAM SODIUM 3.38 G: 3; .375 INJECTION, SOLUTION INTRAVENOUS at 13:09

## 2024-09-08 RX ADMIN — POTASSIUM CHLORIDE 10 MEQ: 750 TABLET, FILM COATED, EXTENDED RELEASE ORAL at 08:29

## 2024-09-08 RX ADMIN — ONDANSETRON 4 MG: 2 INJECTION INTRAMUSCULAR; INTRAVENOUS at 01:02

## 2024-09-08 RX ADMIN — ONDANSETRON 4 MG: 2 INJECTION INTRAMUSCULAR; INTRAVENOUS at 13:10

## 2024-09-08 RX ADMIN — POTASSIUM CHLORIDE AND SODIUM CHLORIDE 125 ML/HR: 900; 300 INJECTION, SOLUTION INTRAVENOUS at 19:00

## 2024-09-08 RX ADMIN — ONDANSETRON 4 MG: 2 INJECTION INTRAMUSCULAR; INTRAVENOUS at 05:31

## 2024-09-08 RX ADMIN — METOCLOPRAMIDE HYDROCHLORIDE 5 MG: 5 INJECTION INTRAMUSCULAR; INTRAVENOUS at 16:44

## 2024-09-08 RX ADMIN — HEPARIN SODIUM 5000 UNITS: 5000 INJECTION INTRAVENOUS; SUBCUTANEOUS at 23:12

## 2024-09-08 RX ADMIN — LINACLOTIDE 145 MCG: 72 CAPSULE, GELATIN COATED ORAL at 07:04

## 2024-09-08 RX ADMIN — PANTOPRAZOLE SODIUM 40 MG: 40 INJECTION, POWDER, LYOPHILIZED, FOR SOLUTION INTRAVENOUS at 07:04

## 2024-09-08 RX ADMIN — SUCRALFATE 1 G: 1 TABLET ORAL at 07:04

## 2024-09-08 RX ADMIN — LOPERAMIDE HYDROCHLORIDE 2 MG: 2 CAPSULE ORAL at 11:45

## 2024-09-08 RX ADMIN — SUCRALFATE 1 G: 1 TABLET ORAL at 13:09

## 2024-09-08 RX ADMIN — HEPARIN SODIUM 5000 UNITS: 5000 INJECTION INTRAVENOUS; SUBCUTANEOUS at 13:09

## 2024-09-08 RX ADMIN — ONDANSETRON 4 MG: 2 INJECTION INTRAMUSCULAR; INTRAVENOUS at 20:48

## 2024-09-08 RX ADMIN — TRAZODONE HYDROCHLORIDE 75 MG: 50 TABLET ORAL at 20:48

## 2024-09-08 RX ADMIN — METOCLOPRAMIDE HYDROCHLORIDE 5 MG: 5 INJECTION INTRAMUSCULAR; INTRAVENOUS at 11:45

## 2024-09-08 RX ADMIN — ONDANSETRON 4 MG: 2 INJECTION INTRAMUSCULAR; INTRAVENOUS at 08:33

## 2024-09-08 RX ADMIN — SUCRALFATE 1 G: 1 TABLET ORAL at 16:44

## 2024-09-08 RX ADMIN — PIPERACILLIN SODIUM AND TAZOBACTAM SODIUM 3.38 G: 3; .375 INJECTION, SOLUTION INTRAVENOUS at 08:29

## 2024-09-08 ASSESSMENT — COGNITIVE AND FUNCTIONAL STATUS - GENERAL
TOILETING: A LITTLE
WALKING IN HOSPITAL ROOM: A LITTLE
HELP NEEDED FOR BATHING: A LITTLE
CLIMB 3 TO 5 STEPS WITH RAILING: A LITTLE
STANDING UP FROM CHAIR USING ARMS: A LITTLE
DAILY ACTIVITIY SCORE: 21
DRESSING REGULAR LOWER BODY CLOTHING: A LITTLE
MOBILITY SCORE: 20
MOVING TO AND FROM BED TO CHAIR: A LITTLE

## 2024-09-08 ASSESSMENT — PAIN SCALES - GENERAL: PAINLEVEL_OUTOF10: 0 - NO PAIN

## 2024-09-08 NOTE — CARE PLAN
The patient's goals for the shift include  improved nausea    The clinical goals for the shift include Nausea will remain controlled this shift    Over the shift, the patient did not make progress toward the following goals. Patient continued to voice nausea and one episode of emesis at beginning of shift. IV Zofran did not offer any relief per patient. All other nausea medications under review at this time r/t bradycardia, of which patient's HR is between 38-46. Trazadone held this shift regarding bradycardia per RED Sosa CNP. 40meq potassium in 1000mL sodium chloride ran continuously per order regarding hypokalemia. Patient continues with poor appetite/intake. Two new peripheral IVs placed this shift.

## 2024-09-08 NOTE — PROGRESS NOTES
"Latha Quispe is a 79 y.o. female on day 1 of admission presenting with Nausea.    Subjective   She is resting in bed, states her nausea is still \"terrible\". She is also having diarrhea every time she attempt to eat/drink anything. Meds reviewed and adjustments made. Will monitor response to adjustments. All questions answered.      Objective     Physical Exam  Constitutional:       General: She is not in acute distress.     Appearance: She is ill-appearing. She is not toxic-appearing.      Comments: cachectic   HENT:      Head: Normocephalic and atraumatic.      Mouth/Throat:      Mouth: Mucous membranes are dry.   Eyes:      Extraocular Movements: Extraocular movements intact.      Pupils: Pupils are equal, round, and reactive to light.   Cardiovascular:      Rate and Rhythm: Normal rate and regular rhythm.      Pulses: Normal pulses.      Heart sounds: Normal heart sounds. No murmur heard.     No gallop.   Pulmonary:      Effort: Pulmonary effort is normal. No respiratory distress.      Breath sounds: Normal breath sounds. No wheezing, rhonchi or rales.   Abdominal:      General: Bowel sounds are normal. There is no distension.      Palpations: Abdomen is soft.      Tenderness: There is no abdominal tenderness. There is no guarding or rebound.   Musculoskeletal:         General: No swelling, tenderness, deformity or signs of injury. Normal range of motion.      Cervical back: Normal range of motion and neck supple.   Skin:     General: Skin is warm and dry.      Capillary Refill: Capillary refill takes less than 2 seconds.      Coloration: Skin is pale. Skin is not jaundiced.      Findings: No bruising or rash.   Neurological:      General: No focal deficit present.      Mental Status: She is alert and oriented to person, place, and time.      Cranial Nerves: No cranial nerve deficit.      Sensory: No sensory deficit.      Motor: No weakness.      Gait: Gait normal.   Psychiatric:         Mood and Affect: Mood " normal.         Behavior: Behavior normal.         Thought Content: Thought content normal.         Judgment: Judgment normal.     Last Recorded Vitals  Blood pressure 128/60, pulse 50, temperature 37 °C (98.6 °F), temperature source Temporal, resp. rate 16, height 1.524 m (5'), weight (!) 32.8 kg (72 lb 5 oz), SpO2 98%.  Intake/Output last 3 Shifts:  I/O last 3 completed shifts:  In: 1951.5 (59.5 mL/kg) [P.O.:60; I.V.:1641.5 (50 mL/kg); IV Piggyback:250]  Out: - (0 mL/kg)   Weight: 32.8 kg     Scheduled medications  heparin (porcine), 5,000 Units, subcutaneous, q8h BRIANNA  [Held by provider] hyoscyamine, 0.25 mg, sublingual, Before meals & nightly  levothyroxine, 50 mcg, oral, Daily  [Held by provider] linaCLOtide, 145 mcg, oral, Daily before breakfast  magnesium oxide, 400 mg, oral, Daily  metoclopramide, 5 mg, oral, TID AC   Or  metoclopramide, 5 mg, intravenous, TID AC  pantoprazole, 40 mg, oral, Daily before breakfast   Or  pantoprazole, 40 mg, intravenous, Daily before breakfast  piperacillin-tazobactam, 3.375 g, intravenous, q6h  [Held by provider] polyethylene glycol, 17 g, oral, Daily  potassium chloride CR, 10 mEq, oral, Daily  scopolamine, 1 patch, transdermal, q72h  sucralfate, 1 g, oral, 4x daily  traZODone, 75 mg, oral, Nightly    Continuous medications  potassium chloride in 0.9%NaCl, 125 mL/hr, Last Rate: 125 mL/hr (09/08/24 0428)  sodium chloride 0.9%, 10 mL/hr, Last Rate: 10 mL/hr (09/08/24 0420)    PRN medications  PRN medications: acetaminophen **OR** [DISCONTINUED] acetaminophen **OR** [DISCONTINUED] acetaminophen, acetaminophen **OR** [DISCONTINUED] acetaminophen **OR** [DISCONTINUED] acetaminophen, benzocaine-menthol, dextromethorphan-guaifenesin, guaiFENesin, loperamide, melatonin, ondansetron ODT **OR** ondansetron, sodium chloride 0.9%    Relevant Results  CT abdomen pelvis wo IV contrast  Result Date: 9/6/2024  Limited evaluation due to lack of intravenous contrast and paucity of  intra-abdominal fat.  Questionable edema in the pancreas.  Please clinically for acute pancreatitis. Mild edema in the distal sigmoid colon/rectum concerning for component of colitis/proctitis. Cholelithiasis. Punctate nonobstructive calculus in the upper pole of the left kidney. Mild periportal edema. Moderate colonic stool. Postsurgical changes in the distal colon. Signed by Abhilash Esquivel DO     XR chest 1 view  Result Date: 9/6/2024  Emphysematous changes in the lungs. No evidence of acute cardiopulmonary process.     MACRO: None   Signed by: Julio Hernandez 9/6/2024 3:07 PM Dictation workstation:   [id number]      Latest Reference Range & Units 09/07/24 06:01 09/08/24 06:17   GLUCOSE 74 - 99 mg/dL 105 (H) 88   SODIUM 136 - 145 mmol/L 138 139   POTASSIUM 3.5 - 5.3 mmol/L 2.8 (LL) 3.7   CHLORIDE 98 - 107 mmol/L 100 104   Bicarbonate 21 - 32 mmol/L 26 22   Anion Gap 10 - 20 mmol/L 15 17   Blood Urea Nitrogen 6 - 23 mg/dL 6 7   Creatinine 0.50 - 1.05 mg/dL 0.79 0.71   EGFR >60 mL/min/1.73m*2 77 87   Calcium 8.6 - 10.3 mg/dL 8.3 (L) 8.4 (L)   HDL CHOLESTEROL mg/dL 46.6    Cholesterol/HDL Ratio  2.4    LDL Calculated <=99 mg/dL 49    VLDL 0 - 40 mg/dL 19    TRIGLYCERIDES 0 - 149 mg/dL 94    Non HDL Cholesterol 0 - 149 mg/dL 67    MAGNESIUM 1.60 - 2.40 mg/dL  1.00 (L)   CHOLESTEROL 0 - 199 mg/dL 114    WBC 4.4 - 11.3 x10*3/uL 4.7 4.7   nRBC 0.0 - 0.0 /100 WBCs 0.0 0.0   RBC 4.00 - 5.20 x10*6/uL 3.84 (L) 4.12   HEMOGLOBIN 12.0 - 16.0 g/dL 11.3 (L) 12.1   HEMATOCRIT 36.0 - 46.0 % 33.5 (L) 36.8   MCV 80 - 100 fL 87 89   MCH 26.0 - 34.0 pg 29.4 29.4   MCHC 32.0 - 36.0 g/dL 33.7 32.9   RED CELL DISTRIBUTION WIDTH 11.5 - 14.5 % 13.6 13.9   Platelets 150 - 450 x10*3/uL 200 222     Assessment/Plan   Assessment & Plan  UTI (urinary tract infection)    Chronic gastritis    Chronic insomnia    Hypokalemia    Chronic idiopathic constipation    GERD (gastroesophageal reflux disease)    Dehydration    Hypothyroid    IBS  (irritable bowel syndrome)    Nausea    Nausea  UTI (urinary tract infection)  Dehydration  - was on 5 days abx for UTI, severe nausea since then  - UA: 250 LE, 1+ bacteria  - Ucx >100,000 GNB  - IV zosyn started in ED, continue, day 3  - continue IVF  - continue scopolamine patch, prn zofran, reglan TID  - blood cultures NTD  - diet as tolerated     Chronic gastritis  IBS (irritable bowel syndrome)  Chronic idiopathic constipation  GERD (gastroesophageal reflux disease)  - sees GI OP  - continue linaclotide, PPI, miralax, sucralfate  - imodium added for diarrhea  - linaclotide, miralax on hold     Hypothyroid  - TSH 2.08  - continue levothyroxine     Chronic insomnia  - continue trazodone     Hypokalemia  - K 3.8 > 2.8 > 3.7  - repleted with IV, PO  - trend BMP, replete as needed  - telemetry     GI ppx: PPI  DVT ppx: ambulation  Fluids: maintenance  Electrolytes: replace as needed  Nutrition: clears, adat  Adjuncts: PIV  Code Status: full  Pt requires inpatient stay at this time.  Sasha Jefrfey, APRN-CNP

## 2024-09-08 NOTE — PROGRESS NOTES
Latha Quispe is a 79 y.o. female on day 1 of admission presenting with Nausea.    Subjective   Still nauseated.  Scopolamine patch not helping her       Objective     Physical Exam  Constitutional:       Appearance: Normal appearance.   Abdominal:      Palpations: Abdomen is soft. There is no mass.      Tenderness: There is no abdominal tenderness. There is no guarding.         Last Recorded Vitals  Blood pressure 128/60, pulse 50, temperature 37 °C (98.6 °F), temperature source Temporal, resp. rate 16, height 1.524 m (5'), weight (!) 32.8 kg (72 lb 5 oz), SpO2 98%.  Intake/Output last 3 Shifts:  I/O last 3 completed shifts:  In: 1951.5 (59.5 mL/kg) [P.O.:60; I.V.:1641.5 (50 mL/kg); IV Piggyback:250]  Out: - (0 mL/kg)   Weight: 32.8 kg       Assessment/Plan   Assessment & Plan  Nausea    Chronic gastritis    Chronic insomnia    Hypothyroid    IBS (irritable bowel syndrome)    Hypokalemia    Chronic idiopathic constipation    GERD (gastroesophageal reflux disease)    Dehydration    UTI (urinary tract infection)    Plan-consider Phenergan or Reglan cardiology to see bradycardia if not cardiac related then Phenergan can be considered in addition to Zofran      Cam Diez MD

## 2024-09-08 NOTE — CONSULTS
Consults  History Of Present Illness:    Latha Quispe is a 79 y.o. female presenting with Generalized weakness, fatigue, severe nausea, poor oral intake for the past 3 days.  She denies any chest pain.  She has no shortness of breath.  She denies any dizziness.  No orthopnea or paroxysmal nocturnal dyspnea lower extremity edema.  She has been unable to keep any food or fluid down and is requiring medications for nausea relief.    Telemetry shows marked sinus bradycardia.    Old records were reviewed.    The patient has chronic sinus bradycardia with declining resting heart rate in recent months.  However it has been reported that heart rate does augment with activity.    Laboratory studies were reviewed and notable for magnesium 1.44 potassium 2.9 a month ago and 2 days ago it was 3.2 CBC notable for WBC 7.8 hemoglobin 12.5 BUN 7 creatinine 0.9 cardiac troponins all negative urinalysis notable for 1+ bacteria 2+ hyaline casts urine culture greater than 100,000 gram-negative bacilli 20,000-80,000 E faecalis lipase and amylase normal blood cultures no growth cholesterol 114 magnesium level today 1.00 potassium improved to 3.7.  Past Medical History:  She has a past medical history of Abdominal pain, Anxiety, Cat bite (03/11/2023), Cataract, Depression, Disease of thyroid gland, GERD (gastroesophageal reflux disease), Hypothyroidism, Irritable bowel syndrome, Panic attacks, Personal history of malignant neoplasm of breast (06/30/2021), and Personal history of other complications of pregnancy, childbirth and the puerperium.    Past Surgical History:  She has a past surgical history that includes Other surgical history (06/30/2021); Breast surgery; Gastric bypass; Colon surgery; Hysterectomy; Cataract extraction; Mastectomy, partial (Left); CT angio abdomen w and or wo IV IV contrast (12/28/2023); and Colectomy (1992).      Social History:  She reports that she has never smoked. She has never been exposed to tobacco  smoke. She has never used smokeless tobacco. She reports that she does not drink alcohol and does not use drugs.    Family History:  Family History   Problem Relation Name Age of Onset    Osteoporosis Mother      Alcohol abuse Father      Cancer Sister          breast    Osteoporosis Sister      Other (bladder cancer) Sister      Cancer Brother          colon. prostate    Colon cancer Brother      Colon cancer Brother      Hypothyroidism Other          Allergies:  Nsaids (non-steroidal anti-inflammatory drug)    Outpatient Medications:  Current Outpatient Medications   Medication Instructions    acetaminophen (TYLENOL) 650 mg, oral, Every 4 hours PRN    cholecalciferol (Vitamin D3) 50 mcg (2,000 unit) capsule 1 capsule, oral, Daily    hyoscyamine (ANASPAZ) 0.25 mg, sublingual, 4 times daily before meals and nightly    lactulose 10 g, oral, 2 times daily PRN    levothyroxine (SYNTHROID, LEVOXYL) 50 mcg, oral, Daily    linaCLOtide (LINZESS) 145 mcg, oral, Daily before breakfast, Do not crush or chew.    magnesium hydroxide (Milk of Magnesia) 2,400 mg/10 mL suspension suspension 10 mL, oral, Daily PRN    magnesium oxide (Mag-Ox) 400 mg tablet oral    melatonin 3 mg, oral, Nightly PRN    ondansetron ODT (ZOFRAN-ODT) 4 mg, oral, Every 12 hours PRN    pantoprazole (PROTONIX) 40 mg, oral, Daily    potassium chloride CR 10 mEq ER tablet 10 mEq, oral, Daily, Do not crush, chew, or split.    sucralfate (CARAFATE) 1 g, oral, 4 times daily, Before meals and at bedtime    traZODone (DESYREL) 150 mg, oral, Nightly         Inpatient Medications:  PRN medications   Medication    acetaminophen    acetaminophen    benzocaine-menthol    dextromethorphan-guaifenesin    guaiFENesin    loperamide    melatonin    ondansetron ODT    Or    ondansetron    sodium chloride 0.9%     Continuous Medications   Medication Dose Last Rate    potassium chloride in 0.9%NaCl  125 mL/hr 125 mL/hr (09/08/24 1352)    sodium chloride 0.9%  10 mL/hr 10 mL/hr  (09/08/24 3650)       Last Labs:  Results for orders placed or performed during the hospital encounter of 09/06/24 (from the past 96 hour(s))   CBC and Auto Differential   Result Value Ref Range    WBC 7.8 4.4 - 11.3 x10*3/uL    nRBC 0.0 0.0 - 0.0 /100 WBCs    RBC 4.32 4.00 - 5.20 x10*6/uL    Hemoglobin 12.5 12.0 - 16.0 g/dL    Hematocrit 38.2 36.0 - 46.0 %    MCV 88 80 - 100 fL    MCH 28.9 26.0 - 34.0 pg    MCHC 32.7 32.0 - 36.0 g/dL    RDW 13.3 11.5 - 14.5 %    Platelets 242 150 - 450 x10*3/uL    Neutrophils % 88.3 40.0 - 80.0 %    Immature Granulocytes %, Automated 0.3 0.0 - 0.9 %    Lymphocytes % 4.5 13.0 - 44.0 %    Monocytes % 6.3 2.0 - 10.0 %    Eosinophils % 0.1 0.0 - 6.0 %    Basophils % 0.5 0.0 - 2.0 %    Neutrophils Absolute 6.87 (H) 1.60 - 5.50 x10*3/uL    Immature Granulocytes Absolute, Automated 0.02 0.00 - 0.50 x10*3/uL    Lymphocytes Absolute 0.35 (L) 0.80 - 3.00 x10*3/uL    Monocytes Absolute 0.49 0.05 - 0.80 x10*3/uL    Eosinophils Absolute 0.01 0.00 - 0.40 x10*3/uL    Basophils Absolute 0.04 0.00 - 0.10 x10*3/uL   Comprehensive metabolic panel   Result Value Ref Range    Glucose 129 (H) 74 - 99 mg/dL    Sodium 130 (L) 136 - 145 mmol/L    Potassium 3.8 3.5 - 5.3 mmol/L    Chloride 98 98 - 107 mmol/L    Bicarbonate 21 21 - 32 mmol/L    Anion Gap 15 10 - 20 mmol/L    Urea Nitrogen 7 6 - 23 mg/dL    Creatinine 0.90 0.50 - 1.05 mg/dL    eGFR 66 >60 mL/min/1.73m*2    Calcium 8.7 8.6 - 10.3 mg/dL    Albumin 3.6 3.4 - 5.0 g/dL    Alkaline Phosphatase 83 33 - 136 U/L    Total Protein 6.4 6.4 - 8.2 g/dL    AST 23 9 - 39 U/L    Bilirubin, Total 0.5 0.0 - 1.2 mg/dL    ALT 14 7 - 45 U/L   Troponin I, High Sensitivity, Initial   Result Value Ref Range    Troponin I, High Sensitivity 5 0 - 13 ng/L   Urinalysis with Reflex Culture and Microscopic   Result Value Ref Range    Color, Urine Dark-Yellow Light-Yellow, Yellow, Dark-Yellow    Appearance, Urine Turbid (N) Clear    Specific Gravity, Urine 1.009 1.005 -  1.035    pH, Urine 6.0 5.0, 5.5, 6.0, 6.5, 7.0, 7.5, 8.0    Protein, Urine 20 (TRACE) NEGATIVE, 10 (TRACE), 20 (TRACE) mg/dL    Glucose, Urine Normal Normal mg/dL    Blood, Urine 0.1 (1+) (A) NEGATIVE    Ketones, Urine NEGATIVE NEGATIVE mg/dL    Bilirubin, Urine NEGATIVE NEGATIVE    Urobilinogen, Urine Normal Normal mg/dL    Nitrite, Urine NEGATIVE NEGATIVE    Leukocyte Esterase, Urine 250 William/µL (A) NEGATIVE   Extra Urine Gray Tube   Result Value Ref Range    Extra Tube Hold for add-ons.    Microscopic Only, Urine   Result Value Ref Range    WBC, Urine 1-5 1-5, NONE /HPF    RBC, Urine 3-5 NONE, 1-2, 3-5 /HPF    Squamous Epithelial Cells, Urine 1-9 (SPARSE) Reference range not established. /HPF    Bacteria, Urine 1+ (A) NONE SEEN /HPF    Mucus, Urine FEW Reference range not established. /LPF    Hyaline Casts, Urine 2+ (A) NONE /LPF   Urine Culture    Specimen: Clean Catch/Voided; Urine   Result Value Ref Range    Urine Culture >100,000 Gram Negative Bacilli (A)     Urine Culture 20,000 - 80,000 Enterococcus faecalis (A)    Troponin, High Sensitivity, 1 Hour   Result Value Ref Range    Troponin I, High Sensitivity 7 0 - 13 ng/L   Lipase   Result Value Ref Range    Lipase 10 9 - 82 U/L   Amylase   Result Value Ref Range    Amylase 34 29 - 103 U/L   Blood Culture    Specimen: Peripheral Venipuncture; Blood culture   Result Value Ref Range    Blood Culture No growth at 1 day    Blood Culture    Specimen: Peripheral Venipuncture; Blood culture   Result Value Ref Range    Blood Culture No growth at 1 day    SST TOP   Result Value Ref Range    Extra Tube Hold for add-ons.    CBC   Result Value Ref Range    WBC 4.7 4.4 - 11.3 x10*3/uL    nRBC 0.0 0.0 - 0.0 /100 WBCs    RBC 3.84 (L) 4.00 - 5.20 x10*6/uL    Hemoglobin 11.3 (L) 12.0 - 16.0 g/dL    Hematocrit 33.5 (L) 36.0 - 46.0 %    MCV 87 80 - 100 fL    MCH 29.4 26.0 - 34.0 pg    MCHC 33.7 32.0 - 36.0 g/dL    RDW 13.6 11.5 - 14.5 %    Platelets 200 150 - 450 x10*3/uL   Basic  metabolic panel   Result Value Ref Range    Glucose 105 (H) 74 - 99 mg/dL    Sodium 138 136 - 145 mmol/L    Potassium 2.8 (LL) 3.5 - 5.3 mmol/L    Chloride 100 98 - 107 mmol/L    Bicarbonate 26 21 - 32 mmol/L    Anion Gap 15 10 - 20 mmol/L    Urea Nitrogen 6 6 - 23 mg/dL    Creatinine 0.79 0.50 - 1.05 mg/dL    eGFR 77 >60 mL/min/1.73m*2    Calcium 8.3 (L) 8.6 - 10.3 mg/dL   Lipid panel   Result Value Ref Range    Cholesterol 114 0 - 199 mg/dL    HDL-Cholesterol 46.6 mg/dL    Cholesterol/HDL Ratio 2.4     LDL Calculated 49 <=99 mg/dL    VLDL 19 0 - 40 mg/dL    Triglycerides 94 0 - 149 mg/dL    Non HDL Cholesterol 67 0 - 149 mg/dL   CBC   Result Value Ref Range    WBC 4.7 4.4 - 11.3 x10*3/uL    nRBC 0.0 0.0 - 0.0 /100 WBCs    RBC 4.12 4.00 - 5.20 x10*6/uL    Hemoglobin 12.1 12.0 - 16.0 g/dL    Hematocrit 36.8 36.0 - 46.0 %    MCV 89 80 - 100 fL    MCH 29.4 26.0 - 34.0 pg    MCHC 32.9 32.0 - 36.0 g/dL    RDW 13.9 11.5 - 14.5 %    Platelets 222 150 - 450 x10*3/uL   Basic metabolic panel   Result Value Ref Range    Glucose 88 74 - 99 mg/dL    Sodium 139 136 - 145 mmol/L    Potassium 3.7 3.5 - 5.3 mmol/L    Chloride 104 98 - 107 mmol/L    Bicarbonate 22 21 - 32 mmol/L    Anion Gap 17 10 - 20 mmol/L    Urea Nitrogen 7 6 - 23 mg/dL    Creatinine 0.71 0.50 - 1.05 mg/dL    eGFR 87 >60 mL/min/1.73m*2    Calcium 8.4 (L) 8.6 - 10.3 mg/dL   Magnesium   Result Value Ref Range    Magnesium 1.00 (L) 1.60 - 2.40 mg/dL          Last Recorded Vitals:  Vitals:    09/08/24 0201 09/08/24 0638 09/08/24 1004 09/08/24 1508   BP: 139/69 128/60  128/59   BP Location: Right arm Right arm  Left arm   Patient Position: Lying Sitting  Lying   Pulse:  50  (!) 48   Resp:  16  16   Temp:  37 °C (98.6 °F)  36.6 °C (97.9 °F)   TempSrc:  Temporal  Temporal   SpO2:  98% 98% 97%   Weight:       Height:         I/O last 3 completed shifts:  In: 1951.5 (59.5 mL/kg) [P.O.:60; I.V.:1641.5 (50 mL/kg); IV Piggyback:250]  Out: - (0 mL/kg)   Weight: 32.8 kg        Physical Exam:  Constitutional: Cachetic awake/alert/oriented x3, no distress, alert and cooperative  Eyes: PERRL, EOMI, clear sclera  ENMT: mucous membranes moist, no apparent injury, no lesions seen  Head/Neck: Neck supple, no apparent injury, thyroid without mass or tenderness, No JVD, trachea midline, no bruits  Respiratory/Thorax: Patent airways, CTAB, normal breath sounds with good chest expansion, thorax symmetric  Cardiovascular: Regular, rate and rhythm, no murmurs, 2+ equal pulses of the extremities, normal S 1and S 2  Gastrointestinal: Nondistended, soft, non-tender, no rebound tenderness or guarding, no masses palpable, no organomegaly, +BS, no bruits  Musculoskeletal: ROM intact, no joint swelling, normal strength  Extremities: normal extremities, no cyanosis edema, contusions or wounds, no clubbing  Neurological: alert and oriented x3, intact senses, motor, response and reflexes, normal strength  Lymphatic: No significant lymphadenopathy  Psychological: Appropriate mood and behavior  Skin: Warm and dry, no lesions, no rashes     Assessment/Plan   Problem List Items Addressed This Visit          Endocrine/Metabolic    RESOLVED: Hyponatremia       Gastrointestinal and Abdominal    * (Principal) Nausea - Primary     Other Visit Diagnoses       UTI (urinary tract infection) with pyuria               Patient has been admitted to the hospital and monitored on telemetry.  Currently she has no active cardiopulmonary symptoms.  She has resting sinus bradycardia.  No correlating dizziness, syncope, hypotension.  Heart rate has been reported to augment with activity.  Due to her extremely cachectic state poor candidate for permanent pacemaker with leads.  If she does not demonstrate overt symptomatic bradycardia in the future may be a candidate for a leadless pacemaker.  Primary problem appears to be nausea and agree with using Compazine or Phenergan for symptom relief.  CT reviewed and suggests possible  pancreatitis duodenitis, advised proton pump inhibitors.  Continue levothyroxine for hypothyroidism.  Supplement electrolytes to normal range.  Will follow with you.  Peripheral IV 09/07/24 20 G Left;Anterior Forearm (Active)   Site Assessment Clean;Dry;Intact 09/08/24 0900   Dressing Status Clean;Dry 09/08/24 0900   Number of days: 1       Peripheral IV 09/07/24 20 G Distal;Right;Upper;Anterior Arm (Active)   Site Assessment Clean;Dry;Intact 09/08/24 0900   Dressing Status Clean;Dry 09/08/24 0900   Number of days: 1       Code Status:  Full Code    I spent 30 minutes in the professional and overall care of this patient.        David Logan MD

## 2024-09-09 ENCOUNTER — TELEPHONE (OUTPATIENT)
Dept: UROLOGY | Facility: CLINIC | Age: 79
End: 2024-09-09
Payer: MEDICARE

## 2024-09-09 VITALS
OXYGEN SATURATION: 98 % | DIASTOLIC BLOOD PRESSURE: 96 MMHG | SYSTOLIC BLOOD PRESSURE: 142 MMHG | HEIGHT: 60 IN | BODY MASS INDEX: 14.2 KG/M2 | WEIGHT: 72.31 LBS | RESPIRATION RATE: 16 BRPM | HEART RATE: 62 BPM | TEMPERATURE: 96.1 F

## 2024-09-09 DIAGNOSIS — N39.0 RECURRENT UTI: Primary | ICD-10-CM

## 2024-09-09 LAB
ANION GAP SERPL CALC-SCNC: 13 MMOL/L (ref 10–20)
BUN SERPL-MCNC: 3 MG/DL (ref 6–23)
CALCIUM SERPL-MCNC: 8.2 MG/DL (ref 8.6–10.3)
CHLORIDE SERPL-SCNC: 106 MMOL/L (ref 98–107)
CO2 SERPL-SCNC: 23 MMOL/L (ref 21–32)
CREAT SERPL-MCNC: 0.64 MG/DL (ref 0.5–1.05)
EGFRCR SERPLBLD CKD-EPI 2021: >90 ML/MIN/1.73M*2
ERYTHROCYTE [DISTWIDTH] IN BLOOD BY AUTOMATED COUNT: 13.8 % (ref 11.5–14.5)
GLUCOSE SERPL-MCNC: 95 MG/DL (ref 74–99)
HCT VFR BLD AUTO: 34.7 % (ref 36–46)
HGB BLD-MCNC: 11.5 G/DL (ref 12–16)
HOLD SPECIMEN: NORMAL
MAGNESIUM SERPL-MCNC: 1.21 MG/DL (ref 1.6–2.4)
MCH RBC QN AUTO: 29.6 PG (ref 26–34)
MCHC RBC AUTO-ENTMCNC: 33.1 G/DL (ref 32–36)
MCV RBC AUTO: 89 FL (ref 80–100)
NRBC BLD-RTO: 0 /100 WBCS (ref 0–0)
PLATELET # BLD AUTO: 210 X10*3/UL (ref 150–450)
POTASSIUM SERPL-SCNC: 3.7 MMOL/L (ref 3.5–5.3)
RBC # BLD AUTO: 3.88 X10*6/UL (ref 4–5.2)
SODIUM SERPL-SCNC: 138 MMOL/L (ref 136–145)
WBC # BLD AUTO: 4 X10*3/UL (ref 4.4–11.3)

## 2024-09-09 PROCEDURE — 2500000004 HC RX 250 GENERAL PHARMACY W/ HCPCS (ALT 636 FOR OP/ED): Mod: IPSPLIT | Performed by: INTERNAL MEDICINE

## 2024-09-09 PROCEDURE — 2500000001 HC RX 250 WO HCPCS SELF ADMINISTERED DRUGS (ALT 637 FOR MEDICARE OP): Mod: IPSPLIT | Performed by: NURSE PRACTITIONER

## 2024-09-09 PROCEDURE — 80048 BASIC METABOLIC PNL TOTAL CA: CPT | Mod: IPSPLIT

## 2024-09-09 PROCEDURE — 99239 HOSP IP/OBS DSCHRG MGMT >30: CPT | Performed by: NURSE PRACTITIONER

## 2024-09-09 PROCEDURE — 2500000004 HC RX 250 GENERAL PHARMACY W/ HCPCS (ALT 636 FOR OP/ED): Mod: IPSPLIT | Performed by: NURSE PRACTITIONER

## 2024-09-09 PROCEDURE — 2580000001 HC RX 258 IV SOLUTIONS: Mod: IPSPLIT | Performed by: NURSE PRACTITIONER

## 2024-09-09 PROCEDURE — 85027 COMPLETE CBC AUTOMATED: CPT | Mod: IPSPLIT

## 2024-09-09 PROCEDURE — 2500000002 HC RX 250 W HCPCS SELF ADMINISTERED DRUGS (ALT 637 FOR MEDICARE OP, ALT 636 FOR OP/ED): Mod: IPSPLIT | Performed by: NURSE PRACTITIONER

## 2024-09-09 PROCEDURE — 2500000004 HC RX 250 GENERAL PHARMACY W/ HCPCS (ALT 636 FOR OP/ED): Mod: IPSPLIT

## 2024-09-09 PROCEDURE — 83735 ASSAY OF MAGNESIUM: CPT | Mod: IPSPLIT | Performed by: NURSE PRACTITIONER

## 2024-09-09 PROCEDURE — 36415 COLL VENOUS BLD VENIPUNCTURE: CPT | Mod: IPSPLIT

## 2024-09-09 RX ORDER — SCOLOPAMINE TRANSDERMAL SYSTEM 1 MG/1
1 PATCH, EXTENDED RELEASE TRANSDERMAL
Qty: 3 PATCH | Refills: 0 | Status: SHIPPED | OUTPATIENT
Start: 2024-09-10 | End: 2024-09-17

## 2024-09-09 RX ORDER — SULFAMETHOXAZOLE AND TRIMETHOPRIM 800; 160 MG/1; MG/1
1 TABLET ORAL 2 TIMES DAILY
Qty: 10 TABLET | Refills: 0 | Status: SHIPPED | OUTPATIENT
Start: 2024-09-09 | End: 2024-09-09 | Stop reason: HOSPADM

## 2024-09-09 RX ORDER — METOCLOPRAMIDE 5 MG/1
5 TABLET ORAL 2 TIMES DAILY
Qty: 10 TABLET | Refills: 0 | Status: SHIPPED | OUTPATIENT
Start: 2024-09-09 | End: 2024-09-16

## 2024-09-09 RX ORDER — CEFUROXIME AXETIL 500 MG/1
500 TABLET ORAL 2 TIMES DAILY
Qty: 10 TABLET | Refills: 0 | Status: SHIPPED | OUTPATIENT
Start: 2024-09-09 | End: 2024-09-16

## 2024-09-09 RX ADMIN — Medication 400 MG: at 08:20

## 2024-09-09 RX ADMIN — PANTOPRAZOLE SODIUM 40 MG: 40 INJECTION, POWDER, LYOPHILIZED, FOR SOLUTION INTRAVENOUS at 05:46

## 2024-09-09 RX ADMIN — LEVOTHYROXINE SODIUM 50 MCG: 50 TABLET ORAL at 08:20

## 2024-09-09 RX ADMIN — SODIUM CHLORIDE 10 ML/HR: 9 INJECTION, SOLUTION INTRAVENOUS at 05:47

## 2024-09-09 RX ADMIN — PIPERACILLIN SODIUM AND TAZOBACTAM SODIUM 3.38 G: 3; .375 INJECTION, SOLUTION INTRAVENOUS at 02:08

## 2024-09-09 RX ADMIN — POTASSIUM CHLORIDE 10 MEQ: 750 TABLET, FILM COATED, EXTENDED RELEASE ORAL at 08:20

## 2024-09-09 RX ADMIN — SODIUM CHLORIDE 10 ML/HR: 9 INJECTION, SOLUTION INTRAVENOUS at 02:08

## 2024-09-09 RX ADMIN — METOCLOPRAMIDE HYDROCHLORIDE 5 MG: 5 INJECTION INTRAMUSCULAR; INTRAVENOUS at 05:46

## 2024-09-09 RX ADMIN — POTASSIUM CHLORIDE AND SODIUM CHLORIDE 125 ML/HR: 900; 300 INJECTION, SOLUTION INTRAVENOUS at 03:04

## 2024-09-09 RX ADMIN — PIPERACILLIN SODIUM AND TAZOBACTAM SODIUM 3.38 G: 3; .375 INJECTION, SOLUTION INTRAVENOUS at 08:30

## 2024-09-09 RX ADMIN — HEPARIN SODIUM 5000 UNITS: 5000 INJECTION INTRAVENOUS; SUBCUTANEOUS at 05:46

## 2024-09-09 RX ADMIN — SUCRALFATE 1 G: 1 TABLET ORAL at 05:46

## 2024-09-09 ASSESSMENT — COGNITIVE AND FUNCTIONAL STATUS - GENERAL
HELP NEEDED FOR BATHING: A LITTLE
DRESSING REGULAR LOWER BODY CLOTHING: A LITTLE
DAILY ACTIVITIY SCORE: 22
MOBILITY SCORE: 23
CLIMB 3 TO 5 STEPS WITH RAILING: A LITTLE

## 2024-09-09 ASSESSMENT — PAIN SCALES - GENERAL: PAINLEVEL_OUTOF10: 0 - NO PAIN

## 2024-09-09 NOTE — TELEPHONE ENCOUNTER
----- Message from Sheridan Aldridge sent at 9/9/2024  7:27 AM EDT -----  Will you please call her and let her know her urine culture came back positive for a UTI? I sent anabiotics to her pharmacy with instructions.

## 2024-09-09 NOTE — PROGRESS NOTES
09/09/24 0905   Discharge Planning   Living Arrangements Spouse/significant other  (12 year old grandson lives with patient)   Support Systems Spouse/significant other   Assistance Needed independent at home, patient does not drive,spouse will transport home   Type of Residence Private residence   Number of Stairs Within Residence   (lives on second floor, has chair lif tto upstairs.)   Home or Post Acute Services None   Expected Discharge Disposition Home   Does the patient need discharge transport arranged? No     Spoke with patient to discuss discharge planning. Patient A&Ox3, patient  independent with all ADL's and IADLS. ,no home oxygen, no HD,Patient denies any home going needs.

## 2024-09-09 NOTE — PROGRESS NOTES
Latha Quispe is a 79 y.o. female on day 2 of admission presenting with Nausea.      Subjective   She is sitting on the edge of the bed, states she feels great this morning and wants to go home.       Review of systems:  Constitutional: negative for fever, chills, or malaise  Neuro: negative for dizziness, headache, numbness, tingling  ENT: Negative for nasal congestion or sore throat  Resp: negative for shortness of breath, cough, or wheezing  CV: negative for chest pain, palpitations  GI: negative for abd pain, nausea, vomiting or diarrhea  : negative for dysuria, frequency, or urgency  Skin: negative for lesions, wounds, or rash  Musculoskeletal: Negative for weakness, myalgia, or arthralgia  Endocrine: Negative for polyuria or polydipsia         Objective   Constitutional: cachetic, awake/alert/oriented x3, no distress, alert and cooperative  Eyes: PERRL, EOMI, clear sclera  ENMT: mucous membranes moist, no apparent injury, no lesions seen  Head/Neck: Neck supple, no apparent injury, thyroid without mass or tenderness, No JVD, trachea midline, no bruits  Respiratory/Thorax: Patent airways, CTAB, normal breath sounds with good chest expansion, thorax symmetric  Cardiovascular: Regular, rate and rhythm, no murmurs, 2+ equal pulses of the extremities, normal S 1and S 2  Gastrointestinal: Nondistended, soft, non-tender, no rebound tenderness or guarding, no masses palpable, no organomegaly, +BS, no bruits  Musculoskeletal: ROM intact, no joint swelling, normal strength  Extremities: normal extremities, no cyanosis edema, contusions or wounds, no clubbing  Neurological: alert and oriented x3, intact senses, motor, response and reflexes, normal strength  Lymphatic: No significant lymphadenopathy  Psychological: Appropriate mood and behavior  Skin: Warm and dry, no lesions, no rashes      Last Recorded Vitals  BP (!) 142/96 (BP Location: Left arm, Patient Position: Lying)   Pulse 62   Temp 35.6 °C (96.1 °F)  (Temporal)   Resp 16   Ht 1.524 m (5')   Wt (!) 32.8 kg (72 lb 5 oz)   SpO2 98%   BMI 14.12 kg/m²     Intake/Output last 3 Shifts:  I/O last 3 completed shifts:  In: 3430.9 (104.6 mL/kg) [I.V.:3080.9 (93.9 mL/kg); IV Piggyback:350]  Out: - (0 mL/kg)   Weight: 32.8 kg   I/O this shift:  In: 1425.4 [I.V.:1365.4; IV Piggyback:60]  Out: -     Relevant Results  Scheduled medications    Continuous medications    PRN medications      Results for orders placed or performed during the hospital encounter of 09/06/24 (from the past 24 hour(s))   CBC   Result Value Ref Range    WBC 4.0 (L) 4.4 - 11.3 x10*3/uL    nRBC 0.0 0.0 - 0.0 /100 WBCs    RBC 3.88 (L) 4.00 - 5.20 x10*6/uL    Hemoglobin 11.5 (L) 12.0 - 16.0 g/dL    Hematocrit 34.7 (L) 36.0 - 46.0 %    MCV 89 80 - 100 fL    MCH 29.6 26.0 - 34.0 pg    MCHC 33.1 32.0 - 36.0 g/dL    RDW 13.8 11.5 - 14.5 %    Platelets 210 150 - 450 x10*3/uL   Basic metabolic panel   Result Value Ref Range    Glucose 95 74 - 99 mg/dL    Sodium 138 136 - 145 mmol/L    Potassium 3.7 3.5 - 5.3 mmol/L    Chloride 106 98 - 107 mmol/L    Bicarbonate 23 21 - 32 mmol/L    Anion Gap 13 10 - 20 mmol/L    Urea Nitrogen 3 (L) 6 - 23 mg/dL    Creatinine 0.64 0.50 - 1.05 mg/dL    eGFR >90 >60 mL/min/1.73m*2    Calcium 8.2 (L) 8.6 - 10.3 mg/dL   Magnesium   Result Value Ref Range    Magnesium 1.21 (L) 1.60 - 2.40 mg/dL   SST TOP   Result Value Ref Range    Extra Tube Hold for add-ons.        XR chest 1 view   Final Result   Emphysematous changes in the lungs. No evidence of acute   cardiopulmonary process.             MACRO:   None        Signed by: Julio Hernandez 9/6/2024 3:07 PM   Dictation workstation:   [id number]      CT abdomen pelvis wo IV contrast   Final Result   Limited evaluation due to lack of intravenous contrast and paucity of   intra-abdominal fat.  Questionable edema in the pancreas.  Please   clinically for acute pancreatitis.   Mild edema in the distal sigmoid colon/rectum concerning  for component   of colitis/proctitis.   Cholelithiasis.   Punctate nonobstructive calculus in the upper pole of the left kidney.   Mild periportal edema.   Moderate colonic stool.   Postsurgical changes in the distal colon.   Signed by Abhilash Esquivel DO          Transthoracic Echo (TTE) Complete    Result Date: 5/7/2024   [facility], [address] [place] [address]              Tel [phone] and Fax [phone] TRANSTHORACIC ECHOCARDIOGRAM REPORT  Patient Name:      CLAUDIA RAY       Reading Physician:    [id number] Cruzito Gaspar MD Study Date:        5/6/2024             Ordering Provider:    [id number] NICO DUVAL MRN/PID:           [id number]             Fellow: Accession#:        [id number]         Nurse: Date of Birth/Age: 1945 / 78 years Sonographer:          Travis Dunn RDCS Gender:            F                    Additional Staff: Height:            152.40 cm            Admit Date: Weight:            36.74 kg             Admission Status:     Inpatient -                                                               Routine BSA / BMI:         1.27 m2 / 15.82      Encounter#:           [id number]                    kg/m2                                         Department Location:  [facility] Blood Pressure: 169 /78 mmHg Study Type:    TRANSTHORACIC ECHO (TTE) COMPLETE Diagnosis/ICD: Abnormal electrocardiogram [ECG] [EKG]-R94.31 Indication:    Bradycardia CPT Code:      Echo Complete w Full Doppler-03310 Patient History: Pertinent History: COPD and Dyspnea. Bradycardia. Study Detail: The following Echo studies were performed: 2D, M-Mode, Doppler and               color flow. The patient was awake.  PHYSICIAN INTERPRETATION: Left Ventricle: The left ventricular systolic function is  normal, with an estimated ejection fraction of 60-65%. There are no regional wall motion abnormalities. The left ventricular cavity size is normal. Spectral Doppler shows an impaired relaxation pattern of left ventricular diastolic filling. Left Atrium: The left atrium is normal in size. Right Ventricle: The right ventricle is normal in size. There is normal right ventricular global systolic function. Right Atrium: The right atrium is normal in size. Aortic Valve: The aortic valve is trileaflet. There is mild aortic valve cusp calcification. There is mild aortic valve regurgitation. The peak instantaneous gradient of the aortic valve is 6.8 mmHg. Mitral Valve: The mitral valve is normal in structure. There is mild mitral valve regurgitation. Tricuspid Valve: The tricuspid valve is structurally normal. There is mild tricuspid regurgitation. Pulmonic Valve: The pulmonic valve is not well visualized. There is physiologic pulmonic valve regurgitation. Pericardium: There is no pericardial effusion noted. Aorta: The aortic root was not well visualized.  CONCLUSIONS:  1. Left ventricular systolic function is normal with a 60-65% estimated ejection fraction.  2. Spectral Doppler shows an impaired relaxation pattern of left ventricular diastolic filling.  3. Mild aortic valve regurgitation. QUANTITATIVE DATA SUMMARY: 2D MEASUREMENTS:                          Normal Ranges: Ao Root d:     3.10 cm   (2.0-3.7cm) LAs:           3.30 cm   (2.7-4.0cm) IVSd:          0.93 cm   (0.6-1.1cm) LVPWd:         0.85 cm   (0.6-1.1cm) LVIDd:         3.85 cm   (3.9-5.9cm) LVIDs:         2.14 cm LV Mass Index: 80.4 g/m2 LV % FS        44.4 % LA VOLUME:                               Normal Ranges: LA Vol A4C:        26.4 ml    (22+/-6mL/m2) LA Vol A2C:        32.0 ml LA Vol BP:         33.6 ml LA Vol Index A4C:  20.7ml/m2 LA Vol Index A2C:  25.2 ml/m2 LA Vol Index BP:   26.4 ml/m2 LA Area A4C:       11.0 cm2 LA Area A2C:       14.0 cm2 LA Major  Axis A4C: 3.9 cm LA Major Axis A2C: 5.2 cm RA VOLUME BY A/L METHOD:                               Normal Ranges: RA Vol A4C:        20.2 ml    (8.3-19.5ml) RA Vol Index A4C:  15.9 ml/m2 RA Area A4C:       10.0 cm2 RA Major Axis A4C: 4.2 cm M-MODE MEASUREMENTS:                  Normal Ranges: Ao Root: 2.90 cm (2.0-3.7cm) LAs:     3.30 cm (2.7-4.0cm) AORTA MEASUREMENTS:                    Normal Ranges: Asc Ao, d: 2.10 cm (2.1-3.4cm) LV SYSTOLIC FUNCTION BY 2D PLANIMETRY (MOD):                     Normal Ranges: EF-A4C View: 63.5 % (>=55%) EF-A2C View: 66.4 % EF-Biplane:  66.1 % LV DIASTOLIC FUNCTION:                               Normal Ranges: MV Peak E:        0.73 m/s    (0.7-1.2 m/s) MV Peak A:        0.90 m/s    (0.42-0.7 m/s) E/A Ratio:        0.81        (1.0-2.2) MV e'             0.06 m/s    (>8.0) MV lateral e'     0.08 m/s MV medial e'      0.05 m/s E/e' Ratio:       11.20       (<8.0) PulmV Sys Petey:    59.20 cm/s PulmV Nguyen Petey:   35.60 cm/s PulmV S/D Petey:    1.70 PulmV A Revs Petey: 33.50 cm/s PulmV A Revs Dur: 114.00 msec MITRAL VALVE:                 Normal Ranges: MV DT: 219 msec (150-240msec) AORTIC VALVE:                         Normal Ranges: AoV Vmax:      1.30 m/s (<=1.7m/s) AoV Peak P.8 mmHg (<20mmHg) LVOT Max Petey:  1.22 m/s (<=1.1m/s) LVOT VTI:      24.60 cm LVOT Diameter: 1.90 cm  (1.8-2.4cm) AoV Area,Vmax: 2.66 cm2 (2.5-4.5cm2)  RIGHT VENTRICLE: RV Basal 3.00 cm RV Mid   1.50 cm RV Major 4.8 cm TAPSE:   21.0 mm RV s'    0.12 m/s TRICUSPID VALVE/RVSP:                             Normal Ranges: Peak TR Velocity: 2.11 m/s RV Syst Pressure: 20.8 mmHg (< 30mmHg) IVC Diam:         1.60 cm PULMONIC VALVE:                      Normal Ranges: PV Max Petey: 1.0 m/s  (0.6-0.9m/s) PV Max P.2 mmHg Pulmonary Veins: PulmV A Revs Dur: 114.00 msec PulmV A Revs Petey: 33.50 cm/s PulmV Nguyen Petey:   35.60 cm/s PulmV S/D Petey:    1.70 PulmV Sys Petey:    59.20 cm/s  [id number] Cruzito Gaspar MD Electronically  signed on 5/7/2024 at 7:49:26 AM  ** Final **           Assessment/Plan   Principal Problem:    Nausea  Active Problems:    Chronic gastritis    Chronic insomnia    Hypothyroid    IBS (irritable bowel syndrome)    Hypokalemia    Chronic idiopathic constipation    GERD (gastroesophageal reflux disease)    Dehydration    UTI (urinary tract infection)    Patient has been admitted to the hospital and monitored on telemetry. Currently she has no active cardiopulmonary symptoms. She has resting sinus bradycardia. No correlating dizziness, syncope, hypotension. Heart rate has been reported to augment with activity. Due to her extremely cachectic state poor candidate for permanent pacemaker with leads. If she does not demonstrate overt symptomatic bradycardia in the future may be a candidate for a leadless pacemaker. Primary problem appears to be nausea and agree with using Compazine or Phenergan for symptom relief. CT reviewed and suggests possible pancreatitis duodenitis, advised proton pump inhibitors. Continue levothyroxine for hypothyroidism. Supplement electrolytes to normal range.         Tammi Morin, APRN-CNP

## 2024-09-09 NOTE — PROGRESS NOTES
Latha Quispe is a 79 y.o. female on day 2 of admission presenting with Nausea.    Subjective        Objective     Physical Exam  Constitutional:       General: She is not in acute distress.     Appearance: She is ill-appearing. She is not toxic-appearing.      Comments: cachectic   HENT:      Head: Normocephalic and atraumatic.      Mouth/Throat:      Mouth: Mucous membranes are dry.   Eyes:      Extraocular Movements: Extraocular movements intact.      Pupils: Pupils are equal, round, and reactive to light.   Cardiovascular:      Rate and Rhythm: Normal rate and regular rhythm.      Pulses: Normal pulses.      Heart sounds: Normal heart sounds. No murmur heard.     No gallop.   Pulmonary:      Effort: Pulmonary effort is normal. No respiratory distress.      Breath sounds: Normal breath sounds. No wheezing, rhonchi or rales.   Abdominal:      General: Bowel sounds are normal. There is no distension.      Palpations: Abdomen is soft.      Tenderness: There is no abdominal tenderness. There is no guarding or rebound.   Musculoskeletal:         General: No swelling, tenderness, deformity or signs of injury. Normal range of motion.      Cervical back: Normal range of motion and neck supple.   Skin:     General: Skin is warm and dry.      Capillary Refill: Capillary refill takes less than 2 seconds.      Coloration: Skin is pale. Skin is not jaundiced.      Findings: No bruising or rash.   Neurological:      General: No focal deficit present.      Mental Status: She is alert and oriented to person, place, and time.      Cranial Nerves: No cranial nerve deficit.      Sensory: No sensory deficit.      Motor: No weakness.      Gait: Gait normal.   Psychiatric:         Mood and Affect: Mood normal.         Behavior: Behavior normal.         Thought Content: Thought content normal.         Judgment: Judgment normal.     Last Recorded Vitals  Blood pressure 129/62, pulse (!) 45, temperature 36.5 °C (97.7 °F), temperature  source Temporal, resp. rate 16, height 1.524 m (5'), weight (!) 32.8 kg (72 lb 5 oz), SpO2 98%.  Intake/Output last 3 Shifts:  I/O last 3 completed shifts:  In: 1791.5 (54.6 mL/kg) [I.V.:1641.5 (50 mL/kg); IV Piggyback:150]  Out: - (0 mL/kg)   Weight: 32.8 kg     Scheduled medications  heparin (porcine), 5,000 Units, subcutaneous, q8h BRIANNA  [Held by provider] hyoscyamine, 0.25 mg, sublingual, Before meals & nightly  levothyroxine, 50 mcg, oral, Daily  [Held by provider] linaCLOtide, 145 mcg, oral, Daily before breakfast  magnesium oxide, 400 mg, oral, Daily  metoclopramide, 5 mg, oral, TID AC   Or  metoclopramide, 5 mg, intravenous, TID AC  pantoprazole, 40 mg, oral, Daily before breakfast   Or  pantoprazole, 40 mg, intravenous, Daily before breakfast  piperacillin-tazobactam, 3.375 g, intravenous, q6h  [Held by provider] polyethylene glycol, 17 g, oral, Daily  potassium chloride CR, 10 mEq, oral, Daily  scopolamine, 1 patch, transdermal, q72h  sucralfate, 1 g, oral, 4x daily  traZODone, 75 mg, oral, Nightly    Continuous medications  potassium chloride in 0.9%NaCl, 125 mL/hr, Last Rate: 125 mL/hr (09/09/24 0304)  sodium chloride 0.9%, 10 mL/hr, Last Rate: 10 mL/hr (09/09/24 0547)    PRN medications  PRN medications: acetaminophen **OR** [DISCONTINUED] acetaminophen **OR** [DISCONTINUED] acetaminophen, acetaminophen **OR** [DISCONTINUED] acetaminophen **OR** [DISCONTINUED] acetaminophen, benzocaine-menthol, dextromethorphan-guaifenesin, guaiFENesin, loperamide, melatonin, ondansetron ODT **OR** ondansetron, sodium chloride 0.9%    Relevant Results  CT abdomen pelvis wo IV contrast  Result Date: 9/6/2024  Limited evaluation due to lack of intravenous contrast and paucity of intra-abdominal fat.  Questionable edema in the pancreas.  Please clinically for acute pancreatitis. Mild edema in the distal sigmoid colon/rectum concerning for component of colitis/proctitis. Cholelithiasis. Punctate nonobstructive calculus in  the upper pole of the left kidney. Mild periportal edema. Moderate colonic stool. Postsurgical changes in the distal colon. Signed by Abhilash Esquivel,      XR chest 1 view  Result Date: 9/6/2024  Emphysematous changes in the lungs. No evidence of acute cardiopulmonary process.     MACRO: None   Signed by: Julio Hernandez 9/6/2024 3:07 PM Dictation workstation:   [id number]       Assessment/Plan   Assessment & Plan  UTI (urinary tract infection)    Chronic gastritis    Chronic insomnia    Hypokalemia    Chronic idiopathic constipation    GERD (gastroesophageal reflux disease)    Dehydration    Hypothyroid    IBS (irritable bowel syndrome)    Nausea    Nausea  UTI (urinary tract infection)  Dehydration  - was on 5 days abx for UTI, severe nausea since then  - UA: 250 LE, 1+ bacteria  - Ucx >100,000 GNB  - IV zosyn started in ED, continue, day 3  - continue IVF  - continue scopolamine patch, prn zofran, reglan TID  - blood cultures NTD  - diet as tolerated     Chronic gastritis  IBS (irritable bowel syndrome)  Chronic idiopathic constipation  GERD (gastroesophageal reflux disease)  - sees GI OP  - continue linaclotide, PPI, miralax, sucralfate  - imodium added for diarrhea  - linaclotide, miralax on hold     Hypothyroid  - TSH 2.08  - continue levothyroxine     Chronic insomnia  - continue trazodone     Hypokalemia  - K 3.8 > 2.8 > 3.7  - repleted with IV, PO  - trend BMP, replete as needed  - telemetry     GI ppx: PPI  DVT ppx: ambulation  Fluids: maintenance  Electrolytes: replace as needed  Nutrition: clears, adat  Adjuncts: PIV  Code Status: full  Pt requires inpatient stay at this time.  Sasha Jeffrey, APRN-CNP

## 2024-09-09 NOTE — CARE PLAN
The patient's goals for the shift include  feeling better    The clinical goals for the shift include Pt will not experience any episodes of emesis this shift    Over the shift, the patient did make progress toward the following goals. Pt remained emesis free this shift and reports improvement of nausea symptoms. IV to R arm removed this shift for absence of blood return.

## 2024-09-10 ENCOUNTER — PATIENT OUTREACH (OUTPATIENT)
Dept: PRIMARY CARE | Facility: CLINIC | Age: 79
End: 2024-09-10
Payer: MEDICARE

## 2024-09-10 LAB
ATRIAL RATE: 50 BPM
BACTERIA UR CULT: ABNORMAL
BACTERIA UR CULT: ABNORMAL
CARBA5 NEG: ABNORMAL
P AXIS: 64 DEGREES
P OFFSET: 199 MS
P ONSET: 149 MS
PR INTERVAL: 138 MS
Q ONSET: 218 MS
QRS COUNT: 8 BEATS
QRS DURATION: 84 MS
QT INTERVAL: 516 MS
QTC CALCULATION(BAZETT): 470 MS
QTC FREDERICIA: 485 MS
R AXIS: -71 DEGREES
T AXIS: 86 DEGREES
T OFFSET: 476 MS
VENTRICULAR RATE: 50 BPM

## 2024-09-10 NOTE — PROGRESS NOTES
Discharge Facility:[facility]  Discharge Diagnosis:Nausea   Admission Date:9/6/24  Discharge Date: 9/9/24    PCP Appointment Date:9/16/24  Specialist Appointment Date: N/A  Hospital Encounter and Summary Linked: Yes  See discharge assessment below for further details  Engagement  Call Start Time: 1207 (9/10/2024 12:06 PM)    Medications  Medications reviewed with patient/caregiver?: Yes (9/10/2024 12:06 PM)  Is the patient having any side effects they believe may be caused by any medication additions or changes?: No (9/10/2024 12:06 PM)  Does the patient have all medications ordered at discharge?: Yes (9/10/2024 12:06 PM)  Prescription Comments: START taking: cefuroxime (Ceftin) metoclopramide (Reglan) scopolamine (Transderm-Scop) Start taking on: September 10, 2024 (9/10/2024 12:06 PM)  Is the patient taking all medications as directed (includes completed medication regime)?: Yes (9/10/2024 12:06 PM)  Medication Comments: see med list (9/10/2024 12:06 PM)    Appointments  Does the patient have a primary care provider?: Yes (9/10/2024 12:06 PM)  Care Management Interventions: Verified appointment date/time/provider (fu 9/16/24) (9/10/2024 12:06 PM)  Has the patient kept scheduled appointments due by today?: Yes (9/10/2024 12:06 PM)    Patient Teaching  Does the patient have access to their discharge instructions?: Yes (9/10/2024 12:06 PM)  Care Management Interventions: Reviewed instructions with patient (9/10/2024 12:06 PM)  What is the patient's perception of their health status since discharge?: Improving (9/10/2024 12:06 PM)  Is the patient/caregiver able to teach back the hierarchy of who to call/visit for symptoms/problems? PCP, Specialist, Home Health nurse, Urgent Care, ED, 911: Yes (9/10/2024 12:06 PM)    Wrap Up  Wrap Up Additional Comments: This CM spoke with pt via phone. Pt reports doing well at home since discharge. New meds reviewed. Pt denies CP and SOB. Patient denies any further discharge  questions/needs at this time. Emphasized that Follow up is needed after discharge to review the hospital recommendations, assess your response to your treatment.  Pt aware of my availability for non-emergent concerns. Contact info provided to patient (9/10/2024 12:06 PM)      Genevieve Thornton LPN

## 2024-09-11 LAB
BACTERIA BLD CULT: NORMAL
BACTERIA BLD CULT: NORMAL

## 2024-09-12 NOTE — DISCHARGE SUMMARY
Discharge Diagnosis  Nausea    Discharge Meds     Medication List      START taking these medications     cefuroxime 500 mg tablet; Commonly known as: Ceftin; Take 1 tablet (500   mg) by mouth 2 times a day for 5 days.   metoclopramide 5 mg tablet; Commonly known as: Reglan; Take 1 tablet (5   mg) by mouth 2 times a day for 5 days.   scopolamine 1 mg over 3 days patch 3 day; Commonly known as:   Transderm-Scop; Place 1 patch over 72 hours on the skin every 3rd day for   3 doses.     CONTINUE taking these medications     acetaminophen 325 mg tablet; Commonly known as: Tylenol   hyoscyamine 0.125 mg disintegrating tablet; Commonly known as: Anaspaz;   Place 2 tablets (0.25 mg) under the tongue 4 times a day before meals.   lactulose 20 gram/30 mL oral solution; Take 15 mL (10 g) by mouth 2   times a day as needed (Constipation).   levothyroxine 50 mcg tablet; Commonly known as: Synthroid, Levoxyl; Take   1 tablet (50 mcg) by mouth once daily.   linaCLOtide 145 mcg capsule; Commonly known as: Linzess; Take 1 capsule   (145 mcg) by mouth once daily in the morning. Take before meals. Do not   crush or chew.   magnesium hydroxide 2,400 mg/10 mL suspension suspension; Commonly known   as: Milk of Magnesia   magnesium oxide 400 mg tablet; Commonly known as: Mag-Ox   melatonin 3 mg tablet   ondansetron ODT 4 mg disintegrating tablet; Commonly known as:   Zofran-ODT; Take 1 tablet (4 mg) by mouth every 12 hours if needed for   nausea or vomiting.   pantoprazole 40 mg EC tablet; Commonly known as: ProtoNix; Take 1 tablet   (40 mg) by mouth once daily.   potassium chloride CR 10 mEq ER tablet; Commonly known as: Klor-Con   sucralfate 1 gram tablet; Commonly known as: Carafate; Take 1 tablet (1   g) by mouth 4 times a day. Before meals and at bedtime   traZODone 150 mg tablet; Commonly known as: Desyrel; Take 1 tablet (150   mg) by mouth once daily at bedtime.   Vitamin D3 50 mcg (2,000 unit) capsule; Generic drug:  cholecalciferol       Test Results Pending At Discharge  Pending Labs       No current pending labs.            Hospital Course   Latha Quispe is a 79 y.o. female with PMH of anxiety, depression, GERD, hypothyroidism, IBS, breast ca who was admitted with intractable nausea. She experienced nausea for 5 days prior to admission with anorexia, after finishing antibiotics for UTI. She was treated with anti-nausea medications, IVF and a slowly advancing diet. She was seen by Dr Diez as well. She was discharged home with recommendations to follow up with her PCP and GI.     Problem List:  Nausea  UTI (urinary tract infection)  Dehydration  - was on 5 days abx for UTI, severe nausea since then  - UA: 250 LE, 1+ bacteria  - Ucx >100,000 GNB  - IV zosyn started in ED, continue, day 3  - continue IVF  - continue scopolamine patch, prn zofran, reglan TID  - blood cultures NTD  - diet as tolerated  ---Continue all chronic meds, zofran and reglan  ---Follow up as needed with PCP, GI     Chronic gastritis  IBS (irritable bowel syndrome)  Chronic idiopathic constipation  GERD (gastroesophageal reflux disease)  Hypothyroid  Chronic insomnia  ---Continue all chronic meds  ---Follow up as needed with PCP    Hypokalemia  - K 3.8 > 2.8 > 3.7  - repleted with IV, PO  - trend BMP, replete as needed  - telemetry  ---stable on DC    Pertinent Physical Exam At Time of Discharge  Physical Exam  Constitutional:       General: She is not in acute distress.     Appearance: She is ill-appearing. She is not toxic-appearing.      Comments: cachectic   HENT:      Head: Normocephalic and atraumatic.      Mouth/Throat:      Mouth: Mucous membranes are dry.   Eyes:      Extraocular Movements: Extraocular movements intact.      Pupils: Pupils are equal, round, and reactive to light.   Cardiovascular:      Rate and Rhythm: Normal rate and regular rhythm.      Pulses: Normal pulses.      Heart sounds: Normal heart sounds. No murmur heard.     No  gallop.   Pulmonary:      Effort: Pulmonary effort is normal. No respiratory distress.      Breath sounds: Normal breath sounds. No wheezing, rhonchi or rales.   Abdominal:      General: Bowel sounds are normal. There is no distension.      Palpations: Abdomen is soft.      Tenderness: There is no abdominal tenderness. There is no guarding or rebound.   Musculoskeletal:         General: No swelling, tenderness, deformity or signs of injury. Normal range of motion.      Cervical back: Normal range of motion and neck supple.   Skin:     General: Skin is warm and dry.      Capillary Refill: Capillary refill takes less than 2 seconds.      Coloration: Skin is pale. Skin is not jaundiced.      Findings: No bruising or rash.   Neurological:      General: No focal deficit present.      Mental Status: She is alert and oriented to person, place, and time.      Cranial Nerves: No cranial nerve deficit.      Sensory: No sensory deficit.      Motor: No weakness.      Gait: Gait normal.   Psychiatric:         Mood and Affect: Mood normal.         Behavior: Behavior normal.         Thought Content: Thought content normal.         Judgment: Judgment normal.     Stable for discharge.  Total cumulative time spent in preparation of this discharge including documentation review, coordination of care with the medical team including PT/SW/care coordinators and treating consultants, discussion with patient and pertinent family members and finalization of prescriptions, follow-up appointments, and this discharge summary was approximately 45 minutes.    Outpatient Follow-Up  Future Appointments   Date Time Provider Department Center   9/16/2024  9:15 AM Melanie Andrade MD [id number] [facility]   10/17/2024 10:40 AM Abhilash Rodriguez MD [id number] [facility]   10/28/2024  9:20 AM Cori Carrasco APRN-CNP [id number]   12/11/2024 10:00 AM Melanie Andrade MD [id number]         Sasha Jeffrey APRN-CNP

## 2024-09-13 LAB
ATRIAL RATE: 58 BPM
P AXIS: 65 DEGREES
P OFFSET: 193 MS
P ONSET: 143 MS
PR INTERVAL: 138 MS
Q ONSET: 212 MS
QRS COUNT: 10 BEATS
QRS DURATION: 92 MS
QT INTERVAL: 458 MS
QTC CALCULATION(BAZETT): 449 MS
QTC FREDERICIA: 452 MS
R AXIS: -65 DEGREES
T AXIS: 66 DEGREES
T OFFSET: 441 MS
VENTRICULAR RATE: 58 BPM

## 2024-09-16 ENCOUNTER — APPOINTMENT (OUTPATIENT)
Dept: PRIMARY CARE | Facility: CLINIC | Age: 79
End: 2024-09-16
Payer: MEDICARE

## 2024-09-16 VITALS
TEMPERATURE: 96.7 F | OXYGEN SATURATION: 100 % | BODY MASS INDEX: 14.84 KG/M2 | DIASTOLIC BLOOD PRESSURE: 60 MMHG | HEART RATE: 57 BPM | WEIGHT: 75.6 LBS | SYSTOLIC BLOOD PRESSURE: 110 MMHG | HEIGHT: 60 IN

## 2024-09-16 DIAGNOSIS — Z87.440 RECENT URINARY TRACT INFECTION: ICD-10-CM

## 2024-09-16 DIAGNOSIS — Z23 NEED FOR INFLUENZA VACCINATION: ICD-10-CM

## 2024-09-16 DIAGNOSIS — K59.00 CONSTIPATION, UNSPECIFIED CONSTIPATION TYPE: ICD-10-CM

## 2024-09-16 DIAGNOSIS — E87.6 HYPOKALEMIA: Primary | ICD-10-CM

## 2024-09-16 DIAGNOSIS — R39.9 URINARY SYMPTOM OR SIGN: ICD-10-CM

## 2024-09-16 PROCEDURE — 99496 TRANSJ CARE MGMT HIGH F2F 7D: CPT | Performed by: FAMILY MEDICINE

## 2024-09-16 PROCEDURE — 87086 URINE CULTURE/COLONY COUNT: CPT

## 2024-09-16 PROCEDURE — 90662 IIV NO PRSV INCREASED AG IM: CPT | Performed by: FAMILY MEDICINE

## 2024-09-16 PROCEDURE — 81003 URINALYSIS AUTO W/O SCOPE: CPT | Performed by: FAMILY MEDICINE

## 2024-09-16 PROCEDURE — G0008 ADMIN INFLUENZA VIRUS VAC: HCPCS | Performed by: FAMILY MEDICINE

## 2024-09-16 PROCEDURE — 1159F MED LIST DOCD IN RCRD: CPT | Performed by: FAMILY MEDICINE

## 2024-09-16 PROCEDURE — 1111F DSCHRG MED/CURRENT MED MERGE: CPT | Performed by: FAMILY MEDICINE

## 2024-09-16 RX ORDER — POTASSIUM CHLORIDE 750 MG/1
10 TABLET, FILM COATED, EXTENDED RELEASE ORAL 2 TIMES DAILY
Qty: 60 TABLET | Refills: 3 | Status: SHIPPED | OUTPATIENT
Start: 2024-09-16

## 2024-09-16 NOTE — PROGRESS NOTES
Subjective   Patient ID: Latha Quispe is a 79 y.o. female who presents for Hospital Follow-up (Doing better has problems in the mornings- go to the bathroom has a bowel movement and she a lot of pain. Throughout the day it goes away. ) and Medication Problem (Potassium goes right through her. Different form of the medication? ).    HPI  TCM   Here for hospital follow up 9/6-9/9/2024 for severe intractable nausea/vomiting.  Sent home on cefuroxime for UTI. Linzess was stopped during hospitalization wondering if she can go back on that gets constipation and pain with BM without it. No longer having nausea or vomiting. Ct abd/pelvis showed mild edema in the distal sigmoid colon/rectum concerning for component of colitis/proctitis.  Cholelithiasis. Punctate nonobstructive calculus in the upper pole of the left kidney. Mild periportal edema. Moderate colonic stool.  Postsurgical changes in the distal colon.  Had low potassium in the hospital , does not feel like she is absorbing the potassium.  Has appt with GI for follow up    Review of Systems  General: no fever  Eyes: no blurry vision  ENT: no sore throat, no ear pain  Resp: no cough, sob or wheezing  Cardio: no chest pain, no palpitations  Abd: see HPI  : see HPI    /60   Pulse 57   Temp 35.9 °C (96.7 °F)   Ht 1.524 m (5')   Wt (!) 34.3 kg (75 lb 9.6 oz)   SpO2 100%   BMI 14.76 kg/m²       Objective   Physical Exam  Gen: NAD, alert  Head: normocephalic/atraumatic  Eyes: conjunctivae normal  Ears: canals clear bilaterally, TM normal   Nose: external nose normal   Oropharynx: clear   Resp: Clear to auscultation  CVS: Regular rate and rhythm  Abdomen: soft, NT, ND  Ext: no edema, NT of lower extremities    Assessment/Plan   Problem List Items Addressed This Visit       Hypokalemia - Primary    Relevant Medications    potassium chloride CR 10 mEq ER tablet     Other Visit Diagnoses       Recent urinary tract infection        Relevant Orders    POCT UA  Automated manually resulted (Completed)    Urine Culture (Completed)    Need for influenza vaccination        Relevant Orders    Flu vaccine, trivalent, preservative free, HIGH-DOSE, age 65y+ (Fluzone) (Completed)    Urinary symptom or sign        Relevant Orders    Urine Culture (Completed)    Constipation, unspecified constipation type      Okay to start back on linzess

## 2024-09-17 DIAGNOSIS — J42 CHRONIC BRONCHITIS, UNSPECIFIED CHRONIC BRONCHITIS TYPE (MULTI): Primary | ICD-10-CM

## 2024-09-18 ENCOUNTER — TELEPHONE (OUTPATIENT)
Dept: UROLOGY | Facility: CLINIC | Age: 79
End: 2024-09-18
Payer: MEDICARE

## 2024-09-18 LAB — BACTERIA UR CULT: NORMAL

## 2024-09-18 NOTE — TELEPHONE ENCOUNTER
----- Message from Sheridan Aldridge sent at 9/18/2024 10:34 AM EDT -----  Will you call this patient and let her know her urine culture is negative?       I was unable to make contact with the pt

## 2024-09-20 DIAGNOSIS — N39.0 RECURRENT UTI: Primary | ICD-10-CM

## 2024-09-22 LAB
ATRIAL RATE: 50 BPM
P AXIS: 64 DEGREES
P OFFSET: 199 MS
P ONSET: 149 MS
PR INTERVAL: 138 MS
Q ONSET: 218 MS
QRS COUNT: 8 BEATS
QRS DURATION: 84 MS
QT INTERVAL: 516 MS
QTC CALCULATION(BAZETT): 470 MS
QTC FREDERICIA: 485 MS
R AXIS: -71 DEGREES
T AXIS: 86 DEGREES
T OFFSET: 476 MS
VENTRICULAR RATE: 50 BPM

## 2024-09-25 ENCOUNTER — PATIENT OUTREACH (OUTPATIENT)
Dept: PRIMARY CARE | Facility: CLINIC | Age: 79
End: 2024-09-25
Payer: MEDICARE

## 2024-09-30 LAB
BACTERIA UR CULT: ABNORMAL
BACTERIA UR CULT: ABNORMAL
CARBA5 NEG: ABNORMAL
LABORATORY COMMENT REPORT: ABNORMAL

## 2024-10-03 ENCOUNTER — TELEPHONE (OUTPATIENT)
Dept: PRIMARY CARE | Facility: CLINIC | Age: 79
End: 2024-10-03
Payer: MEDICARE

## 2024-10-03 ENCOUNTER — LAB (OUTPATIENT)
Dept: LAB | Facility: LAB | Age: 79
End: 2024-10-03
Payer: MEDICARE

## 2024-10-03 DIAGNOSIS — R39.9 URINARY SYMPTOM OR SIGN: ICD-10-CM

## 2024-10-03 DIAGNOSIS — B37.9 YEAST INFECTION: Primary | ICD-10-CM

## 2024-10-03 DIAGNOSIS — R39.9 URINARY SYMPTOM OR SIGN: Primary | ICD-10-CM

## 2024-10-03 LAB
APPEARANCE UR: ABNORMAL
BACTERIA #/AREA URNS AUTO: ABNORMAL /HPF
BILIRUB UR STRIP.AUTO-MCNC: NEGATIVE MG/DL
COLOR UR: COLORLESS
GLUCOSE UR STRIP.AUTO-MCNC: NORMAL MG/DL
KETONES UR STRIP.AUTO-MCNC: NEGATIVE MG/DL
LEUKOCYTE ESTERASE UR QL STRIP.AUTO: ABNORMAL
MUCOUS THREADS #/AREA URNS AUTO: ABNORMAL /LPF
NITRITE UR QL STRIP.AUTO: NEGATIVE
PH UR STRIP.AUTO: 6 [PH]
PROT UR STRIP.AUTO-MCNC: NEGATIVE MG/DL
RBC # UR STRIP.AUTO: ABNORMAL /UL
RBC #/AREA URNS AUTO: >20 /HPF
SP GR UR STRIP.AUTO: 1
UROBILINOGEN UR STRIP.AUTO-MCNC: NORMAL MG/DL
WBC #/AREA URNS AUTO: >50 /HPF

## 2024-10-03 PROCEDURE — 87086 URINE CULTURE/COLONY COUNT: CPT

## 2024-10-03 RX ORDER — MICONAZOLE NITRATE 2 %
1 CREAM WITH APPLICATOR VAGINAL NIGHTLY
Qty: 45 G | Refills: 0 | Status: SHIPPED | OUTPATIENT
Start: 2024-10-03 | End: 2024-10-10

## 2024-10-03 RX ORDER — FLUCONAZOLE 150 MG/1
TABLET ORAL
Qty: 2 TABLET | Refills: 0 | Status: SHIPPED | OUTPATIENT
Start: 2024-10-03

## 2024-10-04 ENCOUNTER — LAB (OUTPATIENT)
Dept: LAB | Facility: LAB | Age: 79
End: 2024-10-04
Payer: MEDICARE

## 2024-10-04 DIAGNOSIS — N39.0 ACUTE UTI: ICD-10-CM

## 2024-10-04 DIAGNOSIS — N39.0 ACUTE UTI: Primary | ICD-10-CM

## 2024-10-04 LAB — BACTERIA UR CULT: ABNORMAL

## 2024-10-04 PROCEDURE — 87086 URINE CULTURE/COLONY COUNT: CPT

## 2024-10-05 LAB — BACTERIA UR CULT: NORMAL

## 2024-10-07 ENCOUNTER — TELEPHONE (OUTPATIENT)
Dept: PRIMARY CARE | Facility: CLINIC | Age: 79
End: 2024-10-07
Payer: MEDICARE

## 2024-10-08 ENCOUNTER — PATIENT MESSAGE (OUTPATIENT)
Dept: PRIMARY CARE | Facility: CLINIC | Age: 79
End: 2024-10-08
Payer: MEDICARE

## 2024-10-08 ENCOUNTER — PATIENT OUTREACH (OUTPATIENT)
Dept: PRIMARY CARE | Facility: CLINIC | Age: 79
End: 2024-10-08
Payer: MEDICARE

## 2024-10-09 ENCOUNTER — TELEPHONE (OUTPATIENT)
Dept: PRIMARY CARE | Facility: CLINIC | Age: 79
End: 2024-10-09
Payer: MEDICARE

## 2024-10-09 ENCOUNTER — LAB (OUTPATIENT)
Dept: LAB | Facility: LAB | Age: 79
End: 2024-10-09
Payer: MEDICARE

## 2024-10-09 DIAGNOSIS — N39.0 ACUTE UTI: ICD-10-CM

## 2024-10-09 DIAGNOSIS — N39.0 ACUTE UTI: Primary | ICD-10-CM

## 2024-10-09 DIAGNOSIS — B37.9 YEAST INFECTION: ICD-10-CM

## 2024-10-09 PROCEDURE — 87086 URINE CULTURE/COLONY COUNT: CPT

## 2024-10-09 PROCEDURE — 87186 SC STD MICRODIL/AGAR DIL: CPT

## 2024-10-09 RX ORDER — FLUCONAZOLE 150 MG/1
TABLET ORAL
Qty: 2 TABLET | Refills: 0 | Status: SHIPPED | OUTPATIENT
Start: 2024-10-09 | End: 2024-10-11 | Stop reason: WASHOUT

## 2024-10-09 NOTE — TELEPHONE ENCOUNTER
Since being on the medication for the infection her feet and ankles have been swollen.     Did not know the name of the medication.

## 2024-10-09 NOTE — TELEPHONE ENCOUNTER
Can you please ask her to come in tomorrow at 9AM. She had called earlier that she was still having urinary symptoms and having vaginal issues. I had ordered her urine, but I can see her tomorrow and see her for her swelling and urine/vaginal issues if she can come in  Thanks

## 2024-10-10 ENCOUNTER — HOSPITAL ENCOUNTER (EMERGENCY)
Facility: HOSPITAL | Age: 79
Discharge: HOME | End: 2024-10-10
Payer: MEDICARE

## 2024-10-10 ENCOUNTER — TELEPHONE (OUTPATIENT)
Dept: PRIMARY CARE | Facility: CLINIC | Age: 79
End: 2024-10-10

## 2024-10-10 ENCOUNTER — APPOINTMENT (OUTPATIENT)
Dept: PRIMARY CARE | Facility: CLINIC | Age: 79
End: 2024-10-10
Payer: MEDICARE

## 2024-10-10 VITALS
HEIGHT: 60 IN | SYSTOLIC BLOOD PRESSURE: 128 MMHG | WEIGHT: 70 LBS | HEART RATE: 57 BPM | DIASTOLIC BLOOD PRESSURE: 72 MMHG | OXYGEN SATURATION: 99 % | RESPIRATION RATE: 16 BRPM | TEMPERATURE: 99.1 F | BODY MASS INDEX: 13.74 KG/M2

## 2024-10-10 DIAGNOSIS — N30.00 ACUTE CYSTITIS WITHOUT HEMATURIA: Primary | ICD-10-CM

## 2024-10-10 PROCEDURE — 99283 EMERGENCY DEPT VISIT LOW MDM: CPT

## 2024-10-10 RX ORDER — CEPHALEXIN 500 MG/1
500 CAPSULE ORAL 2 TIMES DAILY
Qty: 14 CAPSULE | Refills: 0 | Status: ON HOLD | OUTPATIENT
Start: 2024-10-10 | End: 2024-10-17

## 2024-10-10 ASSESSMENT — PAIN DESCRIPTION - LOCATION: LOCATION: LEG

## 2024-10-10 ASSESSMENT — PAIN DESCRIPTION - ORIENTATION: ORIENTATION: RIGHT

## 2024-10-10 ASSESSMENT — PAIN - FUNCTIONAL ASSESSMENT: PAIN_FUNCTIONAL_ASSESSMENT: 0-10

## 2024-10-10 ASSESSMENT — PAIN SCALES - GENERAL: PAINLEVEL_OUTOF10: 9

## 2024-10-10 NOTE — TELEPHONE ENCOUNTER
Patient took new pill yesterday, when she woke up she was all swollen and in pain on the right side.  Cannot move her hand.

## 2024-10-10 NOTE — ED TRIAGE NOTES
Patient presents with c/o recent right leg swelling, right leg pain and right hand pain. States she feels weak today and had to use her 's walker

## 2024-10-10 NOTE — ED PROVIDER NOTES
HPI   Chief Complaint   Patient presents with    Leg Pain    Hand Pain    Weakness, Gen       CC: Transient right lower extremity swelling  HPI:   79-year-old female presents ED stating that her right lower extremity was swollen this morning, she states now it is back to normal, she also reported some chronic pain in her right knee and right hand she stated that she felt some weakness in the lower extremities bilaterally however feels fine now.  Patient states she was concern for possible blood clot in the lower extremities.  She denies having any chest pain, shortness of breath, she denies any fever, chills no nausea vomiting diarrhea.  She denies any recent or remote falls.  Patient denies having any headache, dizziness.    Additional Limitations to History:   External Records Reviewed: I reviewed recent and relevant outside records including   History Obtained From:     Past Medical History: Per HPI  Medications: Reviewed in EMR and with patient  Allergies:  Reviewed in EMR  Past Surgical History:   Social History:     ------------------------------------------------------------------------------------------------------  Physical Exam:  --Vital signs reviewed in nursing triage note, EMR flow sheets, and at patient's bedside  GEN:  A&Ox3, no acute distress, appears comfortable.  Conversational and appropriate.  No confusion or gross mental status changes.  EYES: EOMI, non-injected sclera.  ENT: Moist mucous membranes, no apparent injuries or lesions.   CARDIO: Normal rate and regular rhythm. No murmurs, rubs, or gallops.  2+ equal pulses of the distal extremities.   PULM: Clear to auscultation bilaterally. No rales, rhonchi, or wheezes. Good symmetric chest expansion.  GI: Soft, non-tender, non-distended. No rebound tenderness or guarding.  SKIN: Warm and dry, no rashes or lesions.  MSK: ROM intact the extremities without contractures.   EXT: No peripheral edema, contusions, or wounds.   NEURO: Cranial nerves  II-XII grossly intact. Sensation to light touch intact and equal bilaterally in upper and lower extremities.  Symmetric 5/5 strength in upper and lower extremities.  PSYCH: Appropriate mood and behavior, converses and responds appropriately during exam.  -------------------------------------------------------------------------------------------------------        Differential Diagnoses Considered:   Chronic Medical Conditions Significantly Affecting Care:   Diagnostic testing considered: [PERC, D-Dimer, PECARN, etc.]      - I independently interpreted: [CXR, CT, POCUS, etc. including your interpretation]  - Labs notable for     Escalation of Care: Appropriate for   Social Determinants of Health Significantly Affecting Care: [Homelessness, lacking transportation, uninsured, unable to afford medications]  Prescription Drug Consideration: [Antibiotics, antivirals, pain medications, etc.]  Discussion of Management with Other Providers:  I discussed the patient/results with: [admitting team, consultant, radiologist, social work, EPAT, case management, PT/OT, RT, PCP, etc.]      Mehdi Yonug PA-C      Transient        Patient History   Past Medical History:   Diagnosis Date    Abdominal pain     Anxiety     Cat bite 03/11/2023    Cataract     Depression     Disease of thyroid gland     GERD (gastroesophageal reflux disease)     Hypothyroidism     Irritable bowel syndrome     Panic attacks     Personal history of malignant neoplasm of breast 06/30/2021    Personal history of other complications of pregnancy, childbirth and the puerperium     miscarriage     Past Surgical History:   Procedure Laterality Date    BREAST SURGERY      CATARACT EXTRACTION      COLECTOMY  1992    COLON SURGERY      CT ABDOMEN ANGIOGRAM W AND/OR WO IV CONTRAST  12/28/2023    GASTRIC BYPASS      HYSTERECTOMY      MASTECTOMY, PARTIAL Left     OTHER SURGICAL HISTORY  06/30/2021    Varicose vein ligation     Family History   Problem Relation Name Age  of Onset    Osteoporosis Mother      Alcohol abuse Father      Cancer Sister          breast    Osteoporosis Sister      Other (bladder cancer) Sister      Cancer Brother          colon. prostate    Colon cancer Brother      Colon cancer Brother      Hypothyroidism Other       Social History     Tobacco Use    Smoking status: Never     Passive exposure: Never    Smokeless tobacco: Never   Vaping Use    Vaping status: Never Used   Substance Use Topics    Alcohol use: Never    Drug use: Never       Physical Exam   ED Triage Vitals [10/10/24 1046]   Temperature Heart Rate Respirations BP   37.3 °C (99.1 °F) 63 16 136/70      Pulse Ox Temp src Heart Rate Source Patient Position   100 % -- -- --      BP Location FiO2 (%)     -- --       Physical Exam      ED Course & MDM   Diagnoses as of 10/10/24 1219   Acute cystitis without hematuria                 No data recorded     Marlyn Coma Scale Score: 15 (10/10/24 1047 : Letty Egan RN)                           Medical Decision Making  79-year-old female who had concerns for possible DVT in the lower extremities, both lower extremities are neurovascular intact distally, there is no edema, there is no erythema, pedal pulses are intact, and there is low suspicion for DVT or ischemic etiology.  Patient has good bilateral strength in the lower extremities, no focal deficits on examination, low suspicion for TIA or CVA she is hemodynamically stable, nontoxic, well-hydrated, no acute distress, afebrile, reviewed patient's outpatient labs specifically her urinalysis, urine culture which grew enteric bacilli, patient placed on antibiotics, advised her to follow-up with her primary care provider.  She does not want any labs or imaging done at this time.  Return to ED precautions given to patient.        Procedure  Procedures     Mehdi Young PA-C  10/11/24 3087

## 2024-10-11 ENCOUNTER — OFFICE VISIT (OUTPATIENT)
Dept: PRIMARY CARE | Facility: CLINIC | Age: 79
End: 2024-10-11
Payer: MEDICARE

## 2024-10-11 VITALS
HEART RATE: 80 BPM | WEIGHT: 71 LBS | HEIGHT: 60 IN | BODY MASS INDEX: 13.94 KG/M2 | SYSTOLIC BLOOD PRESSURE: 117 MMHG | OXYGEN SATURATION: 98 % | TEMPERATURE: 98.2 F | DIASTOLIC BLOOD PRESSURE: 76 MMHG

## 2024-10-11 DIAGNOSIS — N39.0 ACUTE UTI: Primary | ICD-10-CM

## 2024-10-11 LAB — BACTERIA UR CULT: ABNORMAL

## 2024-10-11 PROCEDURE — 1159F MED LIST DOCD IN RCRD: CPT | Performed by: FAMILY MEDICINE

## 2024-10-11 PROCEDURE — 99212 OFFICE O/P EST SF 10 MIN: CPT | Performed by: FAMILY MEDICINE

## 2024-10-11 PROCEDURE — 1036F TOBACCO NON-USER: CPT | Performed by: FAMILY MEDICINE

## 2024-10-11 RX ORDER — SULFAMETHOXAZOLE AND TRIMETHOPRIM 800; 160 MG/1; MG/1
1 TABLET ORAL 2 TIMES DAILY
Qty: 10 TABLET | Refills: 0 | Status: SHIPPED | OUTPATIENT
Start: 2024-10-11 | End: 2024-10-16

## 2024-10-11 ASSESSMENT — PATIENT HEALTH QUESTIONNAIRE - PHQ9
SUM OF ALL RESPONSES TO PHQ9 QUESTIONS 1 AND 2: 0
1. LITTLE INTEREST OR PLEASURE IN DOING THINGS: NOT AT ALL
2. FEELING DOWN, DEPRESSED OR HOPELESS: NOT AT ALL

## 2024-10-11 NOTE — PROGRESS NOTES
Subjective   Patient ID: Latha Quispe is a 79 y.o. female who presents for UTI (Already on abx from ER. ).  HPI  Here for follow up of ED visit yesterday  Had taken diflucan for yeast infection, and patient had swelling in her arm and legs. No swelling today.   Was also having UTI symptoms and was given cephalexin, feeling better.     Review of Systems  General: no fever  Eyes: no blurry vision  ENT: no sore throat, no ear pain  Resp: no cough, sob or wheezing  Cardio: no chest pain, no palpitations  Abd: no nausea/vomiting  : see HPI      /76   Pulse 80   Temp 36.8 °C (98.2 °F)   Ht 1.524 m (5')   Wt (!) 32.2 kg (71 lb)   SpO2 98%   BMI 13.87 kg/m²       Objective   Physical Exam  Gen: NAD, alert  Head: normocephalic/atraumatic  Eyes: conjunctivae normal  Ears: canals clear bilaterally, TM normal   Nose: external nose normal   Oropharynx: clear   Resp: Clear to auscultation  CVS: Regular rate and rhythm  Abdomen: soft, NT, ND  Ext: no edema, NT of lower extremities       Assessment/Plan   Problem List Items Addressed This Visit    None  Visit Diagnoses       Acute UTI    -  Primary  Continue cephalexin

## 2024-10-16 NOTE — PROGRESS NOTES
Virtual or Telephone Consent    A telephone visit (audio only) between the patient (at the originating site) and the provider (at the distant site) was utilized to provide this telehealth service.   Verbal consent was requested and obtained from Latha Quispe on this date, 10/19/24 for a telehealth visit.     HISTORY OF PRESENT ILLNESS:  Latha Quispe is a 79 y.o. female who presents today for a virtual follow up visit. She feels like she has a bladder infection. She recently had a urine culture done and it was positive.          Past Medical History  She has a past medical history of Abdominal pain, Anxiety, Cat bite (03/11/2023), Cataract, Depression, Disease of thyroid gland, GERD (gastroesophageal reflux disease), Hypothyroidism, Irritable bowel syndrome, Panic attacks, Personal history of malignant neoplasm of breast (06/30/2021), and Personal history of other complications of pregnancy, childbirth and the puerperium.    Surgical History  She has a past surgical history that includes Other surgical history (06/30/2021); Breast surgery; Gastric bypass; Colon surgery; Hysterectomy; Cataract extraction; Mastectomy, partial (Left); CT angio abdomen w and or wo IV IV contrast (12/28/2023); and Colectomy (1992).     Social History  She reports that she has never smoked. She has never been exposed to tobacco smoke. She has never used smokeless tobacco. She reports that she does not drink alcohol and does not use drugs.    Family History  Family History   Problem Relation Name Age of Onset    Osteoporosis Mother      Alcohol abuse Father      Cancer Sister          breast    Osteoporosis Sister      Other (bladder cancer) Sister      Cancer Brother          colon. prostate    Colon cancer Brother      Colon cancer Brother      Hypothyroidism Other          Allergies  Diflucan [fluconazole] and Nsaids (non-steroidal anti-inflammatory drug)      A comprehensive 10+ review of systems was negative except for: see hpi                   Assessment:  Latha Quispe is a 79 y.o.  who presents for microhematuria, UTI  and incontinence      Microhematuria:   -Cystoscopy done 25  CTU shows small nonbstructing L kidney stone   Repeat UA in 1 year     UTI:  Standing urine culture at  labs   Rx trimpex x 90 days, 10/2024-2025  Rx estraiol   Positive culture on 10/3/24 and 10/9/24       Incontinence:   Stopped gemtesa for now       Constipation:  Smooth move tea recommended       Follow up in 3 months         I spent a total of 11 minutes speaking with the patient on the telephone     All questions and concerns were answered and addressed.  The patient expressed understanding and agrees with the plan.     Abhilash Rodriguez MD    Scribe Attestation  By signing my name below, I, Shayla Tavera, Scribmichael   attest that this documentation has been prepared under the direction and in the presence of Abhilash Rodriguez MD.

## 2024-10-17 ENCOUNTER — APPOINTMENT (OUTPATIENT)
Dept: UROLOGY | Facility: CLINIC | Age: 79
End: 2024-10-17
Payer: MEDICARE

## 2024-10-17 DIAGNOSIS — N39.0 RECURRENT UTI: Primary | ICD-10-CM

## 2024-10-17 RX ORDER — TRIMETHOPRIM 100 MG/1
TABLET ORAL
Qty: 180 TABLET | Refills: 0 | Status: ON HOLD | OUTPATIENT
Start: 2024-10-17

## 2024-10-17 RX ORDER — ESTRADIOL 0.1 MG/G
CREAM VAGINAL
Qty: 42.5 G | Refills: 12 | Status: ON HOLD | OUTPATIENT
Start: 2024-10-17

## 2024-10-19 ENCOUNTER — APPOINTMENT (OUTPATIENT)
Dept: RADIOLOGY | Facility: HOSPITAL | Age: 79
End: 2024-10-19
Payer: MEDICARE

## 2024-10-19 ENCOUNTER — HOSPITAL ENCOUNTER (OUTPATIENT)
Facility: HOSPITAL | Age: 79
Setting detail: OBSERVATION
End: 2024-10-19
Attending: EMERGENCY MEDICINE | Admitting: INTERNAL MEDICINE
Payer: MEDICARE

## 2024-10-19 DIAGNOSIS — R62.7 FAILURE TO THRIVE IN ADULT: Primary | ICD-10-CM

## 2024-10-19 DIAGNOSIS — E83.42 HYPOMAGNESEMIA: ICD-10-CM

## 2024-10-19 DIAGNOSIS — K58.0 IRRITABLE BOWEL SYNDROME WITH DIARRHEA: ICD-10-CM

## 2024-10-19 DIAGNOSIS — R64 CACHEXIA (MULTI): ICD-10-CM

## 2024-10-19 LAB
ALBUMIN SERPL BCP-MCNC: 3.6 G/DL (ref 3.4–5)
ALP SERPL-CCNC: 86 U/L (ref 33–136)
ALT SERPL W P-5'-P-CCNC: 17 U/L (ref 7–45)
ANION GAP SERPL CALC-SCNC: 14 MMOL/L (ref 10–20)
APPEARANCE UR: ABNORMAL
AST SERPL W P-5'-P-CCNC: 23 U/L (ref 9–39)
BACTERIA #/AREA URNS AUTO: ABNORMAL /HPF
BASOPHILS # BLD AUTO: 0.03 X10*3/UL (ref 0–0.1)
BASOPHILS NFR BLD AUTO: 0.6 %
BILIRUB SERPL-MCNC: 0.5 MG/DL (ref 0–1.2)
BILIRUB UR STRIP.AUTO-MCNC: NEGATIVE MG/DL
BUN SERPL-MCNC: 17 MG/DL (ref 6–23)
CALCIUM SERPL-MCNC: 9.6 MG/DL (ref 8.6–10.3)
CHLORIDE SERPL-SCNC: 95 MMOL/L (ref 98–107)
CO2 SERPL-SCNC: 25 MMOL/L (ref 21–32)
COLOR UR: ABNORMAL
CREAT SERPL-MCNC: 0.94 MG/DL (ref 0.5–1.05)
EGFRCR SERPLBLD CKD-EPI 2021: 62 ML/MIN/1.73M*2
EOSINOPHIL # BLD AUTO: 0.01 X10*3/UL (ref 0–0.4)
EOSINOPHIL NFR BLD AUTO: 0.2 %
ERYTHROCYTE [DISTWIDTH] IN BLOOD BY AUTOMATED COUNT: 13.4 % (ref 11.5–14.5)
GLUCOSE SERPL-MCNC: 151 MG/DL (ref 74–99)
GLUCOSE UR STRIP.AUTO-MCNC: NORMAL MG/DL
HCT VFR BLD AUTO: 39.9 % (ref 36–46)
HGB BLD-MCNC: 13.3 G/DL (ref 12–16)
IMM GRANULOCYTES # BLD AUTO: 0.02 X10*3/UL (ref 0–0.5)
IMM GRANULOCYTES NFR BLD AUTO: 0.4 % (ref 0–0.9)
KETONES UR STRIP.AUTO-MCNC: NEGATIVE MG/DL
LACTATE SERPL-SCNC: 1.2 MMOL/L (ref 0.4–2)
LEUKOCYTE ESTERASE UR QL STRIP.AUTO: NEGATIVE
LIPASE SERPL-CCNC: 26 U/L (ref 9–82)
LYMPHOCYTES # BLD AUTO: 0.8 X10*3/UL (ref 0.8–3)
LYMPHOCYTES NFR BLD AUTO: 16.7 %
MAGNESIUM SERPL-MCNC: 1.37 MG/DL (ref 1.6–2.4)
MCH RBC QN AUTO: 29.5 PG (ref 26–34)
MCHC RBC AUTO-ENTMCNC: 33.3 G/DL (ref 32–36)
MCV RBC AUTO: 89 FL (ref 80–100)
MONOCYTES # BLD AUTO: 0.26 X10*3/UL (ref 0.05–0.8)
MONOCYTES NFR BLD AUTO: 5.4 %
MUCOUS THREADS #/AREA URNS AUTO: ABNORMAL /LPF
NEUTROPHILS # BLD AUTO: 3.68 X10*3/UL (ref 1.6–5.5)
NEUTROPHILS NFR BLD AUTO: 76.7 %
NITRITE UR QL STRIP.AUTO: NEGATIVE
NRBC BLD-RTO: 0 /100 WBCS (ref 0–0)
PH UR STRIP.AUTO: 5.5 [PH]
PHOSPHATE SERPL-MCNC: 3 MG/DL (ref 2.5–4.9)
PLATELET # BLD AUTO: 488 X10*3/UL (ref 150–450)
POTASSIUM SERPL-SCNC: 3.8 MMOL/L (ref 3.5–5.3)
PROT SERPL-MCNC: 7.2 G/DL (ref 6.4–8.2)
PROT UR STRIP.AUTO-MCNC: NEGATIVE MG/DL
RBC # BLD AUTO: 4.51 X10*6/UL (ref 4–5.2)
RBC # UR STRIP.AUTO: ABNORMAL /UL
RBC #/AREA URNS AUTO: ABNORMAL /HPF
SODIUM SERPL-SCNC: 130 MMOL/L (ref 136–145)
SP GR UR STRIP.AUTO: 1.01
SQUAMOUS #/AREA URNS AUTO: ABNORMAL /HPF
UROBILINOGEN UR STRIP.AUTO-MCNC: NORMAL MG/DL
WBC # BLD AUTO: 4.8 X10*3/UL (ref 4.4–11.3)
WBC #/AREA URNS AUTO: ABNORMAL /HPF

## 2024-10-19 PROCEDURE — 2500000002 HC RX 250 W HCPCS SELF ADMINISTERED DRUGS (ALT 637 FOR MEDICARE OP, ALT 636 FOR OP/ED): Mod: MUE | Performed by: NURSE PRACTITIONER

## 2024-10-19 PROCEDURE — 96365 THER/PROPH/DIAG IV INF INIT: CPT | Mod: 59

## 2024-10-19 PROCEDURE — 99285 EMERGENCY DEPT VISIT HI MDM: CPT

## 2024-10-19 PROCEDURE — 80053 COMPREHEN METABOLIC PANEL: CPT | Performed by: EMERGENCY MEDICINE

## 2024-10-19 PROCEDURE — 84100 ASSAY OF PHOSPHORUS: CPT | Performed by: EMERGENCY MEDICINE

## 2024-10-19 PROCEDURE — 2500000004 HC RX 250 GENERAL PHARMACY W/ HCPCS (ALT 636 FOR OP/ED): Performed by: NURSE PRACTITIONER

## 2024-10-19 PROCEDURE — 87186 SC STD MICRODIL/AGAR DIL: CPT | Mod: GENLAB | Performed by: NURSE PRACTITIONER

## 2024-10-19 PROCEDURE — 2500000005 HC RX 250 GENERAL PHARMACY W/O HCPCS: Performed by: NURSE PRACTITIONER

## 2024-10-19 PROCEDURE — 83735 ASSAY OF MAGNESIUM: CPT | Performed by: EMERGENCY MEDICINE

## 2024-10-19 PROCEDURE — 81003 URINALYSIS AUTO W/O SCOPE: CPT | Performed by: EMERGENCY MEDICINE

## 2024-10-19 PROCEDURE — 96372 THER/PROPH/DIAG INJ SC/IM: CPT | Performed by: NURSE PRACTITIONER

## 2024-10-19 PROCEDURE — 85025 COMPLETE CBC W/AUTO DIFF WBC: CPT | Performed by: EMERGENCY MEDICINE

## 2024-10-19 PROCEDURE — G0378 HOSPITAL OBSERVATION PER HR: HCPCS

## 2024-10-19 PROCEDURE — 74176 CT ABD & PELVIS W/O CONTRAST: CPT

## 2024-10-19 PROCEDURE — 2500000001 HC RX 250 WO HCPCS SELF ADMINISTERED DRUGS (ALT 637 FOR MEDICARE OP): Performed by: NURSE PRACTITIONER

## 2024-10-19 PROCEDURE — 2500000004 HC RX 250 GENERAL PHARMACY W/ HCPCS (ALT 636 FOR OP/ED): Performed by: EMERGENCY MEDICINE

## 2024-10-19 PROCEDURE — 83605 ASSAY OF LACTIC ACID: CPT | Performed by: EMERGENCY MEDICINE

## 2024-10-19 PROCEDURE — 99223 1ST HOSP IP/OBS HIGH 75: CPT | Performed by: NURSE PRACTITIONER

## 2024-10-19 PROCEDURE — 83690 ASSAY OF LIPASE: CPT | Performed by: EMERGENCY MEDICINE

## 2024-10-19 PROCEDURE — 36415 COLL VENOUS BLD VENIPUNCTURE: CPT | Performed by: EMERGENCY MEDICINE

## 2024-10-19 PROCEDURE — 96375 TX/PRO/DX INJ NEW DRUG ADDON: CPT

## 2024-10-19 PROCEDURE — 74176 CT ABD & PELVIS W/O CONTRAST: CPT | Performed by: RADIOLOGY

## 2024-10-19 RX ORDER — LEVOTHYROXINE SODIUM 50 UG/1
50 TABLET ORAL DAILY
Status: DISCONTINUED | OUTPATIENT
Start: 2024-10-20 | End: 2024-10-23 | Stop reason: HOSPADM

## 2024-10-19 RX ORDER — PANTOPRAZOLE SODIUM 40 MG/1
40 TABLET, DELAYED RELEASE ORAL
Status: DISCONTINUED | OUTPATIENT
Start: 2024-10-20 | End: 2024-10-23 | Stop reason: HOSPADM

## 2024-10-19 RX ORDER — SODIUM CHLORIDE 9 MG/ML
125 INJECTION, SOLUTION INTRAVENOUS CONTINUOUS
Status: DISCONTINUED | OUTPATIENT
Start: 2024-10-19 | End: 2024-10-19

## 2024-10-19 RX ORDER — SODIUM CHLORIDE 9 MG/ML
INJECTION, SOLUTION INTRAVENOUS
Status: DISPENSED
Start: 2024-10-19 | End: 2024-10-20

## 2024-10-19 RX ORDER — ACETAMINOPHEN 160 MG/5ML
650 SOLUTION ORAL EVERY 4 HOURS PRN
Status: DISCONTINUED | OUTPATIENT
Start: 2024-10-19 | End: 2024-10-19

## 2024-10-19 RX ORDER — ACETAMINOPHEN 650 MG/1
650 SUPPOSITORY RECTAL EVERY 4 HOURS PRN
Status: DISCONTINUED | OUTPATIENT
Start: 2024-10-19 | End: 2024-10-19

## 2024-10-19 RX ORDER — POTASSIUM CHLORIDE 750 MG/1
10 TABLET, FILM COATED, EXTENDED RELEASE ORAL 2 TIMES DAILY
Status: DISCONTINUED | OUTPATIENT
Start: 2024-10-19 | End: 2024-10-23 | Stop reason: HOSPADM

## 2024-10-19 RX ORDER — ONDANSETRON 4 MG/1
4 TABLET, FILM COATED ORAL EVERY 8 HOURS PRN
Status: DISCONTINUED | OUTPATIENT
Start: 2024-10-19 | End: 2024-10-23 | Stop reason: HOSPADM

## 2024-10-19 RX ORDER — HYOSCYAMINE SULFATE 0.12 MG/1
0.25 TABLET, ORALLY DISINTEGRATING ORAL
Status: DISCONTINUED | OUTPATIENT
Start: 2024-10-19 | End: 2024-10-23 | Stop reason: HOSPADM

## 2024-10-19 RX ORDER — ACETAMINOPHEN 500 MG
5 TABLET ORAL NIGHTLY PRN
Status: DISCONTINUED | OUTPATIENT
Start: 2024-10-19 | End: 2024-10-23 | Stop reason: HOSPADM

## 2024-10-19 RX ORDER — ACETAMINOPHEN 325 MG/1
650 TABLET ORAL EVERY 4 HOURS PRN
Status: DISCONTINUED | OUTPATIENT
Start: 2024-10-19 | End: 2024-10-23 | Stop reason: HOSPADM

## 2024-10-19 RX ORDER — HEPARIN SODIUM 5000 [USP'U]/ML
5000 INJECTION, SOLUTION INTRAVENOUS; SUBCUTANEOUS EVERY 8 HOURS SCHEDULED
Status: DISCONTINUED | OUTPATIENT
Start: 2024-10-19 | End: 2024-10-23 | Stop reason: HOSPADM

## 2024-10-19 RX ORDER — TRIMETHOPRIM 100 MG/1
100 TABLET ORAL DAILY
Status: DISCONTINUED | OUTPATIENT
Start: 2024-10-20 | End: 2024-10-23 | Stop reason: HOSPADM

## 2024-10-19 RX ORDER — SUCRALFATE 1 G/1
1 TABLET ORAL 4 TIMES DAILY
Status: DISCONTINUED | OUTPATIENT
Start: 2024-10-19 | End: 2024-10-23 | Stop reason: HOSPADM

## 2024-10-19 RX ORDER — CALCIUM CARBONATE 200(500)MG
500 TABLET,CHEWABLE ORAL 4 TIMES DAILY PRN
Status: DISCONTINUED | OUTPATIENT
Start: 2024-10-19 | End: 2024-10-23 | Stop reason: HOSPADM

## 2024-10-19 RX ORDER — PANTOPRAZOLE SODIUM 40 MG/10ML
40 INJECTION, POWDER, LYOPHILIZED, FOR SOLUTION INTRAVENOUS
Status: DISCONTINUED | OUTPATIENT
Start: 2024-10-20 | End: 2024-10-23 | Stop reason: HOSPADM

## 2024-10-19 RX ORDER — DIPHENOXYLATE HYDROCHLORIDE AND ATROPINE SULFATE 2.5; .025 MG/1; MG/1
1 TABLET ORAL 4 TIMES DAILY PRN
Status: DISCONTINUED | OUTPATIENT
Start: 2024-10-19 | End: 2024-10-23 | Stop reason: HOSPADM

## 2024-10-19 RX ORDER — MAGNESIUM SULFATE HEPTAHYDRATE 40 MG/ML
2 INJECTION, SOLUTION INTRAVENOUS ONCE
Status: COMPLETED | OUTPATIENT
Start: 2024-10-19 | End: 2024-10-19

## 2024-10-19 RX ORDER — ONDANSETRON HYDROCHLORIDE 2 MG/ML
4 INJECTION, SOLUTION INTRAVENOUS EVERY 8 HOURS PRN
Status: DISCONTINUED | OUTPATIENT
Start: 2024-10-19 | End: 2024-10-23 | Stop reason: HOSPADM

## 2024-10-19 RX ORDER — SODIUM CHLORIDE 9 MG/ML
100 INJECTION, SOLUTION INTRAVENOUS CONTINUOUS
Status: DISPENSED | OUTPATIENT
Start: 2024-10-19 | End: 2024-10-20

## 2024-10-19 SDOH — SOCIAL STABILITY: SOCIAL INSECURITY: DOES ANYONE TRY TO KEEP YOU FROM HAVING/CONTACTING OTHER FRIENDS OR DOING THINGS OUTSIDE YOUR HOME?: NO

## 2024-10-19 SDOH — SOCIAL STABILITY: SOCIAL INSECURITY: ABUSE: ADULT

## 2024-10-19 SDOH — SOCIAL STABILITY: SOCIAL INSECURITY: WITHIN THE LAST YEAR, HAVE YOU BEEN AFRAID OF YOUR PARTNER OR EX-PARTNER?: NO

## 2024-10-19 SDOH — SOCIAL STABILITY: SOCIAL INSECURITY: HAVE YOU HAD ANY THOUGHTS OF HARMING ANYONE ELSE?: NO

## 2024-10-19 SDOH — SOCIAL STABILITY: SOCIAL INSECURITY: WITHIN THE LAST YEAR, HAVE YOU BEEN HUMILIATED OR EMOTIONALLY ABUSED IN OTHER WAYS BY YOUR PARTNER OR EX-PARTNER?: NO

## 2024-10-19 SDOH — ECONOMIC STABILITY: FOOD INSECURITY: WITHIN THE PAST 12 MONTHS, THE FOOD YOU BOUGHT JUST DIDN'T LAST AND YOU DIDN'T HAVE MONEY TO GET MORE.: NEVER TRUE

## 2024-10-19 SDOH — SOCIAL STABILITY: SOCIAL INSECURITY: DO YOU FEEL UNSAFE GOING BACK TO THE PLACE WHERE YOU ARE LIVING?: NO

## 2024-10-19 SDOH — ECONOMIC STABILITY: INCOME INSECURITY: IN THE PAST 12 MONTHS HAS THE ELECTRIC, GAS, OIL, OR WATER COMPANY THREATENED TO SHUT OFF SERVICES IN YOUR HOME?: NO

## 2024-10-19 SDOH — SOCIAL STABILITY: SOCIAL INSECURITY: WERE YOU ABLE TO COMPLETE ALL THE BEHAVIORAL HEALTH SCREENINGS?: YES

## 2024-10-19 SDOH — SOCIAL STABILITY: SOCIAL INSECURITY: ARE THERE ANY APPARENT SIGNS OF INJURIES/BEHAVIORS THAT COULD BE RELATED TO ABUSE/NEGLECT?: NO

## 2024-10-19 SDOH — SOCIAL STABILITY: SOCIAL INSECURITY
WITHIN THE LAST YEAR, HAVE YOU BEEN RAPED OR FORCED TO HAVE ANY KIND OF SEXUAL ACTIVITY BY YOUR PARTNER OR EX-PARTNER?: NO

## 2024-10-19 SDOH — SOCIAL STABILITY: SOCIAL INSECURITY: HAS ANYONE EVER THREATENED TO HURT YOUR FAMILY OR YOUR PETS?: NO

## 2024-10-19 SDOH — ECONOMIC STABILITY: FOOD INSECURITY: WITHIN THE PAST 12 MONTHS, YOU WORRIED THAT YOUR FOOD WOULD RUN OUT BEFORE YOU GOT THE MONEY TO BUY MORE.: NEVER TRUE

## 2024-10-19 SDOH — SOCIAL STABILITY: SOCIAL INSECURITY: HAVE YOU HAD THOUGHTS OF HARMING ANYONE ELSE?: NO

## 2024-10-19 SDOH — SOCIAL STABILITY: SOCIAL INSECURITY: ARE YOU OR HAVE YOU BEEN THREATENED OR ABUSED PHYSICALLY, EMOTIONALLY, OR SEXUALLY BY ANYONE?: NO

## 2024-10-19 SDOH — SOCIAL STABILITY: SOCIAL INSECURITY: DO YOU FEEL ANYONE HAS EXPLOITED OR TAKEN ADVANTAGE OF YOU FINANCIALLY OR OF YOUR PERSONAL PROPERTY?: NO

## 2024-10-19 ASSESSMENT — ENCOUNTER SYMPTOMS
RESPIRATORY NEGATIVE: 1
DIARRHEA: 1
PSYCHIATRIC NEGATIVE: 1
ALLERGIC/IMMUNOLOGIC NEGATIVE: 1
MUSCULOSKELETAL NEGATIVE: 1
CARDIOVASCULAR NEGATIVE: 1
WEAKNESS: 1
FATIGUE: 1
ENDOCRINE NEGATIVE: 1
APPETITE CHANGE: 1
NAUSEA: 1
HEMATOLOGIC/LYMPHATIC NEGATIVE: 1
UNEXPECTED WEIGHT CHANGE: 1
EYES NEGATIVE: 1

## 2024-10-19 ASSESSMENT — ACTIVITIES OF DAILY LIVING (ADL)
ADEQUATE_TO_COMPLETE_ADL: YES
JUDGMENT_ADEQUATE_SAFELY_COMPLETE_DAILY_ACTIVITIES: YES
PATIENT'S MEMORY ADEQUATE TO SAFELY COMPLETE DAILY ACTIVITIES?: YES
BATHING: NEEDS ASSISTANCE
GROOMING: NEEDS ASSISTANCE
WALKS IN HOME: NEEDS ASSISTANCE
HEARING - RIGHT EAR: DIFFICULTY WITH NOISE
FEEDING YOURSELF: NEEDS ASSISTANCE
ASSISTIVE_DEVICE: DENTURES UPPER;DENTURES LOWER;CANE
LACK_OF_TRANSPORTATION: NO
DRESSING YOURSELF: NEEDS ASSISTANCE
TOILETING: NEEDS ASSISTANCE
HEARING - LEFT EAR: DIFFICULTY WITH NOISE

## 2024-10-19 ASSESSMENT — PAIN DESCRIPTION - LOCATION: LOCATION: ABDOMEN

## 2024-10-19 ASSESSMENT — PAIN SCALES - GENERAL
PAINLEVEL_OUTOF10: 8
PAINLEVEL_OUTOF10: 6
PAINLEVEL_OUTOF10: 6

## 2024-10-19 ASSESSMENT — COLUMBIA-SUICIDE SEVERITY RATING SCALE - C-SSRS
1. IN THE PAST MONTH, HAVE YOU WISHED YOU WERE DEAD OR WISHED YOU COULD GO TO SLEEP AND NOT WAKE UP?: NO
2. HAVE YOU ACTUALLY HAD ANY THOUGHTS OF KILLING YOURSELF?: NO
6. HAVE YOU EVER DONE ANYTHING, STARTED TO DO ANYTHING, OR PREPARED TO DO ANYTHING TO END YOUR LIFE?: NO

## 2024-10-19 ASSESSMENT — PATIENT HEALTH QUESTIONNAIRE - PHQ9
1. LITTLE INTEREST OR PLEASURE IN DOING THINGS: NOT AT ALL
SUM OF ALL RESPONSES TO PHQ9 QUESTIONS 1 & 2: 0
2. FEELING DOWN, DEPRESSED OR HOPELESS: NOT AT ALL

## 2024-10-19 ASSESSMENT — PAIN - FUNCTIONAL ASSESSMENT
PAIN_FUNCTIONAL_ASSESSMENT: 0-10
PAIN_FUNCTIONAL_ASSESSMENT: 0-10

## 2024-10-19 ASSESSMENT — LIFESTYLE VARIABLES
SKIP TO QUESTIONS 9-10: 1
AUDIT-C TOTAL SCORE: 0
HOW OFTEN DO YOU HAVE 6 OR MORE DRINKS ON ONE OCCASION: NEVER
AUDIT-C TOTAL SCORE: 0
HOW MANY STANDARD DRINKS CONTAINING ALCOHOL DO YOU HAVE ON A TYPICAL DAY: PATIENT DOES NOT DRINK
HOW OFTEN DO YOU HAVE A DRINK CONTAINING ALCOHOL: NEVER

## 2024-10-19 ASSESSMENT — COGNITIVE AND FUNCTIONAL STATUS - GENERAL
MOVING TO AND FROM BED TO CHAIR: A LITTLE
EATING MEALS: A LITTLE
TOILETING: A LITTLE
MOBILITY SCORE: 20
PERSONAL GROOMING: A LITTLE
CLIMB 3 TO 5 STEPS WITH RAILING: A LITTLE
STANDING UP FROM CHAIR USING ARMS: A LITTLE
DAILY ACTIVITIY SCORE: 18
DRESSING REGULAR LOWER BODY CLOTHING: A LITTLE
DRESSING REGULAR UPPER BODY CLOTHING: A LITTLE
HELP NEEDED FOR BATHING: A LITTLE
PATIENT BASELINE BEDBOUND: NO
WALKING IN HOSPITAL ROOM: A LITTLE

## 2024-10-19 ASSESSMENT — PAIN DESCRIPTION - FREQUENCY: FREQUENCY: CONSTANT/CONTINUOUS

## 2024-10-19 NOTE — CONSULTS
Nutrition Initial Assessment:   Nutrition Assessment    Reason for Assessment: Admission nursing screening (MST=3 and weight loss)    Patient is a 79 y.o. female presenting with complaints of weight loss, diarrhea, anorexia for a couple of months. She states she has immediate diarrhea as soon as she eats anything and it lasts several hours afterwards.     PMH of anxiety, depresison, GERD, hypothyroidism, IBS, chronic constipation and malnutrition.    Nutrition History:  Energy Intake: Poor < 50 %  Food and Nutrient History: Visited patient in room. She was resting on her side, wrapped in blankets, and awoke easily, recalling previous conversations with this dietitian. Patient has a history of diarrhea and poor intake. She reported that while she tries to eat meals, she often feels unwell afterward. Yesterday, she began drinking protein shakes three times a day. Spoke with the provider about the possibility of enteral nutrition (EN), as the patient is not consuming enough orally. Patient stated she is willing to have strawberry Ensure Plus three times a day. UPDATE 10/20/24. Pt did consume 2 Ensures today.    Food Allergies/Intolerances:   NKFA  GI Symptoms: Diarrhea, Nausea, and Abdominal pain  Oral Problems:  none reported          Anthropometrics:  Height: 152.4 cm (5')   Weight: (!) 34 kg (74 lb 15.3 oz)   BMI (Calculated): 14.64  IBW/kg (Dietitian Calculated): 45.5 kg  Percent of IBW: 65 %       Weight History:   Wt Readings from Last 10 Encounters:   10/19/24 (!) 29.4 kg (64 lb 13 oz)   10/11/24 (!) 32.2 kg (71 lb)   10/10/24 (!) 31.8 kg (70 lb)   09/16/24 (!) 34.3 kg (75 lb 9.6 oz)   09/06/24 (!) 32.8 kg (72 lb 5 oz)   08/29/24 (!) 34.7 kg (76 lb 6.4 oz)   08/26/24 (!) 35.3 kg (77 lb 12.8 oz)   07/23/24 (!) 35.8 kg (79 lb)   07/19/24 (!) 37.9 kg (83 lb 8.9 oz)   07/19/24 (!) 35.9 kg (79 lb 3.2 oz)       Weight Change %:  Weight History / % Weight Change: Arlint per chart hx: 9/16/24: 34.3kg 14% loss in one  month if weight of 29.4kg is correct. Pt did report 10lb weight loss  Significant Weight Loss: Yes  Interpretation of Weight Loss: >5% in 1 month    Nutrition Focused Physical Exam Findings:    Subcutaneous Fat Loss:   Orbital Fat Pads: Severe (dark circles, hollowing and loose skin)  Buccal Fat Pads: Severe (hollow, sunken and narrow face)  Triceps: Severe (negligible fat tissue)  Ribs: Defer (pt lying in bed)  Muscle Wasting:  Temporalis: Severe (hollowed scooping depression)  Pectoralis (Clavicular Region): Severe (protruding prominent clavicle)  Deltoid/Trapezius: Severe (squared shoulders, acromion process prominent)  Interosseous: Severe (depressed area between thumb and forefinger)  Trapezius/Infraspinatus/Supraspinatus (Scapular Region): Severe (prominent visual scapula, depression between ribs, scapula or shoulder)  Quadriceps: Severe (depressions on inner and outer thigh)  Gastrocnemius: Severe (minimal muscle definition)  Edema:  Edema: none  Physical Findings:  Hair: Negative  Eyes: Negative  Mouth: Positive (very red tounge)    Nutrition Significant Labs:  CBC Trend:   Results from last 7 days   Lab Units 10/20/24  0626 10/19/24  1032   WBC AUTO x10*3/uL 5.4 4.8   RBC AUTO x10*6/uL 3.73* 4.51   HEMOGLOBIN g/dL 11.0* 13.3   HEMATOCRIT % 33.2* 39.9   MCV fL 89 89   PLATELETS AUTO x10*3/uL 386 488*    , BMP Trend:   Results from last 7 days   Lab Units 10/20/24  0626 10/19/24  1032   GLUCOSE mg/dL 83 151*   CALCIUM mg/dL 8.6 9.6   SODIUM mmol/L 131* 130*   POTASSIUM mmol/L 3.8 3.8   CO2 mmol/L 24 25   CHLORIDE mmol/L 100 95*   BUN mg/dL 14 17   CREATININE mg/dL 0.71 0.94   Lipid Panel:   Lab Results   Component Value Date    CHOL 114 09/07/2024    HDL 46.6 09/07/2024    CHHDL 2.4 09/07/2024    LDLF 80 07/13/2022    VLDL 19 09/07/2024    TRIG 94 09/07/2024     Nutrition Specific Medications:  cholecalciferol, 2,000 Units, oral, Daily  heparin (porcine), 5,000 Units, subcutaneous, q8h BRIANNA  hyoscyamine, 0.25  mg, sublingual, Before meals & nightly  levothyroxine, 50 mcg, oral, Daily  pantoprazole,   potassium chloride CR, 10 mEq, oral, BID  sucralfate, 1 g, oral, 4x daily  traZODone, 150 mg, oral, Nightly  trimethoprim, 100 mg, oral, Daily      I/O:   Last BM Date: 10/20/24; Stool Appearance: Loose (10/20/24 1422)    Intake/Output Summary (Last 24 hours) at 10/20/2024 1616  Last data filed at 10/20/2024 1500  Gross per 24 hour   Intake 2115 ml   Output 1250 ml   Net 865 ml     Dictation #1  MRN:[id number]  CSN:[id number]   Dietary Orders (From admission, onward)       Start     Ordered    10/19/24 1457  May Participate in Room Service  ( ROOM SERVICE MAY PARTICIPATE)  Once        Question:  .  Answer:  Yes    10/19/24 1456    10/19/24 1438  Adult diet Regular  Diet effective now        Question:  Diet type  Answer:  Regular    10/19/24 1437    10/19/24 1438  Oral nutritional supplements  Until discontinued        Question Answer Comment   Deliver with Breakfast    Deliver with Lunch    Deliver with Dinner    Select supplement: Ensure Plus High Protein        10/19/24 1437                Estimated Needs:   Total Energy Estimated Needs (kCal):  (1365-1593kcal/day)  Method for Estimating Needs: 30-35kcal/kg of IBW (45.5kg)  Total Protein Estimated Needs (g): 44.1 g  Method for Estimating Needs: 1.5g/kg actual weight (29.4kg)  Total Fluid Estimated Needs (mL):  (1ml/kcal or per medical team)           Nutrition Diagnosis   Malnutrition Diagnosis  Patient has Malnutrition Diagnosis: Yes  Diagnosis Status: New  As Evidenced by: severe muscle loss (temporal depression, pectoralis major, deltoids, interosseous, gastrocnemius) and severe subcutaneous fat loss (orbital, perioral, below triceps); 14% weight loss in one month, poor nutritional intake meeting less than 75% of estimated needs for greater than one month.            Nutrition Interventions/Recommendations         Nutrition Prescription:  Individualized Nutrition  Prescription Provided for : diet, fluids, ONS        Nutrition Interventions:   Interventions: Medical food supplement, Meals and snacks  Meals and Snacks: General healthful diet  Goal: general healthful diet  Medical Food Supplement: Commercial beverage  Goal: Ensure Plus High Protein TID (350kcal and 20g protein per 8 fluid oz)    Goal: Sasha GUARDADO-CNP    Nutrition Education:   Spoke with patient about adding protein rich foods, small amounts of food often, and ONS.      Nutrition Monitoring and Evaluation   Food/Nutrient Related History Monitoring  Monitoring and Evaluation Plan: Amount of food  Amount of Food: Medical food intake, Estimated amout of food  Criteria: increase intake to 75% of meals and ONS    Body Composition/Growth/Weight History  Monitoring and Evaluation Plan: Weight  Weight: Measured weight  Criteria: gradual weight gain         Nutrition Focused Physical Findings  Monitoring and Evaluation Plan: Adipose, Muscles  Adipose: Loss of subcutaneous fat  Criteria: maintain adipose stores  Muscles: Muscle atrophy  Criteria: maintain lean muscle mass  Other: Evaluate nutrition intervention as compared to nutrition goal(s) and estimated nutrient need criteria.       Follow Up:     Time Spent (min): 60 minutes  Last Date of Nutrition Visit: 10/19/24  Nutrition Follow-Up Needed?: Dietitian to reassess per policy  Follow up Comment: weight, intake, malnutrition

## 2024-10-19 NOTE — CARE PLAN
The patient's goals for the shift include feeling better     The clinical goals for the shift include  increase in fluid intake to promote urinary output    Patient admitted for generalized weakness and failure to thrive. She has drank a liter of water since arriving to the floor on top of 125 mL/hr continuous fluids. She was able to provide urinary output for a urine sample which was sent to the lab. Her O2 remains at % on room air, /71, RR 16 and heart rate in the low 50's dropping into the 40's. Will continue fluids and monitor vitals.

## 2024-10-19 NOTE — ED PROVIDER NOTES
[facility]  ED  Provider Note  10/19/2024  9:12 AM  216/216-A              Chief Complaint   Patient presents with    Weakness, Gen     Feels weak, losing weight, poor appetite, painful BM         History of Present Illness:   Latha Quispe is a 79 y.o. female presenting to the ED for Weight loss, diarrhea, beginning Several months ago.  The complaint has been persistent, severe in severity, and worsened by nothing.  Patient reports that anytime she eats or drinks anything she has immediate and severe diarrhea.  The diarrhea lasts for several hours after eating.  Her potassium pills are coming out intact.  She did take 3 ensures yesterday and attempt to gain some calories with similar results.  She states she is lost 10 pounds in the last week.  Per the old chart she has lost 7 pounds in the past 7 days.  Her weight is 28 kg.  She has history of anxiety, irritable bowel and hypokalemia.      Review of Systems:   Pertinent positives and review of systems as noted above.  Remaining 10 review of systems is negative or noncontributory to today's episode of care.  Review of Systems       --------------------------------------------- PAST HISTORY ---------------------------------------------  Past Medical History:   Diagnosis Date    Abdominal pain     Anxiety     Cat bite 03/11/2023    Cataract     Depression     Disease of thyroid gland     GERD (gastroesophageal reflux disease)     Hypothyroidism     Irritable bowel syndrome     Panic attacks     Personal history of malignant neoplasm of breast 06/30/2021    Personal history of other complications of pregnancy, childbirth and the puerperium     miscarriage         has a past surgical history that includes Other surgical history (06/30/2021); Breast surgery; Gastric bypass; Colon surgery; Hysterectomy; Cataract extraction; Mastectomy, partial (Left); CT angio abdomen w and or wo IV IV contrast (12/28/2023); and Colectomy (1992).     reports that she has  never smoked. She has never been exposed to tobacco smoke. She has never used smokeless tobacco. She reports that she does not drink alcohol and does not use drugs.    family history includes Alcohol abuse in her father; Cancer in her brother and sister; Colon cancer in her brother and brother; Hypothyroidism in an other family member; Osteoporosis in her mother and sister; bladder cancer in her sister. Unless otherwise noted, family history is non contributory    Current Discharge Medication List        CONTINUE these medications which have NOT CHANGED    Details   acetaminophen (Tylenol) 325 mg tablet Take 2 tablets (650 mg) by mouth every 4 hours if needed for mild pain (1 - 3).      cholecalciferol (Vitamin D3) 50 mcg (2,000 unit) capsule Take 1 capsule (50 mcg) by mouth once daily.      estradiol (Estrace) 0.01 % (0.1 mg/gram) vaginal cream Insert pea size amount into vagina every night for 2 weeks, then 2x/week after  Qty: 42.5 g, Refills: 12    Associated Diagnoses: Recurrent UTI      hyoscyamine 0.125 mg disintegrating tablet Place 2 tablets (0.25 mg) under the tongue 4 times a day before meals.  Qty: 720 tablet, Refills: 3    Associated Diagnoses: Irritable bowel syndrome with diarrhea      levothyroxine (Synthroid, Levoxyl) 50 mcg tablet Take 1 tablet (50 mcg) by mouth once daily.  Qty: 90 tablet, Refills: 3    Associated Diagnoses: Hypothyroidism, unspecified type      linaCLOtide (Linzess) 145 mcg capsule Take 1 capsule (145 mcg) by mouth once daily in the morning. Take before meals. Do not crush or chew.  Qty: 90 capsule, Refills: 3    Associated Diagnoses: Chronic idiopathic constipation      pantoprazole (ProtoNix) 40 mg EC tablet Take 1 tablet (40 mg) by mouth once daily.  Qty: 90 tablet, Refills: 3    Associated Diagnoses: Gastroesophageal reflux disease without esophagitis      potassium chloride CR 10 mEq ER tablet Take 1 tablet (10 mEq) by mouth 2 times a day. Do not crush, chew, or split.  Qty:  60 tablet, Refills: 3    Comments: Increase in dosage  Associated Diagnoses: Hypokalemia      sucralfate (Carafate) 1 gram tablet Take 1 tablet (1 g) by mouth 4 times a day. Before meals and at bedtime  Qty: 120 tablet, Refills: 11    Associated Diagnoses: Chronic gastritis, presence of bleeding unspecified, unspecified gastritis type      traZODone (Desyrel) 150 mg tablet Take 1 tablet (150 mg) by mouth once daily at bedtime.  Qty: 30 tablet, Refills: 11    Associated Diagnoses: Insomnia, unspecified type      trimethoprim (Trimpex) 100 mg tablet Take 1 pill daily for 90 days  Qty: 180 tablet, Refills: 0    Associated Diagnoses: Recurrent UTI      magnesium hydroxide (Milk of Magnesia) 2,400 mg/10 mL suspension suspension Take 10 mL by mouth once daily as needed.      metoclopramide (Reglan) 5 mg tablet Take 1 tablet (5 mg) by mouth 2 times a day for 5 days.  Qty: 10 tablet, Refills: 0    Associated Diagnoses: Nausea      ondansetron ODT (Zofran-ODT) 4 mg disintegrating tablet Take 1 tablet (4 mg) by mouth every 12 hours if needed for nausea or vomiting.  Qty: 20 tablet, Refills: 0    Associated Diagnoses: Nausea and vomiting, unspecified vomiting type           STOP taking these medications       cephalexin (Keflex) 500 mg capsule Comments:   Reason for Stopping:         sulfamethoxazole-trimethoprim (Bactrim DS) 800-160 mg tablet Comments:   Reason for Stopping:              The patient’s home medications have been reviewed.    Allergies: Diflucan [fluconazole] and Nsaids (non-steroidal anti-inflammatory drug)    -------------------------------------------------- RESULTS -------------------------------------------------  All laboratory and radiology results have been personally reviewed by myself   LABS:  Labs Reviewed   URINE CULTURE - Abnormal       Result Value    Urine Culture >100,000 Enteric bacilli (*)    COMPREHENSIVE METABOLIC PANEL - Abnormal    Glucose 151 (*)     Sodium 130 (*)     Potassium 3.8       Chloride 95 (*)     Bicarbonate 25      Anion Gap 14      Urea Nitrogen 17      Creatinine 0.94      eGFR 62      Calcium 9.6      Albumin 3.6      Alkaline Phosphatase 86      Total Protein 7.2      AST 23      Bilirubin, Total 0.5      ALT 17     CBC WITH AUTO DIFFERENTIAL - Abnormal    WBC 4.8      nRBC 0.0      RBC 4.51      Hemoglobin 13.3      Hematocrit 39.9      MCV 89      MCH 29.5      MCHC 33.3      RDW 13.4      Platelets 488 (*)     Neutrophils % 76.7      Immature Granulocytes %, Automated 0.4      Lymphocytes % 16.7      Monocytes % 5.4      Eosinophils % 0.2      Basophils % 0.6      Neutrophils Absolute 3.68      Immature Granulocytes Absolute, Automated 0.02      Lymphocytes Absolute 0.80      Monocytes Absolute 0.26      Eosinophils Absolute 0.01      Basophils Absolute 0.03     URINALYSIS WITH REFLEX CULTURE AND MICROSCOPIC - Abnormal    Color, Urine Light-Yellow      Appearance, Urine Turbid (*)     Specific Gravity, Urine 1.009      pH, Urine 5.5      Protein, Urine NEGATIVE      Glucose, Urine Normal      Blood, Urine 0.06 (1+) (*)     Ketones, Urine NEGATIVE      Bilirubin, Urine NEGATIVE      Urobilinogen, Urine Normal      Nitrite, Urine NEGATIVE      Leukocyte Esterase, Urine NEGATIVE     MAGNESIUM - Abnormal    Magnesium 1.37 (*)    BASIC METABOLIC PANEL - Abnormal    Glucose 83      Sodium 131 (*)     Potassium 3.8      Chloride 100      Bicarbonate 24      Anion Gap 11      Urea Nitrogen 14      Creatinine 0.71      eGFR 87      Calcium 8.6     CBC - Abnormal    WBC 5.4      nRBC 0.0      RBC 3.73 (*)     Hemoglobin 11.0 (*)     Hematocrit 33.2 (*)     MCV 89      MCH 29.5      MCHC 33.1      RDW 13.2      Platelets 386     MAGNESIUM - Abnormal    Magnesium 1.58 (*)    BASIC METABOLIC PANEL - Abnormal    Glucose 97      Sodium 133 (*)     Potassium 3.2 (*)     Chloride 100      Bicarbonate 28      Anion Gap 8 (*)     Urea Nitrogen 12      Creatinine 0.69      eGFR 89      Calcium 8.9      CBC - Abnormal    WBC 4.3 (*)     nRBC 0.0      RBC 3.65 (*)     Hemoglobin 10.7 (*)     Hematocrit 32.8 (*)     MCV 90      MCH 29.3      MCHC 32.6      RDW 13.6      Platelets 363     MAGNESIUM - Abnormal    Magnesium 1.41 (*)    BASIC METABOLIC PANEL - Abnormal    Glucose 97      Sodium 132 (*)     Potassium 3.7      Chloride 98      Bicarbonate 28      Anion Gap 10      Urea Nitrogen 14      Creatinine 0.65      eGFR 90      Calcium 8.8     CBC - Abnormal    WBC 4.7      nRBC 0.0      RBC 3.53 (*)     Hemoglobin 10.6 (*)     Hematocrit 32.5 (*)     MCV 92      MCH 30.0      MCHC 32.6      RDW 14.1      Platelets 314     URINALYSIS MICROSCOPIC WITH REFLEX CULTURE - Abnormal    WBC, Urine 11-20 (*)     RBC, Urine 1-2      Squamous Epithelial Cells, Urine 1-9 (SPARSE)      Bacteria, Urine 1+ (*)     Mucus, Urine FEW     LACTATE - Normal    Lactate 1.2      Narrative:     Venipuncture immediately after or during the administration of Metamizole may lead to falsely low results. Testing should be performed immediately prior to Metamizole dosing.   LIPASE - Normal    Lipase 26      Narrative:     Venipuncture immediately after or during the administration of Metamizole may lead to falsely low results. Testing should be performed immediately prior to Metamizole dosing.   PHOSPHORUS - Normal    Phosphorus 3.0     VITAMIN B12 - Normal    Vitamin B12 407     TISSUE TRANSGLUTAMINASE, IGA - Normal    Tissue Transglutaminase, IgA <1.0     MAGNESIUM - Normal    Magnesium 1.70     STOOL PATHOGEN PANEL, PCR   C. DIFFICILE, PCR   URINALYSIS WITH REFLEX CULTURE AND MICROSCOPIC    Narrative:     The following orders were created for panel order Urinalysis with Reflex Culture and Microscopic.  Procedure                               Abnormality         Status                     ---------                               -----------         ------                     Urinalysis with Reflex C...[[id number]]  Abnormal            Final  result               Extra Urine Gray Tube[[id number]]                            Final result                 Please view results for these tests on the individual orders.   EXTRA URINE GRAY TUBE    Extra Tube Hold for add-ons.         EKG:     RADIOLOGY:  Interpreted by Radiologist.  CT abdomen pelvis wo IV contrast   Final Result   1. Limited exam due to lack of intravenous contrast and lack of   intra-abdominal fat.   2. Status post gastric bypass and colonic surgery at the level of the   sigmoid colon and rectum with possible thickening at the level of the   rectum, which may be related to proctitis.   3. Cholelithiasis.   4. Small amount of periportal edema, which has not significantly   changed.   5. Status post hysterectomy.   6. New 1.5 cm irregular density in the right lung base. Since the   prior exam was only 6 weeks ago, this may be related to an   infiltrate. Follow-up is suggested. If this density persists further   evaluation with CT of the chest should be considered.   7. Emphysematous changes in the lung bases.   8. Redemonstration of punctate nonobstructing left renal stone        MACRO:             None        Signed by: Tami Greenfield 10/19/2024 11:34 AM   Dictation workstation:   [id number]          ------------------------- NURSING NOTES AND VITALS REVIEWED ---------------------------   The nursing notes within the ED encounter and vital signs as below have been reviewed.   /60 (BP Location: Left arm, Patient Position: Lying)   Pulse 56   Temp 36.8 °C (98.3 °F) (Temporal)   Resp 16   Ht 1.524 m (5')   Wt (!) 33 kg (72 lb 12 oz)   SpO2 96%   BMI 14.21 kg/m²   Oxygen Saturation Interpretation: Normal      ---------------------------------------------------PHYSICAL EXAM--------------------------------------  Physical Exam   Constitutional/General: Alert and oriented x3, cachectic appearing, non toxic in NAD  Head: Normocephalic and atraumatic  Eyes: PERRL, EOMI, conjunctiva normal,  sclera non icteric  Mouth: Oropharynx clear, handling secretions, no trismus, no asymmetry of the posterior oropharynx or uvular edema  Neck: Supple, full ROM, non tender to palpation in the midline, no stridor, no crepitus, no meningeal signs  Respiratory: Lungs clear to auscultation bilaterally, no wheezes, rales, or rhonchi  Cardiovascular:  Regular rate. Regular rhythm. No murmurs, gallops, or rubs. 2+ distal pulses  Chest: No chest wall tenderness  GI:  Abdomen Soft, Non tender, Non distended.  +BS. No organomegaly, no palpable masses,  No rebound, guarding, or rigidity. No CVAT   Musculoskeletal: Moves all extremities x 4. Warm and well perfused, no clubbing, cyanosis, or edema. Capillary refill <3 seconds  Integument: skin warm and dry. No rashes. Decreased turgor  Lymphatic: no lymphadenopathy noted  Neurologic:  No focal deficits, symmetric strength 5/5 in the upper and lower extremities bilaterally  Psychiatric: Normal Affect    Procedures    Diagnoses as of 10/22/24 0843   Cachexia (Multi)   Hypomagnesemia   Failure to thrive in adult          Medical Decision Making:   Admit for further care and treatment      Counseling:   The emergency provider has spoken with the patient and discussed today’s results, in addition to providing specific details for the plan of care and counseling regarding the diagnosis and prognosis.  Questions are answered at this time and they are agreeable with the plan.      --------------------------------- IMPRESSION AND DISPOSITION ---------------------------------        IMPRESSION  1. Failure to thrive in adult    2. Cachexia (Multi)    3. Hypomagnesemia        DISPOSITION  Disposition: Admit to med/surg floor  Patient condition is poor      Billing Provider Critical Care Time: 0 minutes     Darnell Brennan MD  10/20/24 0822       Darnell Brennan MD  10/22/24 0843

## 2024-10-19 NOTE — H&P
History Of Present Illness  Latha Quispe is a 79 y.o. female with PMH of anxiety, depresison, GERD, hypothyroidism, IBS, chronic constipation and malnutrition who presented to ED today with complaints of weight loss, diarrhea, anorexia for a couple of months. She states she has immediate diarrhea as soon as she eats anything and it lasts several hours afterwards. She stated her pills were coming out undigested. She has been tolerating some Ensure at home. She estimates a 10 pound weight loss this past week. In the ED her workup showed glucose 151, Na 130, Mag 1.37. CT a/p showed possible proctitis, cholelithiasis, emphysema. She was given IV Mag, IVF and admitted to Medicine for further evaluation and treatment.      Past Medical History  Past Medical History:   Diagnosis Date    Abdominal pain     Anxiety     Cat bite 03/11/2023    Cataract     Depression     Disease of thyroid gland     GERD (gastroesophageal reflux disease)     Hypothyroidism     Irritable bowel syndrome     Panic attacks     Personal history of malignant neoplasm of breast 06/30/2021    Personal history of other complications of pregnancy, childbirth and the puerperium     miscarriage     Surgical History  Past Surgical History:   Procedure Laterality Date    BREAST SURGERY      CATARACT EXTRACTION      COLECTOMY  1992    COLON SURGERY      CT ABDOMEN ANGIOGRAM W AND/OR WO IV CONTRAST  12/28/2023    GASTRIC BYPASS      HYSTERECTOMY      MASTECTOMY, PARTIAL Left     OTHER SURGICAL HISTORY  06/30/2021    Varicose vein ligation      Social History  She reports that she has never smoked. She has never been exposed to tobacco smoke. She has never used smokeless tobacco. She reports that she does not drink alcohol and does not use drugs.    Family History  Family History   Problem Relation Name Age of Onset    Osteoporosis Mother      Alcohol abuse Father      Cancer Sister          breast    Osteoporosis Sister      Other (bladder cancer) Sister       Cancer Brother          colon. prostate    Colon cancer Brother      Colon cancer Brother      Hypothyroidism Other        Allergies  Diflucan [fluconazole] and Nsaids (non-steroidal anti-inflammatory drug)    Review of Systems   Constitutional:  Positive for appetite change, fatigue and unexpected weight change.   HENT: Negative.     Eyes: Negative.    Respiratory: Negative.     Cardiovascular: Negative.    Gastrointestinal:  Positive for diarrhea and nausea.   Endocrine: Negative.    Genitourinary: Negative.    Musculoskeletal: Negative.    Skin: Negative.    Allergic/Immunologic: Negative.    Neurological:  Positive for weakness.   Hematological: Negative.    Psychiatric/Behavioral: Negative.          Physical Exam  Constitutional:       General: She is not in acute distress.     Appearance: She is not toxic-appearing.      Comments: Cachectic, pale   HENT:      Head: Normocephalic and atraumatic.      Mouth/Throat:      Mouth: Mucous membranes are dry.   Eyes:      Extraocular Movements: Extraocular movements intact.      Pupils: Pupils are equal, round, and reactive to light.   Cardiovascular:      Rate and Rhythm: Normal rate and regular rhythm.      Pulses: Normal pulses.      Heart sounds: Normal heart sounds. No murmur heard.     No gallop.   Pulmonary:      Effort: Pulmonary effort is normal. No respiratory distress.      Breath sounds: Normal breath sounds. No wheezing, rhonchi or rales.   Abdominal:      General: Bowel sounds are normal. There is no distension.      Palpations: Abdomen is soft.      Tenderness: There is no abdominal tenderness. There is no guarding or rebound.   Musculoskeletal:         General: No swelling, tenderness, deformity or signs of injury. Normal range of motion.      Cervical back: Normal range of motion and neck supple.   Skin:     General: Skin is warm and dry.      Capillary Refill: Capillary refill takes less than 2 seconds.      Coloration: Skin is pale. Skin is not  jaundiced.      Findings: No bruising or rash.   Neurological:      General: No focal deficit present.      Mental Status: She is alert and oriented to person, place, and time. Mental status is at baseline.      Cranial Nerves: No cranial nerve deficit.      Sensory: No sensory deficit.      Motor: Weakness present.      Gait: Gait normal.   Psychiatric:         Mood and Affect: Mood normal.         Behavior: Behavior normal.         Thought Content: Thought content normal.         Judgment: Judgment normal.     Last Recorded Vitals  Blood pressure 144/75, pulse 78, temperature 36.8 °C (98.2 °F), temperature source Temporal, resp. rate 20, height 1.524 m (5'), weight (!) 28.6 kg (63 lb), SpO2 99%.    Relevant Results  CT abdomen pelvis wo IV contrast  Result Date: 10/19/2024  1. Limited exam due to lack of intravenous contrast and lack of intra-abdominal fat. 2. Status post gastric bypass and colonic surgery at the level of the sigmoid colon and rectum with possible thickening at the level of the rectum, which may be related to proctitis. 3. Cholelithiasis. 4. Small amount of periportal edema, which has not significantly changed. 5. Status post hysterectomy. 6. New 1.5 cm irregular density in the right lung base. Since the prior exam was only 6 weeks ago, this may be related to an infiltrate. Follow-up is suggested. If this density persists further evaluation with CT of the chest should be considered. 7. Emphysematous changes in the lung bases. 8. Redemonstration of punctate nonobstructing left renal stone   MACRO:     None   Signed by: Tami Greenfield 10/19/2024 11:34 AM Dictation workstation:   [id number]      Latest Reference Range & Units 10/19/24 10:32   GLUCOSE 74 - 99 mg/dL 151 (H)   SODIUM 136 - 145 mmol/L 130 (L)   POTASSIUM 3.5 - 5.3 mmol/L 3.8   CHLORIDE 98 - 107 mmol/L 95 (L)   Bicarbonate 21 - 32 mmol/L 25   Anion Gap 10 - 20 mmol/L 14   Blood Urea Nitrogen 6 - 23 mg/dL 17   Creatinine 0.50 - 1.05 mg/dL  0.94   EGFR >60 mL/min/1.73m*2 62   Calcium 8.6 - 10.3 mg/dL 9.6   PHOSPHORUS 2.5 - 4.9 mg/dL 3.0   Albumin 3.4 - 5.0 g/dL 3.6   Alkaline Phosphatase 33 - 136 U/L 86   ALT 7 - 45 U/L 17   AST 9 - 39 U/L 23   Bilirubin Total 0.0 - 1.2 mg/dL 0.5   Total Protein 6.4 - 8.2 g/dL 7.2   MAGNESIUM 1.60 - 2.40 mg/dL 1.37 (L)   Lactate 0.4 - 2.0 mmol/L 1.2   LIPASE 9 - 82 U/L 26   WBC 4.4 - 11.3 x10*3/uL 4.8   nRBC 0.0 - 0.0 /100 WBCs 0.0   RBC 4.00 - 5.20 x10*6/uL 4.51   HEMOGLOBIN 12.0 - 16.0 g/dL 13.3   HEMATOCRIT 36.0 - 46.0 % 39.9   MCV 80 - 100 fL 89   MCH 26.0 - 34.0 pg 29.5   MCHC 32.0 - 36.0 g/dL 33.3   RED CELL DISTRIBUTION WIDTH 11.5 - 14.5 % 13.4   Platelets 150 - 450 x10*3/uL 488 (H)     Assessment/Plan   Assessment & Plan  Failure to thrive in adult    Cachexia (Multi)    Hypomagnesemia    Chronic gastritis    Chronic idiopathic constipation    Dehydration    GERD (gastroesophageal reflux disease)    Hypokalemia    Hypothyroid    IBS (irritable bowel syndrome)    Nausea    Hyponatremia    Vitamin D deficiency      Diarrhea  Failure to thrive  Dehydration  - BMI 12.30  - 7-10 lb weight loss this past week  - no appetite, only drinking supplemental shakes at home  - continue IVF  - continue scopolamine patch, prn zofran for nausea  - lomotil prn  - diet as tolerated  - nutritional consult, appreciate recs  - daily weights   - supplements TID     Chronic gastritis  IBS (irritable bowel syndrome)  Chronic idiopathic constipation  GERD (gastroesophageal reflux disease)  - gastrojejunostomy for ulcer disease in 1992, STC for colonic inertia in 2016   - sees GI outpatient  - continue hyoscyamine, PPI, sucralfate  - GI consulted, appreciate recs    Recent UTI  Frequent UTI  - 10/3  LE, +bacteria  - on trimethoprim daily, continue  - UA, Ucx pending     Hypothyroid  - TSH 2.08 in July  - continue levothyroxine     Hypokalemia  Hypomagnesium  Hyponatremia  - K 3.8  - Na 130  - Mag 1.37  - repleted with IV, PO  -  trend BMP, replete as needed  - telemetry    Chronic insomnia  - continue trazodone    Vitamin D deficiency  - continue cholecalciferol    GI ppx: PPI  DVT ppx: enoxaparin  Fluids: prn  Electrolytes: replace as needed  Nutrition: reg  Adjuncts: PIV  Code Status: full  Pt requires inpatient stay at this time.    MIRTHA Ortega  Attending Attestation:    Patient was seen and examined face to face, history and physical was taken personally at bedside the APRN-CNP, was present for the whole duration of the exam who participated in the documentation of this note. I performed the medical decision-making components (assessment and plan of care). I have reviewed the documentation and verified the findings in the note as written with additions or exceptions as stated in the body of this note.   Elderly patient with history of gastric bypass and partial colon resection in the past, has suffered from constipation for a long time, recently was started on Linzess, since then she has been having good bowel movement and sometimes very frequent and in the last couple of weeks she has been having diarrhea with each p.o. intake, she has small stomach and she cannot eat normal portion, however she continues to have frequent diarrhea despite that she continue to take her Linzess.  She came with diarrhea and some significant weight loss she said 10 pounds weight loss in 1 week.  On exam cachectic looking female in no distress, no decrease in appetite, she takes her protein shake Ensure, and eating a small amount of food but she says she can eat more frequent, no diarrhea since admission to the hospital, exam is normal no abdominal pain.  No swelling in the lower extremity, I think the patient's weight loss and diarrhea most likely secondary to continuation of Linzess which we put on hold, patient's is concerned about constipation but can be treated with different medication even with Linzess with lower dose or less  frequently, we will check his stool for any pathogen patient never had colonoscopy after her colon resection years ago.  If diarrhea does not stop and patient does not start gaining weight consider colonoscopy.    Dr. Javier Donnelly MD  Internal Medicine

## 2024-10-19 NOTE — NURSING NOTE
Sasha Barba CNP notified that Dr. Carrasco is not available on the weekend, and it is okay to notify Dr. Carrasco on Monday per [facility].

## 2024-10-20 VITALS
HEIGHT: 60 IN | OXYGEN SATURATION: 97 % | BODY MASS INDEX: 14.72 KG/M2 | SYSTOLIC BLOOD PRESSURE: 118 MMHG | TEMPERATURE: 97.7 F | WEIGHT: 74.96 LBS | DIASTOLIC BLOOD PRESSURE: 56 MMHG | RESPIRATION RATE: 16 BRPM | HEART RATE: 60 BPM

## 2024-10-20 LAB
ANION GAP SERPL CALC-SCNC: 11 MMOL/L (ref 10–20)
BUN SERPL-MCNC: 14 MG/DL (ref 6–23)
CALCIUM SERPL-MCNC: 8.6 MG/DL (ref 8.6–10.3)
CHLORIDE SERPL-SCNC: 100 MMOL/L (ref 98–107)
CO2 SERPL-SCNC: 24 MMOL/L (ref 21–32)
CREAT SERPL-MCNC: 0.71 MG/DL (ref 0.5–1.05)
EGFRCR SERPLBLD CKD-EPI 2021: 87 ML/MIN/1.73M*2
ERYTHROCYTE [DISTWIDTH] IN BLOOD BY AUTOMATED COUNT: 13.2 % (ref 11.5–14.5)
GLUCOSE SERPL-MCNC: 83 MG/DL (ref 74–99)
HCT VFR BLD AUTO: 33.2 % (ref 36–46)
HGB BLD-MCNC: 11 G/DL (ref 12–16)
HOLD SPECIMEN: NORMAL
MAGNESIUM SERPL-MCNC: 1.58 MG/DL (ref 1.6–2.4)
MCH RBC QN AUTO: 29.5 PG (ref 26–34)
MCHC RBC AUTO-ENTMCNC: 33.1 G/DL (ref 32–36)
MCV RBC AUTO: 89 FL (ref 80–100)
NRBC BLD-RTO: 0 /100 WBCS (ref 0–0)
PLATELET # BLD AUTO: 386 X10*3/UL (ref 150–450)
POTASSIUM SERPL-SCNC: 3.8 MMOL/L (ref 3.5–5.3)
RBC # BLD AUTO: 3.73 X10*6/UL (ref 4–5.2)
SODIUM SERPL-SCNC: 131 MMOL/L (ref 136–145)
VIT B12 SERPL-MCNC: 407 PG/ML (ref 211–911)
WBC # BLD AUTO: 5.4 X10*3/UL (ref 4.4–11.3)

## 2024-10-20 PROCEDURE — 36415 COLL VENOUS BLD VENIPUNCTURE: CPT | Performed by: NURSE PRACTITIONER

## 2024-10-20 PROCEDURE — G0378 HOSPITAL OBSERVATION PER HR: HCPCS

## 2024-10-20 PROCEDURE — 2500000001 HC RX 250 WO HCPCS SELF ADMINISTERED DRUGS (ALT 637 FOR MEDICARE OP): Performed by: NURSE PRACTITIONER

## 2024-10-20 PROCEDURE — 82607 VITAMIN B-12: CPT | Mod: GENLAB | Performed by: NURSE PRACTITIONER

## 2024-10-20 PROCEDURE — 94760 N-INVAS EAR/PLS OXIMETRY 1: CPT | Mod: MUE

## 2024-10-20 PROCEDURE — 99232 SBSQ HOSP IP/OBS MODERATE 35: CPT | Performed by: NURSE PRACTITIONER

## 2024-10-20 PROCEDURE — 83735 ASSAY OF MAGNESIUM: CPT | Performed by: NURSE PRACTITIONER

## 2024-10-20 PROCEDURE — 83516 IMMUNOASSAY NONANTIBODY: CPT | Mod: GENLAB | Performed by: NURSE PRACTITIONER

## 2024-10-20 PROCEDURE — 96372 THER/PROPH/DIAG INJ SC/IM: CPT | Performed by: NURSE PRACTITIONER

## 2024-10-20 PROCEDURE — 96376 TX/PRO/DX INJ SAME DRUG ADON: CPT

## 2024-10-20 PROCEDURE — 2500000005 HC RX 250 GENERAL PHARMACY W/O HCPCS: Performed by: NURSE PRACTITIONER

## 2024-10-20 PROCEDURE — 80048 BASIC METABOLIC PNL TOTAL CA: CPT | Performed by: NURSE PRACTITIONER

## 2024-10-20 PROCEDURE — 2500000002 HC RX 250 W HCPCS SELF ADMINISTERED DRUGS (ALT 637 FOR MEDICARE OP, ALT 636 FOR OP/ED): Mod: MUE | Performed by: NURSE PRACTITIONER

## 2024-10-20 PROCEDURE — 96366 THER/PROPH/DIAG IV INF ADDON: CPT

## 2024-10-20 PROCEDURE — 85027 COMPLETE CBC AUTOMATED: CPT | Performed by: NURSE PRACTITIONER

## 2024-10-20 PROCEDURE — 2500000004 HC RX 250 GENERAL PHARMACY W/ HCPCS (ALT 636 FOR OP/ED): Performed by: NURSE PRACTITIONER

## 2024-10-20 PROCEDURE — 2500000001 HC RX 250 WO HCPCS SELF ADMINISTERED DRUGS (ALT 637 FOR MEDICARE OP): Performed by: STUDENT IN AN ORGANIZED HEALTH CARE EDUCATION/TRAINING PROGRAM

## 2024-10-20 RX ORDER — TRAMADOL HYDROCHLORIDE 50 MG/1
50 TABLET ORAL EVERY 12 HOURS PRN
Status: DISCONTINUED | OUTPATIENT
Start: 2024-10-20 | End: 2024-10-23 | Stop reason: HOSPADM

## 2024-10-20 RX ORDER — CHOLECALCIFEROL (VITAMIN D3) 25 MCG
2000 TABLET ORAL DAILY
Status: DISCONTINUED | OUTPATIENT
Start: 2024-10-20 | End: 2024-10-23 | Stop reason: HOSPADM

## 2024-10-20 RX ORDER — LORAZEPAM 0.5 MG/1
0.5 TABLET ORAL EVERY 8 HOURS PRN
Status: DISCONTINUED | OUTPATIENT
Start: 2024-10-20 | End: 2024-10-23 | Stop reason: HOSPADM

## 2024-10-20 RX ORDER — MAGNESIUM SULFATE HEPTAHYDRATE 40 MG/ML
2 INJECTION, SOLUTION INTRAVENOUS ONCE
Status: COMPLETED | OUTPATIENT
Start: 2024-10-20 | End: 2024-10-20

## 2024-10-20 ASSESSMENT — COGNITIVE AND FUNCTIONAL STATUS - GENERAL
TOILETING: A LITTLE
CLIMB 3 TO 5 STEPS WITH RAILING: A LITTLE
MOBILITY SCORE: 18
MOVING FROM LYING ON BACK TO SITTING ON SIDE OF FLAT BED WITH BEDRAILS: A LITTLE
HELP NEEDED FOR BATHING: A LITTLE
DRESSING REGULAR UPPER BODY CLOTHING: A LITTLE
TURNING FROM BACK TO SIDE WHILE IN FLAT BAD: A LITTLE
WALKING IN HOSPITAL ROOM: A LITTLE
EATING MEALS: A LITTLE
PERSONAL GROOMING: A LITTLE
STANDING UP FROM CHAIR USING ARMS: A LITTLE
MOVING TO AND FROM BED TO CHAIR: A LITTLE
DAILY ACTIVITIY SCORE: 18
DRESSING REGULAR LOWER BODY CLOTHING: A LITTLE

## 2024-10-20 ASSESSMENT — PAIN SCALES - GENERAL
PAINLEVEL_OUTOF10: 0 - NO PAIN
PAINLEVEL_OUTOF10: 0 - NO PAIN
PAINLEVEL_OUTOF10: 8

## 2024-10-20 ASSESSMENT — PAIN - FUNCTIONAL ASSESSMENT: PAIN_FUNCTIONAL_ASSESSMENT: 0-10

## 2024-10-20 NOTE — CARE PLAN
Problem: Pain - Adult  Goal: Verbalizes/displays adequate comfort level or baseline comfort level  Outcome: Progressing     Problem: Safety - Adult  Goal: Free from fall injury  Outcome: Progressing     Problem: Discharge Planning  Goal: Discharge to home or other facility with appropriate resources  Outcome: Progressing     Problem: Chronic Conditions and Co-morbidities  Goal: Patient's chronic conditions and co-morbidity symptoms are monitored and maintained or improved  Outcome: Progressing     Problem: Pain  Goal: Takes deep breaths with improved pain control throughout the shift  Outcome: Progressing  Goal: Turns in bed with improved pain control throughout the shift  Outcome: Progressing  Goal: Walks with improved pain control throughout the shift  Outcome: Progressing  Goal: Performs ADL's with improved pain control throughout shift  Outcome: Progressing  Goal: Participates in PT with improved pain control throughout the shift  Outcome: Progressing  Goal: Free from opioid side effects throughout the shift  Outcome: Progressing  Goal: Free from acute confusion related to pain meds throughout the shift  Outcome: Progressing     Problem: Nutrition  Goal: Less than 5 days NPO/clear liquids  Outcome: Progressing  Goal: Oral intake greater than 50%  Outcome: Progressing  Goal: Oral intake greater 75%  Outcome: Progressing  Goal: Consume prescribed supplement  Outcome: Progressing  Goal: Adequate PO fluid intake  Outcome: Progressing  Goal: Nutrition support goals are met within 48 hrs  Outcome: Progressing  Goal: Nutrition support is meeting 75% of nutrient needs  Outcome: Progressing  Goal: Tube feed tolerance  Outcome: Progressing  Goal: BG  mg/dL  Outcome: Progressing  Goal: Lab values WNL  Outcome: Progressing  Goal: Electrolytes WNL  Outcome: Progressing  Goal: Promote healing  Outcome: Progressing  Goal: Maintain stable weight  Outcome: Progressing  Goal: Reduce weight from edema/fluid  Outcome:  Progressing  Goal: Gradual weight gain  Outcome: Progressing  Goal: Improve ostomy output  Outcome: Progressing   The patient's goals for the shift include  be able to eat     The clinical goals for the shift include pt will tolerate meals and medications   Pt has tolerated meals and medications. She has ate about 50% for meals and drank almost all of her ensure. Vital signs have been stable. No fall or injury throughout the shift. Pt has had multiple loose bowel movements throughout the shift. Pt has been discontinue off of IV fluids at this time. Pt also has been teary eyed about the thought of being discharged home. She doesn't think she will be ready and able to be discharged anytime soon. Did comfort her and reassure her that she wouldn't be discharged until medically cleared and we think is fit.

## 2024-10-20 NOTE — PROGRESS NOTES
Latha Quispe is a 79 y.o. female on day 0 of admission presenting with Failure to thrive in adult.      Subjective   Patient assessed at bedside; sitting up in bed. She reports when she takes linzess she has a lot of diarrhea. She was afraid to stop taking it d/t the fear of constipation. She denies pain, fever, chills.       Objective     Last Recorded Vitals  /58 (BP Location: Right arm, Patient Position: Lying)   Pulse 70   Temp 36.6 °C (97.9 °F) (Temporal)   Resp 16   Wt (!) 34 kg (74 lb 15.3 oz)   SpO2 97%   Intake/Output last 3 Shifts:    Intake/Output Summary (Last 24 hours) at 10/20/2024 1136  Last data filed at 10/20/2024 0340  Gross per 24 hour   Intake 2060.41 ml   Output 925 ml   Net 1135.41 ml       Admission Weight  Weight: (!) 28.6 kg (63 lb) (10/19/24 0913)    Daily Weight  10/20/24 : (!) 34 kg (74 lb 15.3 oz)    Image Results      Physical Exam  Vitals reviewed.   Constitutional:       Appearance: She is ill-appearing.      Comments: cachexia   HENT:      Head: Normocephalic and atraumatic.      Right Ear: External ear normal.      Left Ear: External ear normal.      Nose: Nose normal.      Mouth/Throat:      Mouth: Mucous membranes are moist.      Pharynx: Oropharynx is clear.   Eyes:      Conjunctiva/sclera: Conjunctivae normal.      Pupils: Pupils are equal, round, and reactive to light.   Cardiovascular:      Rate and Rhythm: Normal rate and regular rhythm.      Pulses: Normal pulses.      Heart sounds: Normal heart sounds.   Pulmonary:      Effort: Pulmonary effort is normal.      Breath sounds: Rales present.   Abdominal:      General: Bowel sounds are normal.      Palpations: Abdomen is soft.   Musculoskeletal:         General: Normal range of motion.      Cervical back: Normal range of motion and neck supple.   Skin:     General: Skin is dry.   Neurological:      General: No focal deficit present.      Mental Status: She is alert and oriented to person, place, and time.    Psychiatric:         Mood and Affect: Mood normal.         Behavior: Behavior normal.         Relevant Results    Scheduled medications  heparin (porcine), 5,000 Units, subcutaneous, q8h BRIANNA  hyoscyamine, 0.25 mg, sublingual, Before meals & nightly  levothyroxine, 50 mcg, oral, Daily  magnesium sulfate, 2 g, intravenous, Once  pantoprazole, 40 mg, oral, Daily before breakfast   Or  pantoprazole, 40 mg, intravenous, Daily before breakfast  potassium chloride CR, 10 mEq, oral, BID  sucralfate, 1 g, oral, 4x daily  traZODone, 150 mg, oral, Nightly  trimethoprim, 100 mg, oral, Daily      Continuous medications  sodium chloride 0.9%, 100 mL/hr, Last Rate: Stopped (10/20/24 1000)      PRN medications  PRN medications: acetaminophen **OR** [DISCONTINUED] acetaminophen **OR** [DISCONTINUED] acetaminophen, acetaminophen **OR** [DISCONTINUED] acetaminophen **OR** [DISCONTINUED] acetaminophen, calcium carbonate, diphenoxylate-atropine, melatonin, ondansetron **OR** ondansetron, promethazine, traMADol    Results for orders placed or performed during the hospital encounter of 10/19/24 (from the past 24 hours)   Urinalysis with Reflex Culture and Microscopic   Result Value Ref Range    Color, Urine Light-Yellow Light-Yellow, Yellow, Dark-Yellow    Appearance, Urine Turbid (N) Clear    Specific Gravity, Urine 1.009 1.005 - 1.035    pH, Urine 5.5 5.0, 5.5, 6.0, 6.5, 7.0, 7.5, 8.0    Protein, Urine NEGATIVE NEGATIVE, 10 (TRACE), 20 (TRACE) mg/dL    Glucose, Urine Normal Normal mg/dL    Blood, Urine 0.06 (1+) (A) NEGATIVE    Ketones, Urine NEGATIVE NEGATIVE mg/dL    Bilirubin, Urine NEGATIVE NEGATIVE    Urobilinogen, Urine Normal Normal mg/dL    Nitrite, Urine NEGATIVE NEGATIVE    Leukocyte Esterase, Urine NEGATIVE NEGATIVE   Extra Urine Gray Tube   Result Value Ref Range    Extra Tube Hold for add-ons.    Urinalysis Microscopic   Result Value Ref Range    WBC, Urine 11-20 (A) 1-5, NONE /HPF    RBC, Urine 1-2 NONE, 1-2, 3-5 /HPF     Squamous Epithelial Cells, Urine 1-9 (SPARSE) Reference range not established. /HPF    Bacteria, Urine 1+ (A) NONE SEEN /HPF    Mucus, Urine FEW Reference range not established. /LPF   Basic metabolic panel   Result Value Ref Range    Glucose 83 74 - 99 mg/dL    Sodium 131 (L) 136 - 145 mmol/L    Potassium 3.8 3.5 - 5.3 mmol/L    Chloride 100 98 - 107 mmol/L    Bicarbonate 24 21 - 32 mmol/L    Anion Gap 11 10 - 20 mmol/L    Urea Nitrogen 14 6 - 23 mg/dL    Creatinine 0.71 0.50 - 1.05 mg/dL    eGFR 87 >60 mL/min/1.73m*2    Calcium 8.6 8.6 - 10.3 mg/dL   CBC   Result Value Ref Range    WBC 5.4 4.4 - 11.3 x10*3/uL    nRBC 0.0 0.0 - 0.0 /100 WBCs    RBC 3.73 (L) 4.00 - 5.20 x10*6/uL    Hemoglobin 11.0 (L) 12.0 - 16.0 g/dL    Hematocrit 33.2 (L) 36.0 - 46.0 %    MCV 89 80 - 100 fL    MCH 29.5 26.0 - 34.0 pg    MCHC 33.1 32.0 - 36.0 g/dL    RDW 13.2 11.5 - 14.5 %    Platelets 386 150 - 450 x10*3/uL   Magnesium   Result Value Ref Range    Magnesium 1.58 (L) 1.60 - 2.40 mg/dL                   Assessment/Plan      Assessment & Plan  Failure to thrive in adult    Cachexia (Multi)    Hypomagnesemia    Chronic gastritis    Chronic idiopathic constipation    Dehydration    GERD (gastroesophageal reflux disease)    Hypokalemia    Hypothyroid    IBS (irritable bowel syndrome)    Nausea    Hyponatremia    Vitamin D deficiency    Diarrhea due to medication  Failure to thrive  Dehydration  Severe protein calorie malnutrition  S/p gastric bypass and colectomy  - BMI 12.30  - 7-10 lb weight loss this past week  - no appetite, only drinking supplemental shakes at home  - continue protein shakes  - continue IVF will stop after bag in completed  - discontinue scopolamine patch  - continue zofran 4 mg q6h prn for nausea  - continue lomotil  2.5-0.025 mg QID prn  - diet as tolerated  - nutritional consult, appreciate recs  - daily weights   - supplements TID  - hold linzess  - stool for C. Diff: pending  - stool for pathogens: pending      Chronic gastritis  IBS (irritable bowel syndrome)  Chronic idiopathic constipation  GERD (gastroesophageal reflux disease)  - gastrojejunostomy for ulcer disease in 1992, STC for colonic inertia in 2016   - sees GI outpatient  - continue hyoscyamine 0.25 mg QID, pantoprazole 40 mg daily, sucralfate 1 g QID  - GI consulted, appreciate recs  - hold linzess     Recent UTI  Frequent UTI  - 10/3  LE, +bacteria  - 10/9 urine culture grew citrobacter freundii complex  - completed Bactrim DS  - continue trimethoprim 100 mg daily  - UA, Ucx pending     Hypothyroid  - TSH 2.08 in July  - continue levothyroxine 50 mcg daily     Hypokalemia  Hypomagnesium  Hyponatremia  - K 3.8  - Na 130 > 131  - Mag 1.37 > 1.58  - repleted with IV, PO  - trend BMP, replete as needed  - telemetry     Chronic insomnia  - continue trazodone 150 mg hs     Vitamin D deficiency  - continue cholecalciferol 2000 units daily     GI ppx:   - continue pantoprazole 40 mg daily    DVT ppx:   - continue enoxaparin 40 mg daily     Code Status: full    Disposition: Pt requires inpatient stay at this time.      Malnutrition Diagnosis Status: New     As Evidenced by: severe muscle loss (temporal depression, pectoralis major, deltoids, interosseous, gastrocnemius) and severe subcutaneous fat loss (orbital, perioral, below triceps); 14% weight loss in one month, poor nutritional intake meeting less than 75% of estimated needs for greater than one month.  I agree with the dietitian's malnutrition diagnosis.         Deja Yi, APRN-CNP

## 2024-10-21 LAB
ANION GAP SERPL CALC-SCNC: 8 MMOL/L (ref 10–20)
BUN SERPL-MCNC: 12 MG/DL (ref 6–23)
CALCIUM SERPL-MCNC: 8.9 MG/DL (ref 8.6–10.3)
CHLORIDE SERPL-SCNC: 100 MMOL/L (ref 98–107)
CO2 SERPL-SCNC: 28 MMOL/L (ref 21–32)
CREAT SERPL-MCNC: 0.69 MG/DL (ref 0.5–1.05)
EGFRCR SERPLBLD CKD-EPI 2021: 89 ML/MIN/1.73M*2
ERYTHROCYTE [DISTWIDTH] IN BLOOD BY AUTOMATED COUNT: 13.6 % (ref 11.5–14.5)
GLUCOSE SERPL-MCNC: 97 MG/DL (ref 74–99)
HCT VFR BLD AUTO: 32.8 % (ref 36–46)
HGB BLD-MCNC: 10.7 G/DL (ref 12–16)
HOLD SPECIMEN: NORMAL
MAGNESIUM SERPL-MCNC: 1.41 MG/DL (ref 1.6–2.4)
MCH RBC QN AUTO: 29.3 PG (ref 26–34)
MCHC RBC AUTO-ENTMCNC: 32.6 G/DL (ref 32–36)
MCV RBC AUTO: 90 FL (ref 80–100)
NRBC BLD-RTO: 0 /100 WBCS (ref 0–0)
PLATELET # BLD AUTO: 363 X10*3/UL (ref 150–450)
POTASSIUM SERPL-SCNC: 3.2 MMOL/L (ref 3.5–5.3)
RBC # BLD AUTO: 3.65 X10*6/UL (ref 4–5.2)
SODIUM SERPL-SCNC: 133 MMOL/L (ref 136–145)
TTG IGA SER IA-ACNC: <1 U/ML
WBC # BLD AUTO: 4.3 X10*3/UL (ref 4.4–11.3)

## 2024-10-21 PROCEDURE — 2500000005 HC RX 250 GENERAL PHARMACY W/O HCPCS: Performed by: NURSE PRACTITIONER

## 2024-10-21 PROCEDURE — 2500000001 HC RX 250 WO HCPCS SELF ADMINISTERED DRUGS (ALT 637 FOR MEDICARE OP): Performed by: NURSE PRACTITIONER

## 2024-10-21 PROCEDURE — 94760 N-INVAS EAR/PLS OXIMETRY 1: CPT | Mod: IPSPLIT

## 2024-10-21 PROCEDURE — 1200000002 HC GENERAL ROOM WITH TELEMETRY DAILY: Mod: IPSPLIT

## 2024-10-21 PROCEDURE — 2500000004 HC RX 250 GENERAL PHARMACY W/ HCPCS (ALT 636 FOR OP/ED): Mod: IPSPLIT | Performed by: NURSE PRACTITIONER

## 2024-10-21 PROCEDURE — 2500000001 HC RX 250 WO HCPCS SELF ADMINISTERED DRUGS (ALT 637 FOR MEDICARE OP): Performed by: STUDENT IN AN ORGANIZED HEALTH CARE EDUCATION/TRAINING PROGRAM

## 2024-10-21 PROCEDURE — 2500000002 HC RX 250 W HCPCS SELF ADMINISTERED DRUGS (ALT 637 FOR MEDICARE OP, ALT 636 FOR OP/ED): Mod: IPSPLIT | Performed by: NURSE PRACTITIONER

## 2024-10-21 PROCEDURE — 99232 SBSQ HOSP IP/OBS MODERATE 35: CPT | Performed by: NURSE PRACTITIONER

## 2024-10-21 PROCEDURE — 96372 THER/PROPH/DIAG INJ SC/IM: CPT | Performed by: NURSE PRACTITIONER

## 2024-10-21 PROCEDURE — 85027 COMPLETE CBC AUTOMATED: CPT | Performed by: NURSE PRACTITIONER

## 2024-10-21 PROCEDURE — 96376 TX/PRO/DX INJ SAME DRUG ADON: CPT

## 2024-10-21 PROCEDURE — 80048 BASIC METABOLIC PNL TOTAL CA: CPT | Performed by: NURSE PRACTITIONER

## 2024-10-21 PROCEDURE — 2500000002 HC RX 250 W HCPCS SELF ADMINISTERED DRUGS (ALT 637 FOR MEDICARE OP, ALT 636 FOR OP/ED): Mod: MUE | Performed by: NURSE PRACTITIONER

## 2024-10-21 PROCEDURE — 96366 THER/PROPH/DIAG IV INF ADDON: CPT

## 2024-10-21 PROCEDURE — 36415 COLL VENOUS BLD VENIPUNCTURE: CPT | Performed by: NURSE PRACTITIONER

## 2024-10-21 PROCEDURE — 83735 ASSAY OF MAGNESIUM: CPT | Performed by: NURSE PRACTITIONER

## 2024-10-21 RX ORDER — CEFEPIME HYDROCHLORIDE 2 G/50ML
2 INJECTION, SOLUTION INTRAVENOUS EVERY 24 HOURS
Status: DISCONTINUED | OUTPATIENT
Start: 2024-10-21 | End: 2024-10-22

## 2024-10-21 RX ORDER — MAGNESIUM SULFATE HEPTAHYDRATE 40 MG/ML
4 INJECTION, SOLUTION INTRAVENOUS ONCE
Status: COMPLETED | OUTPATIENT
Start: 2024-10-21 | End: 2024-10-21

## 2024-10-21 RX ORDER — POTASSIUM CHLORIDE 20 MEQ/1
40 TABLET, EXTENDED RELEASE ORAL ONCE
Status: COMPLETED | OUTPATIENT
Start: 2024-10-21 | End: 2024-10-21

## 2024-10-21 ASSESSMENT — COGNITIVE AND FUNCTIONAL STATUS - GENERAL
CLIMB 3 TO 5 STEPS WITH RAILING: A LITTLE
MOBILITY SCORE: 24
DAILY ACTIVITIY SCORE: 24
DAILY ACTIVITIY SCORE: 24
MOBILITY SCORE: 22
WALKING IN HOSPITAL ROOM: A LITTLE

## 2024-10-21 ASSESSMENT — PAIN SCALES - GENERAL
PAINLEVEL_OUTOF10: 0 - NO PAIN

## 2024-10-21 ASSESSMENT — ENCOUNTER SYMPTOMS
NAUSEA: 1
ABDOMINAL PAIN: 1
UNEXPECTED WEIGHT CHANGE: 1
DIARRHEA: 1
ROS GI COMMENTS: SEE HPI

## 2024-10-21 ASSESSMENT — ACTIVITIES OF DAILY LIVING (ADL): LACK_OF_TRANSPORTATION: NO

## 2024-10-21 ASSESSMENT — PAIN - FUNCTIONAL ASSESSMENT
PAIN_FUNCTIONAL_ASSESSMENT: 0-10

## 2024-10-21 NOTE — CONSULTS
Inpatient consult to gastroenterology  Consult performed by: MIRTHA Montana  Consult ordered by: MIRTHA Ortega  Reason for consult: nausea        History Of Present Illness  Latha Quispe is a 79 y.o. female who presented to [facility] ED with a chief complaint of diarrhea and weight loss. She was having diarrhea immediately after meals, and she had a 10 pound weight loss in 1 week, so she presented to the ED. CT abdomen/pelvis was limited due to body habitus and lack of IV contrast, showed possible proctitis. She was admitted for FTT.  Her diarrhea has slowed down, last occurred this am. She denies BRBPR, melena, hematochezia.  She is feeling somewhat better today.    She has a history of gastric bypass surgery for ulcer history.     She is on Linzess daily for IBS-C/CIC. This was held on admission. At last appointment with me on 8/26/24 she did not want to lower her Linzess dose and was having multiple bms daily with less abdominal pain.      Past Medical History:   Diagnosis Date    Abdominal pain     Anxiety     Cat bite 03/11/2023    Cataract     Depression     Disease of thyroid gland     GERD (gastroesophageal reflux disease)     Hypothyroidism     Irritable bowel syndrome     Panic attacks     Personal history of malignant neoplasm of breast 06/30/2021    Personal history of other complications of pregnancy, childbirth and the puerperium     miscarriage       Past Surgical History:   Procedure Laterality Date    BREAST SURGERY      CATARACT EXTRACTION      COLECTOMY  1992    COLON SURGERY      CT ABDOMEN ANGIOGRAM W AND/OR WO IV CONTRAST  12/28/2023    GASTRIC BYPASS      HYSTERECTOMY      MASTECTOMY, PARTIAL Left     OTHER SURGICAL HISTORY  06/30/2021    Varicose vein ligation        Social history  She reports that she has never smoked. She has never been exposed to tobacco smoke. She has never used smokeless tobacco. She reports that she does not drink alcohol and does not use  drugs.    Family History   Problem Relation Name Age of Onset    Osteoporosis Mother      Alcohol abuse Father      Cancer Sister          breast    Osteoporosis Sister      Other (bladder cancer) Sister      Cancer Brother          colon. prostate    Colon cancer Brother      Colon cancer Brother      Hypothyroidism Other          Allergies  Diflucan [fluconazole] and Nsaids (non-steroidal anti-inflammatory drug)    Review of Systems   Constitutional:  Positive for unexpected weight change.   Gastrointestinal:  Positive for abdominal pain, diarrhea and nausea.        See HPI   All other systems reviewed and are negative.      Last Recorded Vitals  Blood pressure 124/63, pulse 58, temperature 37.2 °C (98.9 °F), temperature source Temporal, resp. rate 16, height 1.524 m (5'), weight (!) 35.3 kg (77 lb 13.2 oz), SpO2 97%.    Physical Exam  Constitutional: cachectic, NAD. Alert and cooperative  Skin: no jaundice   Eyes: anicteric, normal conjunctiva  ENT: MMM  Pulmonary: easy and nonlabored on RA  Abdomen: thin, soft, NT, ND. No ascites.  MSK: MAEx4  Extremities: no edema  Neuro: aaox3. No asterixis.   Psych: appropriate mood and behavior    Intake/Output last 3 Shifts:  I/O last 3 completed shifts:  In: 2033.8 (57.6 mL/kg) [P.O.:120; I.V.:1913.8 (54.2 mL/kg)]  Out: 950 (26.9 mL/kg) [Urine:950 (0.7 mL/kg/hr)]  Weight: 35.3 kg      Current medications during hospitalization  Scheduled medications  cefepime, 2 g, intravenous, q24h  cholecalciferol, 2,000 Units, oral, Daily  heparin (porcine), 5,000 Units, subcutaneous, q8h BRIANNA  hyoscyamine, 0.25 mg, sublingual, Before meals & nightly  levothyroxine, 50 mcg, oral, Daily  magnesium sulfate, 4 g, intravenous, Once  pantoprazole, 40 mg, oral, Daily before breakfast   Or  pantoprazole, 40 mg, intravenous, Daily before breakfast  potassium chloride CR, 10 mEq, oral, BID  sucralfate, 1 g, oral, 4x daily  traZODone, 150 mg, oral, Nightly  trimethoprim, 100 mg, oral,  Daily      Continuous medications     PRN medications  PRN medications: acetaminophen **OR** [DISCONTINUED] acetaminophen **OR** [DISCONTINUED] acetaminophen, acetaminophen **OR** [DISCONTINUED] acetaminophen **OR** [DISCONTINUED] acetaminophen, calcium carbonate, diphenoxylate-atropine, LORazepam, melatonin, ondansetron **OR** ondansetron, promethazine, traMADol    Relevant Results  BMP:  Lab Results   Component Value Date     (L) 10/21/2024     (L) 10/20/2024     (L) 10/19/2024    K 3.2 (L) 10/21/2024    K 3.8 10/20/2024    K 3.8 10/19/2024     10/21/2024     10/20/2024    CL 95 (L) 10/19/2024    CO2 28 10/21/2024    CO2 24 10/20/2024    CO2 25 10/19/2024    BUN 12 10/21/2024    BUN 14 10/20/2024    BUN 17 10/19/2024    CREATININE 0.69 10/21/2024    CREATININE 0.71 10/20/2024    CREATININE 0.94 10/19/2024       CBC:  Lab Results   Component Value Date    WBC 4.3 (L) 10/21/2024    WBC 5.4 10/20/2024    WBC 4.8 10/19/2024    RBC 3.65 (L) 10/21/2024    RBC 3.73 (L) 10/20/2024    RBC 4.51 10/19/2024    HGB 10.7 (L) 10/21/2024    HGB 11.0 (L) 10/20/2024    HGB 13.3 10/19/2024    HCT 32.8 (L) 10/21/2024    HCT 33.2 (L) 10/20/2024    HCT 39.9 10/19/2024    MCV 90 10/21/2024    MCV 89 10/20/2024    MCV 89 10/19/2024    MCH 29.3 10/21/2024    MCH 29.5 10/20/2024    MCH 29.5 10/19/2024    MCHC 32.6 10/21/2024    MCHC 33.1 10/20/2024    MCHC 33.3 10/19/2024    RDW 13.6 10/21/2024    RDW 13.2 10/20/2024    RDW 13.4 10/19/2024     10/21/2024     10/20/2024     (H) 10/19/2024       Lactate Level:  Lab Results   Component Value Date    LACTATE 1.2 10/19/2024    LACTATE 1.8 07/19/2024    LACTATE 1.2 05/05/2024       CRP/ESR Level:  Lab Results   Component Value Date    CRP <0.10 05/05/2024       Hepatic Function Panel:  Lab Results   Component Value Date    ALKPHOS 86 10/19/2024    ALKPHOS 83 09/06/2024    ALKPHOS 96 07/19/2024    ALT 17 10/19/2024    ALT 14 09/06/2024    ALT 14  "07/19/2024    AST 23 10/19/2024    AST 23 09/06/2024    AST 25 07/19/2024    PROT 7.2 10/19/2024    PROT 6.4 09/06/2024    PROT 6.6 07/19/2024    BILITOT 0.5 10/19/2024    BILITOT 0.5 09/06/2024    BILITOT 0.4 07/19/2024       Magnesium:  Lab Results   Component Value Date    MG 1.41 (L) 10/21/2024       INR:  Lab Results   Component Value Date    PROTIME 13.0 (H) 05/05/2024    PROTIME 13.7 (H) 04/26/2021    INR 1.2 (H) 05/05/2024    INR 1.2 (H) 04/26/2021       TSH/T4:  No results found for: \"TSH\"    Lipid Profile:  No results found for: \"CHLPL\", \"TRIG\", \"HDL\", \"LDLCALC\", \"LDLDIRECT\"    Amylase/Lipase:  Lab Results   Component Value Date    AMYLASE 34 09/06/2024    LIPASE 26 10/19/2024    LIPASE 10 09/06/2024    LIPASE 14 07/19/2024       Calcium Level  Lab Results   Component Value Date    CALCIUM 8.9 10/21/2024    CALCIUM 8.6 10/20/2024    CALCIUM 9.6 10/19/2024        Radiology:   CT abdomen pelvis wo IV contrast   Final Result   1. Limited exam due to lack of intravenous contrast and lack of   intra-abdominal fat.   2. Status post gastric bypass and colonic surgery at the level of the   sigmoid colon and rectum with possible thickening at the level of the   rectum, which may be related to proctitis.   3. Cholelithiasis.   4. Small amount of periportal edema, which has not significantly   changed.   5. Status post hysterectomy.   6. New 1.5 cm irregular density in the right lung base. Since the   prior exam was only 6 weeks ago, this may be related to an   infiltrate. Follow-up is suggested. If this density persists further   evaluation with CT of the chest should be considered.   7. Emphysematous changes in the lung bases.   8. Redemonstration of punctate nonobstructing left renal stone        MACRO:             None        Signed by: Tami Greenfield 10/19/2024 11:34 AM   Dictation workstation:   [id number]        Last EGD 05/05/24 with Dr. Courtney- Findings  The upper third of the esophagus, middle third of the " esophagus and lower third of the esophagus appeared normal.  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  The afferent limb was difficult to visualize.    Last Colonoscopy 12/31/2020 with Dr. Guzmán (modified due to altered anatomy) which noted friable anastomosis but otherwise WNL. 5 year follow up recommended.     ASSESSMENT/RECS  79 y.o. female on day 0 of admission presenting with Failure to thrive in adult. GI consulted for nausea, anorexia, diarrhea, unintentional weight loss.  Her Linzess is on hold.      Recommend restarting Linzess at lower dose 72 mcg every 3 days once diarrhea has resolved  Monitor daily weights, calorie counts  Await stool studies  Monitor CBC and BMP daily  Discussed pt's weight toss and possible need for PEG/enteric feeding if she does not gain weight in the future.   Patient has a scheduled follow-up with me next week    GEO Montana-CNP

## 2024-10-21 NOTE — PROGRESS NOTES
Latha Quispe is a 79 y.o. female on day 0 of admission presenting with Failure to thrive in adult.      Subjective   Patient assessed at bedside; sitting up at the side of the bed. She is feeling better; diarrhea has stopped. POC discussed; questions answered. She denies pain, fever, chills, N/V       Objective     Last Recorded Vitals  /63 (BP Location: Right arm, Patient Position: Lying)   Pulse 58   Temp 37.2 °C (98.9 °F) (Temporal)   Resp 16   Wt (!) 35.3 kg (77 lb 13.2 oz)   SpO2 97%   Intake/Output last 3 Shifts:    Intake/Output Summary (Last 24 hours) at 10/21/2024 1144  Last data filed at 10/21/2024 0921  Gross per 24 hour   Intake 1273.75 ml   Output 950 ml   Net 323.75 ml       Admission Weight  Weight: (!) 28.6 kg (63 lb) (10/19/24 0913)    Daily Weight  10/21/24 : (!) 35.3 kg (77 lb 13.2 oz)    Image Results      Physical Exam  Vitals reviewed.   Constitutional:       Appearance: She is ill-appearing.      Comments: Cachexia, severely under weight   HENT:      Head: Normocephalic and atraumatic.      Right Ear: External ear normal.      Left Ear: External ear normal.      Nose: Nose normal.      Mouth/Throat:      Mouth: Mucous membranes are moist.      Pharynx: Oropharynx is clear.   Eyes:      Conjunctiva/sclera: Conjunctivae normal.      Pupils: Pupils are equal, round, and reactive to light.   Cardiovascular:      Rate and Rhythm: Normal rate and regular rhythm.      Pulses: Normal pulses.      Heart sounds: Normal heart sounds.   Pulmonary:      Effort: Pulmonary effort is normal.      Breath sounds: Normal breath sounds.      Comments: Kyphotic back  Abdominal:      General: Bowel sounds are normal.      Palpations: Abdomen is soft.   Musculoskeletal:         General: Normal range of motion.      Cervical back: Normal range of motion and neck supple.   Skin:     General: Skin is warm and dry.   Neurological:      General: No focal deficit present.      Mental Status: She is alert and  oriented to person, place, and time.   Psychiatric:         Mood and Affect: Mood normal.         Behavior: Behavior normal.         Relevant Results    Scheduled medications  cholecalciferol, 2,000 Units, oral, Daily  heparin (porcine), 5,000 Units, subcutaneous, q8h BRIANNA  hyoscyamine, 0.25 mg, sublingual, Before meals & nightly  levothyroxine, 50 mcg, oral, Daily  magnesium sulfate, 4 g, intravenous, Once  pantoprazole, 40 mg, oral, Daily before breakfast   Or  pantoprazole, 40 mg, intravenous, Daily before breakfast  potassium chloride CR, 10 mEq, oral, BID  sucralfate, 1 g, oral, 4x daily  traZODone, 150 mg, oral, Nightly  trimethoprim, 100 mg, oral, Daily      Continuous medications     PRN medications  PRN medications: acetaminophen **OR** [DISCONTINUED] acetaminophen **OR** [DISCONTINUED] acetaminophen, acetaminophen **OR** [DISCONTINUED] acetaminophen **OR** [DISCONTINUED] acetaminophen, calcium carbonate, diphenoxylate-atropine, LORazepam, melatonin, ondansetron **OR** ondansetron, promethazine, traMADol    Results for orders placed or performed during the hospital encounter of 10/19/24 (from the past 24 hours)   Basic metabolic panel   Result Value Ref Range    Glucose 97 74 - 99 mg/dL    Sodium 133 (L) 136 - 145 mmol/L    Potassium 3.2 (L) 3.5 - 5.3 mmol/L    Chloride 100 98 - 107 mmol/L    Bicarbonate 28 21 - 32 mmol/L    Anion Gap 8 (L) 10 - 20 mmol/L    Urea Nitrogen 12 6 - 23 mg/dL    Creatinine 0.69 0.50 - 1.05 mg/dL    eGFR 89 >60 mL/min/1.73m*2    Calcium 8.9 8.6 - 10.3 mg/dL   CBC   Result Value Ref Range    WBC 4.3 (L) 4.4 - 11.3 x10*3/uL    nRBC 0.0 0.0 - 0.0 /100 WBCs    RBC 3.65 (L) 4.00 - 5.20 x10*6/uL    Hemoglobin 10.7 (L) 12.0 - 16.0 g/dL    Hematocrit 32.8 (L) 36.0 - 46.0 %    MCV 90 80 - 100 fL    MCH 29.3 26.0 - 34.0 pg    MCHC 32.6 32.0 - 36.0 g/dL    RDW 13.6 11.5 - 14.5 %    Platelets 363 150 - 450 x10*3/uL   SST TOP   Result Value Ref Range    Extra Tube Hold for add-ons.     Magnesium   Result Value Ref Range    Magnesium 1.41 (L) 1.60 - 2.40 mg/dL                   Assessment/Plan      Assessment & Plan  Failure to thrive in adult    Cachexia (Multi)    Hypomagnesemia    Chronic gastritis    Chronic idiopathic constipation    Dehydration    GERD (gastroesophageal reflux disease)    Hypokalemia    Hypothyroid    IBS (irritable bowel syndrome)    Nausea    Hyponatremia    Vitamin D deficiency    Diarrhea due to medication  Failure to thrive  Dehydration  Severe protein calorie malnutrition  S/p gastric bypass and colectomy  - BMI 12.30  - 7-10 lb weight loss this past week  - no appetite, only drinking supplemental shakes at home  - continue protein shakes  - discontinue IVF   - discontinue scopolamine patch  - continue zofran 4 mg q6h prn for nausea  - continue lomotil  2.5-0.025 mg QID prn  - diet as tolerated  - nutritional consult, appreciate recs  - daily weights   - supplements TID  - hold linzess  - stool for C. 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   - sees GI outpatient  - continue hyoscyamine 0.25 mg QID, pantoprazole 40 mg daily, sucralfate 1 g QID  - GI consulted, appreciate recs  - hold linzess     Recent UTI  Frequent UTI  - 10/3  LE, +bacteria  - 10/9 urine culture grew citrobacter freundii complex  - completed Bactrim DS  - continue trimethoprim 100 mg daily  - repeat urine culture grew enteric bacilli  - started cefepime 2g daily; day 1     Hypothyroid  - TSH 2.08 in July  - continue levothyroxine 50 mcg daily     Hypokalemia  Hypomagnesium  Hyponatremia  - K 3.8 > 3.2  - Na 130 > 131  - Mag 1.37 > 1.58 > 1.41  - repleted with IV, PO  - trend BMP, replete as needed  - telemetry     Chronic insomnia  - continue trazodone 150 mg hs     Vitamin D  deficiency  - continue cholecalciferol 2000 units daily     GI ppx:   - continue pantoprazole 40 mg daily     DVT ppx:   - continue enoxaparin 40 mg daily     Code Status: full     Disposition: Pt requires inpatient stay at this time.              Deja Yi, APRN-CNP

## 2024-10-21 NOTE — PROGRESS NOTES
10/21/24 1302   Discharge Planning   Living Arrangements Spouse/significant other;Family members   Support Systems Spouse/significant other;Family members   Assistance Needed Pateint A & O x4; patient says she lives with her  and grandson.  They live in a house-they go in from the garage and their is a chair lift up 13 stairs onto the main floor.  She says she is indepenent with ADLS/IADLS.  She uses a walker as needed and has grab bars in the bathroom.  She doesn't need assistance.  Her  uses oxygen and a walker.  Their daughter assists as needed.  She doesn't drive but her  does.   Type of Residence Private residence   Number of Stairs to Enter Residence 1   Number of Stairs Within Residence 13  (w/ a stiar lift)   Do you have animals or pets at home? Yes   Type of Animals or Pets dog   Home or Post Acute Services None   Expected Discharge Disposition Home   Does the patient need discharge transport arranged? No   Financial Resource Strain   How hard is it for you to pay for the very basics like food, housing, medical care, and heating? Not very   Housing Stability   In the last 12 months, was there a time when you were not able to pay the mortgage or rent on time? N   In the past 12 months, how many times have you moved where you were living? 0   At any time in the past 12 months, were you homeless or living in a shelter (including now)? N   Transportation Needs   In the past 12 months, has lack of transportation kept you from medical appointments or from getting medications? no   In the past 12 months, has lack of transportation kept you from meetings, work, or from getting things needed for daily living? No     OBSERVATION:  Confirmed Melanie Andrade MD is PCP, [facility] is pharmacy and demographics are correct.      DC PLAN:  Home      1:05 PM  Patient has been upgraded.   IMM letter given and explained to patient. Consent/signature obtained and copy given to patient.

## 2024-10-21 NOTE — CARE PLAN
The patient's goals for the shift include To get a good nights rest    The clinical goals for the shift include Pt will tolerate sitting in chair with meals throughout shift.    Pt remained safe and stable throughout shift. VS were stable and pt denied pain. VS were stable and pt denied pain. Pt received IV abx and IV mag today and tolerated well. Pt sat in chair throughout shift. No other complaints. Pt resting in bed with call light within reach. Family at bedside.

## 2024-10-21 NOTE — DISCHARGE INSTR - OTHER ORDERS
Thank you for choosing [facility] for your Health Care needs.  Also, thank you for allowing us to take you and your families preferences into account when determining your discharge plan.  Stay well!     You may receive a survey in the mail within the next couple weeks. Please take the time to complete it and return it. Your input is ALWAYS important to us. Thank you!  Your Care Transition Team - Nakia Adamson & Angie - [phone]      For questions about your medications listed on your discharge instructions, please call the Nurses Station at [phone].

## 2024-10-22 LAB
ANION GAP SERPL CALC-SCNC: 10 MMOL/L (ref 10–20)
BACTERIA UR CULT: ABNORMAL
BUN SERPL-MCNC: 14 MG/DL (ref 6–23)
CALCIUM SERPL-MCNC: 8.8 MG/DL (ref 8.6–10.3)
CHLORIDE SERPL-SCNC: 98 MMOL/L (ref 98–107)
CO2 SERPL-SCNC: 28 MMOL/L (ref 21–32)
CREAT SERPL-MCNC: 0.65 MG/DL (ref 0.5–1.05)
EGFRCR SERPLBLD CKD-EPI 2021: 90 ML/MIN/1.73M*2
ERYTHROCYTE [DISTWIDTH] IN BLOOD BY AUTOMATED COUNT: 14.1 % (ref 11.5–14.5)
GLUCOSE SERPL-MCNC: 97 MG/DL (ref 74–99)
HCT VFR BLD AUTO: 32.5 % (ref 36–46)
HGB BLD-MCNC: 10.6 G/DL (ref 12–16)
MAGNESIUM SERPL-MCNC: 1.7 MG/DL (ref 1.6–2.4)
MCH RBC QN AUTO: 30 PG (ref 26–34)
MCHC RBC AUTO-ENTMCNC: 32.6 G/DL (ref 32–36)
MCV RBC AUTO: 92 FL (ref 80–100)
NRBC BLD-RTO: 0 /100 WBCS (ref 0–0)
PLATELET # BLD AUTO: 314 X10*3/UL (ref 150–450)
POTASSIUM SERPL-SCNC: 3.7 MMOL/L (ref 3.5–5.3)
RBC # BLD AUTO: 3.53 X10*6/UL (ref 4–5.2)
SODIUM SERPL-SCNC: 132 MMOL/L (ref 136–145)
WBC # BLD AUTO: 4.7 X10*3/UL (ref 4.4–11.3)

## 2024-10-22 PROCEDURE — 2500000001 HC RX 250 WO HCPCS SELF ADMINISTERED DRUGS (ALT 637 FOR MEDICARE OP): Mod: IPSPLIT | Performed by: NURSE PRACTITIONER

## 2024-10-22 PROCEDURE — 2500000002 HC RX 250 W HCPCS SELF ADMINISTERED DRUGS (ALT 637 FOR MEDICARE OP, ALT 636 FOR OP/ED): Mod: IPSPLIT | Performed by: NURSE PRACTITIONER

## 2024-10-22 PROCEDURE — 99232 SBSQ HOSP IP/OBS MODERATE 35: CPT

## 2024-10-22 PROCEDURE — 94760 N-INVAS EAR/PLS OXIMETRY 1: CPT | Mod: IPSPLIT

## 2024-10-22 PROCEDURE — 80048 BASIC METABOLIC PNL TOTAL CA: CPT | Mod: IPSPLIT | Performed by: NURSE PRACTITIONER

## 2024-10-22 PROCEDURE — 2500000001 HC RX 250 WO HCPCS SELF ADMINISTERED DRUGS (ALT 637 FOR MEDICARE OP): Mod: IPSPLIT | Performed by: STUDENT IN AN ORGANIZED HEALTH CARE EDUCATION/TRAINING PROGRAM

## 2024-10-22 PROCEDURE — 85027 COMPLETE CBC AUTOMATED: CPT | Mod: IPSPLIT | Performed by: NURSE PRACTITIONER

## 2024-10-22 PROCEDURE — 2500000004 HC RX 250 GENERAL PHARMACY W/ HCPCS (ALT 636 FOR OP/ED): Mod: IPSPLIT | Performed by: NURSE PRACTITIONER

## 2024-10-22 PROCEDURE — 83735 ASSAY OF MAGNESIUM: CPT | Mod: IPSPLIT | Performed by: NURSE PRACTITIONER

## 2024-10-22 PROCEDURE — 36415 COLL VENOUS BLD VENIPUNCTURE: CPT | Mod: IPSPLIT | Performed by: NURSE PRACTITIONER

## 2024-10-22 PROCEDURE — 2500000005 HC RX 250 GENERAL PHARMACY W/O HCPCS: Mod: IPSPLIT | Performed by: NURSE PRACTITIONER

## 2024-10-22 PROCEDURE — 2500000001 HC RX 250 WO HCPCS SELF ADMINISTERED DRUGS (ALT 637 FOR MEDICARE OP): Mod: IPSPLIT

## 2024-10-22 PROCEDURE — 1200000002 HC GENERAL ROOM WITH TELEMETRY DAILY: Mod: IPSPLIT

## 2024-10-22 RX ORDER — CEPHALEXIN 250 MG/1
500 CAPSULE ORAL EVERY 12 HOURS SCHEDULED
Status: DISCONTINUED | OUTPATIENT
Start: 2024-10-22 | End: 2024-10-23 | Stop reason: HOSPADM

## 2024-10-22 ASSESSMENT — COGNITIVE AND FUNCTIONAL STATUS - GENERAL
MOBILITY SCORE: 24
DAILY ACTIVITIY SCORE: 24

## 2024-10-22 ASSESSMENT — PAIN - FUNCTIONAL ASSESSMENT: PAIN_FUNCTIONAL_ASSESSMENT: 0-10

## 2024-10-22 ASSESSMENT — PAIN SCALES - GENERAL
PAINLEVEL_OUTOF10: 0 - NO PAIN

## 2024-10-22 NOTE — CARE PLAN
Problem: Discharge Planning  Goal: Discharge to home or other facility with appropriate resources  Outcome: Progressing   The patient's goals for the shift include To get a good nights rest    The clinical goals for the shift include Obtain stool sample    No stools reported today. Tolerated regular diet. Poor appetite. Oral ATB initiated for urine Cx.

## 2024-10-22 NOTE — PROGRESS NOTES
Subjective   79 y.o. female on day 1 of admission presenting with Failure to thrive in adult.    Sitting in bed eating lunch. Denies abdominal pain. Reports some diarrhea with 3 blood clots in it this am.     Objective   PHYSICAL EXAM:  Gen: NAD, alert and awake, cachectic  Head: Normocephalic, atraumatic  Eyes: Sclera anicteric, conjunctiva pink   ENMT: MMM  Lungs: Easy, non-labored breathing  Abd: Bowel sounds present, soft, non-distended, non-tender, no organomegaly, no ascites, no asterixis  Musculoskeletal: MAEx4  Ext: No BLE edema  Neuro: A/O x3, no gross focal deficits  Psych: Congruent mood and affect, appropriate insight and judgement  Skin: No jaundice, no pallor    Last Recorded Vitals  Blood pressure 117/60, pulse 56, temperature 36.8 °C (98.3 °F), temperature source Temporal, resp. rate 16, height 1.524 m (5'), weight (!) 33 kg (72 lb 12 oz), SpO2 96%.     Intake/Output last 3 Shifts:  I/O last 3 completed shifts:  In: 1231.7 (37.3 mL/kg) [P.O.:1080; I.V.:101.7 (3.1 mL/kg); IV Piggyback:50]  Out: - (0 mL/kg)   Weight: 33 kg      Current medications during hospitalization  Scheduled medications  cefepime, 2 g, intravenous, q24h  cholecalciferol, 2,000 Units, oral, Daily  heparin (porcine), 5,000 Units, subcutaneous, q8h BRIANNA  hyoscyamine, 0.25 mg, sublingual, Before meals & nightly  levothyroxine, 50 mcg, oral, Daily  pantoprazole, 40 mg, oral, Daily before breakfast   Or  pantoprazole, 40 mg, intravenous, Daily before breakfast  potassium chloride CR, 10 mEq, oral, BID  sucralfate, 1 g, oral, 4x daily  traZODone, 150 mg, oral, Nightly  trimethoprim, 100 mg, oral, Daily      Continuous medications     PRN medications  PRN medications: acetaminophen **OR** [DISCONTINUED] acetaminophen **OR** [DISCONTINUED] acetaminophen, acetaminophen **OR** [DISCONTINUED] acetaminophen **OR** [DISCONTINUED] acetaminophen, calcium carbonate, diphenoxylate-atropine, LORazepam, melatonin, ondansetron **OR** ondansetron,  promethazine, traMADol    Relevant Results  Results for orders placed or performed during the hospital encounter of 10/19/24 (from the past 24 hours)   Basic metabolic panel   Result Value Ref Range    Glucose 97 74 - 99 mg/dL    Sodium 132 (L) 136 - 145 mmol/L    Potassium 3.7 3.5 - 5.3 mmol/L    Chloride 98 98 - 107 mmol/L    Bicarbonate 28 21 - 32 mmol/L    Anion Gap 10 10 - 20 mmol/L    Urea Nitrogen 14 6 - 23 mg/dL    Creatinine 0.65 0.50 - 1.05 mg/dL    eGFR 90 >60 mL/min/1.73m*2    Calcium 8.8 8.6 - 10.3 mg/dL   CBC   Result Value Ref Range    WBC 4.7 4.4 - 11.3 x10*3/uL    nRBC 0.0 0.0 - 0.0 /100 WBCs    RBC 3.53 (L) 4.00 - 5.20 x10*6/uL    Hemoglobin 10.6 (L) 12.0 - 16.0 g/dL    Hematocrit 32.5 (L) 36.0 - 46.0 %    MCV 92 80 - 100 fL    MCH 30.0 26.0 - 34.0 pg    MCHC 32.6 32.0 - 36.0 g/dL    RDW 14.1 11.5 - 14.5 %    Platelets 314 150 - 450 x10*3/uL   Magnesium   Result Value Ref Range    Magnesium 1.70 1.60 - 2.40 mg/dL         Radiology:   CT abdomen pelvis wo IV contrast   Final Result   1. Limited exam due to lack of intravenous contrast and lack of   intra-abdominal fat.   2. Status post gastric bypass and colonic surgery at the level of the   sigmoid colon and rectum with possible thickening at the level of the   rectum, which may be related to proctitis.   3. Cholelithiasis.   4. Small amount of periportal edema, which has not significantly   changed.   5. Status post hysterectomy.   6. New 1.5 cm irregular density in the right lung base. Since the   prior exam was only 6 weeks ago, this may be related to an   infiltrate. Follow-up is suggested. If this density persists further   evaluation with CT of the chest should be considered.   7. Emphysematous changes in the lung bases.   8. Redemonstration of punctate nonobstructing left renal stone        MACRO:             None        Signed by: Tami Greenfield 10/19/2024 11:34 AM   Dictation workstation:   [id number]              ASSESSMENT/RECS  79 y.o.  female on day 1 of admission presenting with Failure to thrive in adult. She reports BRBPR (3 clots) this am, which is new, but diarrhea has slowed down and her abdominal pain and nausea are improved.    Recommend restarting Linzess at lower dose- 72 mcg PO every 3 days once diarrhea has resolved  Monitor daily weights, calorie counts  Await stool studies  Monitor CBC and BMP daily  Discussed pt's weight toss and possible need for PEG/enteric feeding if she does not gain weight in the future.   Patient has a scheduled follow-up with me next week, I will see if my office can get that changed to 2 weeks post discharge    Cori Carrasco, GEO-CNP

## 2024-10-22 NOTE — CARE PLAN
The patient's goals for the shift include To get a good nights rest    The clinical goals for the shift include pt will remain safe and rn will get stool sample during this shift.    Over the shift, the patient continued to be on contact precautions with inability to obtain sample due to missing collection hat. Pt medicated as ordered. Pt able to ambulate with standby assist to the bathroom. Pt anxious about not being able to gain weight. Pt call light within reach and bed alarm on.

## 2024-10-22 NOTE — PROGRESS NOTES
Latha Quispe is a 79 y.o. female on day 1 of admission presenting with Failure to thrive in adult.      Subjective   Pt assessed at bedside, has some mild abd discomfort. Pt states her diarrhea has improved but she had a small amount this morning that had blood in it. Discussed sending occult stool. Pt agreeable.        Objective     Last Recorded Vitals  /60 (BP Location: Left arm, Patient Position: Lying)   Pulse 56   Temp 36.8 °C (98.3 °F) (Temporal)   Resp 16   Wt (!) 33 kg (72 lb 12 oz)   SpO2 96%   Intake/Output last 3 Shifts:    Intake/Output Summary (Last 24 hours) at 10/22/2024 1134  Last data filed at 10/22/2024 0900  Gross per 24 hour   Intake 1231.67 ml   Output --   Net 1231.67 ml       Admission Weight  Weight: (!) 28.6 kg (63 lb) (10/19/24 0913)    Daily Weight  10/22/24 : (!) 33 kg (72 lb 12 oz)    Image Results  CT abdomen pelvis wo IV contrast  Narrative: Interpreted By:  Tami Greenfield,   STUDY:  CT ABDOMEN PELVIS WO IV CONTRAST;  10/19/2024 10:55 am      INDICATION:  Signs/Symptoms:Weight loss abdominal pain and diarrhea. History of  gastric bypass, hysterectomy, colectomy      COMPARISON:  09/06/2024      ACCESSION NUMBER(S):  [id number]      ORDERING CLINICIAN:  TRISH HOLMAN      TECHNIQUE:  CT of the abdomen and pelvis was performed. Contiguous axial images  were obtained at 3 mm slice thickness through the abdomen and pelvis.  Coronal and sagittal reconstructions at 3 mm slice thickness were  performed.  No intravenous contrast was administered.      FINDINGS:  The study is somewhat limited due to lack of intravenous contrast and  lack of intra-abdominal fat.      LOWER CHEST: Emphysematous changes are noted in the lung bases. There  is new irregular density in the right lung base which measures 1.6  cm. Scarring seen in the left lung base      ABDOMEN: Please note the evaluation of vessels, lymph nodes and solid  organs is limited due to lack of intravenous contrast.       LIVER: Small amount of periportal edema is again noted.      BILE DUCTS: Normal caliber.      GALLBLADDER: Small calcified gallstone is noted no wall thickening.      SPLEEN: Within normal limits.      ADRENALS: Within normal limits.      PANCREAS: Within normal limits.      KIDNEYS/URETERS: Punctate nonobstructing left renal stone is again  noted there is no evidence for hydronephrosis or hydroureter.          PELVIS:      REPRODUCTIVE ORGANS: The uterus is surgically absent.  BLADDER: Within normal limits.      VESSELS: The aorta and IVC are normal in caliber. Vascular  calcifications are seen      RETROPERITONEUM/PERITONEUM/LYMPH NODES: No ascites or free air, no  fluid collection. No enlarged mesenteric lymph nodes.      BOWEL: Normal caliber.  No abnormal appendix is noted. Patient is  status post gastric bypass. Postsurgical changes are seen at the  level of the sigmoid colon/rectum. There may be some thickening at  the level of the rectum with a very difficult      ABDOMINAL WALL: Within normal limits.      BONES: Osteopenia is noted. Degenerative changes are seen in the  visualized spine      Impression: 1. Limited exam due to lack of intravenous contrast and lack of  intra-abdominal fat.  2. Status post gastric bypass and colonic surgery at the level of the  sigmoid colon and rectum with possible thickening at the level of the  rectum, which may be related to proctitis.  3. Cholelithiasis.  4. Small amount of periportal edema, which has not significantly  changed.  5. Status post hysterectomy.  6. New 1.5 cm irregular density in the right lung base. Since the  prior exam was only 6 weeks ago, this may be related to an  infiltrate. Follow-up is suggested. If this density persists further  evaluation with CT of the chest should be considered.  7. Emphysematous changes in the lung bases.  8. Redemonstration of punctate nonobstructing left renal stone      MACRO:          None      Signed by: Tami Greenfield  10/19/2024 11:34 AM  Dictation workstation:   [id number]      Physical Exam  Vitals reviewed.   Constitutional:       Appearance: She is ill-appearing.   HENT:      Head: Normocephalic and atraumatic.      Right Ear: External ear normal.      Left Ear: External ear normal.      Nose: Nose normal.      Mouth/Throat:      Pharynx: Oropharynx is clear.   Eyes:      Conjunctiva/sclera: Conjunctivae normal.   Cardiovascular:      Rate and Rhythm: Normal rate and regular rhythm.      Pulses: Normal pulses.      Heart sounds: Normal heart sounds.   Pulmonary:      Effort: Pulmonary effort is normal.      Breath sounds: Normal breath sounds.   Abdominal:      General: Bowel sounds are normal.      Palpations: Abdomen is soft.      Tenderness: There is abdominal tenderness.   Musculoskeletal:         General: Normal range of motion.      Cervical back: Normal range of motion and neck supple.   Skin:     General: Skin is dry.   Neurological:      General: No focal deficit present.      Mental Status: She is alert and oriented to person, place, and time.   Psychiatric:         Mood and Affect: Mood normal.         Behavior: Behavior normal.         Relevant Results  Scheduled medications  cefepime, 2 g, intravenous, q24h  cholecalciferol, 2,000 Units, oral, Daily  heparin (porcine), 5,000 Units, subcutaneous, q8h BRIANNA  hyoscyamine, 0.25 mg, sublingual, Before meals & nightly  levothyroxine, 50 mcg, oral, Daily  pantoprazole, 40 mg, oral, Daily before breakfast   Or  pantoprazole, 40 mg, intravenous, Daily before breakfast  potassium chloride CR, 10 mEq, oral, BID  sucralfate, 1 g, oral, 4x daily  traZODone, 150 mg, oral, Nightly  trimethoprim, 100 mg, oral, Daily      Continuous medications     PRN medications  PRN medications: acetaminophen **OR** [DISCONTINUED] acetaminophen **OR** [DISCONTINUED] acetaminophen, acetaminophen **OR** [DISCONTINUED] acetaminophen **OR** [DISCONTINUED] acetaminophen, calcium carbonate,  diphenoxylate-atropine, LORazepam, melatonin, ondansetron **OR** ondansetron, promethazine, traMADol    Results for orders placed or performed during the hospital encounter of 10/19/24 (from the past 24 hours)   Basic metabolic panel   Result Value Ref Range    Glucose 97 74 - 99 mg/dL    Sodium 132 (L) 136 - 145 mmol/L    Potassium 3.7 3.5 - 5.3 mmol/L    Chloride 98 98 - 107 mmol/L    Bicarbonate 28 21 - 32 mmol/L    Anion Gap 10 10 - 20 mmol/L    Urea Nitrogen 14 6 - 23 mg/dL    Creatinine 0.65 0.50 - 1.05 mg/dL    eGFR 90 >60 mL/min/1.73m*2    Calcium 8.8 8.6 - 10.3 mg/dL   CBC   Result Value Ref Range    WBC 4.7 4.4 - 11.3 x10*3/uL    nRBC 0.0 0.0 - 0.0 /100 WBCs    RBC 3.53 (L) 4.00 - 5.20 x10*6/uL    Hemoglobin 10.6 (L) 12.0 - 16.0 g/dL    Hematocrit 32.5 (L) 36.0 - 46.0 %    MCV 92 80 - 100 fL    MCH 30.0 26.0 - 34.0 pg    MCHC 32.6 32.0 - 36.0 g/dL    RDW 14.1 11.5 - 14.5 %    Platelets 314 150 - 450 x10*3/uL   Magnesium   Result Value Ref Range    Magnesium 1.70 1.60 - 2.40 mg/dL          Assessment/Plan      Assessment & Plan  Failure to thrive in adult    Cachexia (Multi)    Hypomagnesemia    Chronic gastritis    Chronic idiopathic constipation    Dehydration    GERD (gastroesophageal reflux disease)    Hypokalemia    Hypothyroid    IBS (irritable bowel syndrome)    Nausea    Hyponatremia    Vitamin D deficiency    #Diarrhea due to medication  #Failure to thrive  #Dehydration  #Severe protein calorie malnutrition  #S/p gastric bypass and colectomy  -BMI 12.30  -7-10 lb weight loss this past week  -no appetite, only drinking supplemental shakes at home  -continue protein shakes  -discontinue IVF   -discontinue scopolamine patch  -continue zofran 4 mg q6h prn for nausea  -continue lomotil  2.5-0.025 mg QID prn  -diet as tolerated  -nutritional consult, appreciate recs  -daily weights   -supplements TID  -hold linzess  -stool for C. 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   -sees GI outpatient  -continue hyoscyamine 0.25 mg QID, pantoprazole 40 mg daily, sucralfate 1 g QID  -GI consulted, appreciate recs  -hold linzess     #Recent UTI  #Frequent UTI  -10/3  LE, +bacteria  -10/9 urine culture grew citrobacter freundii complex  -completed Bactrim DS  -continue trimethoprim 100 mg daily  -repeat urine culture grew enteric bacilli  -started cefepime 2g daily; day 1     #Hypothyroid  -TSH 2.08 in July  -continue levothyroxine 50 mcg daily     #Hypokalemia  #Hypomagnesium  #Hyponatremia  -K 3.8 > 3.2 > 3.7  -Na 130 > 131 > 132  -Mag 1.37 > 1.58 > 1.41  -repleted with IV, PO  -trend BMP, replete as needed  -telemetry     #Chronic insomnia  -continue trazodone 150 mg hs     #Vitamin D deficiency  -continue cholecalciferol 2000 units daily    DVT ppx  -heparin subcutaneous    PUD ppx  -pantoprazole    F: PRN  E: Replete per protocol  N: Regular  A: PIV    Disposition: Pt requires more than 2 inpatient days at this time   Code Status: Full Code        Stephy Reddy, APRN-CNP

## 2024-10-23 VITALS
RESPIRATION RATE: 16 BRPM | SYSTOLIC BLOOD PRESSURE: 99 MMHG | HEIGHT: 60 IN | OXYGEN SATURATION: 96 % | BODY MASS INDEX: 14.28 KG/M2 | HEART RATE: 86 BPM | DIASTOLIC BLOOD PRESSURE: 58 MMHG | WEIGHT: 72.75 LBS | TEMPERATURE: 98 F

## 2024-10-23 LAB
ANION GAP SERPL CALC-SCNC: 9 MMOL/L (ref 10–20)
BUN SERPL-MCNC: 15 MG/DL (ref 6–23)
CALCIUM SERPL-MCNC: 9.2 MG/DL (ref 8.6–10.3)
CHLORIDE SERPL-SCNC: 95 MMOL/L (ref 98–107)
CO2 SERPL-SCNC: 33 MMOL/L (ref 21–32)
CREAT SERPL-MCNC: 0.65 MG/DL (ref 0.5–1.05)
EGFRCR SERPLBLD CKD-EPI 2021: 90 ML/MIN/1.73M*2
ERYTHROCYTE [DISTWIDTH] IN BLOOD BY AUTOMATED COUNT: 14.1 % (ref 11.5–14.5)
GLUCOSE SERPL-MCNC: 98 MG/DL (ref 74–99)
HCT VFR BLD AUTO: 35.2 % (ref 36–46)
HGB BLD-MCNC: 11.3 G/DL (ref 12–16)
HOLD SPECIMEN: NORMAL
MCH RBC QN AUTO: 29.5 PG (ref 26–34)
MCHC RBC AUTO-ENTMCNC: 32.1 G/DL (ref 32–36)
MCV RBC AUTO: 92 FL (ref 80–100)
NRBC BLD-RTO: 0 /100 WBCS (ref 0–0)
PLATELET # BLD AUTO: 331 X10*3/UL (ref 150–450)
POTASSIUM SERPL-SCNC: 3.9 MMOL/L (ref 3.5–5.3)
RBC # BLD AUTO: 3.83 X10*6/UL (ref 4–5.2)
SODIUM SERPL-SCNC: 133 MMOL/L (ref 136–145)
WBC # BLD AUTO: 5.5 X10*3/UL (ref 4.4–11.3)

## 2024-10-23 PROCEDURE — 2500000002 HC RX 250 W HCPCS SELF ADMINISTERED DRUGS (ALT 637 FOR MEDICARE OP, ALT 636 FOR OP/ED): Mod: IPSPLIT | Performed by: NURSE PRACTITIONER

## 2024-10-23 PROCEDURE — 2500000001 HC RX 250 WO HCPCS SELF ADMINISTERED DRUGS (ALT 637 FOR MEDICARE OP): Mod: IPSPLIT | Performed by: STUDENT IN AN ORGANIZED HEALTH CARE EDUCATION/TRAINING PROGRAM

## 2024-10-23 PROCEDURE — 85027 COMPLETE CBC AUTOMATED: CPT | Mod: IPSPLIT | Performed by: NURSE PRACTITIONER

## 2024-10-23 PROCEDURE — 2500000005 HC RX 250 GENERAL PHARMACY W/O HCPCS: Mod: IPSPLIT | Performed by: NURSE PRACTITIONER

## 2024-10-23 PROCEDURE — 2500000001 HC RX 250 WO HCPCS SELF ADMINISTERED DRUGS (ALT 637 FOR MEDICARE OP): Mod: IPSPLIT | Performed by: NURSE PRACTITIONER

## 2024-10-23 PROCEDURE — 2500000004 HC RX 250 GENERAL PHARMACY W/ HCPCS (ALT 636 FOR OP/ED): Mod: IPSPLIT | Performed by: NURSE PRACTITIONER

## 2024-10-23 PROCEDURE — 2500000001 HC RX 250 WO HCPCS SELF ADMINISTERED DRUGS (ALT 637 FOR MEDICARE OP): Mod: IPSPLIT

## 2024-10-23 PROCEDURE — 36415 COLL VENOUS BLD VENIPUNCTURE: CPT | Mod: IPSPLIT | Performed by: NURSE PRACTITIONER

## 2024-10-23 PROCEDURE — 80048 BASIC METABOLIC PNL TOTAL CA: CPT | Mod: IPSPLIT | Performed by: NURSE PRACTITIONER

## 2024-10-23 PROCEDURE — 99239 HOSP IP/OBS DSCHRG MGMT >30: CPT

## 2024-10-23 ASSESSMENT — COGNITIVE AND FUNCTIONAL STATUS - GENERAL
MOBILITY SCORE: 23
CLIMB 3 TO 5 STEPS WITH RAILING: A LITTLE
DAILY ACTIVITIY SCORE: 24

## 2024-10-23 ASSESSMENT — PAIN - FUNCTIONAL ASSESSMENT: PAIN_FUNCTIONAL_ASSESSMENT: 0-10

## 2024-10-23 ASSESSMENT — PAIN SCALES - GENERAL
PAINLEVEL_OUTOF10: 0 - NO PAIN
PAINLEVEL_OUTOF10: 0 - NO PAIN

## 2024-10-23 NOTE — CARE PLAN
The patient's goals for the shift include To get a good nights rest    The clinical goals for the shift include Control anxiety    Discharged home. Reviewed instructions with patient and spouse. Reviewed all medications. Aware of all follow up appointments. All questions answered.

## 2024-10-23 NOTE — DISCHARGE SUMMARY
Discharge Diagnosis  Failure to thrive in adult    Issues Requiring Follow-Up  Follow up with PCP, GI    Discharge Meds     Medication List      CHANGE how you take these medications     linaCLOtide 72 mcg capsule; Commonly known as: Linzess; Take 1 capsule   (72 mcg) by mouth every 3 days. Do not crush or chew.; What changed:   medication strength, how much to take, when to take this     CONTINUE taking these medications     acetaminophen 325 mg tablet; Commonly known as: Tylenol   estradiol 0.01 % (0.1 mg/gram) vaginal cream; Commonly known as:   Estrace; Insert pea size amount into vagina every night for 2 weeks, then   2x/week after   hyoscyamine 0.125 mg disintegrating tablet; Commonly known as: Anaspaz;   Place 2 tablets (0.25 mg) under the tongue 4 times a day before meals.   levothyroxine 50 mcg tablet; Commonly known as: Synthroid, Levoxyl; Take   1 tablet (50 mcg) by mouth once daily.   ondansetron ODT 4 mg disintegrating tablet; Commonly known as:   Zofran-ODT; Take 1 tablet (4 mg) by mouth every 12 hours if needed for   nausea or vomiting.   pantoprazole 40 mg EC tablet; Commonly known as: ProtoNix; Take 1 tablet   (40 mg) by mouth once daily.   potassium chloride CR 10 mEq ER tablet; Commonly known as: Klor-Con;   Take 1 tablet (10 mEq) by mouth 2 times a day. Do not crush, chew, or   split.   sucralfate 1 gram tablet; Commonly known as: Carafate; Take 1 tablet (1   g) by mouth 4 times a day. Before meals and at bedtime   traZODone 150 mg tablet; Commonly known as: Desyrel; Take 1 tablet (150   mg) by mouth once daily at bedtime.   trimethoprim 100 mg tablet; Commonly known as: Trimpex; Take 1 pill   daily for 90 days   Vitamin D3 50 mcg (2,000 unit) capsule; Generic drug: cholecalciferol     STOP taking these medications     cephalexin 500 mg capsule; Commonly known as: Keflex   magnesium hydroxide 2,400 mg/10 mL suspension suspension; Commonly known   as: Milk of Magnesia   metoclopramide 5 mg tablet;  Commonly known as: Reglan   sulfamethoxazole-trimethoprim 800-160 mg tablet; Commonly known as:   Bactrim DS       Test Results Pending At Discharge  Pending Labs       No current pending labs.            Hospital Course   Latha Quispe is a 79 y.o. female with PMH of anxiety, depresison, GERD, hypothyroidism, IBS, chronic constipation and malnutrition who presented to ED today with complaints of weight loss, diarrhea, anorexia for a couple of months. She states she has immediate diarrhea as soon as she eats anything and it lasts several hours afterwards. She stated her pills were coming out undigested. She has been tolerating some Ensure at home. She estimates a 10 pound weight loss this past week. In the ED her workup showed glucose 151, Na 130, Mag 1.37. CT a/p showed possible proctitis, cholelithiasis, emphysema. She was given IV Mag, IVF and admitted to Medicine for further evaluation and treatment.     Hospital Course:  #Diarrhea due to medication  #Failure to thrive  #Dehydration  #Severe protein calorie malnutrition  #S/p gastric bypass and colectomy  -BMI 12.30  -7-10 lb weight loss this past week  -no appetite, only drinking supplemental shakes at home  -continue protein shakes  -discontinue IVF   -discontinue scopolamine patch  -continue zofran 4 mg q6h prn for nausea  -continue lomotil  2.5-0.025 mg QID prn  -diet as tolerated  -nutritional consult, appreciate recs  -daily weights   -supplements TID  -hold linzess  -stool for C. Diff: pending  -stool for pathogens: pending  -Occult stool pending for report blood in stool      #Chronic gastritis  #IBS (irritable bowel syndrome)  #Chronic idiopathic constipation  #GERD (gastroesophageal reflux disease)  -gastrojejunostomy for ulcer disease in 1992, [facility] for colonic inertia in 2016   -sees GI outpatient  -continue hyoscyamine 0.25 mg QID, pantoprazole 40 mg daily, sucralfate 1 g QID  -GI consulted, appreciate recs  -hold linzess     #Recent UTI  #Frequent  UTI  -10/3  LE, +bacteria  -10/9 urine culture grew citrobacter freundii complex  -completed Bactrim DS  -continue trimethoprim 100 mg daily  -repeat urine culture grew enteric bacilli  -started cefepime 2g daily; day 1     #Hypothyroid  -TSH 2.08 in July  -continue levothyroxine 50 mcg daily     #Hypokalemia  #Hypomagnesium  #Hyponatremia  -K 3.8 > 3.2 > 3.7  -Na 130 > 131 > 132  -Mag 1.37 > 1.58 > 1.41  -repleted with IV, PO  -trend BMP, replete as needed  -telemetry     #Chronic insomnia  -continue trazodone 150 mg hs     #Vitamin D deficiency  -continue cholecalciferol 2000 units daily     DVT ppx  -heparin subcutaneous     PUD ppx  -pantoprazole     F: PRN  E: Replete per protocol  N: Regular  A: PIV     Disposition: Pt stable for discharge. Linzess decreased to every 3 days at lower dose. Has a follow up with GI.     Code Status: Full Code    Total cumulative time spent in preparation of this discharge including documentation review, coordination of care with the medical team including PT/SW/care coordinators and treating consultants, discussion with patient and pertinent family members and finalization of prescriptions, followup appointments and this discharge summary was approximately  45 minutes. Over 50% of the time was spent face-to-face counseling the patient on diagnosis, prescriptions, and follow up appointments.     Pertinent Physical Exam At Time of Discharge  Physical Exam  Vitals reviewed.   Constitutional:       Appearance: She is ill-appearing.   HENT:      Head: Normocephalic and atraumatic.      Right Ear: External ear normal.      Left Ear: External ear normal.      Nose: Nose normal.      Mouth/Throat:      Pharynx: Oropharynx is clear.   Eyes:      Conjunctiva/sclera: Conjunctivae normal.   Cardiovascular:      Rate and Rhythm: Normal rate and regular rhythm.      Pulses: Normal pulses.      Heart sounds: Normal heart sounds.   Pulmonary:      Effort: Pulmonary effort is normal.       Breath sounds: Normal breath sounds.   Abdominal:      General: Bowel sounds are normal.      Palpations: Abdomen is soft.      Tenderness: There is abdominal tenderness.   Musculoskeletal:         General: Normal range of motion.      Cervical back: Normal range of motion and neck supple.   Skin:     General: Skin is dry.   Neurological:      General: No focal deficit present.      Mental Status: She is alert and oriented to person, place, and time.   Psychiatric:         Mood and Affect: Mood normal.         Behavior: Behavior normal.     Outpatient Follow-Up  Future Appointments   Date Time Provider Department Center   10/28/2024  9:20 AM Cori Carrasco APRN-CNP [id number]   12/11/2024 10:00 AM Melanie Andrade MD [id number]   1/27/2025 11:40 AM Abhilash Rodriguez MD [id number]         Stephy Reddy APRN-CNP

## 2024-10-23 NOTE — PROGRESS NOTES
10/23/24 1025   Discharge Planning   Assistance Needed Pateint A & O x4; patient says she lives with her  and grandson. They live in a house-they go in from the garage and their is a chair lift up 13 stairs onto the main floor. She says she is indepenent with ADLS/IADLS. She uses a walker as needed and has grab bars in the bathroom. She doesn't need assistance. Her  uses oxygen and a walker. Their daughter assists as needed. She doesn't drive but her  does.   Home or Post Acute Services None   Expected Discharge Disposition Home   Does the patient need discharge transport arranged? No     Patient medically ready for discharge.  Patient follow up information on discharge instructions.  Patient will be returning home today. Patient is in agreement with discharge plan.    DC PLAN  Home

## 2024-10-23 NOTE — PROGRESS NOTES
Subjective   79 y.o. female on day 2 of admission presenting with Failure to thrive in adult.  Complaining of some generalized abdominal pain this morning.  Had a formed bowel movement last night, no BRBPR.        Objective   PHYSICAL EXAM:  Gen: NAD, alert and awake, cachectic  Head: Normocephalic, atraumatic  Eyes: Sclera anicteric, conjunctiva pink   ENMT: MMM  Lungs: Easy, non-labored breathing  Abd: Bowel sounds present, soft, mildly-distended, non-tender, no organomegaly, no ascites, no asterixis  Musculoskeletal: MAEx4  Ext: No BLE edema  Neuro: A/O x3, no gross focal deficits  Psych: Congruent mood and affect, appropriate insight and judgement  Skin: No jaundice, no pallor    Last Recorded Vitals  Blood pressure 105/67, pulse 62, temperature 36.7 °C (98.1 °F), temperature source Temporal, resp. rate 16, height 1.524 m (5'), weight (!) 33 kg (72 lb 12 oz), SpO2 97%.     Intake/Output last 3 Shifts:  I/O last 3 completed shifts:  In: 840 (25.5 mL/kg) [P.O.:840]  Out: - (0 mL/kg)   Weight: 33 kg      Current medications during hospitalization  Scheduled medications  cephalexin, 500 mg, oral, q12h BRIANNA  cholecalciferol, 2,000 Units, oral, Daily  heparin (porcine), 5,000 Units, subcutaneous, q8h BRIANNA  hyoscyamine, 0.25 mg, sublingual, Before meals & nightly  levothyroxine, 50 mcg, oral, Daily  pantoprazole, 40 mg, oral, Daily before breakfast   Or  pantoprazole, 40 mg, intravenous, Daily before breakfast  potassium chloride CR, 10 mEq, oral, BID  sucralfate, 1 g, oral, 4x daily  traZODone, 150 mg, oral, Nightly  trimethoprim, 100 mg, oral, Daily      Continuous medications     PRN medications  PRN medications: acetaminophen **OR** [DISCONTINUED] acetaminophen **OR** [DISCONTINUED] acetaminophen, acetaminophen **OR** [DISCONTINUED] acetaminophen **OR** [DISCONTINUED] acetaminophen, calcium carbonate, diphenoxylate-atropine, LORazepam, melatonin, ondansetron **OR** ondansetron, promethazine, traMADol    Relevant  Results  Results for orders placed or performed during the hospital encounter of 10/19/24 (from the past 24 hours)   Basic metabolic panel   Result Value Ref Range    Glucose 98 74 - 99 mg/dL    Sodium 133 (L) 136 - 145 mmol/L    Potassium 3.9 3.5 - 5.3 mmol/L    Chloride 95 (L) 98 - 107 mmol/L    Bicarbonate 33 (H) 21 - 32 mmol/L    Anion Gap 9 (L) 10 - 20 mmol/L    Urea Nitrogen 15 6 - 23 mg/dL    Creatinine 0.65 0.50 - 1.05 mg/dL    eGFR 90 >60 mL/min/1.73m*2    Calcium 9.2 8.6 - 10.3 mg/dL   CBC   Result Value Ref Range    WBC 5.5 4.4 - 11.3 x10*3/uL    nRBC 0.0 0.0 - 0.0 /100 WBCs    RBC 3.83 (L) 4.00 - 5.20 x10*6/uL    Hemoglobin 11.3 (L) 12.0 - 16.0 g/dL    Hematocrit 35.2 (L) 36.0 - 46.0 %    MCV 92 80 - 100 fL    MCH 29.5 26.0 - 34.0 pg    MCHC 32.1 32.0 - 36.0 g/dL    RDW 14.1 11.5 - 14.5 %    Platelets 331 150 - 450 x10*3/uL   SST TOP   Result Value Ref Range    Extra Tube Hold for add-ons.          Radiology:   CT abdomen pelvis wo IV contrast   Final Result   1. Limited exam due to lack of intravenous contrast and lack of   intra-abdominal fat.   2. Status post gastric bypass and colonic surgery at the level of the   sigmoid colon and rectum with possible thickening at the level of the   rectum, which may be related to proctitis.   3. Cholelithiasis.   4. Small amount of periportal edema, which has not significantly   changed.   5. Status post hysterectomy.   6. New 1.5 cm irregular density in the right lung base. Since the   prior exam was only 6 weeks ago, this may be related to an   infiltrate. Follow-up is suggested. If this density persists further   evaluation with CT of the chest should be considered.   7. Emphysematous changes in the lung bases.   8. Redemonstration of punctate nonobstructing left renal stone        MACRO:             None        Signed by: Tami Greenfield 10/19/2024 11:34 AM   Dictation workstation:   [id number]              ASSESSMENT/RECS  79 y.o. female on day 2 of admission  presenting with Failure to thrive in adult.  Symptoms improved, had a formed bowel movement without blood this morning.  Planning for discharge.  Recommend restarting Linzess at lower dose- 72 mcg PO every 3 days once diarrhea has resolved.  She can start this on Saturday, sooner if she feels she needs it  Monitor weights as outpatient, consider continued follow-up with RDN  Discussed pt's weight toss and possible need for PEG/enteric feeding if she does not gain weight in the future.   Patient has a scheduled follow-up with me next week, I will see if my office can get that changed to 2 weeks post discharge  Will likely need colonoscopy/flex sig as outpt    Cori Carrasco, GEO-CNP

## 2024-10-23 NOTE — CARE PLAN
"The patient's goals for the shift include To get a good nights rest    The clinical goals for the shift include pt will be able to provide a stool sample during this shift    Over the shift, the patient finally had a per pt \"normal bowel movement\" unable to obtain sample missed collection hat. Pt had uneventful night and medicated as ordered. Pt bed alarm on and call light within reach for assistance.     "

## 2024-10-24 ENCOUNTER — PATIENT OUTREACH (OUTPATIENT)
Dept: PRIMARY CARE | Facility: CLINIC | Age: 79
End: 2024-10-24
Payer: MEDICARE

## 2024-10-24 NOTE — PROGRESS NOTES
Discharge Facility:  [facility]  Discharge Diagnosis: Failure to Thrive in Adult  Admission Date:  10/9/24  Discharge Date:   10/23/24    PCP Appointment Date: 10/29/24  Specialist Appointment Date:  GI - 10/28/24  Hospital Encounter and Summary Linked: Yes  See discharge assessment below for further details     Engagement  Call Start Time: 0958 (10/24/2024 10:18 AM)    Medications  Medications reviewed with patient/caregiver?: Yes (10/24/2024 10:18 AM)  Is the patient having any side effects they believe may be caused by any medication additions or changes?: No (10/24/2024 10:18 AM)  Does the patient have all medications ordered at discharge?: Yes (10/24/2024 10:18 AM)  Care Management Interventions: No intervention needed (10/24/2024 10:18 AM)  Prescription Comments: CHANGE how you take these medications     linaCLOtide 72 mcg capsule; Commonly known as: Linzess; Take 1 capsule   (72 mcg) by mouth every 3 days. Do not crush or chew.; What changed:   medication strength, how much to take, when to take this  STOP taking these medications     cephalexin 500 mg capsule; Commonly known as: Keflex   magnesium hydroxide 2,400 mg/10 mL suspension suspension; Commonly known   as: Milk of Magnesia   metoclopramide 5 mg tablet; Commonly known as: Reglan   sulfamethoxazole-trimethoprim 800-160 mg tablet; Commonly known as:   Bactrim DS (10/24/2024 10:18 AM)  Is the patient taking all medications as directed (includes completed medication regime)?: Yes (10/24/2024 10:18 AM)  Medication Comments: Pt reports also started on estradiol vaginal cream and trimethoprim for UTI prevention. Rxs noted in medication list.  She reports she is taking all medications as prescribed and has no questions or concerns. (10/24/2024 10:18 AM)    Appointments  Does the patient have a primary care provider?: Yes (10/24/2024 10:18 AM)  Care Management Interventions: -- (appt scheduled for 10/29/24 with PCP) (10/24/2024 10:18 AM)  Has  "the patient kept scheduled appointments due by today?: Not applicable (10/24/2024 10:18 AM)    Self Management  What is the home health agency?: n/a (10/24/2024 10:18 AM)  Has home health visited the patient within 72 hours of discharge?: Not applicable (10/24/2024 10:18 AM)  What Durable Medical Equipment (DME) was ordered?: n/a (10/24/2024 10:18 AM)    Patient Teaching  Does the patient have access to their discharge instructions?: Yes (10/24/2024 10:18 AM)  Care Management Interventions: Reviewed instructions with patient (10/24/2024 10:18 AM)  What is the patient's perception of their health status since discharge?: Improving (10/24/2024 10:18 AM)  Is the patient/caregiver able to teach back the hierarchy of who to call/visit for symptoms/problems? PCP, Specialist, Home Health nurse, Urgent Care, ED, 911: Yes (10/24/2024 10:18 AM)    Wrap Up  Wrap Up Additional Comments: Successful transition of care outreach with patient. Patient reports doing \"very well\" at home since discharge. New meds/changes reviewed with patient during outreach. Patient would like to continue working with a dietician to help with gaining weight and referral being placed at this time.  Patient denies further discharge questions/concerns/needs at time of outreach call. Emphasized that follow up appts are needed after discharge with PCP and reviewed needed follow ups with any specialties to assess response to treatment from hospitalization. She has a GI follow up scheduled for 10/28 and we scheduled her hospital follow up with PCP on 10/29/24.  Patient aware of my availability for non-emergent concerns. Contact information provided to the patient. (10/24/2024 10:18 AM)       "

## 2024-10-25 ENCOUNTER — TELEPHONE (OUTPATIENT)
Dept: GASTROENTEROLOGY | Facility: CLINIC | Age: 79
End: 2024-10-25
Payer: MEDICARE

## 2024-10-25 NOTE — TELEPHONE ENCOUNTER
----- Message from Cori Carrasco sent at 10/23/2024  9:38 AM EDT -----  Regarding: Reschedule appointment  Patient was just admitted in the hospital, please have her appointment rescheduled to 2 weeks post discharge.  Thank you

## 2024-10-28 ENCOUNTER — APPOINTMENT (OUTPATIENT)
Dept: GASTROENTEROLOGY | Facility: CLINIC | Age: 79
End: 2024-10-28
Payer: MEDICARE

## 2024-10-28 ENCOUNTER — TELEPHONE (OUTPATIENT)
Dept: GASTROENTEROLOGY | Facility: CLINIC | Age: 79
End: 2024-10-28

## 2024-10-29 ENCOUNTER — APPOINTMENT (OUTPATIENT)
Dept: PRIMARY CARE | Facility: CLINIC | Age: 79
End: 2024-10-29
Payer: MEDICARE

## 2024-10-29 VITALS
HEART RATE: 80 BPM | TEMPERATURE: 98.2 F | BODY MASS INDEX: 14.65 KG/M2 | OXYGEN SATURATION: 97 % | WEIGHT: 74.6 LBS | HEIGHT: 60 IN | SYSTOLIC BLOOD PRESSURE: 118 MMHG | DIASTOLIC BLOOD PRESSURE: 64 MMHG

## 2024-10-29 DIAGNOSIS — R19.7 DIARRHEA OF PRESUMED INFECTIOUS ORIGIN: Primary | ICD-10-CM

## 2024-10-29 DIAGNOSIS — F41.0 PANIC ATTACKS: ICD-10-CM

## 2024-10-29 PROCEDURE — 99496 TRANSJ CARE MGMT HIGH F2F 7D: CPT | Performed by: FAMILY MEDICINE

## 2024-10-29 PROCEDURE — 1111F DSCHRG MED/CURRENT MED MERGE: CPT | Performed by: FAMILY MEDICINE

## 2024-10-29 PROCEDURE — 1159F MED LIST DOCD IN RCRD: CPT | Performed by: FAMILY MEDICINE

## 2024-10-29 RX ORDER — LORAZEPAM 0.5 MG/1
0.5 TABLET ORAL DAILY PRN
Qty: 7 TABLET | Refills: 0 | Status: SHIPPED | OUTPATIENT
Start: 2024-10-29 | End: 2024-11-06 | Stop reason: SDUPTHER

## 2024-10-29 RX ORDER — DIPHENOXYLATE HYDROCHLORIDE AND ATROPINE SULFATE 2.5; .025 MG/1; MG/1
1 TABLET ORAL 4 TIMES DAILY PRN
Qty: 30 TABLET | Refills: 0 | Status: SHIPPED | OUTPATIENT
Start: 2024-10-29

## 2024-10-29 NOTE — PROGRESS NOTES
Subjective   Patient ID: Latha Quispe is a 79 y.o. female who presents for Hospital Follow-up (Has been having diarrhea. Been losing weight. Dr. Rodriguez put her on Trimpex that can cause diarrhea. Has not taken her Linzess- she has diarrhea when she does. ).    [facility]  TCM  Here for hospital follow up 10/19-10/23 for diarrhea, weight loss. Magnesium level was low at time of admission, given IV magnesium. CT showed possible proctitis. Linzess stopped. Following with GI, has appt next week. Still having diarrhea. Is currently on trimethoprim from urologist for recurrent UTI, thinks it may be causing her diarrhea, will stop for a few days and see if helps.   Has been having panic attacks from having diarrhea, feeling anxious, because has been in and out of the hospital. Was getting ativan in the hospital had helped    Review of Systems  General: no fever  Eyes: no blurry vision  ENT: no sore throat, no ear pain  Resp: no cough, sob or wheezing  Cardio: no chest pain, no palpitations  Abd: no nausea/vomiting  : no dysuria, no increased urinary frequency      /64   Pulse 80   Temp 36.8 °C (98.2 °F)   Ht 1.524 m (5')   Wt (!) 33.8 kg (74 lb 9.6 oz)   SpO2 97%   BMI 14.57 kg/m²       Objective   Physical Exam  Gen: NAD, alert  Head: normocephalic/atraumatic  Eyes: conjunctivae normal  Ears: canals clear bilaterally, TM normal   Nose: external nose normal   Oropharynx: clear   Resp: Clear to auscultation  CVS: Regular rate and rhythm  Abdomen: soft, NT, ND  Ext: no edema, NT of lower extremities    Assessment/Plan   Problem List Items Addressed This Visit    None  Visit Diagnoses       Diarrhea of presumed infectious origin    -  Primary    Relevant Medications    diphenoxylate-atropine (Lomotil) 2.5-0.025 mg tablet    Other Relevant Orders    Stool Pathogen Panel, PCR (Completed)    C. difficile, PCR (Completed)  Follow up with GI    Panic attacks        Relevant Medications    LORazepam (Ativan) 0.5 mg tablet

## 2024-10-30 ENCOUNTER — LAB (OUTPATIENT)
Dept: LAB | Facility: LAB | Age: 79
End: 2024-10-30
Payer: MEDICARE

## 2024-10-30 DIAGNOSIS — R19.7 DIARRHEA OF PRESUMED INFECTIOUS ORIGIN: ICD-10-CM

## 2024-10-30 PROCEDURE — 87493 C DIFF AMPLIFIED PROBE: CPT

## 2024-10-30 PROCEDURE — 83993 ASSAY FOR CALPROTECTIN FECAL: CPT

## 2024-10-30 PROCEDURE — 87506 IADNA-DNA/RNA PROBE TQ 6-11: CPT

## 2024-10-30 NOTE — PROGRESS NOTES
History Of Present Illness  Latha Quispe is a 79 y.o. female presenting to GI clinic for follow up.  She is here with her , Brayan.    She was recently hospitalized with failure to thrive and severe diarrhea.  Her Linzess dose was changed to 72 mcg every 3 days.  Patient and her  state that she has not needed the Linzess since discharge.  She has had persistent diarrhea, 2-4 times daily, she denies nocturnal awakenings.  She saw her PCP for diarrhea and infectious stool studies were negative.  Fecal calprotectin was borderline elevated, but normal at 85.  She has not been taking her antibiotic and feels that stool has been less loose due to this. No bm yet today    She feels that Levsin is working well for her abdominal pain and she has been taking 2 tablets before meals.  She not had much abdominal pain, but has nausea which is making it difficult for her to eat.     Social History  She reports that she has never smoked. She has never been exposed to tobacco smoke. She has never used smokeless tobacco. She reports that she does not drink alcohol and does not use drugs.  She does not take NSAIDs on a regular basis    Family History  Family History   Problem Relation Name Age of Onset    Osteoporosis Mother      Alcohol abuse Father      Cancer Sister          breast    Osteoporosis Sister      Other (bladder cancer) Sister      Cancer Brother          colon. prostate    Colon cancer Brother      Colon cancer Brother      Hypothyroidism Other       The patient does have a FH of CRC. she does not have a FH of IBD    Review of Systems   Constitutional:  Positive for fatigue.   Gastrointestinal:  Positive for diarrhea and nausea.        See HPI   All other systems reviewed and are negative.        Physical Exam  Constitutional:       General: She is not in acute distress.     Appearance: Normal appearance. She is cachectic.   HENT:      Head: Normocephalic and atraumatic.      Mouth/Throat:      Mouth:  Mucous membranes are moist.   Eyes:      General: No scleral icterus.     Conjunctiva/sclera: Conjunctivae normal.      Pupils: Pupils are equal, round, and reactive to light.   Pulmonary:      Effort: Pulmonary effort is normal.   Abdominal:      General: Abdomen is flat. Bowel sounds are normal. There is no distension.      Palpations: Abdomen is soft. There is no mass.      Tenderness: There is no abdominal tenderness.      Hernia: No hernia is present.   Musculoskeletal:         General: Normal range of motion.      Cervical back: Normal range of motion.   Skin:     General: Skin is warm and dry.      Coloration: Skin is not jaundiced or pale.   Neurological:      Mental Status: She is alert. Mental status is at baseline.   Psychiatric:         Mood and Affect: Mood normal.         Behavior: Behavior normal.          Last Vital Signs  /74   Pulse 69   Temp 36.8 °C (98.2 °F)   Ht 1.524 m (5')   Wt (!) 33.9 kg (74 lb 12.8 oz)   SpO2 100%   BMI 14.61 kg/m²      Relevant Results  Lab Results   Component Value Date    WBC 5.5 10/23/2024    HGB 11.3 (L) 10/23/2024    HCT 35.2 (L) 10/23/2024    MCV 92 10/23/2024     10/23/2024      Lab Results   Component Value Date    GLUCOSE 98 10/23/2024    CALCIUM 9.2 10/23/2024     (L) 10/23/2024    K 3.9 10/23/2024    CO2 33 (H) 10/23/2024    CL 95 (L) 10/23/2024    BUN 15 10/23/2024    CREATININE 0.65 10/23/2024      Lab Results   Component Value Date    ALT 17 10/19/2024    AST 23 10/19/2024    ALKPHOS 86 10/19/2024    BILITOT 0.5 10/19/2024    INR 1.2 (H) 05/05/2024      Lab Results   Component Value Date    GFVEDHCO41 407 10/20/2024       Lab Results   Component Value Date    CALPS 85 (H) 10/30/2024    CRP <0.10 05/05/2024      Lab Results   Component Value Date    LIPASE 26 10/19/2024    LIPASE 10 09/06/2024    LIPASE 14 07/19/2024    LIPASE 10 05/05/2024    LIPASE 41 01/25/2024    LIPASE 18 07/08/2023    LIPASE 11 04/26/2021    No results found for:  "\"HGBA1C\"     History of gastrojejunostomy in 1992 for treatment of ulcers and 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    EGD/COLONOSCOPY/COLOGUARD  EGD 05/05/24 with Dr. Courtney- Findings  The upper third of the esophagus, middle third of the esophagus and lower third of the esophagus appeared normal.  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  The afferent limb was difficult to visualize.     EGD 01/02/24 with Dr. Diez- Findings  Performed forceps biopsies in the stomach  Regular Z-line 32 cm from the incisors  FOOD in the greater curve of the stomach   FINAL DIAGNOSIS   A. STOMACH ANTRUM BIOPSY:    -- GASTRIC MUCOSA WITH MILD CHRONIC NONSPECIFIC GASTRITIS AND REACTIVE GASTROPATHY  -- HELICOBACTER PYLORI NOT IDENTIFIED       EUS 3/15/2022 with Dr. Caba- Impression:            EGD:                         - Normal esophagus.                         - Z-line, 36 cm from the incisors.                         - Erythematous mucosa in the stomach. Biopsied.                         - A gastroenterostomy was found, characterized by an                          intact appearance and healthy appearing mucosa. See                          Findings above.                         - Normal ampulla and examined duodenal limb.                         - Duodenal limb was examined in its entirety. At end,                          mucosal changes with altered texture and friability                          noted. Biopsied.                         - Normal examined jejunal limb.                         EUS:                         - The pancreatic duct had a dilated endosonographic                          appearance, had a tortuous/ectatic appearance and had                          hyperechoic walls in the main pancreatic duct. The                          pancreatic duct measured up to 5 mm in diameter in the                    "       neck and 3 mm in the tail.                         - Pancreatic parenchymal abnormalities consisting of                          hyperechoic foci were noted in the genu of the                          pancreas, pancreatic body and pancreatic tail.                         - No pancreatic mass was seen, however head of                          pancreas incompletely visualized due to patient's                          post-surgical anatomy.                         - There was no evidence of significant pathology in                          the visualized portion of the liver.  FINAL DIAGNOSIS  A.  SMALL BOWEL, COLD FORCEPS:    --SMALL INTESTINAL MUCOSA WITH FOCAL ACUTE INFLAMMATION, GASTRIC MUCIN CELL METAPLASIA AND HISTIOCYTIC INFILTRATION, SEE NOTE.    Note: Immunohistochemical study shows the histiocytes are positive for CD 68,  and are negative for S100.  GMS, PAS/D and AFB stains are negative for microorganisms.    B.  STOMACH BIOPSY, COLD FORCEPS:    --GASTRIC OXYNTIC MUCOSA WITH MILD CHRONIC INFLAMMATION AND FEATURES SUGGESTIVE OF REACTIVE GASTROPATHY.  --NO HELICOBACTER PYLORI-LIKE ORGANISMS SEEN IN ROUTINE STAINED SECTIONS.       EGD 12/31/2020 with Dr. Guzmán- moderate food residue in stomach, hx Vadim en Y due to previous ulcers and negative biopsies for h.pylori.      Colonoscopy 12/31/2020 with Dr. Guzmán (modified due to altered anatomy) which noted friable anastomosis but otherwise WNL. 5 year follow up recommended.     EGD 12/23/2020 with Dr. Guzmán- ESOPHAGUS: The entire examined esophagus appeared normal.  GE JUNCTION: The Z-line was located at 34 cm from the incissors and appeared regular.  STOMACH: There was a large amount of food debris in the stomach which limited views. There was no gross blood seen. There appeared to be postoperative changes with a possible diverticulum in the stomach. The gastrojejunal anastomosis appeared to be at 45 cm from the incisors without any ulceration  seen. Because of amount of food present in the stomach the procedure was abbreviated for patient safety to avoid aspiration and the jejunum was not intubated.      EGD 10/17/2016 with Dr. Huerta- ESOPHAGUS: The distal esophagus had several concentric rings but there were nonobstructing.  STOMACH: There was evidence of previous gastric surgery (Vadim-en-Y). The gastrojejunal anastomosis was friable with 2 small punctate ulcerations identified. The mucosa bled easily on contact and with biopsy. The jejunal loop beyond the anastomosis appeared normal. The gastric mucosa along the suture line in the stomach was edematous and erythematous. Multiple cold biopsies were also obtained from the stomach to rule out H. pylori infection.      EGD 8/15/2016 with Dr. Celis ESOPHAGUS: The distal esophagus was noted to be tortuous with a nonobstructing Schatzki's ring at the GE junction.  STOMACH: There was evidence of previous gastric resection secondary to peptic ulcer disease. A Billroth II anastomosis was identified. Both the limbs were entered and examined. He fair in limb demonstrated some edema and erythema which was biopsied using cold biopsy forceps to rule out celiac disease. At the anastomosis there was a small benign-appearing ulcer identified. Cold biopsies were obtained to rule out malignancy. In addition, the proximal body of the stomach demonstrated a linear erythema and edema which was biopsied using cold forceps to rule out the presence of H. pylori infection. Retroflexion of the gastroscope in the antrum demonstrated a small sliding-type hiatal hernia.   FINAL DIAGNOSIS  A.  Anastomosis Site, Biopsy - CHRONIC AND ACTIVE GASTRITIS WITH ACUTE  ULCERATION AND FOCAL INTESTINAL METAPLASIA.  Comment:  Giemsa stain is negative for Helicobacter pylori.  An Alcian Blue/PAS stain confirms the presence of intestinal metaplasia.  There is no evidence of dysplasia or malignancy.    B.  Small Intestine, Biopsy - NO SIGNIFICANT  PATHOLOGIC CHANGE.  Comment:  The villous architecture is preserved and there is no evidence of  intraepithelial lymphocytosis. No granulomas or parasites are seen.    C.  Stomach, Body, Biopsy - CHRONIC ACTIVE GASTRITIS.  Comment:  Giemsa stain is negative for Helicobacter pylori.       Colonoscopy (flex sig due to altered anatomy) 8/15/2016 with Dr. Huerta- Findings:  RECTAL EXAM: Normal sphincter tone, no palpable masses.  COLON: The colonoscope was inserted into the rectum and advanced to approximately 20 cm from the anal verge where the ileocolonic anastomosis was identified. This was an end-to-side anastomosis. The anastomosis had some friable mucosa along its margin. No active bleeding. No ulcer identified. Residual vegetable debris was noted near the anastomotic region. The scope was then retracted back in the rectum is retroflexed demonstrating grade 2 nonbleeding internal hemorrhoids.      EGD 4/21/2015 with Dr. Garrison-Findings:  ESOPHAGUS: There was a very small sliding (1cm) hiatal hernia. The esophagus appeared otherwise normal.   GE JUNCTION: The Z-line was located at cm from the gums and appeared irregular. No erosive esophagitis was noted at the GE junction. Also no lesion of the lower esophagus mentioned in the upper GI series was seen.  STOMACH: The antrum of the stomach is missing (previous gastrectomy). There was slight dilation of the stomach ending in a double barrel opening. The more proximal opening is blind ended while the second one is slightly narrower than the expected, accommodating the gastroscope easily through it. Based on the se findings the previous procedure must have been Vagotomy with antrectomy and Vadim en Y anastomosis. There were two lesions, one in the stomach closure line and a second one at the G-J anastomosis. Both were biopsied/removed with biopsy forceps.   FINAL DIAGNOSIS  A.  Gastric Lesion, Biopsy - SMALL FRAGMENT OF GASTRIC FUNDIC/BODY TYPE MUCOSA WITH FOCAL ACUTE  EROSIVE GASTRITIS.  Comment: Focal fibrinoinflammatory exudate is present. There is minimal chronic inflammation. Giemsa stain is negative for Helicobacter pylori.  An Alcian blue/PAS stain reveals no evidence of intestinal metaplasia.    B.  Gastrojejunal Junction Polyp, Biopsy - ACUTE INFLAMMATION WITH GRANULATION TISSUE.  -  NEGATIVE FOR DYSPLASIA OR MALIGNANCY.       Colonoscopy 4/21/2015 with Dr. Garrison- Findings: 1) Extensive melanosis coli, due chronic abuse of laxatives  2) Redundancy and dilation of a multi-curved colon. No anatomic abnormalities, causing obstruction were seen though     PERTINENT IMAGING  === 10/19/24 ===  CT ABDOMEN PELVIS WO IV CONTRAST  - Impression -  1. Limited exam due to lack of intravenous contrast and lack of intra-abdominal fat.  2. Status post gastric bypass and colonic surgery at the level of the sigmoid colon and rectum with possible thickening at the level of the rectum, which may be related to proctitis.  3. Cholelithiasis.  4. Small amount of periportal edema, which has not significantly changed.  5. Status post hysterectomy.  6. New 1.5 cm irregular density in the right lung base. Since the prior exam was only 6 weeks ago, this may be related to an infiltrate. Follow-up is suggested. If this density persists further evaluation with CT of the chest should be considered.  7. Emphysematous changes in the lung bases.  8. Redemonstration of punctate nonobstructing left renal stone    === 09/06/24 ===  CT ABDOMEN PELVIS WO IV CONTRAST  - Impression -  Limited evaluation due to lack of intravenous contrast and paucity of  intra-abdominal fat.  Questionable edema in the pancreas.  Please  clinically for acute pancreatitis.  Mild edema in the distal sigmoid colon/rectum concerning for component  of colitis/proctitis.  Cholelithiasis.  Punctate nonobstructive calculus in the upper pole of the left kidney.  Mild periportal edema.  Moderate colonic stool.  Postsurgical changes in  the distal colon.    === 07/19/24 ===  CT CHEST ABDOMEN PELVIS W IV CONTRAST  - Impression -  CHEST:  Moderately severe emphysema. Moderate kyphosis. No acute intrathoracic abnormality.    ABDOMEN-PELVIS:  Postop changes. No acute abnormality of the abdomen or pelvis. The exam is limited by the paucity of fat    === 07/08/24 ===  NM GASTRIC EMPTYING SOLID   12% %  at 1 hr    (normal max 90%)  5% %  at 2 hr    (normal max 60%)      IMPRESSION  1. Rapid gastric emptying without evidence of gastroparesis (Pt was taking prescribed Linzess)    === 05/05/24 ===  CT ABDOMEN PELVIS W IV CONTRAST  - Impression -  1.  No acute findings in the abdomen and pelvis.  2. Few fluid-filled small bowel loops in the lower abdomen without definite segmental distention to suggest obstruction.  3. Redemonstration of diffusely dilated main pancreatic duct, similar compared to prior imaging dating back to 2021. This was worked up by MRI and CT pancreas protocol in 2022. Recommend correlation with prior reports.  4. Cholelithiasis without acute cholecystitis.  5. Diffuse osseous demineralization      Assessment/Plan   Assessment & Plan  Irritable bowel syndrome with diarrhea  10/30/24 1320-Spoke with Sanam Lorenzo in laboratory client services who will have fecal calp added on to infectious stool studies that were collected  Fecal calprotectin borderline elevated, 85  Having IBS-D symptoms, soft or loose bowel movements 3-4 times daily.   Orders:    Calprotectin, Fecal    Failure to thrive in adult  Has gained 2 pounds since hospitalization, still has nausea and difficulty eating but abdominal pain is lessened  Start Remeron, may stimulate both appetite and improve abdominal pain, nausea  Orders:    mirtazapine (Remeron) 7.5 mg tablet; Take 1 tablet (7.5 mg) by mouth once daily at bedtime.    1 month follow-up for weight check and possible increase of mirtazapine        Cori Carrasco, GEO-CNP  11/04/24

## 2024-10-30 NOTE — ASSESSMENT & PLAN NOTE
10/30/24 1320-Spoke with Sanam Lorenzo in laboratory client services who will have fecal calp added on to infectious stool studies that were collected  Fecal calprotectin borderline elevated, 85  Having IBS-D symptoms, soft or loose bowel movements 3-4 times daily.   Orders:    Calprotectin, Fecal

## 2024-10-31 LAB — C DIF TOX TCDA+TCDB STL QL NAA+PROBE: NOT DETECTED

## 2024-11-01 ENCOUNTER — TELEPHONE (OUTPATIENT)
Dept: PRIMARY CARE | Facility: CLINIC | Age: 79
End: 2024-11-01
Payer: MEDICARE

## 2024-11-01 LAB

## 2024-11-04 ENCOUNTER — APPOINTMENT (OUTPATIENT)
Dept: GASTROENTEROLOGY | Facility: CLINIC | Age: 79
End: 2024-11-04
Payer: MEDICARE

## 2024-11-04 VITALS
SYSTOLIC BLOOD PRESSURE: 125 MMHG | TEMPERATURE: 98.2 F | WEIGHT: 74.8 LBS | OXYGEN SATURATION: 100 % | BODY MASS INDEX: 14.69 KG/M2 | HEART RATE: 69 BPM | DIASTOLIC BLOOD PRESSURE: 74 MMHG | HEIGHT: 60 IN

## 2024-11-04 DIAGNOSIS — R62.7 FAILURE TO THRIVE IN ADULT: ICD-10-CM

## 2024-11-04 DIAGNOSIS — K58.0 IRRITABLE BOWEL SYNDROME WITH DIARRHEA: Primary | ICD-10-CM

## 2024-11-04 LAB — CALPROTECTIN STL-MCNT: 85 UG/G

## 2024-11-04 PROCEDURE — 99214 OFFICE O/P EST MOD 30 MIN: CPT

## 2024-11-04 PROCEDURE — 1159F MED LIST DOCD IN RCRD: CPT

## 2024-11-04 PROCEDURE — 1111F DSCHRG MED/CURRENT MED MERGE: CPT

## 2024-11-04 PROCEDURE — 1160F RVW MEDS BY RX/DR IN RCRD: CPT

## 2024-11-04 PROCEDURE — 1036F TOBACCO NON-USER: CPT

## 2024-11-04 RX ORDER — MIRTAZAPINE 7.5 MG/1
7.5 TABLET, FILM COATED ORAL NIGHTLY
Qty: 30 TABLET | Refills: 0 | Status: SHIPPED | OUTPATIENT
Start: 2024-11-04 | End: 2024-12-04

## 2024-11-04 ASSESSMENT — ENCOUNTER SYMPTOMS
ROS GI COMMENTS: SEE HPI
DIARRHEA: 1
FATIGUE: 1
NAUSEA: 1

## 2024-11-04 NOTE — ASSESSMENT & PLAN NOTE
Has gained 2 pounds since hospitalization, still has nausea and difficulty eating but abdominal pain is lessened  Start Remeron, may stimulate both appetite and improve abdominal pain, nausea  Orders:    mirtazapine (Remeron) 7.5 mg tablet; Take 1 tablet (7.5 mg) by mouth once daily at bedtime.

## 2024-11-04 NOTE — PATIENT INSTRUCTIONS
Start mirtazapine for appetite, nausea, abdominal pain. Take 1 tablet (7.5 mg) by mouth once daily at bedtime.    Continue hyoscyamine before meals and bedtime    Hold the Linzess for now- if the constipation develops again then take every 3 days as needed    1 month follow up

## 2024-11-05 ENCOUNTER — PATIENT OUTREACH (OUTPATIENT)
Dept: CARE COORDINATION | Facility: CLINIC | Age: 79
End: 2024-11-05
Payer: MEDICARE

## 2024-11-06 ENCOUNTER — APPOINTMENT (OUTPATIENT)
Dept: PRIMARY CARE | Facility: CLINIC | Age: 79
End: 2024-11-06
Payer: MEDICARE

## 2024-11-06 VITALS
OXYGEN SATURATION: 99 % | BODY MASS INDEX: 14.3 KG/M2 | TEMPERATURE: 98 F | HEART RATE: 57 BPM | WEIGHT: 73.2 LBS | SYSTOLIC BLOOD PRESSURE: 96 MMHG | DIASTOLIC BLOOD PRESSURE: 60 MMHG

## 2024-11-06 DIAGNOSIS — F41.0 PANIC ATTACKS: ICD-10-CM

## 2024-11-06 DIAGNOSIS — R11.2 NAUSEA AND VOMITING, UNSPECIFIED VOMITING TYPE: Primary | ICD-10-CM

## 2024-11-06 PROCEDURE — 1159F MED LIST DOCD IN RCRD: CPT | Performed by: FAMILY MEDICINE

## 2024-11-06 PROCEDURE — 99213 OFFICE O/P EST LOW 20 MIN: CPT | Performed by: FAMILY MEDICINE

## 2024-11-06 PROCEDURE — 1111F DSCHRG MED/CURRENT MED MERGE: CPT | Performed by: FAMILY MEDICINE

## 2024-11-06 RX ORDER — PROMETHAZINE HYDROCHLORIDE 12.5 MG/1
12.5 TABLET ORAL EVERY 12 HOURS PRN
Qty: 20 TABLET | Refills: 0 | Status: SHIPPED | OUTPATIENT
Start: 2024-11-06

## 2024-11-06 RX ORDER — LORAZEPAM 0.5 MG/1
0.5 TABLET ORAL DAILY PRN
Qty: 30 TABLET | Refills: 5 | Status: SHIPPED | OUTPATIENT
Start: 2024-11-06

## 2024-11-06 NOTE — PROGRESS NOTES
Subjective   Patient ID: Latha Quispe is a 79 y.o. female who presents for Follow-up.  HPI  Here for follow up   Followed up with GI for diarrhea, on lomotil, was also started on remeron. Still having but better.   Anxiety also better with ativan.   Still having nausea, does not like the zofran.     Review of Systems  General: no fever  Eyes: no blurry vision  ENT: no sore throat, no ear pain  Resp: no cough, sob or wheezing  Cardio: no chest pain, no palpitations  Abd: see HPI  : no dysuria, no increased urinary frequency      BP 96/60   Pulse 57   Temp 36.7 °C (98 °F)   Wt (!) 33.2 kg (73 lb 3.2 oz)   SpO2 99%   BMI 14.30 kg/m²       Objective   Physical Exam  Gen: NAD, alert  Head: normocephalic/atraumatic  Eyes: conjunctivae normal  Ears: canals clear bilaterally, TM normal   Nose: external nose normal   Oropharynx: clear   Resp: Clear to auscultation  CVS: Regular rate and rhythm  Abdomen: soft, NT, ND  Ext: no edema, NT of lower extremities      Assessment/Plan   Problem List Items Addressed This Visit    None  Visit Diagnoses       Nausea and vomiting, unspecified vomiting type    -  Primary    Relevant Medications    promethazine (Phenergan) 12.5 mg tablet    Panic attacks        Relevant Medications    LORazepam (Ativan) 0.5 mg tablet

## 2024-11-11 ENCOUNTER — PATIENT OUTREACH (OUTPATIENT)
Dept: CARE COORDINATION | Facility: CLINIC | Age: 79
End: 2024-11-11
Payer: MEDICARE

## 2024-11-11 DIAGNOSIS — K21.9 GASTROESOPHAGEAL REFLUX DISEASE WITHOUT ESOPHAGITIS: ICD-10-CM

## 2024-11-11 DIAGNOSIS — E03.9 HYPOTHYROIDISM, UNSPECIFIED TYPE: ICD-10-CM

## 2024-11-11 RX ORDER — LEVOTHYROXINE SODIUM 50 UG/1
50 TABLET ORAL DAILY
Qty: 90 TABLET | Refills: 3 | Status: SHIPPED | OUTPATIENT
Start: 2024-11-11

## 2024-11-11 RX ORDER — PANTOPRAZOLE SODIUM 40 MG/1
40 TABLET, DELAYED RELEASE ORAL DAILY
Qty: 90 TABLET | Refills: 3 | Status: SHIPPED | OUTPATIENT
Start: 2024-11-11

## 2024-11-13 ENCOUNTER — PATIENT OUTREACH (OUTPATIENT)
Dept: CARE COORDINATION | Facility: CLINIC | Age: 79
End: 2024-11-13
Payer: MEDICARE

## 2024-11-18 ENCOUNTER — PATIENT MESSAGE (OUTPATIENT)
Dept: CARE COORDINATION | Facility: CLINIC | Age: 79
End: 2024-11-18

## 2024-11-18 ENCOUNTER — APPOINTMENT (OUTPATIENT)
Dept: CARE COORDINATION | Facility: CLINIC | Age: 79
End: 2024-11-18
Payer: MEDICARE

## 2024-11-18 NOTE — CARE PLAN
INITIAL NUTRITION EDUCATION VISIT    Reason for Nutrition Visit:  Pt is a 79 y.o. female being seen Virtual referred for  Unintentional weight loss.     Past Medical Hx:  Patient Active Problem List   Diagnosis    Anxiety and depression    Chronic gastritis    Chronic insomnia    Hypothyroid    Nausea    IBS (irritable bowel syndrome)    Hypokalemia    Chronic idiopathic constipation    COPD (chronic obstructive pulmonary disease) (Multi)    Vitamin D deficiency    Hyponatremia    GERD (gastroesophageal reflux disease)    Dehydration    UTI (urinary tract infection)    Cachexia (Multi)    Hypomagnesemia    Failure to thrive in adult        Current Medications:   Current Outpatient Medications on File Prior to Visit   Medication Sig Dispense Refill    acetaminophen (Tylenol) 325 mg tablet Take 2 tablets (650 mg) by mouth every 4 hours if needed for mild pain (1 - 3).      cholecalciferol (Vitamin D3) 50 mcg (2,000 unit) capsule Take 1 capsule (50 mcg) by mouth once daily.      diphenoxylate-atropine (Lomotil) 2.5-0.025 mg tablet Take 1 tablet by mouth 4 times a day as needed for diarrhea. 30 tablet 0    estradiol (Estrace) 0.01 % (0.1 mg/gram) vaginal cream Insert pea size amount into vagina every night for 2 weeks, then 2x/week after 42.5 g 12    hyoscyamine 0.125 mg disintegrating tablet Place 2 tablets (0.25 mg) under the tongue 4 times a day before meals. 720 tablet 3    levothyroxine (Synthroid, Levoxyl) 50 mcg tablet Take 1 tablet (50 mcg) by mouth once daily. 90 tablet 3    linaCLOtide (Linzess) 72 mcg capsule Take 1 capsule (72 mcg) by mouth every 3 days. Do not crush or chew. 10 capsule 1    LORazepam (Ativan) 0.5 mg tablet Take 1 tablet (0.5 mg) by mouth once daily as needed for anxiety. 30 tablet 5    mirtazapine (Remeron) 7.5 mg tablet Take 1 tablet (7.5 mg) by mouth once daily at bedtime. 30 tablet 0    pantoprazole (ProtoNix) 40 mg EC tablet Take 1 tablet (40 mg) by mouth once daily. 90 tablet 3     potassium chloride CR 10 mEq ER tablet Take 1 tablet (10 mEq) by mouth 2 times a day. Do not crush, chew, or split. 60 tablet 3    promethazine (Phenergan) 12.5 mg tablet Take 1 tablet (12.5 mg) by mouth every 12 hours if needed for nausea or vomiting. 20 tablet 0    sucralfate (Carafate) 1 gram tablet Take 1 tablet (1 g) by mouth 4 times a day. Before meals and at bedtime 120 tablet 11    traZODone (Desyrel) 150 mg tablet Take 1 tablet (150 mg) by mouth once daily at bedtime. 30 tablet 11    trimethoprim (Trimpex) 100 mg tablet Take 1 pill daily for 90 days 180 tablet 0     No current facility-administered medications on file prior to visit.       Food & Nutrition Related History:  Dietary Considerations:  NA  Food Allergies: None  Intolerance: None  Appetite: Fluctuates  GI Symptoms : nausea & diarrhea   Swallowing Difficulty: No problems with swallowing  Chewing no problems chewing  Food Preparation: Patient and Partner/Spouse  Cooking Skills/Barriers: Low preference for cooking  Grocery Shopping: Patient and Partner/Spouse  No difficulty affording food  Supplements: Denies daily    Sleep duration/quality : 5-6 hours    24 Diet Recall:  Meal 1:   Meal 2:  Meal 3:    Snacks:    Beverages:   Eating out:    Alcohol Intake:    Self-Identified Challenges:     Physical Activity:   Do you exercise regularly?: No.  Types of Activities: Mostly Sedentary  Duration: <30 minutes Frequency: irregularly    Labs:  Lab Results   Component Value Date     (L) 10/23/2024    K 3.9 10/23/2024    CL 95 (L) 10/23/2024    CO2 33 (H) 10/23/2024    BUN 15 10/23/2024    CREATININE 0.65 10/23/2024    CALCIUM 9.2 10/23/2024    ALBUMIN 3.6 10/19/2024    PROT 7.2 10/19/2024    BILITOT 0.5 10/19/2024    ALKPHOS 86 10/19/2024    ALT 17 10/19/2024    AST 23 10/19/2024    GLUCOSE 98 10/23/2024     Lab Results   Component Value Date    CHOL 114 09/07/2024    LDLCALC 49 09/07/2024    TRIG 94 09/07/2024    HDL 46.6 09/07/2024    LDLF 80  07/13/2022        Comments:     CMP:    Lipid panel:    Vitamin D:    A1C:      Anthropometrics:   Ht Readings from Last 1 Encounters:   11/04/24 1.524 m (5')      BMI Readings from Last 1 Encounters:   11/06/24 14.30 kg/m²      Wt Readings from Last 10 Encounters:   11/06/24 (!) 33.2 kg (73 lb 3.2 oz)   11/04/24 (!) 33.9 kg (74 lb 12.8 oz)   10/29/24 (!) 33.8 kg (74 lb 9.6 oz)   10/22/24 (!) 33 kg (72 lb 12 oz)   10/11/24 (!) 32.2 kg (71 lb)   10/10/24 (!) 31.8 kg (70 lb)   09/16/24 (!) 34.3 kg (75 lb 9.6 oz)   09/06/24 (!) 32.8 kg (72 lb 5 oz)   08/29/24 (!) 34.7 kg (76 lb 6.4 oz)   08/26/24 (!) 35.3 kg (77 lb 12.8 oz)      Weight change:  Significant loss, at one point was down to 66  lbs, normal body weight 95 lbs; CBW 74 lbs  Significant Weight Change: Yes    Visit Summary   Patient reports that she is actually starting to feel better, calorie intake has been increasing. Since last admission she is drinking 2-3 Ensure per day. Still having some nausea, but new medication is helping. Eating more but still not gaining much weight. Focused on finding easy to prepare meals that will help increase kcal/protein intake. Also discussed making her own high protein shakes; encouraged to maximize solid foods/fiber to help firm up stools. Set weight gain goal of 1 lbs per week.       Nutrition Diagnosis:  Diagnosis Statement 1:  Diagnosis Status: New  Diagnosis : Underweight  related to decreased ability to consume sufficient energy as evidenced by poor intake, N/V/D/C, abdominal pain, reflux, gas, belching, flatus, bloating, fecal incontinence    Nutrition Interventions:  Provided nutrition education on the following topic(s): Increased Energy using ACONutritionCounseling: Goal Setting  Referred pt to Coordination of Care: None  Discussed with pt? Yes  Provided handouts on:     Nutrition Goals:  Nutrition Goals : Gradual Weight Gain   Nutrition Goals : Oral intake greater than 50%  Nutrition Goals: Meat/Protein Foods:  Increase    Readiness to Change : Good  Level of Understanding : Good  Anticipated Compliant : Good    Follow up Plan  Will follow-up x 1 mo

## 2024-12-04 DIAGNOSIS — R62.7 FAILURE TO THRIVE IN ADULT: ICD-10-CM

## 2024-12-04 RX ORDER — MIRTAZAPINE 7.5 MG/1
7.5 TABLET, FILM COATED ORAL NIGHTLY
Qty: 30 TABLET | Refills: 0 | Status: SHIPPED | OUTPATIENT
Start: 2024-12-04 | End: 2025-01-03

## 2024-12-04 NOTE — PROGRESS NOTES
History Of Present Illness  Latha Quispe is a 79 y.o. female presenting to GI clinic for follow up IBS, nausea, FTT.  She is here with her , Brayan.    Patient is much improved.  She states her nausea and appetite have improved with the nightly mirtazapine, however she is still nauseous in the mornings. She states she is still having soft stools 2-3 times daily, she has not taken the Linzess yet because she has not needed it.  Her abdominal pain is better than it was before, and she is not having the abdominal pain associated with bowel movements like she was.  Her weight is up to 78 to 79 pounds at home.  She is very happy with her current regimen, but she would like to go up on the mirtazapine to see if it will help her nausea more.    Social History  She reports that she has never smoked. She has never been exposed to tobacco smoke. She has never used smokeless tobacco. She reports that she does not drink alcohol and does not use drugs.  She does not take NSAIDs on a regular basis    Family History  Family History   Problem Relation Name Age of Onset    Osteoporosis Mother      Alcohol abuse Father      Cancer Sister          breast    Osteoporosis Sister      Other (bladder cancer) Sister      Cancer Brother          colon. prostate    Colon cancer Brother      Colon cancer Brother      Hypothyroidism Other       The patient does have a FH of CRC. she does have a FH of IBD    Review of Systems   Constitutional:  Negative for unexpected weight change.   Gastrointestinal:  Positive for abdominal pain and nausea. Negative for anal bleeding, blood in stool, constipation and diarrhea.        See HPI   All other systems reviewed and are negative.        Physical Exam  Constitutional:       General: She is not in acute distress.     Appearance: Normal appearance. She is underweight. She is not ill-appearing.   HENT:      Head: Normocephalic and atraumatic.      Mouth/Throat:      Mouth: Mucous membranes are  "moist.   Eyes:      General: No scleral icterus.     Conjunctiva/sclera: Conjunctivae normal.      Pupils: Pupils are equal, round, and reactive to light.   Pulmonary:      Effort: Pulmonary effort is normal.   Abdominal:      General: There is no distension.      Palpations: Abdomen is soft. There is no mass.      Tenderness: There is no abdominal tenderness.      Hernia: No hernia is present.   Musculoskeletal:         General: Normal range of motion.      Cervical back: Normal range of motion.   Skin:     General: Skin is warm and dry.      Coloration: Skin is not jaundiced or pale.   Neurological:      Mental Status: She is alert. Mental status is at baseline.   Psychiatric:         Mood and Affect: Mood normal.         Behavior: Behavior normal.          Last Vital Signs  /75 (BP Location: Left arm, Patient Position: Sitting, BP Cuff Size: Small adult)   Pulse 55   Resp 18   Ht 1.499 m (4' 11\")   Wt (!) 36.5 kg (80 lb 8 oz)   SpO2 99%   BMI 16.26 kg/m²      Relevant Results  Lab Results   Component Value Date    WBC 5.5 10/23/2024    HGB 11.3 (L) 10/23/2024    HCT 35.2 (L) 10/23/2024    MCV 92 10/23/2024     10/23/2024      Lab Results   Component Value Date    GLUCOSE 98 10/23/2024    CALCIUM 9.2 10/23/2024     (L) 10/23/2024    K 3.9 10/23/2024    CO2 33 (H) 10/23/2024    CL 95 (L) 10/23/2024    BUN 15 10/23/2024    CREATININE 0.65 10/23/2024      Lab Results   Component Value Date    ALT 17 10/19/2024    AST 23 10/19/2024    ALKPHOS 86 10/19/2024    BILITOT 0.5 10/19/2024    INR 1.2 (H) 05/05/2024      Lab Results   Component Value Date    BVUKGURJ11 407 10/20/2024       Lab Results   Component Value Date    CALPS 85 (H) 10/30/2024    CRP <0.10 05/05/2024      Lab Results   Component Value Date    LIPASE 26 10/19/2024    LIPASE 10 09/06/2024    LIPASE 14 07/19/2024    LIPASE 10 05/05/2024    LIPASE 41 01/25/2024    LIPASE 18 07/08/2023    LIPASE 11 04/26/2021    No results found for: " "\"HGBA1C\"     History of gastrojejunostomy in 1992 for treatment of ulcers and 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    EGD/COLONOSCOPY/COLOGUARD  EGD 05/05/24 with Dr. Courtney- Findings  The upper third of the esophagus, middle third of the esophagus and lower third of the esophagus appeared normal.  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  The afferent limb was difficult to visualize.     EGD 01/02/24 with Dr. Diez- Findings  Performed forceps biopsies in the stomach  Regular Z-line 32 cm from the incisors  FOOD in the greater curve of the stomach   FINAL DIAGNOSIS   A. STOMACH ANTRUM BIOPSY:    -- GASTRIC MUCOSA WITH MILD CHRONIC NONSPECIFIC GASTRITIS AND REACTIVE GASTROPATHY  -- HELICOBACTER PYLORI NOT IDENTIFIED       EUS 3/15/2022 with Dr. Caba- Impression:            EGD:                         - Normal esophagus.                         - Z-line, 36 cm from the incisors.                         - Erythematous mucosa in the stomach. Biopsied.                         - A gastroenterostomy was found, characterized by an                          intact appearance and healthy appearing mucosa. See                          Findings above.                         - Normal ampulla and examined duodenal limb.                         - Duodenal limb was examined in its entirety. At end,                          mucosal changes with altered texture and friability                          noted. Biopsied.                         - Normal examined jejunal limb.                         EUS:                         - The pancreatic duct had a dilated endosonographic                          appearance, had a tortuous/ectatic appearance and had                          hyperechoic walls in the main pancreatic duct. The                          pancreatic duct measured up to 5 mm in diameter in the                    "       neck and 3 mm in the tail.                         - Pancreatic parenchymal abnormalities consisting of                          hyperechoic foci were noted in the genu of the                          pancreas, pancreatic body and pancreatic tail.                         - No pancreatic mass was seen, however head of                          pancreas incompletely visualized due to patient's                          post-surgical anatomy.                         - There was no evidence of significant pathology in                          the visualized portion of the liver.  FINAL DIAGNOSIS  A.  SMALL BOWEL, COLD FORCEPS:    --SMALL INTESTINAL MUCOSA WITH FOCAL ACUTE INFLAMMATION, GASTRIC MUCIN CELL METAPLASIA AND HISTIOCYTIC INFILTRATION, SEE NOTE.    Note: Immunohistochemical study shows the histiocytes are positive for CD 68,  and are negative for S100.  GMS, PAS/D and AFB stains are negative for microorganisms.    B.  STOMACH BIOPSY, COLD FORCEPS:    --GASTRIC OXYNTIC MUCOSA WITH MILD CHRONIC INFLAMMATION AND FEATURES SUGGESTIVE OF REACTIVE GASTROPATHY.  --NO HELICOBACTER PYLORI-LIKE ORGANISMS SEEN IN ROUTINE STAINED SECTIONS.       EGD 12/31/2020 with Dr. Guzmán- moderate food residue in stomach, hx Vadim en Y due to previous ulcers and negative biopsies for h.pylori.      Colonoscopy 12/31/2020 with Dr. Guzmán (modified due to altered anatomy) which noted friable anastomosis but otherwise WNL. 5 year follow up recommended.     EGD 12/23/2020 with Dr. Guzmán- ESOPHAGUS: The entire examined esophagus appeared normal.  GE JUNCTION: The Z-line was located at 34 cm from the incissors and appeared regular.  STOMACH: There was a large amount of food debris in the stomach which limited views. There was no gross blood seen. There appeared to be postoperative changes with a possible diverticulum in the stomach. The gastrojejunal anastomosis appeared to be at 45 cm from the incisors without any ulceration  seen. Because of amount of food present in the stomach the procedure was abbreviated for patient safety to avoid aspiration and the jejunum was not intubated.      EGD 10/17/2016 with Dr. Huerta- ESOPHAGUS: The distal esophagus had several concentric rings but there were nonobstructing.  STOMACH: There was evidence of previous gastric surgery (Vadim-en-Y). The gastrojejunal anastomosis was friable with 2 small punctate ulcerations identified. The mucosa bled easily on contact and with biopsy. The jejunal loop beyond the anastomosis appeared normal. The gastric mucosa along the suture line in the stomach was edematous and erythematous. Multiple cold biopsies were also obtained from the stomach to rule out H. pylori infection.      EGD 8/15/2016 with Dr. Celis ESOPHAGUS: The distal esophagus was noted to be tortuous with a nonobstructing Schatzki's ring at the GE junction.  STOMACH: There was evidence of previous gastric resection secondary to peptic ulcer disease. A Billroth II anastomosis was identified. Both the limbs were entered and examined. He fair in limb demonstrated some edema and erythema which was biopsied using cold biopsy forceps to rule out celiac disease. At the anastomosis there was a small benign-appearing ulcer identified. Cold biopsies were obtained to rule out malignancy. In addition, the proximal body of the stomach demonstrated a linear erythema and edema which was biopsied using cold forceps to rule out the presence of H. pylori infection. Retroflexion of the gastroscope in the antrum demonstrated a small sliding-type hiatal hernia.   FINAL DIAGNOSIS  A.  Anastomosis Site, Biopsy - CHRONIC AND ACTIVE GASTRITIS WITH ACUTE  ULCERATION AND FOCAL INTESTINAL METAPLASIA.  Comment:  Giemsa stain is negative for Helicobacter pylori.  An Alcian Blue/PAS stain confirms the presence of intestinal metaplasia.  There is no evidence of dysplasia or malignancy.    B.  Small Intestine, Biopsy - NO SIGNIFICANT  PATHOLOGIC CHANGE.  Comment:  The villous architecture is preserved and there is no evidence of  intraepithelial lymphocytosis. No granulomas or parasites are seen.    C.  Stomach, Body, Biopsy - CHRONIC ACTIVE GASTRITIS.  Comment:  Giemsa stain is negative for Helicobacter pylori.       Colonoscopy (flex sig due to altered anatomy) 8/15/2016 with Dr. Huerta- Findings:  RECTAL EXAM: Normal sphincter tone, no palpable masses.  COLON: The colonoscope was inserted into the rectum and advanced to approximately 20 cm from the anal verge where the ileocolonic anastomosis was identified. This was an end-to-side anastomosis. The anastomosis had some friable mucosa along its margin. No active bleeding. No ulcer identified. Residual vegetable debris was noted near the anastomotic region. The scope was then retracted back in the rectum is retroflexed demonstrating grade 2 nonbleeding internal hemorrhoids.      EGD 4/21/2015 with Dr. Garrison-Findings:  ESOPHAGUS: There was a very small sliding (1cm) hiatal hernia. The esophagus appeared otherwise normal.   GE JUNCTION: The Z-line was located at cm from the gums and appeared irregular. No erosive esophagitis was noted at the GE junction. Also no lesion of the lower esophagus mentioned in the upper GI series was seen.  STOMACH: The antrum of the stomach is missing (previous gastrectomy). There was slight dilation of the stomach ending in a double barrel opening. The more proximal opening is blind ended while the second one is slightly narrower than the expected, accommodating the gastroscope easily through it. Based on the se findings the previous procedure must have been Vagotomy with antrectomy and Vadim en Y anastomosis. There were two lesions, one in the stomach closure line and a second one at the G-J anastomosis. Both were biopsied/removed with biopsy forceps.   FINAL DIAGNOSIS  A.  Gastric Lesion, Biopsy - SMALL FRAGMENT OF GASTRIC FUNDIC/BODY TYPE MUCOSA WITH FOCAL ACUTE  EROSIVE GASTRITIS.  Comment: Focal fibrinoinflammatory exudate is present. There is minimal chronic inflammation. Giemsa stain is negative for Helicobacter pylori.  An Alcian blue/PAS stain reveals no evidence of intestinal metaplasia.    B.  Gastrojejunal Junction Polyp, Biopsy - ACUTE INFLAMMATION WITH GRANULATION TISSUE.  -  NEGATIVE FOR DYSPLASIA OR MALIGNANCY.       Colonoscopy 4/21/2015 with Dr. Garrison- Findings: 1) Extensive melanosis coli, due chronic abuse of laxatives  2) Redundancy and dilation of a multi-curved colon. No anatomic abnormalities, causing obstruction were seen though     PERTINENT IMAGING  === 10/19/24 ===  CT ABDOMEN PELVIS WO IV CONTRAST  - Impression -  1. Limited exam due to lack of intravenous contrast and lack of intra-abdominal fat.  2. Status post gastric bypass and colonic surgery at the level of the sigmoid colon and rectum with possible thickening at the level of the rectum, which may be related to proctitis.  3. Cholelithiasis.  4. Small amount of periportal edema, which has not significantly changed.  5. Status post hysterectomy.  6. New 1.5 cm irregular density in the right lung base. Since the prior exam was only 6 weeks ago, this may be related to an infiltrate. Follow-up is suggested. If this density persists further evaluation with CT of the chest should be considered.  7. Emphysematous changes in the lung bases.  8. Redemonstration of punctate nonobstructing left renal stone    === 09/06/24 ===  CT ABDOMEN PELVIS WO IV CONTRAST  - Impression -  Limited evaluation due to lack of intravenous contrast and paucity of  intra-abdominal fat.  Questionable edema in the pancreas.  Please  clinically for acute pancreatitis.  Mild edema in the distal sigmoid colon/rectum concerning for component  of colitis/proctitis.  Cholelithiasis.  Punctate nonobstructive calculus in the upper pole of the left kidney.  Mild periportal edema.  Moderate colonic stool.  Postsurgical changes in  the distal colon.    === 07/19/24 ===  CT CHEST ABDOMEN PELVIS W IV CONTRAST  - Impression -  CHEST:  Moderately severe emphysema. Moderate kyphosis. No acute intrathoracic abnormality.    ABDOMEN-PELVIS:  Postop changes. No acute abnormality of the abdomen or pelvis. The exam is limited by the paucity of fat    === 07/08/24 ===  NM GASTRIC EMPTYING SOLID   12% %  at 1 hr    (normal max 90%)  5% %  at 2 hr    (normal max 60%)      IMPRESSION  1. Rapid gastric emptying without evidence of gastroparesis (Pt was taking prescribed Linzess)    === 05/05/24 ===  CT ABDOMEN PELVIS W IV CONTRAST  - Impression -  1.  No acute findings in the abdomen and pelvis.  2. Few fluid-filled small bowel loops in the lower abdomen without definite segmental distention to suggest obstruction.  3. Redemonstration of diffusely dilated main pancreatic duct, similar compared to prior imaging dating back to 2021. This was worked up by MRI and CT pancreas protocol in 2022. Recommend correlation with prior reports.  4. Cholelithiasis without acute cholecystitis.  5. Diffuse osseous demineralization    Assessment/Plan   Assessment & Plan  Irritable bowel syndrome with constipation  Symptoms currently controlled  Continue hyoscyamine 4 times daily  Has Linzess if needed       Failure to thrive in adult  Responding well to mirtazapine 7.5 at bedtime, however she is still having some nausea in the mornings.  She would like to increase dose.  Will increase to 15 mg nightly.  Orders:    mirtazapine (Remeron) 15 mg tablet; Take 1 tablet (15 mg) by mouth once daily at bedtime.    Gastroesophageal reflux disease without esophagitis  Continue Carafate 4 times daily and pantoprazole daily       Follow-up in 3 months        GEO [place]-CNP  12/09/24

## 2024-12-06 ENCOUNTER — PATIENT OUTREACH (OUTPATIENT)
Dept: PRIMARY CARE | Facility: CLINIC | Age: 79
End: 2024-12-06
Payer: MEDICARE

## 2024-12-06 NOTE — PROGRESS NOTES
TCM RN Outreach  Unable to reach patient for follow up call after hospitalization.   LVM with call back number for patient to call if needed   If no voicemail available call attempts x 2 were made to contact the patient to assist with any questions or concerns patient may have.

## 2024-12-09 ENCOUNTER — APPOINTMENT (OUTPATIENT)
Dept: GASTROENTEROLOGY | Facility: CLINIC | Age: 79
End: 2024-12-09
Payer: MEDICARE

## 2024-12-09 VITALS
WEIGHT: 80.5 LBS | BODY MASS INDEX: 16.23 KG/M2 | DIASTOLIC BLOOD PRESSURE: 75 MMHG | HEIGHT: 59 IN | SYSTOLIC BLOOD PRESSURE: 148 MMHG | RESPIRATION RATE: 18 BRPM | HEART RATE: 55 BPM | OXYGEN SATURATION: 99 %

## 2024-12-09 DIAGNOSIS — K58.1 IRRITABLE BOWEL SYNDROME WITH CONSTIPATION: Primary | ICD-10-CM

## 2024-12-09 DIAGNOSIS — K21.9 GASTROESOPHAGEAL REFLUX DISEASE WITHOUT ESOPHAGITIS: ICD-10-CM

## 2024-12-09 DIAGNOSIS — R62.7 FAILURE TO THRIVE IN ADULT: ICD-10-CM

## 2024-12-09 PROCEDURE — 1036F TOBACCO NON-USER: CPT

## 2024-12-09 PROCEDURE — 1160F RVW MEDS BY RX/DR IN RCRD: CPT

## 2024-12-09 PROCEDURE — 99214 OFFICE O/P EST MOD 30 MIN: CPT

## 2024-12-09 PROCEDURE — 1126F AMNT PAIN NOTED NONE PRSNT: CPT

## 2024-12-09 PROCEDURE — 1159F MED LIST DOCD IN RCRD: CPT

## 2024-12-09 RX ORDER — MIRTAZAPINE 15 MG/1
15 TABLET, FILM COATED ORAL NIGHTLY
Qty: 30 TABLET | Refills: 0 | Status: SHIPPED | OUTPATIENT
Start: 2024-12-09 | End: 2025-01-08

## 2024-12-09 ASSESSMENT — ENCOUNTER SYMPTOMS
UNEXPECTED WEIGHT CHANGE: 0
CONSTIPATION: 0
NAUSEA: 1
ROS GI COMMENTS: SEE HPI
BLOOD IN STOOL: 0
DIARRHEA: 0
ANAL BLEEDING: 0
ABDOMINAL PAIN: 1

## 2024-12-09 ASSESSMENT — PAIN SCALES - GENERAL: PAINLEVEL_OUTOF10: 0-NO PAIN

## 2024-12-09 NOTE — ASSESSMENT & PLAN NOTE
Responding well to mirtazapine 7.5 at bedtime, however she is still having some nausea in the mornings.  She would like to increase dose.  Will increase to 15 mg nightly.  Orders:    mirtazapine (Remeron) 15 mg tablet; Take 1 tablet (15 mg) by mouth once daily at bedtime.

## 2024-12-09 NOTE — PATIENT INSTRUCTIONS
Continue pantoprazole, hyoscyamine, Carafate  Use Linzess as needed  Increase mirtazapine to 15 mg nightly  Continue multivitamin daily  Follow up in 3 months

## 2024-12-11 ENCOUNTER — APPOINTMENT (OUTPATIENT)
Dept: PRIMARY CARE | Facility: CLINIC | Age: 79
End: 2024-12-11
Payer: MEDICARE

## 2024-12-11 VITALS
OXYGEN SATURATION: 100 % | WEIGHT: 79.4 LBS | HEIGHT: 59 IN | DIASTOLIC BLOOD PRESSURE: 78 MMHG | SYSTOLIC BLOOD PRESSURE: 118 MMHG | HEART RATE: 77 BPM | BODY MASS INDEX: 16.01 KG/M2 | TEMPERATURE: 97.9 F

## 2024-12-11 DIAGNOSIS — Z00.00 MEDICARE ANNUAL WELLNESS VISIT, SUBSEQUENT: Primary | ICD-10-CM

## 2024-12-11 DIAGNOSIS — Z78.0 ENCOUNTER FOR OSTEOPOROSIS SCREENING IN ASYMPTOMATIC POSTMENOPAUSAL PATIENT: ICD-10-CM

## 2024-12-11 DIAGNOSIS — Z13.820 ENCOUNTER FOR OSTEOPOROSIS SCREENING IN ASYMPTOMATIC POSTMENOPAUSAL PATIENT: ICD-10-CM

## 2024-12-11 DIAGNOSIS — N39.0 ACUTE UTI: ICD-10-CM

## 2024-12-11 LAB
POC APPEARANCE, URINE: CLEAR
POC BILIRUBIN, URINE: NEGATIVE
POC BLOOD, URINE: ABNORMAL
POC COLOR, URINE: YELLOW
POC GLUCOSE, URINE: NEGATIVE MG/DL
POC KETONES, URINE: NEGATIVE MG/DL
POC LEUKOCYTES, URINE: NEGATIVE
POC NITRITE,URINE: NEGATIVE
POC PH, URINE: 6.5 PH
POC PROTEIN, URINE: NEGATIVE MG/DL
POC SPECIFIC GRAVITY, URINE: 1.01
POC UROBILINOGEN, URINE: 0.2 EU/DL

## 2024-12-11 PROCEDURE — 1036F TOBACCO NON-USER: CPT | Performed by: FAMILY MEDICINE

## 2024-12-11 PROCEDURE — 81003 URINALYSIS AUTO W/O SCOPE: CPT | Performed by: FAMILY MEDICINE

## 2024-12-11 PROCEDURE — 1159F MED LIST DOCD IN RCRD: CPT | Performed by: FAMILY MEDICINE

## 2024-12-11 PROCEDURE — 87086 URINE CULTURE/COLONY COUNT: CPT

## 2024-12-11 PROCEDURE — G0439 PPPS, SUBSEQ VISIT: HCPCS | Performed by: FAMILY MEDICINE

## 2024-12-11 PROCEDURE — 1160F RVW MEDS BY RX/DR IN RCRD: CPT | Performed by: FAMILY MEDICINE

## 2024-12-11 PROCEDURE — 1170F FXNL STATUS ASSESSED: CPT | Performed by: FAMILY MEDICINE

## 2024-12-11 RX ORDER — NITROFURANTOIN 25; 75 MG/1; MG/1
100 CAPSULE ORAL 2 TIMES DAILY
Qty: 14 CAPSULE | Refills: 0 | Status: SHIPPED | OUTPATIENT
Start: 2024-12-11 | End: 2024-12-18

## 2024-12-11 ASSESSMENT — ACTIVITIES OF DAILY LIVING (ADL)
BATHING: INDEPENDENT
DRESSING: INDEPENDENT
TAKING_MEDICATION: INDEPENDENT
MANAGING_FINANCES: INDEPENDENT
GROCERY_SHOPPING: INDEPENDENT
DOING_HOUSEWORK: INDEPENDENT

## 2024-12-11 NOTE — PROGRESS NOTES
"Subjective   Reason for Visit: Latha Quispe is an 79 y.o. female here for a Medicare Wellness visit.     Past Medical, Surgical, and Family History reviewed and updated in chart.    Reviewed all medications by prescribing practitioner or clinical pharmacist (such as prescriptions, OTCs, herbal therapies and supplements) and documented in the medical record.    Chief Complaint   Patient presents with    Medicare Annual Wellness Visit Subsequent    UTI         HPI  Here for medicare annual visit  Has been having dysuria and urine urgency x 3 days. No fever. No nausea/vomiting. Diarrhea has improved from last visit    Patient Care Team:  Melanie Andrade MD as PCP - General  Melanie Andrade MD as PCP - [facility] Attributed Provider  Heavenly Gaming RN as Care Manager (Case Management)  Joellen Peralta RD as Dietitian (Case Management)     Review of Systems  General: no fever  Eyes: no blurry vision  ENT: no sore throat, no ear pain  Resp: no cough, sob or wheezing  Cardio: no chest pain, no palpitations  Abd: no nausea/vomiting  :see HPI    Objective   Vitals:  /78   Pulse 77   Temp 36.6 °C (97.9 °F)   Ht 1.499 m (4' 11\")   Wt (!) 36 kg (79 lb 6.4 oz)   SpO2 100%   BMI 16.04 kg/m²       Physical Exam  Gen: NAD, alert  Head: normocephalic/atraumatic  Eyes: conjunctivae normal  Ears: canals clear bilaterally, TM normal   Nose: external nose normal   Oropharynx: clear   Resp: Clear to auscultation  CVS: Regular rate and rhythm  Abdomen: soft, NT, ND  Ext: no edema, NT of lower extremities       Assessment & Plan  Medicare annual wellness visit, subsequent         Acute UTI    Orders:    POCT UA Automated manually resulted    Urine Culture    nitrofurantoin, macrocrystal-monohydrate, (Macrobid) 100 mg capsule; Take 1 capsule (100 mg) by mouth 2 times a day for 7 days.    Encounter for osteoporosis screening in asymptomatic postmenopausal patient    Orders:    XR DEXA bone density; " Future

## 2024-12-13 DIAGNOSIS — N39.0 ACUTE UTI: Primary | ICD-10-CM

## 2024-12-13 LAB — BACTERIA UR CULT: NORMAL

## 2024-12-16 ENCOUNTER — APPOINTMENT (OUTPATIENT)
Dept: CARE COORDINATION | Facility: CLINIC | Age: 79
End: 2024-12-16
Payer: MEDICARE

## 2024-12-16 NOTE — CARE PLAN
"Nutrition Follow-up   Dietitian 1 mo  follow-up. Pt is being seen Virtual for  weight assistance/working on gaining weight.     Labs  Lab Results   Component Value Date     (L) 10/23/2024    K 3.9 10/23/2024    CL 95 (L) 10/23/2024    CO2 33 (H) 10/23/2024    BUN 15 10/23/2024    CREATININE 0.65 10/23/2024    CALCIUM 9.2 10/23/2024    ALBUMIN 3.6 10/19/2024    PROT 7.2 10/19/2024    BILITOT 0.5 10/19/2024    ALKPHOS 86 10/19/2024    ALT 17 10/19/2024    AST 23 10/19/2024    GLUCOSE 98 10/23/2024       Lab Results   Component Value Date    CHOL 114 09/07/2024    LDLCALC 49 09/07/2024    TRIG 94 09/07/2024    HDL 46.6 09/07/2024    LDLF 80 07/13/2022       Anthropometrics  Ht Readings from Last 1 Encounters:   12/11/24 1.499 m (4' 11\")       BMI Readings from Last 1 Encounters:   12/11/24 16.04 kg/m²       Wt Readings from Last 10 Encounters:   12/11/24 (!) 36 kg (79 lb 6.4 oz)   12/09/24 (!) 36.5 kg (80 lb 8 oz)   11/06/24 (!) 33.2 kg (73 lb 3.2 oz)   11/04/24 (!) 33.9 kg (74 lb 12.8 oz)   10/29/24 (!) 33.8 kg (74 lb 9.6 oz)   10/22/24 (!) 33 kg (72 lb 12 oz)   10/11/24 (!) 32.2 kg (71 lb)   10/10/24 (!) 31.8 kg (70 lb)   09/16/24 (!) 34.3 kg (75 lb 9.6 oz)   09/06/24 (!) 32.8 kg (72 lb 5 oz)       Weight change:   Trending up, increased 4 lbs since last call, CBW 79 Lbs    Visit Summary  Patient reports that she is feeling and doing better. Appetite has been picking up and she is eating 3 small meals and 2 snacks. Aims to drink 2 nutritional drinks/day, sometimes struggles with this due to nausea. Discussed some methods to help increase kcal/protein intake. Provided lots of food ideas. Also, having some constipation. Focused on ways to increase dietary fiber.       Nutrition Diagnosis:  Diagnosis Statement 1:  Diagnosis Status: Ongoing  Diagnosis : Underweight  related to decreased ability to consume sufficient energy as evidenced by poor intake, change in eating habits, early satiety, skipped " meals    Nutrition Goals    Continue gradual weight gain   2.   Continue to work on increasing kcal/protein intake   3.   Add in more fiber rich foods to help reduce constipation     Follow up Plan  Will follow-up x 6 wks

## 2025-01-02 DIAGNOSIS — R62.7 FAILURE TO THRIVE IN ADULT: ICD-10-CM

## 2025-01-03 RX ORDER — MIRTAZAPINE 15 MG/1
15 TABLET, FILM COATED ORAL NIGHTLY
Qty: 30 TABLET | Refills: 0 | Status: SHIPPED | OUTPATIENT
Start: 2025-01-03 | End: 2025-02-02

## 2025-01-08 ENCOUNTER — PATIENT OUTREACH (OUTPATIENT)
Dept: PRIMARY CARE | Facility: CLINIC | Age: 80
End: 2025-01-08
Payer: MEDICARE

## 2025-01-22 DIAGNOSIS — E87.6 HYPOKALEMIA: ICD-10-CM

## 2025-01-22 RX ORDER — POTASSIUM CHLORIDE 750 MG/1
10 TABLET, FILM COATED, EXTENDED RELEASE ORAL 2 TIMES DAILY
Qty: 180 TABLET | Refills: 1 | Status: SHIPPED | OUTPATIENT
Start: 2025-01-22

## 2025-01-27 ENCOUNTER — APPOINTMENT (OUTPATIENT)
Dept: UROLOGY | Facility: CLINIC | Age: 80
End: 2025-01-27
Payer: MEDICARE

## 2025-01-27 DIAGNOSIS — R30.0 DYSURIA: Primary | ICD-10-CM

## 2025-01-27 DIAGNOSIS — N39.0 RECURRENT UTI: ICD-10-CM

## 2025-01-27 RX ORDER — METHENAMINE HIPPURATE 1000 MG/1
1 TABLET ORAL 2 TIMES DAILY
Qty: 180 TABLET | Refills: 3 | Status: SHIPPED | OUTPATIENT
Start: 2025-01-27 | End: 2026-01-27

## 2025-01-27 NOTE — PROGRESS NOTES
Virtual or Telephone Consent    A telephone visit (audio only) between the patient (at the originating site) and the provider (at the distant site) was utilized to provide this telehealth service.   Verbal consent was requested and obtained from Latha Quispe on this date, 25 for a telehealth visit.            HISTORY OF PRESENT ILLNESS:  Doing well, no infections since 10/2024, occasionally has dysuria, resolves with drinking water.          Past Medical History  She has a past medical history of Abdominal pain, Anxiety, Cat bite (2023), Cataract, Depression, Disease of thyroid gland, GERD (gastroesophageal reflux disease), Hypothyroidism, Irritable bowel syndrome, Panic attacks, Personal history of malignant neoplasm of breast (2021), and Personal history of other complications of pregnancy, childbirth and the puerperium.    Surgical History  She has a past surgical history that includes Other surgical history (2021); Breast surgery; Gastric bypass; Colon surgery; Hysterectomy; Cataract extraction; Mastectomy, partial (Left); CT angio abdomen w and or wo IV IV contrast (2023); and Colectomy ().     Social History  She reports that she has never smoked. She has never been exposed to tobacco smoke. She has never used smokeless tobacco. She reports that she does not drink alcohol and does not use drugs.    Family History  Family History   Problem Relation Name Age of Onset    Osteoporosis Mother      Alcohol abuse Father      Cancer Sister          breast    Osteoporosis Sister      Other (bladder cancer) Sister      Cancer Brother          colon. prostate    Colon cancer Brother      Colon cancer Brother      Hypothyroidism Other          Allergies  Diflucan [fluconazole] and Nsaids (non-steroidal anti-inflammatory drug)      A comprehensive 10+ review of systems was negative except for: see hpi                       Assessment:      Latha Quispe is a 79 y.o.  who presents  for microhematuria, UTI  and incontinence      Microhematuria:   -Cystoscopy done 6/6/24  CTU shows small nonbstructing L kidney stone   Repeat UA in 6/2025     UTI:  Standing urine culture at  labs   Rx trimpex x 90 days, 10/2024-1/2025  continue estraiol   Positive culture on 10/3/24 and 10/9/24  Rx methenamine         Incontinence:   Stopped gemtesa  Not an issue         Constipation:  Smooth move tea recommended         Follow up in 3 months           I spent a total of 11 minutes speaking with the patient on the telephone      All questions and concerns were answered and addressed.  The patient expressed understanding and agrees with the plan.      Abhilash Rodriguez MD

## 2025-01-28 DIAGNOSIS — R30.0 DYSURIA: ICD-10-CM

## 2025-01-29 DIAGNOSIS — R30.0 DYSURIA: ICD-10-CM

## 2025-01-30 DIAGNOSIS — R30.0 DYSURIA: ICD-10-CM

## 2025-01-31 DIAGNOSIS — R30.0 DYSURIA: ICD-10-CM

## 2025-02-03 DIAGNOSIS — R30.0 DYSURIA: ICD-10-CM

## 2025-02-04 DIAGNOSIS — R30.0 DYSURIA: ICD-10-CM

## 2025-02-04 DIAGNOSIS — R62.7 FAILURE TO THRIVE IN ADULT: ICD-10-CM

## 2025-02-04 DIAGNOSIS — F32.0 MILD MAJOR DEPRESSION (CMS-HCC): Primary | ICD-10-CM

## 2025-02-04 RX ORDER — MIRTAZAPINE 15 MG/1
15 TABLET, FILM COATED ORAL NIGHTLY
Qty: 30 TABLET | Refills: 5 | Status: SHIPPED | OUTPATIENT
Start: 2025-02-04

## 2025-02-05 ENCOUNTER — PATIENT OUTREACH (OUTPATIENT)
Dept: PRIMARY CARE | Facility: CLINIC | Age: 80
End: 2025-02-05
Payer: MEDICARE

## 2025-02-05 DIAGNOSIS — R30.0 DYSURIA: ICD-10-CM

## 2025-02-06 DIAGNOSIS — R30.0 DYSURIA: ICD-10-CM

## 2025-02-07 DIAGNOSIS — R30.0 DYSURIA: ICD-10-CM

## 2025-02-10 ENCOUNTER — APPOINTMENT (OUTPATIENT)
Dept: CARE COORDINATION | Facility: CLINIC | Age: 80
End: 2025-02-10
Payer: MEDICARE

## 2025-02-10 DIAGNOSIS — R30.0 DYSURIA: ICD-10-CM

## 2025-02-10 NOTE — CARE PLAN
"Nutrition Follow-up   Dietitian 1 mo  follow-up. Pt is being seen Virtual for  weight gain/ unintentional weight loss.   Labs  Lab Results   Component Value Date     (L) 10/23/2024    K 3.9 10/23/2024    CL 95 (L) 10/23/2024    CO2 33 (H) 10/23/2024    BUN 15 10/23/2024    CREATININE 0.65 10/23/2024    CALCIUM 9.2 10/23/2024    ALBUMIN 3.6 10/19/2024    PROT 7.2 10/19/2024    BILITOT 0.5 10/19/2024    ALKPHOS 86 10/19/2024    ALT 17 10/19/2024    AST 23 10/19/2024    GLUCOSE 98 10/23/2024       Lab Results   Component Value Date    CHOL 114 09/07/2024    LDLCALC 49 09/07/2024    TRIG 94 09/07/2024    HDL 46.6 09/07/2024    LDLF 80 07/13/2022        Anthropometrics  Ht Readings from Last 1 Encounters:   12/11/24 1.499 m (4' 11\")       BMI Readings from Last 1 Encounters:   12/11/24 16.04 kg/m²       Wt Readings from Last 10 Encounters:   12/11/24 (!) 36 kg (79 lb 6.4 oz)   12/09/24 (!) 36.5 kg (80 lb 8 oz)   11/06/24 (!) 33.2 kg (73 lb 3.2 oz)   11/04/24 (!) 33.9 kg (74 lb 12.8 oz)   10/29/24 (!) 33.8 kg (74 lb 9.6 oz)   10/22/24 (!) 33 kg (72 lb 12 oz)   10/11/24 (!) 32.2 kg (71 lb)   10/10/24 (!) 31.8 kg (70 lb)   09/16/24 (!) 34.3 kg (75 lb 9.6 oz)   09/06/24 (!) 32.8 kg (72 lb 5 oz)       Weight change:   Up 1 lb, CBW: 80 lbs    Visit Summary  Patient reports that she is doing well. Weight is up 1 lb. Is currently having some abdominal pain. Mostly in the morning. Has been snacking on oranges/melon. Has struggled with acid foods in the past. Recommended to try alternative fruit to oranges. Drinks black tea in the morning on empty stomach. Encouraged to try and switch up morning routine and eliminate caffeine on empty stomach. Also still has some straining with bowel movements. May need stool softener. Encouraged to discuss with doctor at next visit. Increase water and add in more soft/cooked vegetables (no bottom teeth). Continues to drink 1 high kcal Boost per day.       Nutrition Diagnosis:  Diagnosis " Statement 1:  Diagnosis Status: Ongoing  Diagnosis : Underweight  related to decreased ability to consume sufficient energy as evidenced by avoidance or limitation of intake due to GI symptoms    Nutrition Goals    Continue to focusing increasing kcal/protein intake to help facilitate weight gain   2.    Increase water intake - currently drinking 2 bottles of H20 per day, aiming to increase to 2.5-3/day   3.    Increase fiber intake - add in more soft/cooked vegetables     Follow up Plan  Will follow-up x 8 wks/2 mo

## 2025-02-11 DIAGNOSIS — R30.0 DYSURIA: ICD-10-CM

## 2025-02-25 ENCOUNTER — APPOINTMENT (OUTPATIENT)
Dept: PRIMARY CARE | Facility: CLINIC | Age: 80
End: 2025-02-25
Payer: MEDICARE

## 2025-02-25 VITALS
BODY MASS INDEX: 16.05 KG/M2 | SYSTOLIC BLOOD PRESSURE: 128 MMHG | HEART RATE: 66 BPM | TEMPERATURE: 97.7 F | DIASTOLIC BLOOD PRESSURE: 70 MMHG | OXYGEN SATURATION: 98 % | WEIGHT: 79.6 LBS | HEIGHT: 59 IN

## 2025-02-25 DIAGNOSIS — R25.2 MUSCLE CRAMP: ICD-10-CM

## 2025-02-25 DIAGNOSIS — K59.04 CHRONIC IDIOPATHIC CONSTIPATION: Primary | ICD-10-CM

## 2025-02-25 DIAGNOSIS — J98.4 LUNG DENSITY PRESENT ON IMAGING STUDY: ICD-10-CM

## 2025-02-25 DIAGNOSIS — R39.9 URINARY SYMPTOM OR SIGN: ICD-10-CM

## 2025-02-25 LAB
POC APPEARANCE, URINE: ABNORMAL
POC BILIRUBIN, URINE: NEGATIVE
POC BLOOD, URINE: ABNORMAL
POC COLOR, URINE: YELLOW
POC GLUCOSE, URINE: NEGATIVE MG/DL
POC KETONES, URINE: NEGATIVE MG/DL
POC LEUKOCYTES, URINE: ABNORMAL
POC NITRITE,URINE: NEGATIVE
POC PH, URINE: 6 PH
POC PROTEIN, URINE: ABNORMAL MG/DL
POC SPECIFIC GRAVITY, URINE: 1.01
POC UROBILINOGEN, URINE: 0.2 EU/DL

## 2025-02-25 PROCEDURE — 81003 URINALYSIS AUTO W/O SCOPE: CPT | Performed by: FAMILY MEDICINE

## 2025-02-25 PROCEDURE — 1159F MED LIST DOCD IN RCRD: CPT | Performed by: FAMILY MEDICINE

## 2025-02-25 PROCEDURE — 1036F TOBACCO NON-USER: CPT | Performed by: FAMILY MEDICINE

## 2025-02-25 PROCEDURE — 99214 OFFICE O/P EST MOD 30 MIN: CPT | Performed by: FAMILY MEDICINE

## 2025-02-25 NOTE — PROGRESS NOTES
"Subjective   Patient ID: Latha Quispe is a 79 y.o. female who presents for Follow-up (2 mth. Stomach pain been going on 2-3 weeks. Pain when she has bowel movements. ).    HPI  Here for follow up  Has not yet scheduled bone density scan  Had CT abdomen/pelvis done which showed new 1.5 cm irregular density in the right lung base. Since the prior exam was only 6 weeks ago, this may be related to an infiltrate. Follow-up is suggested.   Patient has been having diffuse abdominal pain for the past 2-3 weeks. Does have constipation. Been using linzess at home, not helping, advised to also take miralax (has at home), no vomiting, no fever.   Occasionally gets muscle cramps at night.     Review of Systems  General: no fever  Eyes: no blurry vision  ENT: no sore throat   Resp: no cough   Cardio: no chest pain   Abd: see HPI  : no dysuria, no increased urinary frequency      /70   Pulse 66   Temp 36.5 °C (97.7 °F)   Ht 1.499 m (4' 11\")   Wt (!) 36.1 kg (79 lb 9.6 oz)   SpO2 98%   BMI 16.08 kg/m²       Objective   Physical Exam  Gen: NAD, alert  Head: normocephalic/atraumatic  Eyes: conjunctivae normal  Ears: canals clear bilaterally, TM normal   Nose: external nose normal   Oropharynx: clear   Resp: Clear to auscultation  CVS: Regular rate and rhythm  Abdomen: soft, no rebound or guarding, mild tenderness with deep palpation in lower abdomen  Ext: no edema, NT of lower extremities     CT abdomen/pelvis (10/2024)  1. Limited exam due to lack of intravenous contrast and lack of  intra-abdominal fat.  2. Status post gastric bypass and colonic surgery at the level of the  sigmoid colon and rectum with possible thickening at the level of the  rectum, which may be related to proctitis.  3. Cholelithiasis.  4. Small amount of periportal edema, which has not significantly  changed.  5. Status post hysterectomy.  6. New 1.5 cm irregular density in the right lung base. Since the  prior exam was only 6 weeks ago, this " may be related to an  infiltrate. Follow-up is suggested. If this density persists further  evaluation with CT of the chest should be considered.  7. Emphysematous changes in the lung bases.  8. Redemonstration of punctate nonobstructing left renal stone    Lab Results   Component Value Date    WBC 5.5 10/23/2024    HGB 11.3 (L) 10/23/2024    HCT 35.2 (L) 10/23/2024    MCV 92 10/23/2024     10/23/2024       Chemistry    Lab Results   Component Value Date/Time     (L) 10/23/2024 0623    K 3.9 10/23/2024 0623    CL 95 (L) 10/23/2024 0623    CO2 33 (H) 10/23/2024 0623    BUN 15 10/23/2024 0623    CREATININE 0.65 10/23/2024 0623    Lab Results   Component Value Date/Time    CALCIUM 9.2 10/23/2024 0623    ALKPHOS 86 10/19/2024 1032    AST 23 10/19/2024 1032    ALT 17 10/19/2024 1032    BILITOT 0.5 10/19/2024 1032          Assessment/Plan   Problem List Items Addressed This Visit       Chronic idiopathic constipation  Miralax PRN, linsess     Other Visit Diagnoses       Lung density present on imaging study      Relevant Orders    CT chest wo IV contrast    Urinary symptom or sign        Relevant Orders    POCT UA Automated manually resulted    Muscle cramp        Relevant Orders    Magnesium    Creatine Kinase    Comprehensive Metabolic Panel    CBC

## 2025-02-26 LAB
ALBUMIN SERPL-MCNC: 4.3 G/DL (ref 3.6–5.1)
ALP SERPL-CCNC: 93 U/L (ref 37–153)
ALT SERPL-CCNC: 9 U/L (ref 6–29)
ANION GAP SERPL CALCULATED.4IONS-SCNC: 6 MMOL/L (CALC) (ref 7–17)
AST SERPL-CCNC: 17 U/L (ref 10–35)
BILIRUB SERPL-MCNC: 0.5 MG/DL (ref 0.2–1.2)
BUN SERPL-MCNC: 10 MG/DL (ref 7–25)
CALCIUM SERPL-MCNC: 9.5 MG/DL (ref 8.6–10.4)
CHLORIDE SERPL-SCNC: 100 MMOL/L (ref 98–110)
CK SERPL-CCNC: 61 U/L (ref 18–225)
CO2 SERPL-SCNC: 30 MMOL/L (ref 20–32)
CREAT SERPL-MCNC: 0.91 MG/DL (ref 0.6–1)
EGFRCR SERPLBLD CKD-EPI 2021: 65 ML/MIN/1.73M2
ERYTHROCYTE [DISTWIDTH] IN BLOOD BY AUTOMATED COUNT: 12.5 % (ref 11–15)
GLUCOSE SERPL-MCNC: 92 MG/DL (ref 65–99)
HCT VFR BLD AUTO: 37.4 % (ref 35–45)
HGB BLD-MCNC: 12.1 G/DL (ref 11.7–15.5)
MAGNESIUM SERPL-MCNC: 1.5 MG/DL (ref 1.5–2.5)
MCH RBC QN AUTO: 29.4 PG (ref 27–33)
MCHC RBC AUTO-ENTMCNC: 32.4 G/DL (ref 32–36)
MCV RBC AUTO: 91 FL (ref 80–100)
PLATELET # BLD AUTO: 293 THOUSAND/UL (ref 140–400)
PMV BLD REES-ECKER: 10.1 FL (ref 7.5–12.5)
POTASSIUM SERPL-SCNC: 3.8 MMOL/L (ref 3.5–5.3)
PROT SERPL-MCNC: 6.3 G/DL (ref 6.1–8.1)
RBC # BLD AUTO: 4.11 MILLION/UL (ref 3.8–5.1)
SODIUM SERPL-SCNC: 136 MMOL/L (ref 135–146)
WBC # BLD AUTO: 6.8 THOUSAND/UL (ref 3.8–10.8)

## 2025-02-28 DIAGNOSIS — N39.0 URINARY TRACT INFECTION WITHOUT HEMATURIA, SITE UNSPECIFIED: Primary | ICD-10-CM

## 2025-02-28 LAB — BACTERIA UR CULT: ABNORMAL

## 2025-02-28 RX ORDER — NITROFURANTOIN 25; 75 MG/1; MG/1
100 CAPSULE ORAL 2 TIMES DAILY
Qty: 14 CAPSULE | Refills: 0 | Status: SHIPPED | OUTPATIENT
Start: 2025-02-28 | End: 2025-03-07

## 2025-02-28 RX ORDER — CALCIUM CARBONATE 300MG(750)
400 TABLET,CHEWABLE ORAL 2 TIMES DAILY
COMMUNITY

## 2025-03-10 ENCOUNTER — APPOINTMENT (OUTPATIENT)
Dept: GASTROENTEROLOGY | Facility: CLINIC | Age: 80
End: 2025-03-10
Payer: MEDICARE

## 2025-03-10 NOTE — PROGRESS NOTES
History Of Present Illness  Latha Quispe is a 79 y.o. female presenting to GI clinic for follow up IBS-C, failure to thrive, GERD.  She is here with her .    Pt states that she is feeling terrible.  She has been having severe generalized abdominal pain, for the last week or 2, especially since yesterday.  She has been under more stress recently these last few weeks. Patient is constipated again and now needing to use Linzess in order to have bowel movements.  She has had 3 or 4 bowel movements this morning.  She notes when she does have a bowel movement and empties out, the abdominal pain improves.     Her weight has gone up a few pounds, which she is happy about.  She is trying to stay active and does yoga.    Social History  She reports that she has never smoked. She has never been exposed to tobacco smoke. She has never used smokeless tobacco. She reports that she does not drink alcohol and does not use drugs.  She does not take NSAIDs on a regular basis    Family History  Family History   Problem Relation Name Age of Onset    Osteoporosis Mother      Alcohol abuse Father      Cancer Sister          breast    Osteoporosis Sister      Other (bladder cancer) Sister      Cancer Brother          colon. prostate    Colon cancer Brother      Colon cancer Brother      Hypothyroidism Other       The patient does have a FH of CRC. she does not have a FH of IBD    Review of Systems   Gastrointestinal:  Positive for abdominal pain and constipation.        See HPI   All other systems reviewed and are negative.        Physical Exam  Constitutional:       General: She is not in acute distress.     Appearance: Normal appearance. She is underweight. She is not ill-appearing.      Comments: Hunched over due to abdominal pain   HENT:      Head: Normocephalic and atraumatic.      Mouth/Throat:      Mouth: Mucous membranes are moist.   Eyes:      General: No scleral icterus.     Conjunctiva/sclera: Conjunctivae normal.       "Pupils: Pupils are equal, round, and reactive to light.   Pulmonary:      Effort: Pulmonary effort is normal.   Abdominal:      General: Bowel sounds are normal. There is no distension.      Palpations: Abdomen is soft. There is no mass.      Tenderness: There is abdominal tenderness (Generalized).      Hernia: No hernia is present.   Musculoskeletal:         General: Normal range of motion.      Cervical back: Normal range of motion.   Skin:     General: Skin is warm and dry.      Coloration: Skin is not jaundiced or pale.   Neurological:      Mental Status: She is alert. Mental status is at baseline.   Psychiatric:         Mood and Affect: Mood normal.         Behavior: Behavior normal.          Last Vital Signs  /62   Pulse 69   Ht 1.499 m (4' 11\")   Wt (!) 36.3 kg (80 lb)   SpO2 95%   BMI 16.16 kg/m²      Relevant Results  Lab Results   Component Value Date    WBC 6.8 02/25/2025    HGB 12.1 02/25/2025    HCT 37.4 02/25/2025    MCV 91.0 02/25/2025     02/25/2025      Lab Results   Component Value Date    GLUCOSE 92 02/25/2025    CALCIUM 9.5 02/25/2025     02/25/2025    K 3.8 02/25/2025    CO2 30 02/25/2025     02/25/2025    BUN 10 02/25/2025    CREATININE 0.91 02/25/2025      Lab Results   Component Value Date    ALT 9 02/25/2025    AST 17 02/25/2025    ALKPHOS 93 02/25/2025    BILITOT 0.5 02/25/2025    INR 1.2 (H) 05/05/2024      Lab Results   Component Value Date    RSLRPLMN53 407 10/20/2024       Lab Results   Component Value Date    CALPS 85 (H) 10/30/2024    CRP <0.10 05/05/2024      Lab Results   Component Value Date    LIPASE 26 10/19/2024    LIPASE 10 09/06/2024    LIPASE 14 07/19/2024    LIPASE 10 05/05/2024    LIPASE 41 01/25/2024    LIPASE 18 07/08/2023    LIPASE 11 04/26/2021    No results found for: \"HGBA1C\"     History of gastrojejunostomy in 1992 for treatment of ulcers and 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  "   EGD/COLONOSCOPY/COLOGUARD  EGD 05/05/24 with Dr. Courtney- Findings  The upper third of the esophagus, middle third of the esophagus and lower third of the esophagus appeared normal.  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  The afferent limb was difficult to visualize.     EGD 01/02/24 with Dr. Diez- Findings  Performed forceps biopsies in the stomach  Regular Z-line 32 cm from the incisors  FOOD in the greater curve of the stomach   FINAL DIAGNOSIS   A. STOMACH ANTRUM BIOPSY:    -- GASTRIC MUCOSA WITH MILD CHRONIC NONSPECIFIC GASTRITIS AND REACTIVE GASTROPATHY  -- HELICOBACTER PYLORI NOT IDENTIFIED       EUS 3/15/2022 with Dr. Caba- Impression:            EGD:                         - Normal esophagus.                         - Z-line, 36 cm from the incisors.                         - Erythematous mucosa in the stomach. Biopsied.                         - A gastroenterostomy was found, characterized by an                          intact appearance and healthy appearing mucosa. See                          Findings above.                         - Normal ampulla and examined duodenal limb.                         - Duodenal limb was examined in its entirety. At end,                          mucosal changes with altered texture and friability                          noted. Biopsied.                         - Normal examined jejunal limb.                         EUS:                         - The pancreatic duct had a dilated endosonographic                          appearance, had a tortuous/ectatic appearance and had                          hyperechoic walls in the main pancreatic duct. The                          pancreatic duct measured up to 5 mm in diameter in the                          neck and 3 mm in the tail.                         - Pancreatic parenchymal abnormalities consisting of                          hyperechoic foci were  noted in the genu of the                          pancreas, pancreatic body and pancreatic tail.                         - No pancreatic mass was seen, however head of                          pancreas incompletely visualized due to patient's                          post-surgical anatomy.                         - There was no evidence of significant pathology in                          the visualized portion of the liver.  FINAL DIAGNOSIS  A.  SMALL BOWEL, COLD FORCEPS:    --SMALL INTESTINAL MUCOSA WITH FOCAL ACUTE INFLAMMATION, GASTRIC MUCIN CELL METAPLASIA AND HISTIOCYTIC INFILTRATION, SEE NOTE.    Note: Immunohistochemical study shows the histiocytes are positive for CD 68,  and are negative for S100.  GMS, PAS/D and AFB stains are negative for microorganisms.    B.  STOMACH BIOPSY, COLD FORCEPS:    --GASTRIC OXYNTIC MUCOSA WITH MILD CHRONIC INFLAMMATION AND FEATURES SUGGESTIVE OF REACTIVE GASTROPATHY.  --NO HELICOBACTER PYLORI-LIKE ORGANISMS SEEN IN ROUTINE STAINED SECTIONS.       EGD 12/31/2020 with Dr. Guzmán- moderate food residue in stomach, hx Vadim en Y due to previous ulcers and negative biopsies for h.pylori.      Colonoscopy 12/31/2020 with Dr. Guzmán (modified due to altered anatomy) which noted friable anastomosis but otherwise WNL. 5 year follow up recommended.     EGD 12/23/2020 with Dr. Guzmán- ESOPHAGUS: The entire examined esophagus appeared normal.  GE JUNCTION: The Z-line was located at 34 cm from the incissors and appeared regular.  STOMACH: There was a large amount of food debris in the stomach which limited views. There was no gross blood seen. There appeared to be postoperative changes with a possible diverticulum in the stomach. The gastrojejunal anastomosis appeared to be at 45 cm from the incisors without any ulceration seen. Because of amount of food present in the stomach the procedure was abbreviated for patient safety to avoid aspiration and the jejunum was not intubated.       EGD 10/17/2016 with Dr. Huerta- ESOPHAGUS: The distal esophagus had several concentric rings but there were nonobstructing.  STOMACH: There was evidence of previous gastric surgery (Vadim-en-Y). The gastrojejunal anastomosis was friable with 2 small punctate ulcerations identified. The mucosa bled easily on contact and with biopsy. The jejunal loop beyond the anastomosis appeared normal. The gastric mucosa along the suture line in the stomach was edematous and erythematous. Multiple cold biopsies were also obtained from the stomach to rule out H. pylori infection.      EGD 8/15/2016 with Dr. Celis ESOPHAGUS: The distal esophagus was noted to be tortuous with a nonobstructing Schatzki's ring at the GE junction.  STOMACH: There was evidence of previous gastric resection secondary to peptic ulcer disease. A Billroth II anastomosis was identified. Both the limbs were entered and examined. He fair in limb demonstrated some edema and erythema which was biopsied using cold biopsy forceps to rule out celiac disease. At the anastomosis there was a small benign-appearing ulcer identified. Cold biopsies were obtained to rule out malignancy. In addition, the proximal body of the stomach demonstrated a linear erythema and edema which was biopsied using cold forceps to rule out the presence of H. pylori infection. Retroflexion of the gastroscope in the antrum demonstrated a small sliding-type hiatal hernia.   FINAL DIAGNOSIS  A.  Anastomosis Site, Biopsy - CHRONIC AND ACTIVE GASTRITIS WITH ACUTE  ULCERATION AND FOCAL INTESTINAL METAPLASIA.  Comment:  Giemsa stain is negative for Helicobacter pylori.  An Alcian Blue/PAS stain confirms the presence of intestinal metaplasia.  There is no evidence of dysplasia or malignancy.    B.  Small Intestine, Biopsy - NO SIGNIFICANT PATHOLOGIC CHANGE.  Comment:  The villous architecture is preserved and there is no evidence of  intraepithelial lymphocytosis. No granulomas or parasites are  seen.    C.  Stomach, Body, Biopsy - CHRONIC ACTIVE GASTRITIS.  Comment:  Giemsa stain is negative for Helicobacter pylori.       Colonoscopy (flex sig due to altered anatomy) 8/15/2016 with Dr. Huerta- Findings:  RECTAL EXAM: Normal sphincter tone, no palpable masses.  COLON: The colonoscope was inserted into the rectum and advanced to approximately 20 cm from the anal verge where the ileocolonic anastomosis was identified. This was an end-to-side anastomosis. The anastomosis had some friable mucosa along its margin. No active bleeding. No ulcer identified. Residual vegetable debris was noted near the anastomotic region. The scope was then retracted back in the rectum is retroflexed demonstrating grade 2 nonbleeding internal hemorrhoids.      EGD 4/21/2015 with Dr. Garrison-Findings:  ESOPHAGUS: There was a very small sliding (1cm) hiatal hernia. The esophagus appeared otherwise normal.   GE JUNCTION: The Z-line was located at cm from the gums and appeared irregular. No erosive esophagitis was noted at the GE junction. Also no lesion of the lower esophagus mentioned in the upper GI series was seen.  STOMACH: The antrum of the stomach is missing (previous gastrectomy). There was slight dilation of the stomach ending in a double barrel opening. The more proximal opening is blind ended while the second one is slightly narrower than the expected, accommodating the gastroscope easily through it. Based on the se findings the previous procedure must have been Vagotomy with antrectomy and Vadim en Y anastomosis. There were two lesions, one in the stomach closure line and a second one at the G-J anastomosis. Both were biopsied/removed with biopsy forceps.   FINAL DIAGNOSIS  A.  Gastric Lesion, Biopsy - SMALL FRAGMENT OF GASTRIC FUNDIC/BODY TYPE MUCOSA WITH FOCAL ACUTE EROSIVE GASTRITIS.  Comment: Focal fibrinoinflammatory exudate is present. There is minimal chronic inflammation. Giemsa stain is negative for Helicobacter  pylori.  An Alcian blue/PAS stain reveals no evidence of intestinal metaplasia.    B.  Gastrojejunal Junction Polyp, Biopsy - ACUTE INFLAMMATION WITH GRANULATION TISSUE.  -  NEGATIVE FOR DYSPLASIA OR MALIGNANCY.       Colonoscopy 4/21/2015 with Dr. Garrison- Findings: 1) Extensive melanosis coli, due chronic abuse of laxatives  2) Redundancy and dilation of a multi-curved colon. No anatomic abnormalities, causing obstruction were seen though     PERTINENT IMAGING  === 10/19/24 ===  CT ABDOMEN PELVIS WO IV CONTRAST  - Impression -  1. Limited exam due to lack of intravenous contrast and lack of intra-abdominal fat.  2. Status post gastric bypass and colonic surgery at the level of the sigmoid colon and rectum with possible thickening at the level of the rectum, which may be related to proctitis.  3. Cholelithiasis.  4. Small amount of periportal edema, which has not significantly changed.  5. Status post hysterectomy.  6. New 1.5 cm irregular density in the right lung base. Since the prior exam was only 6 weeks ago, this may be related to an infiltrate. Follow-up is suggested. If this density persists further evaluation with CT of the chest should be considered.  7. Emphysematous changes in the lung bases.  8. Redemonstration of punctate nonobstructing left renal stone     === 09/06/24 ===  CT ABDOMEN PELVIS WO IV CONTRAST  - Impression -  Limited evaluation due to lack of intravenous contrast and paucity of  intra-abdominal fat.  Questionable edema in the pancreas.  Please  clinically for acute pancreatitis.  Mild edema in the distal sigmoid colon/rectum concerning for component  of colitis/proctitis.  Cholelithiasis.  Punctate nonobstructive calculus in the upper pole of the left kidney.  Mild periportal edema.  Moderate colonic stool.  Postsurgical changes in the distal colon.     === 07/19/24 ===  CT CHEST ABDOMEN PELVIS W IV CONTRAST  - Impression -  CHEST:  Moderately severe emphysema. Moderate kyphosis. No  acute intrathoracic abnormality.     ABDOMEN-PELVIS:  Postop changes. No acute abnormality of the abdomen or pelvis. The exam is limited by the paucity of fat     === 07/08/24 ===  NM GASTRIC EMPTYING SOLID   12% %  at 1 hr    (normal max 90%)  5% %  at 2 hr    (normal max 60%)      IMPRESSION  1. Rapid gastric emptying without evidence of gastroparesis (Pt was taking prescribed Linzess)     === 05/05/24 ===  CT ABDOMEN PELVIS W IV CONTRAST  - Impression -  1.  No acute findings in the abdomen and pelvis.  2. Few fluid-filled small bowel loops in the lower abdomen without definite segmental distention to suggest obstruction.  3. Redemonstration of diffusely dilated main pancreatic duct, similar compared to prior imaging dating back to 2021. This was worked up by MRI and CT pancreas protocol in 2022. Recommend correlation with prior reports.  4. Cholelithiasis without acute cholecystitis.  5. Diffuse osseous demineralization       Assessment/Plan   Assessment & Plan  Irritable bowel syndrome with constipation  Continue Linzess daily, encouraged to take this daily or every other day for now  Continue hyoscyamine 2 tabs sublingually 4 times daily  Suspect increase in Remeron may have worsened patient's constipation again, worsening IBS symptoms, dose decreased  Orders:    linaCLOtide (Linzess) 72 mcg capsule; Take 1 capsule (72 mcg) by mouth once daily in the morning. Take before meals. Do not crush or chew. Take 30-60 mins before breakfast    Follow Up In Gastroenterology; Future    Failure to thrive in adult  Continue Remeron at bedtime- suspect that the 15 mg dose may have been too constipating for patient, so dose lowered to 7.5 mg  Orders:    mirtazapine (Remeron) 7.5 mg tablet; Take 1 tablet (7.5 mg) by mouth once daily at bedtime.    Follow Up In Gastroenterology; Future    Gastroesophageal reflux disease without esophagitis  Continue Carafate 4 times daily and pantoprazole daily  Orders:    Follow Up In  Gastroenterology; Future    Follow-up 2 months        GEO [place]-SKYLAR  03/11/25

## 2025-03-11 ENCOUNTER — APPOINTMENT (OUTPATIENT)
Dept: GASTROENTEROLOGY | Facility: CLINIC | Age: 80
End: 2025-03-11
Payer: MEDICARE

## 2025-03-11 VITALS
HEIGHT: 59 IN | OXYGEN SATURATION: 95 % | WEIGHT: 80 LBS | DIASTOLIC BLOOD PRESSURE: 62 MMHG | SYSTOLIC BLOOD PRESSURE: 102 MMHG | BODY MASS INDEX: 16.13 KG/M2 | HEART RATE: 69 BPM

## 2025-03-11 DIAGNOSIS — K58.1 IRRITABLE BOWEL SYNDROME WITH CONSTIPATION: Primary | ICD-10-CM

## 2025-03-11 DIAGNOSIS — F32.0 MILD MAJOR DEPRESSION (CMS-HCC): ICD-10-CM

## 2025-03-11 DIAGNOSIS — K29.50 CHRONIC GASTRITIS, PRESENCE OF BLEEDING UNSPECIFIED, UNSPECIFIED GASTRITIS TYPE: ICD-10-CM

## 2025-03-11 DIAGNOSIS — K21.9 GASTROESOPHAGEAL REFLUX DISEASE WITHOUT ESOPHAGITIS: ICD-10-CM

## 2025-03-11 DIAGNOSIS — R62.7 FAILURE TO THRIVE IN ADULT: ICD-10-CM

## 2025-03-11 PROCEDURE — 1160F RVW MEDS BY RX/DR IN RCRD: CPT

## 2025-03-11 PROCEDURE — 1159F MED LIST DOCD IN RCRD: CPT

## 2025-03-11 PROCEDURE — 99214 OFFICE O/P EST MOD 30 MIN: CPT

## 2025-03-11 PROCEDURE — 1036F TOBACCO NON-USER: CPT

## 2025-03-11 RX ORDER — MIRTAZAPINE 7.5 MG/1
7.5 TABLET, FILM COATED ORAL NIGHTLY
Qty: 90 TABLET | Refills: 0 | Status: SHIPPED | OUTPATIENT
Start: 2025-03-11 | End: 2025-06-09

## 2025-03-11 RX ORDER — SUCRALFATE 1 G/1
1 TABLET ORAL 4 TIMES DAILY
Qty: 360 TABLET | Refills: 3 | Status: SHIPPED | OUTPATIENT
Start: 2025-03-11 | End: 2026-03-11

## 2025-03-11 ASSESSMENT — ENCOUNTER SYMPTOMS
ABDOMINAL PAIN: 1
ROS GI COMMENTS: SEE HPI
CONSTIPATION: 1

## 2025-03-11 NOTE — ASSESSMENT & PLAN NOTE
Continue Linzess daily, encouraged to take this daily or every other day for now  Continue hyoscyamine 2 tabs sublingually 4 times daily  Suspect increase in Remeron may have worsened patient's constipation again, worsening IBS symptoms, dose decreased  Orders:    linaCLOtide (Linzess) 72 mcg capsule; Take 1 capsule (72 mcg) by mouth once daily in the morning. Take before meals. Do not crush or chew. Take 30-60 mins before breakfast    Follow Up In Gastroenterology; Future

## 2025-03-11 NOTE — ASSESSMENT & PLAN NOTE
Orders:    sucralfate (Carafate) 1 gram tablet; Take 1 tablet (1 g) by mouth 4 times a day. Before meals and at bedtime

## 2025-03-11 NOTE — ASSESSMENT & PLAN NOTE
Continue Carafate 4 times daily and pantoprazole daily  Orders:    Follow Up In Gastroenterology; Future

## 2025-03-11 NOTE — ASSESSMENT & PLAN NOTE
Continue Remeron at bedtime- suspect that the 15 mg dose may have been too constipating for patient, so dose lowered to 7.5 mg  Orders:    mirtazapine (Remeron) 7.5 mg tablet; Take 1 tablet (7.5 mg) by mouth once daily at bedtime.    Follow Up In Gastroenterology; Future

## 2025-03-11 NOTE — PATIENT INSTRUCTIONS
Decrease the mirtazapine to 7.5 mg daily.   Continue hyoscyamine 2 tabs under the tongue 4 times daily  Use the Linzess 72 mcg (low dose) daily or every other day  Try waiting 30 mins after taking your medicine in the morning before tea/breakfast  Continue Carafate 4 times daily and pantoprazole daily  2 month follow up

## 2025-04-02 ENCOUNTER — TELEPHONE (OUTPATIENT)
Dept: GASTROENTEROLOGY | Facility: CLINIC | Age: 80
End: 2025-04-02
Payer: MEDICARE

## 2025-04-02 ENCOUNTER — TELEPHONE (OUTPATIENT)
Dept: PRIMARY CARE | Facility: CLINIC | Age: 80
End: 2025-04-02
Payer: MEDICARE

## 2025-04-02 ENCOUNTER — HOSPITAL ENCOUNTER (EMERGENCY)
Facility: HOSPITAL | Age: 80
Discharge: HOME | End: 2025-04-02
Attending: EMERGENCY MEDICINE
Payer: MEDICARE

## 2025-04-02 VITALS
HEIGHT: 60 IN | TEMPERATURE: 97.8 F | DIASTOLIC BLOOD PRESSURE: 64 MMHG | SYSTOLIC BLOOD PRESSURE: 107 MMHG | OXYGEN SATURATION: 100 % | WEIGHT: 75 LBS | RESPIRATION RATE: 18 BRPM | BODY MASS INDEX: 14.72 KG/M2 | HEART RATE: 57 BPM

## 2025-04-02 DIAGNOSIS — R13.10 DYSPHAGIA, UNSPECIFIED TYPE: Primary | ICD-10-CM

## 2025-04-02 PROCEDURE — 99281 EMR DPT VST MAYX REQ PHY/QHP: CPT

## 2025-04-02 PROCEDURE — 99285 EMERGENCY DEPT VISIT HI MDM: CPT | Performed by: EMERGENCY MEDICINE

## 2025-04-02 ASSESSMENT — PAIN DESCRIPTION - PAIN TYPE: TYPE: ACUTE PAIN

## 2025-04-02 ASSESSMENT — PAIN SCALES - GENERAL
PAINLEVEL_OUTOF10: 0 - NO PAIN
PAINLEVEL_OUTOF10: 1

## 2025-04-02 ASSESSMENT — PAIN - FUNCTIONAL ASSESSMENT
PAIN_FUNCTIONAL_ASSESSMENT: 0-10
PAIN_FUNCTIONAL_ASSESSMENT: 0-10

## 2025-04-02 NOTE — TELEPHONE ENCOUNTER
Patient calling with complaints of difficulty swallowing, has had dilation in the past. Patient advised to go to ER if unable to swallow. Patient notified that Cori is out of office until Monday 4/7 due to death in the family. Advised patient to contact PCP.

## 2025-04-02 NOTE — ED PROVIDER NOTES
[facility]  ED  Provider Note  4/2/2025  1:01 PM  H03/H03              Chief Complaint   Patient presents with    Dysphagia     Pt here with complaints of difficulty swallowing x3 weeks. Pt says she has a hard time taking pills and chokes on much of her food. Pt had esophageal scope several years ago.         History of Present Illness:   Latha Quispe is a 79 y.o. female presenting to the ED for difficulty swallowing, beginning several weeks ago.  The complaint has been persistent, gradually worsening in severity, and worsened by difficult to chew foods.  Patient is now having trouble even swallowing water at times which comes back up.  She denies any pain.  She has no fever or chills.  Her last esophageal dilatation was more than 10 years ago.      Review of Systems:   Pertinent positives and review of systems as noted above.  Remaining 10 review of systems is negative or noncontributory to today's episode of care.  Review of Systems       --------------------------------------------- PAST HISTORY ---------------------------------------------  Past Medical History:   Diagnosis Date    Abdominal pain     Anxiety     Cat bite 03/11/2023    Cataract     Depression     Disease of thyroid gland     GERD (gastroesophageal reflux disease)     Hypothyroidism     Irritable bowel syndrome     Panic attacks     Personal history of malignant neoplasm of breast 06/30/2021    Personal history of other complications of pregnancy, childbirth and the puerperium     miscarriage         has a past surgical history that includes Other surgical history (06/30/2021); Breast surgery; Gastric bypass; Colon surgery; Hysterectomy; Cataract extraction; Mastectomy, partial (Left); CT angio abdomen w and or wo IV IV contrast (12/28/2023); and Colectomy (1992).     reports that she has never smoked. She has never been exposed to tobacco smoke. She has never used smokeless tobacco. She reports that she does not drink alcohol and  does not use drugs.    family history includes Alcohol abuse in her father; Cancer in her brother and sister; Colon cancer in her brother and brother; Hypothyroidism in an other family member; Osteoporosis in her mother and sister; bladder cancer in her sister. Unless otherwise noted, family history is non contributory    Patient's Medications   New Prescriptions    No medications on file   Previous Medications    ACETAMINOPHEN (TYLENOL) 325 MG TABLET    Take 2 tablets (650 mg) by mouth every 4 hours if needed for mild pain (1 - 3).    CHOLECALCIFEROL (VITAMIN D3) 50 MCG (2,000 UNIT) CAPSULE    Take 1 capsule (50 mcg) by mouth once daily.    DIPHENOXYLATE-ATROPINE (LOMOTIL) 2.5-0.025 MG TABLET    Take 1 tablet by mouth 4 times a day as needed for diarrhea.    ESTRADIOL (ESTRACE) 0.01 % (0.1 MG/GRAM) VAGINAL CREAM    Insert pea size amount into vagina every night for 2 weeks, then 2x/week after    HYOSCYAMINE 0.125 MG DISINTEGRATING TABLET    Place 2 tablets (0.25 mg) under the tongue 4 times a day before meals.    LEVOTHYROXINE (SYNTHROID, LEVOXYL) 50 MCG TABLET    Take 1 tablet (50 mcg) by mouth once daily.    LINACLOTIDE (LINZESS) 72 MCG CAPSULE    Take 1 capsule (72 mcg) by mouth once daily in the morning. Take before meals. Do not crush or chew. Take 30-60 mins before breakfast    LORAZEPAM (ATIVAN) 0.5 MG TABLET    Take 1 tablet (0.5 mg) by mouth once daily as needed for anxiety.    MAGNESIUM OXIDE (MAG-OX) 400 MG TABLET    1 tablet (400 mg) 2 times a day.    METHENAMINE HIPPURATE (HIPREX) 1 GRAM TABLET    Take 1 tablet (1 g) by mouth 2 times a day.    MIRTAZAPINE (REMERON) 7.5 MG TABLET    Take 1 tablet (7.5 mg) by mouth once daily at bedtime.    PANTOPRAZOLE (PROTONIX) 40 MG EC TABLET    Take 1 tablet (40 mg) by mouth once daily.    POTASSIUM CHLORIDE CR 10 MEQ ER TABLET    Take 1 tablet (10 mEq) by mouth 2 times a day. Do not crush, chew, or split.    PROMETHAZINE (PHENERGAN) 12.5 MG TABLET    Take 1 tablet  (12.5 mg) by mouth every 12 hours if needed for nausea or vomiting.    SUCRALFATE (CARAFATE) 1 GRAM TABLET    Take 1 tablet (1 g) by mouth 4 times a day. Before meals and at bedtime    TRAZODONE (DESYREL) 150 MG TABLET    Take 1 tablet (150 mg) by mouth once daily at bedtime.    TRIMETHOPRIM (TRIMPEX) 100 MG TABLET    Take 1 pill daily for 90 days   Modified Medications    No medications on file   Discontinued Medications    No medications on file      The patient’s home medications have been reviewed.    Allergies: Diflucan [fluconazole] and Nsaids (non-steroidal anti-inflammatory drug)    -------------------------------------------------- RESULTS -------------------------------------------------  All laboratory and radiology results have been personally reviewed by myself   LABS:  Labs Reviewed - No data to display    EKG:     RADIOLOGY:  Interpreted by Radiologist.  No orders to display       ------------------------- NURSING NOTES AND VITALS REVIEWED ---------------------------   The nursing notes within the ED encounter and vital signs as below have been reviewed.   /62   Pulse 60   Temp 36.5 °C (97.7 °F)   Resp 16   Ht 1.524 m (5')   Wt (!) 34 kg (75 lb)   SpO2 96%   BMI 14.65 kg/m²   Oxygen Saturation Interpretation: Normal      ---------------------------------------------------PHYSICAL EXAM--------------------------------------  Physical Exam   Constitutional/General: Alert and oriented x3, well appearing, non toxic in NAD  Head: Normocephalic and atraumatic  Eyes: PERRL, EOMI, conjunctiva normal, sclera non icteric  Mouth: Oropharynx clear, handling secretions, no trismus, no asymmetry of the posterior oropharynx or uvular edema  Neck: Supple, full ROM, non tender to palpation in the midline, no stridor, no crepitus, no meningeal signs  Respiratory: Lungs clear to auscultation bilaterally, no wheezes, rales, or rhonchi  Cardiovascular:  Regular rate. Regular rhythm. No murmurs, gallops, or  rubs. 2+ distal pulses  Chest: No chest wall tenderness  GI:  Abdomen Soft, Non tender, Non distended.  +BS. No organomegaly, no palpable masses,  No rebound, guarding, or rigidity. No CVAT   Musculoskeletal: Moves all extremities x 4. Warm and well perfused, no clubbing, cyanosis, or edema. Capillary refill <3 seconds  Integument: skin warm and dry. No rashes.   Lymphatic: no lymphadenopathy noted  Neurologic:  No focal deficits, symmetric strength 5/5 in the upper and lower extremities bilaterally  Psychiatric: Normal Affect    Procedures    Diagnoses as of 04/02/25 1449   Dysphagia, unspecified type          Medical Decision Making:   I discussed this case with our radiology staff.  There are no studies we can do today to actually demonstrate her ability to swallow accurately.  This is first available on Monday.  I explained this to the patient and they prefer to come back on Monday to have the swallowing study done.  I have asked him to follow-up with her primary care doctor to get this scheduled and also made a referral to GI for them to get more help with her dysphagia.      Counseling:   The emergency provider has spoken with the patient and discussed today’s results, in addition to providing specific details for the plan of care and counseling regarding the diagnosis and prognosis.  Questions are answered at this time and they are agreeable with the plan.      --------------------------------- IMPRESSION AND DISPOSITION ---------------------------------        IMPRESSION  1. Dysphagia, unspecified type        DISPOSITION  Disposition: Discharge to home  Patient condition is fair      Billing Provider Critical Care Time: 0 minutes     Darnell Brennan MD  04/02/25 0696

## 2025-04-02 NOTE — ED TRIAGE NOTES
Pt here with complaints of difficulty swallowing x3 weeks. Pt says she has a hard time taking pills and chokes on much of her food. Pt had esophageal scope several years ago.

## 2025-04-02 NOTE — DISCHARGE INSTRUCTIONS
Chew your food well.    Avoid large pieces of meat or other difficult to swallow foods.    A soft diet be best.    The GI team should call you in the next day or 2 to set up an appointment to take a look at your esophagus and provide definitive care.    Return for worsening symptoms or concerns.

## 2025-04-06 ENCOUNTER — HOSPITAL ENCOUNTER (EMERGENCY)
Facility: HOSPITAL | Age: 80
Discharge: HOME | End: 2025-04-06
Attending: EMERGENCY MEDICINE
Payer: MEDICARE

## 2025-04-06 ENCOUNTER — APPOINTMENT (OUTPATIENT)
Dept: RADIOLOGY | Facility: HOSPITAL | Age: 80
End: 2025-04-06
Payer: MEDICARE

## 2025-04-06 VITALS
WEIGHT: 75 LBS | TEMPERATURE: 97.5 F | DIASTOLIC BLOOD PRESSURE: 61 MMHG | OXYGEN SATURATION: 99 % | SYSTOLIC BLOOD PRESSURE: 100 MMHG | RESPIRATION RATE: 16 BRPM | HEART RATE: 64 BPM | BODY MASS INDEX: 14.72 KG/M2 | HEIGHT: 60 IN

## 2025-04-06 DIAGNOSIS — K52.9 ENTEROCOLITIS: ICD-10-CM

## 2025-04-06 DIAGNOSIS — K80.20 CALCULUS OF GALLBLADDER WITHOUT CHOLECYSTITIS WITHOUT OBSTRUCTION: ICD-10-CM

## 2025-04-06 DIAGNOSIS — R63.6 UNDERWEIGHT: ICD-10-CM

## 2025-04-06 DIAGNOSIS — K58.0 IRRITABLE BOWEL SYNDROME WITH DIARRHEA: ICD-10-CM

## 2025-04-06 DIAGNOSIS — E83.42 HYPOMAGNESEMIA: ICD-10-CM

## 2025-04-06 DIAGNOSIS — E86.0 DEHYDRATION: ICD-10-CM

## 2025-04-06 DIAGNOSIS — E46 PROTEIN MALNUTRITION (MULTI): ICD-10-CM

## 2025-04-06 DIAGNOSIS — K83.8 DILATED BILE DUCT: ICD-10-CM

## 2025-04-06 DIAGNOSIS — R10.30 LOWER ABDOMINAL PAIN: Primary | ICD-10-CM

## 2025-04-06 LAB
ALBUMIN SERPL BCP-MCNC: 3.9 G/DL (ref 3.4–5)
ALP SERPL-CCNC: 95 U/L (ref 33–136)
ALT SERPL W P-5'-P-CCNC: 10 U/L (ref 7–45)
ANION GAP SERPL CALC-SCNC: 11 MMOL/L (ref 10–20)
APPEARANCE UR: CLEAR
AST SERPL W P-5'-P-CCNC: 22 U/L (ref 9–39)
BACTERIA #/AREA URNS AUTO: ABNORMAL /HPF
BASOPHILS # BLD AUTO: 0.06 X10*3/UL (ref 0–0.1)
BASOPHILS NFR BLD AUTO: 1.2 %
BILIRUB SERPL-MCNC: 0.6 MG/DL (ref 0–1.2)
BILIRUB UR STRIP.AUTO-MCNC: NEGATIVE MG/DL
BUN SERPL-MCNC: 15 MG/DL (ref 6–23)
CALCIUM SERPL-MCNC: 9.4 MG/DL (ref 8.6–10.3)
CHLORIDE SERPL-SCNC: 103 MMOL/L (ref 98–107)
CO2 SERPL-SCNC: 24 MMOL/L (ref 21–32)
COLOR UR: ABNORMAL
CREAT SERPL-MCNC: 0.95 MG/DL (ref 0.5–1.05)
EGFRCR SERPLBLD CKD-EPI 2021: 61 ML/MIN/1.73M*2
EOSINOPHIL # BLD AUTO: 0.07 X10*3/UL (ref 0–0.4)
EOSINOPHIL NFR BLD AUTO: 1.4 %
ERYTHROCYTE [DISTWIDTH] IN BLOOD BY AUTOMATED COUNT: 13.7 % (ref 11.5–14.5)
GLUCOSE SERPL-MCNC: 123 MG/DL (ref 74–99)
GLUCOSE UR STRIP.AUTO-MCNC: NORMAL MG/DL
HCT VFR BLD AUTO: 37.2 % (ref 36–46)
HGB BLD-MCNC: 12.1 G/DL (ref 12–16)
HOLD SPECIMEN: NORMAL
IMM GRANULOCYTES # BLD AUTO: 0.02 X10*3/UL (ref 0–0.5)
IMM GRANULOCYTES NFR BLD AUTO: 0.4 % (ref 0–0.9)
KETONES UR STRIP.AUTO-MCNC: NEGATIVE MG/DL
LEUKOCYTE ESTERASE UR QL STRIP.AUTO: NEGATIVE
LIPASE SERPL-CCNC: 16 U/L (ref 9–82)
LYMPHOCYTES # BLD AUTO: 1.27 X10*3/UL (ref 0.8–3)
LYMPHOCYTES NFR BLD AUTO: 26.1 %
MAGNESIUM SERPL-MCNC: 1.45 MG/DL (ref 1.6–2.4)
MCH RBC QN AUTO: 30 PG (ref 26–34)
MCHC RBC AUTO-ENTMCNC: 32.5 G/DL (ref 32–36)
MCV RBC AUTO: 92 FL (ref 80–100)
MONOCYTES # BLD AUTO: 0.43 X10*3/UL (ref 0.05–0.8)
MONOCYTES NFR BLD AUTO: 8.8 %
MUCOUS THREADS #/AREA URNS AUTO: ABNORMAL /LPF
NEUTROPHILS # BLD AUTO: 3.01 X10*3/UL (ref 1.6–5.5)
NEUTROPHILS NFR BLD AUTO: 62.1 %
NITRITE UR QL STRIP.AUTO: NEGATIVE
NRBC BLD-RTO: 0 /100 WBCS (ref 0–0)
PH UR STRIP.AUTO: 6 [PH]
PLATELET # BLD AUTO: 281 X10*3/UL (ref 150–450)
POTASSIUM SERPL-SCNC: 4 MMOL/L (ref 3.5–5.3)
PROT SERPL-MCNC: 6.3 G/DL (ref 6.4–8.2)
PROT UR STRIP.AUTO-MCNC: ABNORMAL MG/DL
RBC # BLD AUTO: 4.03 X10*6/UL (ref 4–5.2)
RBC # UR STRIP.AUTO: ABNORMAL MG/DL
RBC #/AREA URNS AUTO: ABNORMAL /HPF
SODIUM SERPL-SCNC: 134 MMOL/L (ref 136–145)
SP GR UR STRIP.AUTO: 1.03
SQUAMOUS #/AREA URNS AUTO: ABNORMAL /HPF
UROBILINOGEN UR STRIP.AUTO-MCNC: NORMAL MG/DL
WBC # BLD AUTO: 4.9 X10*3/UL (ref 4.4–11.3)
WBC #/AREA URNS AUTO: ABNORMAL /HPF

## 2025-04-06 PROCEDURE — 81001 URINALYSIS AUTO W/SCOPE: CPT | Performed by: EMERGENCY MEDICINE

## 2025-04-06 PROCEDURE — 83735 ASSAY OF MAGNESIUM: CPT | Performed by: EMERGENCY MEDICINE

## 2025-04-06 PROCEDURE — 96374 THER/PROPH/DIAG INJ IV PUSH: CPT | Mod: 59

## 2025-04-06 PROCEDURE — 96375 TX/PRO/DX INJ NEW DRUG ADDON: CPT

## 2025-04-06 PROCEDURE — 99285 EMERGENCY DEPT VISIT HI MDM: CPT | Mod: 25 | Performed by: EMERGENCY MEDICINE

## 2025-04-06 PROCEDURE — 74177 CT ABD & PELVIS W/CONTRAST: CPT | Performed by: STUDENT IN AN ORGANIZED HEALTH CARE EDUCATION/TRAINING PROGRAM

## 2025-04-06 PROCEDURE — 74177 CT ABD & PELVIS W/CONTRAST: CPT

## 2025-04-06 PROCEDURE — 2500000004 HC RX 250 GENERAL PHARMACY W/ HCPCS (ALT 636 FOR OP/ED): Performed by: EMERGENCY MEDICINE

## 2025-04-06 PROCEDURE — 2500000004 HC RX 250 GENERAL PHARMACY W/ HCPCS (ALT 636 FOR OP/ED)

## 2025-04-06 PROCEDURE — 83690 ASSAY OF LIPASE: CPT | Performed by: EMERGENCY MEDICINE

## 2025-04-06 PROCEDURE — 96361 HYDRATE IV INFUSION ADD-ON: CPT

## 2025-04-06 PROCEDURE — 2550000001 HC RX 255 CONTRASTS: Performed by: EMERGENCY MEDICINE

## 2025-04-06 PROCEDURE — 80053 COMPREHEN METABOLIC PANEL: CPT | Performed by: EMERGENCY MEDICINE

## 2025-04-06 PROCEDURE — 85025 COMPLETE CBC W/AUTO DIFF WBC: CPT | Performed by: EMERGENCY MEDICINE

## 2025-04-06 PROCEDURE — 36415 COLL VENOUS BLD VENIPUNCTURE: CPT | Performed by: EMERGENCY MEDICINE

## 2025-04-06 RX ORDER — LANOLIN ALCOHOL/MO/W.PET/CERES
CREAM (GRAM) TOPICAL
Status: COMPLETED
Start: 2025-04-06 | End: 2025-04-06

## 2025-04-06 RX ORDER — MORPHINE SULFATE 4 MG/ML
4 INJECTION, SOLUTION INTRAMUSCULAR; INTRAVENOUS ONCE
Status: COMPLETED | OUTPATIENT
Start: 2025-04-06 | End: 2025-04-06

## 2025-04-06 RX ORDER — LANOLIN ALCOHOL/MO/W.PET/CERES
800 CREAM (GRAM) TOPICAL DAILY
Status: DISCONTINUED | OUTPATIENT
Start: 2025-04-06 | End: 2025-04-06 | Stop reason: HOSPADM

## 2025-04-06 RX ORDER — ONDANSETRON HYDROCHLORIDE 2 MG/ML
4 INJECTION, SOLUTION INTRAVENOUS ONCE
Status: COMPLETED | OUTPATIENT
Start: 2025-04-06 | End: 2025-04-06

## 2025-04-06 RX ORDER — CALCIUM CARBONATE 300MG(750)
400 TABLET,CHEWABLE ORAL 2 TIMES DAILY
Qty: 14 TABLET | Refills: 0 | Status: SHIPPED | OUTPATIENT
Start: 2025-04-06 | End: 2025-04-13

## 2025-04-06 RX ADMIN — Medication 800 MG: at 09:44

## 2025-04-06 RX ADMIN — SODIUM CHLORIDE 1000 ML: 9 INJECTION, SOLUTION INTRAVENOUS at 07:24

## 2025-04-06 RX ADMIN — IOHEXOL 50 ML: 350 INJECTION, SOLUTION INTRAVENOUS at 08:02

## 2025-04-06 RX ADMIN — MORPHINE SULFATE 4 MG: 4 INJECTION, SOLUTION INTRAMUSCULAR; INTRAVENOUS at 07:25

## 2025-04-06 RX ADMIN — ONDANSETRON 4 MG: 2 INJECTION INTRAMUSCULAR; INTRAVENOUS at 07:25

## 2025-04-06 ASSESSMENT — PAIN - FUNCTIONAL ASSESSMENT: PAIN_FUNCTIONAL_ASSESSMENT: 0-10

## 2025-04-06 ASSESSMENT — PAIN DESCRIPTION - PAIN TYPE: TYPE: CHRONIC PAIN

## 2025-04-06 ASSESSMENT — PAIN DESCRIPTION - DESCRIPTORS: DESCRIPTORS: SHARP;STABBING

## 2025-04-06 ASSESSMENT — PAIN SCALES - GENERAL
PAINLEVEL_OUTOF10: 0 - NO PAIN
PAINLEVEL_OUTOF10: 10 - WORST POSSIBLE PAIN
PAINLEVEL_OUTOF10: 9

## 2025-04-06 ASSESSMENT — PAIN DESCRIPTION - LOCATION
LOCATION: ABDOMEN
LOCATION: ABDOMEN

## 2025-04-06 ASSESSMENT — PAIN DESCRIPTION - PROGRESSION: CLINICAL_PROGRESSION: GRADUALLY WORSENING

## 2025-04-06 ASSESSMENT — PAIN DESCRIPTION - ONSET: ONSET: ONGOING

## 2025-04-06 ASSESSMENT — PAIN DESCRIPTION - FREQUENCY: FREQUENCY: CONSTANT/CONTINUOUS

## 2025-04-06 NOTE — ED PROVIDER NOTES
HPI   Chief Complaint   Patient presents with    Abdominal Pain       79-year-old female presents for evaluation of lower abdominal pain and loose stools.  She states that she woke up at 4 in the morning with sharp 10/10 in severity midline lower abdominal pain that radiates to the vaginal area.  No vaginal bleeding or discharge.  Mild burning with urination.  The pain is constant.  No aggravating or alleviating factors.  She has irritable bowel syndrome and is on GERD.  She has prior history of gastric ulcer and is status post gastric bypass.  No nausea or vomiting.  She is hesitant to eat food because it causes her to have diarrhea.  Her weight was up to almost 85 pounds.  Today she weighs 75 pounds.  Remote prior history of breast cancer 25 years ago that has been successfully treated, but no diagnosed malignancy since that time.      History provided by:  Patient and medical records          Patient History   Past Medical History:   Diagnosis Date    Abdominal pain     Anxiety     Cat bite 03/11/2023    Cataract     Depression     Disease of thyroid gland     GERD (gastroesophageal reflux disease)     Hypothyroidism     Irritable bowel syndrome     Panic attacks     Personal history of malignant neoplasm of breast 06/30/2021    Personal history of other complications of pregnancy, childbirth and the puerperium     miscarriage     Past Surgical History:   Procedure Laterality Date    BREAST SURGERY      CATARACT EXTRACTION      COLECTOMY  1992    COLON SURGERY      CT ABDOMEN ANGIOGRAM W AND/OR WO IV CONTRAST  12/28/2023    GASTRIC BYPASS      HYSTERECTOMY      MASTECTOMY, PARTIAL Left     OTHER SURGICAL HISTORY  06/30/2021    Varicose vein ligation     Family History   Problem Relation Name Age of Onset    Osteoporosis Mother      Alcohol abuse Father      Cancer Sister          breast    Osteoporosis Sister      Other (bladder cancer) Sister      Cancer Brother          colon. prostate    Colon cancer Brother       Colon cancer Brother      Hypothyroidism Other       Social History     Tobacco Use    Smoking status: Never     Passive exposure: Never    Smokeless tobacco: Never   Vaping Use    Vaping status: Never Used   Substance Use Topics    Alcohol use: Never    Drug use: Never       Physical Exam   ED Triage Vitals [04/06/25 0643]   Temperature Heart Rate Respirations BP   36.3 °C (97.4 °F) 68 16 117/72      Pulse Ox Temp Source Heart Rate Source Patient Position   99 % Temporal Monitor Sitting      BP Location FiO2 (%)     Left arm --       Physical Exam  Vitals and nursing note reviewed.   Constitutional:       General: She is not in acute distress.     Appearance: She is well-developed and underweight.   HENT:      Head: Normocephalic and atraumatic.      Mouth/Throat:      Mouth: Mucous membranes are dry.   Eyes:      Conjunctiva/sclera: Conjunctivae normal.   Cardiovascular:      Rate and Rhythm: Normal rate and regular rhythm.      Heart sounds: No murmur heard.  Pulmonary:      Effort: Pulmonary effort is normal. No respiratory distress.      Breath sounds: Normal breath sounds.   Abdominal:      Palpations: Abdomen is soft.      Tenderness: There is abdominal tenderness in the suprapubic area. There is no right CVA tenderness, left CVA tenderness, guarding or rebound.   Musculoskeletal:         General: No swelling.      Cervical back: Neck supple.   Skin:     General: Skin is warm and dry.      Capillary Refill: Capillary refill takes less than 2 seconds.   Neurological:      General: No focal deficit present.      Mental Status: She is alert and oriented to person, place, and time.      Cranial Nerves: No cranial nerve deficit.      Sensory: No sensory deficit.      Motor: No weakness.   Psychiatric:         Mood and Affect: Mood normal.           ED Course & MDM   ED Course as of 04/06/25 0948   Sun Apr 06, 2025   0711 71-year-old female presents for evaluation of lower abdominal pain, dehydration,  malnutrition.  Saline bolus Zofran and morphine for pain and rehydration.  Urinalysis to evaluate for UTI.  Laboratory studies.  CT abdomen and pelvis to evaluate for process in the abdomen or pelvis.  Will reevaluate after initial workup, treatment. [BT]   0926 Comprehensive metabolic panel(!)  Sodium 134.  Glucose 123.  Total protein borderline low at 6.3. [BT]   0927 Urinalysis with Reflex Culture and Microscopic(!)  Urine negative for infection [BT]   0927 MAGNESIUM(!): 1.45  Magnesium 1.45.  Given Mag-Ox 400 milligrams here and placed on 1 week of magnesium replacement. [BT]   0944 CT shows fluid-filled small bowel and liquid stool near the ileosigmoid anastomosis.  Nonspecific enterocolitis.  No bowel obstruction.  No pneumatosis.  She has dilation of the bile and pancreatic ducts.  Nonobstructing gallstone noted.  Outpatient MRCP ordered.  I did discuss this finding and need for close follow-up with the patient.  Her primary has been informed by way of shared medical record. [BT]   0945 Abdominal pain improved after treatment.  Abdomen soft, nontender. [BT]   0945 I considered admission.  Her symptoms are most likely related to irritable bowel syndrome.  She states that she takes Linzess more than prescribed.  She also is scared to eat because it makes her bowels more irritable.  She has ensure at home.  She was encouraged to continue taking Ensure.  She was referred to PCP for follow-up.  Advised to return for repeat evaluation with any concerns.  Patient agreeable with plan and verbalized understanding.  Discharged home. [BT]      ED Course User Index  [BT] Rian Shen DO         Diagnoses as of 04/06/25 0948   Lower abdominal pain   Irritable bowel syndrome with diarrhea   Protein malnutrition (Multi)   Underweight   Dehydration   Enterocolitis   Calculus of gallbladder without cholecystitis without obstruction   Dilated bile duct   Hypomagnesemia     CT abdomen pelvis w IV contrast   Final Result    Fluid-filled small-bowel as well as liquid stool near the ileosigmoid   anastomosis suggestive of nonspecific enterocolitis, likely   infectious/inflammatory. No bowel obstruction or pneumatosis.        Progressive bile and pancreatic duct dilation. Recommend further   evaluation with outpatient MRCP to exclude underlying obstructive   process/mass.        MACRO:   None        Signed by: Brandon Tsai 4/6/2025 8:41 AM   Dictation workstation:   [id number]                      No data recorded     [place] Coma Scale Score: 15 (04/06/25 0740 : Ruba Watkins RN)                           Medical Decision Making      Procedure  Procedures     Rian Shen,   04/06/25 0948

## 2025-04-07 ENCOUNTER — TELEPHONE (OUTPATIENT)
Dept: PRIMARY CARE | Facility: CLINIC | Age: 80
End: 2025-04-07

## 2025-04-07 ENCOUNTER — OFFICE VISIT (OUTPATIENT)
Dept: GASTROENTEROLOGY | Facility: CLINIC | Age: 80
End: 2025-04-07
Payer: MEDICARE

## 2025-04-07 ENCOUNTER — APPOINTMENT (OUTPATIENT)
Dept: CARE COORDINATION | Facility: CLINIC | Age: 80
End: 2025-04-07
Payer: MEDICARE

## 2025-04-07 VITALS
DIASTOLIC BLOOD PRESSURE: 60 MMHG | HEART RATE: 54 BPM | RESPIRATION RATE: 18 BRPM | WEIGHT: 77.1 LBS | OXYGEN SATURATION: 100 % | BODY MASS INDEX: 15.14 KG/M2 | HEIGHT: 60 IN | SYSTOLIC BLOOD PRESSURE: 106 MMHG

## 2025-04-07 DIAGNOSIS — R10.9 ACUTE ABDOMINAL PAIN: ICD-10-CM

## 2025-04-07 DIAGNOSIS — R13.19 ESOPHAGEAL DYSPHAGIA: Primary | ICD-10-CM

## 2025-04-07 DIAGNOSIS — E87.6 HYPOKALEMIA: Primary | ICD-10-CM

## 2025-04-07 DIAGNOSIS — K58.1 IRRITABLE BOWEL SYNDROME WITH CONSTIPATION: ICD-10-CM

## 2025-04-07 PROCEDURE — 1160F RVW MEDS BY RX/DR IN RCRD: CPT

## 2025-04-07 PROCEDURE — 1159F MED LIST DOCD IN RCRD: CPT

## 2025-04-07 PROCEDURE — 99214 OFFICE O/P EST MOD 30 MIN: CPT

## 2025-04-07 PROCEDURE — 1036F TOBACCO NON-USER: CPT

## 2025-04-07 PROCEDURE — 1125F AMNT PAIN NOTED PAIN PRSNT: CPT

## 2025-04-07 RX ORDER — TRAMADOL HYDROCHLORIDE 50 MG/1
50 TABLET ORAL EVERY 12 HOURS PRN
Qty: 10 TABLET | Refills: 0 | Status: SHIPPED | OUTPATIENT
Start: 2025-04-07 | End: 2025-04-12

## 2025-04-07 RX ORDER — POTASSIUM CHLORIDE 1.5 G/1.58G
POWDER, FOR SOLUTION ORAL
Qty: 30 PACKET | Refills: 11 | Status: SHIPPED | OUTPATIENT
Start: 2025-04-07

## 2025-04-07 ASSESSMENT — PATIENT HEALTH QUESTIONNAIRE - PHQ9
2. FEELING DOWN, DEPRESSED OR HOPELESS: NOT AT ALL
SUM OF ALL RESPONSES TO PHQ9 QUESTIONS 1 AND 2: 0
1. LITTLE INTEREST OR PLEASURE IN DOING THINGS: NOT AT ALL

## 2025-04-07 ASSESSMENT — ENCOUNTER SYMPTOMS
UNEXPECTED WEIGHT CHANGE: 1
ABDOMINAL PAIN: 1
BLOOD IN STOOL: 0
ANAL BLEEDING: 0
ROS GI COMMENTS: SEE HPI
DIARRHEA: 1
CONSTIPATION: 0

## 2025-04-07 ASSESSMENT — PAIN SCALES - GENERAL: PAINLEVEL_OUTOF10: 10-WORST PAIN EVER

## 2025-04-07 NOTE — TELEPHONE ENCOUNTER
Patient called and said she can't swallow the potassium 10meq BID.   Switched to packets that she can mix with liquid and sent to pharmacy.   Can you please let her know that she has to take it ONLY ONCE a day. (The pills previously was twice a day, but this is a higher dose 20meq)

## 2025-04-07 NOTE — PATIENT INSTRUCTIONS
Decrease Linzess back to every 3 days  Hold MiraLAX  Keep up on protein shakes  EGD with dilation  Continue pantoprazole daily    Keep scheduled follow up

## 2025-04-07 NOTE — PROGRESS NOTES
4/7/25: Spoke with patient's . They are currently at the doctors office. Will follow up with patient and spouse tomorrow.

## 2025-04-07 NOTE — ASSESSMENT & PLAN NOTE
Instructed patient to stop taking Linzess daily and continue using every 3 days as needed since she was doing well on that regimen  Continue hyoscyamine 4 times daily  Orders:    linaCLOtide (Linzess) 72 mcg capsule; Take 1 capsule (72 mcg) by mouth every 3 days. Do not crush or chew. Take 30-60 mins before breakfast

## 2025-04-07 NOTE — H&P (VIEW-ONLY)
History Of Present Illness  Latha Quispe is a 79 y.o. female presenting to GI clinic for follow up.  She is here with her .    Patient was seen in ED twice last week for difficulty swallowing on 4/2/25 and abdominal pain on 4/6/25. She currently complains of abdominal pain, she states it is severe today.     Patient states she recently started taking Linzess daily instead of every 3 days, as well as MiraLAX daily because she is afraid that she is going to get constipated, so she has been having more frequent loose stools.  She started doing this a week or so ago. Prior to this she was doing well on the Linzess and taking every 3 days as needed with the decreased mirtazapine dose. She was not having much abdominal pain at that time.  She was even having bowel movements on her own without Linzess.    Patient complains of progressive dysphagia.  She has to cut up food into tiny bites in order for food to go down, otherwise it feels like food gets stuck in her throat/chest and will go down.  She reports that she has had dilation in the past for her dysphagia with improved symptoms.       Social History  She reports that she has never smoked. She has never been exposed to tobacco smoke. She has never used smokeless tobacco. She reports that she does not drink alcohol and does not use drugs.  She does not take NSAIDs on a regular basis    Family History  Family History   Problem Relation Name Age of Onset    Osteoporosis Mother      Alcohol abuse Father      Cancer Sister          breast    Osteoporosis Sister      Other (bladder cancer) Sister      Cancer Brother          colon. prostate    Colon cancer Brother      Colon cancer Brother      Hypothyroidism Other       The patient does have a FH of CRC. she does not have a FH of IBD    Review of Systems   Constitutional:  Positive for unexpected weight change.   Gastrointestinal:  Positive for abdominal pain and diarrhea. Negative for anal bleeding, blood in  stool and constipation.        See HPI   All other systems reviewed and are negative.        Physical Exam  Constitutional:       Appearance: She is cachectic. She is ill-appearing.   HENT:      Head: Normocephalic and atraumatic.   Eyes:      Conjunctiva/sclera: Conjunctivae normal.      Pupils: Pupils are equal, round, and reactive to light.   Pulmonary:      Effort: Pulmonary effort is normal.   Abdominal:      General: Bowel sounds are normal. There is no distension.      Palpations: Abdomen is soft. There is no mass.      Tenderness: There is no abdominal tenderness. There is no guarding or rebound.      Hernia: No hernia is present.   Musculoskeletal:         General: Normal range of motion.      Cervical back: Normal range of motion.   Skin:     General: Skin is warm and dry.      Coloration: Skin is not jaundiced.   Neurological:      Mental Status: She is alert and oriented to person, place, and time. Mental status is at baseline.   Psychiatric:         Mood and Affect: Mood normal.         Behavior: Behavior normal.          Last Vital Signs  /60 (BP Location: Left arm, Patient Position: Sitting, BP Cuff Size: Small adult)   Pulse 54   Resp 18   Ht 1.524 m (5')   Wt (!) 35 kg (77 lb 1.6 oz)   SpO2 100%   BMI 15.06 kg/m²      Relevant Results  Lab Results   Component Value Date    WBC 4.9 04/06/2025    HGB 12.1 04/06/2025    HCT 37.2 04/06/2025    MCV 92 04/06/2025       Lab Results   Component Value Date    GLUCOSE 123 (H) 04/06/2025    CALCIUM 9.4 04/06/2025     (L) 04/06/2025    K 4.0 04/06/2025    CO2 24 04/06/2025     04/06/2025    BUN 15 04/06/2025    CREATININE 0.95 04/06/2025       Lab Results   Component Value Date    ALT 10 04/06/2025    AST 22 04/06/2025    ALKPHOS 95 04/06/2025    BILITOT 0.6 04/06/2025    INR 1.2 (H) 05/05/2024       Lab Results   Component Value Date    WVHGHOQO76 407 10/20/2024       Lab Results   Component Value Date    CALPS 85 (H) 10/30/2024       "  C-Reactive Protein   Date Value Ref Range Status   05/05/2024 <0.10 <1.00 mg/dL Final       Lab Results   Component Value Date    LIPASE 16 04/06/2025    LIPASE 26 10/19/2024    LIPASE 10 09/06/2024    LIPASE 14 07/19/2024    LIPASE 10 05/05/2024    LIPASE 41 01/25/2024    LIPASE 18 07/08/2023    LIPASE 11 04/26/2021     No results found for: \"HGBA1C\"     History of gastrojejunostomy in 1992 for treatment of ulcers and 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   EGD/COLONOSCOPY/COLOGUARD  EGD 05/05/24 with Dr. Courtney- Findings  The upper third of the esophagus, middle third of the esophagus and lower third of the esophagus appeared normal.  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  The afferent limb was difficult to visualize.     EGD 01/02/24 with Dr. Diez- Findings  Performed forceps biopsies in the stomach  Regular Z-line 32 cm from the incisors  FOOD in the greater curve of the stomach   FINAL DIAGNOSIS   A. STOMACH ANTRUM BIOPSY:    -- GASTRIC MUCOSA WITH MILD CHRONIC NONSPECIFIC GASTRITIS AND REACTIVE GASTROPATHY  -- HELICOBACTER PYLORI NOT IDENTIFIED       EUS 3/15/2022 with Dr. Caba- Impression:            EGD:                         - Normal esophagus.                         - Z-line, 36 cm from the incisors.                         - Erythematous mucosa in the stomach. Biopsied.                         - A gastroenterostomy was found, characterized by an                          intact appearance and healthy appearing mucosa. See                          Findings above.                         - Normal ampulla and examined duodenal limb.                         - Duodenal limb was examined in its entirety. At end,                          mucosal changes with altered texture and friability                          noted. Biopsied.                         - Normal examined jejunal limb.              "            EUS:                         - The pancreatic duct had a dilated endosonographic                          appearance, had a tortuous/ectatic appearance and had                          hyperechoic walls in the main pancreatic duct. The                          pancreatic duct measured up to 5 mm in diameter in the                          neck and 3 mm in the tail.                         - Pancreatic parenchymal abnormalities consisting of                          hyperechoic foci were noted in the genu of the                          pancreas, pancreatic body and pancreatic tail.                         - No pancreatic mass was seen, however head of                          pancreas incompletely visualized due to patient's                          post-surgical anatomy.                         - There was no evidence of significant pathology in                          the visualized portion of the liver.  FINAL DIAGNOSIS  A.  SMALL BOWEL, COLD FORCEPS:    --SMALL INTESTINAL MUCOSA WITH FOCAL ACUTE INFLAMMATION, GASTRIC MUCIN CELL METAPLASIA AND HISTIOCYTIC INFILTRATION, SEE NOTE.    Note: Immunohistochemical study shows the histiocytes are positive for CD 68,  and are negative for S100.  GMS, PAS/D and AFB stains are negative for microorganisms.    B.  STOMACH BIOPSY, COLD FORCEPS:    --GASTRIC OXYNTIC MUCOSA WITH MILD CHRONIC INFLAMMATION AND FEATURES SUGGESTIVE OF REACTIVE GASTROPATHY.  --NO HELICOBACTER PYLORI-LIKE ORGANISMS SEEN IN ROUTINE STAINED SECTIONS.       EGD 12/31/2020 with Dr. Guzmán- moderate food residue in stomach, hx Vadim en Y due to previous ulcers and negative biopsies for h.pylori.      Colonoscopy 12/31/2020 with Dr. Guzmán (modified due to altered anatomy) which noted friable anastomosis but otherwise WNL. 5 year follow up recommended.     EGD 12/23/2020 with Dr. Guzmán- ESOPHAGUS: The entire examined esophagus appeared normal.  GE JUNCTION: The Z-line was located at 34  cm from the incissors and appeared regular.  STOMACH: There was a large amount of food debris in the stomach which limited views. There was no gross blood seen. There appeared to be postoperative changes with a possible diverticulum in the stomach. The gastrojejunal anastomosis appeared to be at 45 cm from the incisors without any ulceration seen. Because of amount of food present in the stomach the procedure was abbreviated for patient safety to avoid aspiration and the jejunum was not intubated.      EGD 10/17/2016 with Dr. Celis ESOPHAGUS: The distal esophagus had several concentric rings but there were nonobstructing.  STOMACH: There was evidence of previous gastric surgery (Vadim-en-Y). The gastrojejunal anastomosis was friable with 2 small punctate ulcerations identified. The mucosa bled easily on contact and with biopsy. The jejunal loop beyond the anastomosis appeared normal. The gastric mucosa along the suture line in the stomach was edematous and erythematous. Multiple cold biopsies were also obtained from the stomach to rule out H. pylori infection.      EGD 8/15/2016 with Dr. Celis ESOPHAGUS: The distal esophagus was noted to be tortuous with a nonobstructing Schatzki's ring at the GE junction.  STOMACH: There was evidence of previous gastric resection secondary to peptic ulcer disease. A Billroth II anastomosis was identified. Both the limbs were entered and examined. He fair in limb demonstrated some edema and erythema which was biopsied using cold biopsy forceps to rule out celiac disease. At the anastomosis there was a small benign-appearing ulcer identified. Cold biopsies were obtained to rule out malignancy. In addition, the proximal body of the stomach demonstrated a linear erythema and edema which was biopsied using cold forceps to rule out the presence of H. pylori infection. Retroflexion of the gastroscope in the antrum demonstrated a small sliding-type hiatal hernia.   FINAL DIAGNOSIS  A.   Anastomosis Site, Biopsy - CHRONIC AND ACTIVE GASTRITIS WITH ACUTE  ULCERATION AND FOCAL INTESTINAL METAPLASIA.  Comment:  Giemsa stain is negative for Helicobacter pylori.  An Alcian Blue/PAS stain confirms the presence of intestinal metaplasia.  There is no evidence of dysplasia or malignancy.    B.  Small Intestine, Biopsy - NO SIGNIFICANT PATHOLOGIC CHANGE.  Comment:  The villous architecture is preserved and there is no evidence of  intraepithelial lymphocytosis. No granulomas or parasites are seen.    C.  Stomach, Body, Biopsy - CHRONIC ACTIVE GASTRITIS.  Comment:  Giemsa stain is negative for Helicobacter pylori.       Colonoscopy (flex sig due to altered anatomy) 8/15/2016 with Dr. Huerta- Findings:  RECTAL EXAM: Normal sphincter tone, no palpable masses.  COLON: The colonoscope was inserted into the rectum and advanced to approximately 20 cm from the anal verge where the ileocolonic anastomosis was identified. This was an end-to-side anastomosis. The anastomosis had some friable mucosa along its margin. No active bleeding. No ulcer identified. Residual vegetable debris was noted near the anastomotic region. The scope was then retracted back in the rectum is retroflexed demonstrating grade 2 nonbleeding internal hemorrhoids.      EGD 4/21/2015 with Dr. Garrison-Findings:  ESOPHAGUS: There was a very small sliding (1cm) hiatal hernia. The esophagus appeared otherwise normal.   GE JUNCTION: The Z-line was located at cm from the gums and appeared irregular. No erosive esophagitis was noted at the GE junction. Also no lesion of the lower esophagus mentioned in the upper GI series was seen.  STOMACH: The antrum of the stomach is missing (previous gastrectomy). There was slight dilation of the stomach ending in a double barrel opening. The more proximal opening is blind ended while the second one is slightly narrower than the expected, accommodating the gastroscope easily through it. Based on the se findings the  previous procedure must have been Vagotomy with antrectomy and Vaidm en Y anastomosis. There were two lesions, one in the stomach closure line and a second one at the G-J anastomosis. Both were biopsied/removed with biopsy forceps.   FINAL DIAGNOSIS  A.  Gastric Lesion, Biopsy - SMALL FRAGMENT OF GASTRIC FUNDIC/BODY TYPE MUCOSA WITH FOCAL ACUTE EROSIVE GASTRITIS.  Comment: Focal fibrinoinflammatory exudate is present. There is minimal chronic inflammation. Giemsa stain is negative for Helicobacter pylori.  An Alcian blue/PAS stain reveals no evidence of intestinal metaplasia.    B.  Gastrojejunal Junction Polyp, Biopsy - ACUTE INFLAMMATION WITH GRANULATION TISSUE.  -  NEGATIVE FOR DYSPLASIA OR MALIGNANCY.       Colonoscopy 4/21/2015 with Dr. Garrison- Findings: 1) Extensive melanosis coli, due chronic abuse of laxatives  2) Redundancy and dilation of a multi-curved colon. No anatomic abnormalities, causing obstruction were seen though     PERTINENT IMAGING  === 04/06/25 ===  CT ABDOMEN PELVIS W IV CONTRAST  - Impression -  Fluid-filled small-bowel as well as liquid stool near the ileosigmoid anastomosis suggestive of nonspecific enterocolitis, likely infectious/inflammatory. No bowel obstruction or pneumatosis.    Progressive bile and pancreatic duct dilation. Recommend further evaluation with outpatient MRCP to exclude underlying obstructive process/mass.  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      === 10/19/24 ===  CT ABDOMEN PELVIS WO IV CONTRAST  - Impression -  1. Limited exam due to lack of intravenous contrast and lack of intra-abdominal fat.  2. Status post gastric bypass and colonic surgery at the level of the sigmoid colon and rectum with possible thickening at the level of the rectum, which may be related to proctitis.  3. Cholelithiasis.  4. Small amount of periportal edema, which has not  significantly changed.  5. Status post hysterectomy.  6. New 1.5 cm irregular density in the right lung base. Since the prior exam was only 6 weeks ago, this may be related to an infiltrate. Follow-up is suggested. If this density persists further evaluation with CT of the chest should be considered.  7. Emphysematous changes in the lung bases.  8. Redemonstration of punctate nonobstructing left renal stone     === 09/06/24 ===  CT ABDOMEN PELVIS WO IV CONTRAST  - Impression -  Limited evaluation due to lack of intravenous contrast and paucity of  intra-abdominal fat.  Questionable edema in the pancreas.  Please  clinically for acute pancreatitis.  Mild edema in the distal sigmoid colon/rectum concerning for component  of colitis/proctitis.  Cholelithiasis.  Punctate nonobstructive calculus in the upper pole of the left kidney.  Mild periportal edema.  Moderate colonic stool.  Postsurgical changes in the distal colon.     === 07/19/24 ===  CT CHEST ABDOMEN PELVIS W IV CONTRAST  - Impression -  CHEST:  Moderately severe emphysema. Moderate kyphosis. No acute intrathoracic abnormality.     ABDOMEN-PELVIS:  Postop changes. No acute abnormality of the abdomen or pelvis. The exam is limited by the paucity of fat     === 07/08/24 ===  NM GASTRIC EMPTYING SOLID   12% %  at 1 hr    (normal max 90%)  5% %  at 2 hr    (normal max 60%)      IMPRESSION  1. Rapid gastric emptying without evidence of gastroparesis (Pt was taking prescribed Linzess)     === 05/05/24 ===  CT ABDOMEN PELVIS W IV CONTRAST  - Impression -  1.  No acute findings in the abdomen and pelvis.  2. Few fluid-filled small bowel loops in the lower abdomen without definite segmental distention to suggest obstruction.  3. Redemonstration of diffusely dilated main pancreatic duct, similar compared to prior imaging dating back to 2021. This was worked up by MRI and CT pancreas protocol in 2022. Recommend correlation with prior reports.  4. Cholelithiasis without  acute cholecystitis.  5. Diffuse osseous demineralization       Assessment/Plan   Assessment & Plan  Irritable bowel syndrome with constipation  Instructed patient to stop taking Linzess daily and continue using every 3 days as needed since she was doing well on that regimen  Continue hyoscyamine 4 times daily  Orders:    linaCLOtide (Linzess) 72 mcg capsule; Take 1 capsule (72 mcg) by mouth every 3 days. Do not crush or chew. Take 30-60 mins before breakfast    Esophageal dysphagia  Recurrent esophageal dysphagia, responded well to dilation in the past  EGD with dilation ordered  If dysphagia persists, will order esophagram, MBSS  Orders:    Esophagogastroduodenoscopy (EGD) w Dilation Pneum; Future    Acute abdominal pain    Orders:    traMADol (Ultram) 50 mg tablet; Take 1 tablet (50 mg) by mouth every 12 hours if needed for severe pain (7 - 10) for up to 5 days.        Follow-up at scheduled appointment next month    GEO [place]-SKYLAR  04/07/25

## 2025-04-07 NOTE — PROGRESS NOTES
History Of Present Illness  Latha Quispe is a 79 y.o. female presenting to GI clinic for follow up.  She is here with her .    Patient was seen in ED twice last week for difficulty swallowing on 4/2/25 and abdominal pain on 4/6/25. She currently complains of abdominal pain, she states it is severe today.     Patient states she recently started taking Linzess daily instead of every 3 days, as well as MiraLAX daily because she is afraid that she is going to get constipated, so she has been having more frequent loose stools.  She started doing this a week or so ago. Prior to this she was doing well on the Linzess and taking every 3 days as needed with the decreased mirtazapine dose. She was not having much abdominal pain at that time.  She was even having bowel movements on her own without Linzess.    Patient complains of progressive dysphagia.  She has to cut up food into tiny bites in order for food to go down, otherwise it feels like food gets stuck in her throat/chest and will go down.  She reports that she has had dilation in the past for her dysphagia with improved symptoms.       Social History  She reports that she has never smoked. She has never been exposed to tobacco smoke. She has never used smokeless tobacco. She reports that she does not drink alcohol and does not use drugs.  She does not take NSAIDs on a regular basis    Family History  Family History   Problem Relation Name Age of Onset    Osteoporosis Mother      Alcohol abuse Father      Cancer Sister          breast    Osteoporosis Sister      Other (bladder cancer) Sister      Cancer Brother          colon. prostate    Colon cancer Brother      Colon cancer Brother      Hypothyroidism Other       The patient does have a FH of CRC. she does not have a FH of IBD    Review of Systems   Constitutional:  Positive for unexpected weight change.   Gastrointestinal:  Positive for abdominal pain and diarrhea. Negative for anal bleeding, blood in  stool and constipation.        See HPI   All other systems reviewed and are negative.        Physical Exam  Constitutional:       Appearance: She is cachectic. She is ill-appearing.   HENT:      Head: Normocephalic and atraumatic.   Eyes:      Conjunctiva/sclera: Conjunctivae normal.      Pupils: Pupils are equal, round, and reactive to light.   Pulmonary:      Effort: Pulmonary effort is normal.   Abdominal:      General: Bowel sounds are normal. There is no distension.      Palpations: Abdomen is soft. There is no mass.      Tenderness: There is no abdominal tenderness. There is no guarding or rebound.      Hernia: No hernia is present.   Musculoskeletal:         General: Normal range of motion.      Cervical back: Normal range of motion.   Skin:     General: Skin is warm and dry.      Coloration: Skin is not jaundiced.   Neurological:      Mental Status: She is alert and oriented to person, place, and time. Mental status is at baseline.   Psychiatric:         Mood and Affect: Mood normal.         Behavior: Behavior normal.          Last Vital Signs  /60 (BP Location: Left arm, Patient Position: Sitting, BP Cuff Size: Small adult)   Pulse 54   Resp 18   Ht 1.524 m (5')   Wt (!) 35 kg (77 lb 1.6 oz)   SpO2 100%   BMI 15.06 kg/m²      Relevant Results  Lab Results   Component Value Date    WBC 4.9 04/06/2025    HGB 12.1 04/06/2025    HCT 37.2 04/06/2025    MCV 92 04/06/2025       Lab Results   Component Value Date    GLUCOSE 123 (H) 04/06/2025    CALCIUM 9.4 04/06/2025     (L) 04/06/2025    K 4.0 04/06/2025    CO2 24 04/06/2025     04/06/2025    BUN 15 04/06/2025    CREATININE 0.95 04/06/2025       Lab Results   Component Value Date    ALT 10 04/06/2025    AST 22 04/06/2025    ALKPHOS 95 04/06/2025    BILITOT 0.6 04/06/2025    INR 1.2 (H) 05/05/2024       Lab Results   Component Value Date    JATIJBGD79 407 10/20/2024       Lab Results   Component Value Date    CALPS 85 (H) 10/30/2024       "  C-Reactive Protein   Date Value Ref Range Status   05/05/2024 <0.10 <1.00 mg/dL Final       Lab Results   Component Value Date    LIPASE 16 04/06/2025    LIPASE 26 10/19/2024    LIPASE 10 09/06/2024    LIPASE 14 07/19/2024    LIPASE 10 05/05/2024    LIPASE 41 01/25/2024    LIPASE 18 07/08/2023    LIPASE 11 04/26/2021     No results found for: \"HGBA1C\"     History of gastrojejunostomy in 1992 for treatment of ulcers and 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   EGD/COLONOSCOPY/COLOGUARD  EGD 05/05/24 with Dr. Courtney- Findings  The upper third of the esophagus, middle third of the esophagus and lower third of the esophagus appeared normal.  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  The afferent limb was difficult to visualize.     EGD 01/02/24 with Dr. Diez- Findings  Performed forceps biopsies in the stomach  Regular Z-line 32 cm from the incisors  FOOD in the greater curve of the stomach   FINAL DIAGNOSIS   A. STOMACH ANTRUM BIOPSY:    -- GASTRIC MUCOSA WITH MILD CHRONIC NONSPECIFIC GASTRITIS AND REACTIVE GASTROPATHY  -- HELICOBACTER PYLORI NOT IDENTIFIED       EUS 3/15/2022 with Dr. Caba- Impression:            EGD:                         - Normal esophagus.                         - Z-line, 36 cm from the incisors.                         - Erythematous mucosa in the stomach. Biopsied.                         - A gastroenterostomy was found, characterized by an                          intact appearance and healthy appearing mucosa. See                          Findings above.                         - Normal ampulla and examined duodenal limb.                         - Duodenal limb was examined in its entirety. At end,                          mucosal changes with altered texture and friability                          noted. Biopsied.                         - Normal examined jejunal limb.              "            EUS:                         - The pancreatic duct had a dilated endosonographic                          appearance, had a tortuous/ectatic appearance and had                          hyperechoic walls in the main pancreatic duct. The                          pancreatic duct measured up to 5 mm in diameter in the                          neck and 3 mm in the tail.                         - Pancreatic parenchymal abnormalities consisting of                          hyperechoic foci were noted in the genu of the                          pancreas, pancreatic body and pancreatic tail.                         - No pancreatic mass was seen, however head of                          pancreas incompletely visualized due to patient's                          post-surgical anatomy.                         - There was no evidence of significant pathology in                          the visualized portion of the liver.  FINAL DIAGNOSIS  A.  SMALL BOWEL, COLD FORCEPS:    --SMALL INTESTINAL MUCOSA WITH FOCAL ACUTE INFLAMMATION, GASTRIC MUCIN CELL METAPLASIA AND HISTIOCYTIC INFILTRATION, SEE NOTE.    Note: Immunohistochemical study shows the histiocytes are positive for CD 68,  and are negative for S100.  GMS, PAS/D and AFB stains are negative for microorganisms.    B.  STOMACH BIOPSY, COLD FORCEPS:    --GASTRIC OXYNTIC MUCOSA WITH MILD CHRONIC INFLAMMATION AND FEATURES SUGGESTIVE OF REACTIVE GASTROPATHY.  --NO HELICOBACTER PYLORI-LIKE ORGANISMS SEEN IN ROUTINE STAINED SECTIONS.       EGD 12/31/2020 with Dr. Guzmán- moderate food residue in stomach, hx Vadim en Y due to previous ulcers and negative biopsies for h.pylori.      Colonoscopy 12/31/2020 with Dr. Guzmán (modified due to altered anatomy) which noted friable anastomosis but otherwise WNL. 5 year follow up recommended.     EGD 12/23/2020 with Dr. Guzmán- ESOPHAGUS: The entire examined esophagus appeared normal.  GE JUNCTION: The Z-line was located at 34  cm from the incissors and appeared regular.  STOMACH: There was a large amount of food debris in the stomach which limited views. There was no gross blood seen. There appeared to be postoperative changes with a possible diverticulum in the stomach. The gastrojejunal anastomosis appeared to be at 45 cm from the incisors without any ulceration seen. Because of amount of food present in the stomach the procedure was abbreviated for patient safety to avoid aspiration and the jejunum was not intubated.      EGD 10/17/2016 with Dr. Celis ESOPHAGUS: The distal esophagus had several concentric rings but there were nonobstructing.  STOMACH: There was evidence of previous gastric surgery (Vadim-en-Y). The gastrojejunal anastomosis was friable with 2 small punctate ulcerations identified. The mucosa bled easily on contact and with biopsy. The jejunal loop beyond the anastomosis appeared normal. The gastric mucosa along the suture line in the stomach was edematous and erythematous. Multiple cold biopsies were also obtained from the stomach to rule out H. pylori infection.      EGD 8/15/2016 with Dr. Celis ESOPHAGUS: The distal esophagus was noted to be tortuous with a nonobstructing Schatzki's ring at the GE junction.  STOMACH: There was evidence of previous gastric resection secondary to peptic ulcer disease. A Billroth II anastomosis was identified. Both the limbs were entered and examined. He fair in limb demonstrated some edema and erythema which was biopsied using cold biopsy forceps to rule out celiac disease. At the anastomosis there was a small benign-appearing ulcer identified. Cold biopsies were obtained to rule out malignancy. In addition, the proximal body of the stomach demonstrated a linear erythema and edema which was biopsied using cold forceps to rule out the presence of H. pylori infection. Retroflexion of the gastroscope in the antrum demonstrated a small sliding-type hiatal hernia.   FINAL DIAGNOSIS  A.   Anastomosis Site, Biopsy - CHRONIC AND ACTIVE GASTRITIS WITH ACUTE  ULCERATION AND FOCAL INTESTINAL METAPLASIA.  Comment:  Giemsa stain is negative for Helicobacter pylori.  An Alcian Blue/PAS stain confirms the presence of intestinal metaplasia.  There is no evidence of dysplasia or malignancy.    B.  Small Intestine, Biopsy - NO SIGNIFICANT PATHOLOGIC CHANGE.  Comment:  The villous architecture is preserved and there is no evidence of  intraepithelial lymphocytosis. No granulomas or parasites are seen.    C.  Stomach, Body, Biopsy - CHRONIC ACTIVE GASTRITIS.  Comment:  Giemsa stain is negative for Helicobacter pylori.       Colonoscopy (flex sig due to altered anatomy) 8/15/2016 with Dr. Huerta- Findings:  RECTAL EXAM: Normal sphincter tone, no palpable masses.  COLON: The colonoscope was inserted into the rectum and advanced to approximately 20 cm from the anal verge where the ileocolonic anastomosis was identified. This was an end-to-side anastomosis. The anastomosis had some friable mucosa along its margin. No active bleeding. No ulcer identified. Residual vegetable debris was noted near the anastomotic region. The scope was then retracted back in the rectum is retroflexed demonstrating grade 2 nonbleeding internal hemorrhoids.      EGD 4/21/2015 with Dr. Garrison-Findings:  ESOPHAGUS: There was a very small sliding (1cm) hiatal hernia. The esophagus appeared otherwise normal.   GE JUNCTION: The Z-line was located at cm from the gums and appeared irregular. No erosive esophagitis was noted at the GE junction. Also no lesion of the lower esophagus mentioned in the upper GI series was seen.  STOMACH: The antrum of the stomach is missing (previous gastrectomy). There was slight dilation of the stomach ending in a double barrel opening. The more proximal opening is blind ended while the second one is slightly narrower than the expected, accommodating the gastroscope easily through it. Based on the se findings the  previous procedure must have been Vagotomy with antrectomy and Vadim en Y anastomosis. There were two lesions, one in the stomach closure line and a second one at the G-J anastomosis. Both were biopsied/removed with biopsy forceps.   FINAL DIAGNOSIS  A.  Gastric Lesion, Biopsy - SMALL FRAGMENT OF GASTRIC FUNDIC/BODY TYPE MUCOSA WITH FOCAL ACUTE EROSIVE GASTRITIS.  Comment: Focal fibrinoinflammatory exudate is present. There is minimal chronic inflammation. Giemsa stain is negative for Helicobacter pylori.  An Alcian blue/PAS stain reveals no evidence of intestinal metaplasia.    B.  Gastrojejunal Junction Polyp, Biopsy - ACUTE INFLAMMATION WITH GRANULATION TISSUE.  -  NEGATIVE FOR DYSPLASIA OR MALIGNANCY.       Colonoscopy 4/21/2015 with Dr. Garrison- Findings: 1) Extensive melanosis coli, due chronic abuse of laxatives  2) Redundancy and dilation of a multi-curved colon. No anatomic abnormalities, causing obstruction were seen though     PERTINENT IMAGING  === 04/06/25 ===  CT ABDOMEN PELVIS W IV CONTRAST  - Impression -  Fluid-filled small-bowel as well as liquid stool near the ileosigmoid anastomosis suggestive of nonspecific enterocolitis, likely infectious/inflammatory. No bowel obstruction or pneumatosis.    Progressive bile and pancreatic duct dilation. Recommend further evaluation with outpatient MRCP to exclude underlying obstructive process/mass.  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      === 10/19/24 ===  CT ABDOMEN PELVIS WO IV CONTRAST  - Impression -  1. Limited exam due to lack of intravenous contrast and lack of intra-abdominal fat.  2. Status post gastric bypass and colonic surgery at the level of the sigmoid colon and rectum with possible thickening at the level of the rectum, which may be related to proctitis.  3. Cholelithiasis.  4. Small amount of periportal edema, which has not  significantly changed.  5. Status post hysterectomy.  6. New 1.5 cm irregular density in the right lung base. Since the prior exam was only 6 weeks ago, this may be related to an infiltrate. Follow-up is suggested. If this density persists further evaluation with CT of the chest should be considered.  7. Emphysematous changes in the lung bases.  8. Redemonstration of punctate nonobstructing left renal stone     === 09/06/24 ===  CT ABDOMEN PELVIS WO IV CONTRAST  - Impression -  Limited evaluation due to lack of intravenous contrast and paucity of  intra-abdominal fat.  Questionable edema in the pancreas.  Please  clinically for acute pancreatitis.  Mild edema in the distal sigmoid colon/rectum concerning for component  of colitis/proctitis.  Cholelithiasis.  Punctate nonobstructive calculus in the upper pole of the left kidney.  Mild periportal edema.  Moderate colonic stool.  Postsurgical changes in the distal colon.     === 07/19/24 ===  CT CHEST ABDOMEN PELVIS W IV CONTRAST  - Impression -  CHEST:  Moderately severe emphysema. Moderate kyphosis. No acute intrathoracic abnormality.     ABDOMEN-PELVIS:  Postop changes. No acute abnormality of the abdomen or pelvis. The exam is limited by the paucity of fat     === 07/08/24 ===  NM GASTRIC EMPTYING SOLID   12% %  at 1 hr    (normal max 90%)  5% %  at 2 hr    (normal max 60%)      IMPRESSION  1. Rapid gastric emptying without evidence of gastroparesis (Pt was taking prescribed Linzess)     === 05/05/24 ===  CT ABDOMEN PELVIS W IV CONTRAST  - Impression -  1.  No acute findings in the abdomen and pelvis.  2. Few fluid-filled small bowel loops in the lower abdomen without definite segmental distention to suggest obstruction.  3. Redemonstration of diffusely dilated main pancreatic duct, similar compared to prior imaging dating back to 2021. This was worked up by MRI and CT pancreas protocol in 2022. Recommend correlation with prior reports.  4. Cholelithiasis without  acute cholecystitis.  5. Diffuse osseous demineralization       Assessment/Plan   Assessment & Plan  Irritable bowel syndrome with constipation  Instructed patient to stop taking Linzess daily and continue using every 3 days as needed since she was doing well on that regimen  Continue hyoscyamine 4 times daily  Orders:    linaCLOtide (Linzess) 72 mcg capsule; Take 1 capsule (72 mcg) by mouth every 3 days. Do not crush or chew. Take 30-60 mins before breakfast    Esophageal dysphagia  Recurrent esophageal dysphagia, responded well to dilation in the past  EGD with dilation ordered  If dysphagia persists, will order esophagram, MBSS  Orders:    Esophagogastroduodenoscopy (EGD) w Dilation Pneum; Future    Acute abdominal pain    Orders:    traMADol (Ultram) 50 mg tablet; Take 1 tablet (50 mg) by mouth every 12 hours if needed for severe pain (7 - 10) for up to 5 days.        Follow-up at scheduled appointment next month    GEO [place]-SKYLAR  04/07/25

## 2025-04-07 NOTE — ASSESSMENT & PLAN NOTE
Recurrent esophageal dysphagia, responded well to dilation in the past  EGD with dilation ordered  If dysphagia persists, will order esophagram, MBSS  Orders:    Esophagogastroduodenoscopy (EGD) w Dilation Pneum; Future

## 2025-04-08 ENCOUNTER — PRE-ADMISSION TESTING (OUTPATIENT)
Dept: PREADMISSION TESTING | Facility: HOSPITAL | Age: 80
End: 2025-04-08
Payer: MEDICARE

## 2025-04-08 ENCOUNTER — ANESTHESIA EVENT (OUTPATIENT)
Dept: GASTROENTEROLOGY | Facility: HOSPITAL | Age: 80
End: 2025-04-08
Payer: MEDICARE

## 2025-04-08 SDOH — HEALTH STABILITY: MENTAL HEALTH: CURRENT SMOKER: 0

## 2025-04-08 ASSESSMENT — DUKE ACTIVITY SCORE INDEX (DASI)
CAN YOU DO YARD WORK LIKE RAKING LEAVES, WEEDING OR PUSHING A MOWER: NO
CAN YOU RUN A SHORT DISTANCE: NO
CAN YOU DO LIGHT WORK AROUND THE HOUSE LIKE DUSTING OR WASHING DISHES: YES
CAN YOU DO HEAVY WORK AROUND THE HOUSE LIKE SCRUBBING FLOORS OR LIFTING AND MOVING HEAVY FURNITURE: NO
CAN YOU PARTICIPATE IN STRENOUS SPORTS LIKE SWIMMING, SINGLES TENNIS, FOOTBALL, BASKETBALL, OR SKIING: NO
CAN YOU WALK A BLOCK OR TWO ON LEVEL GROUND: YES
CAN YOU DO MODERATE WORK AROUND THE HOUSE LIKE VACUUMING, SWEEPING FLOORS OR CARRYING GROCERIES: YES
CAN YOU CLIMB A FLIGHT OF STAIRS OR WALK UP A HILL: YES
CAN YOU PARTICIPATE IN MODERATE RECREATIONAL ACTIVITIES LIKE GOLF, BOWLING, DANCING, DOUBLES TENNIS OR THROWING A BASEBALL OR FOOTBALL: NO
CAN YOU WALK INDOORS, SUCH AS AROUND YOUR HOUSE: YES
CAN YOU TAKE CARE OF YOURSELF (EAT, DRESS, BATHE, OR USE TOILET): YES

## 2025-04-08 NOTE — ANESTHESIA PREPROCEDURE EVALUATION
Patient: Latha Quispe    Procedure Information       Date/Time: 04/25/25 1430    Scheduled providers: Paul Courtney DO    Procedure: EGD    Location: [facility]            Relevant Problems   Anesthesia (within normal limits)      Cardiac (within normal limits)      Pulmonary   (+) COPD, mild (Multi)      Neuro   (+) Anxiety and depression      GI   (+) Dysphagia   (+) GERD (gastroesophageal reflux disease)   (+) IBS (irritable bowel syndrome)      /Renal   (+) Hyponatremia   (+) UTI (urinary tract infection) (Resolved)      Liver (within normal limits)      Endocrine   (+) Failure to thrive in adult   (+) Hypothyroid      Hematology (within normal limits)      Musculoskeletal (within normal limits)      HEENT (within normal limits)      ID   (+) UTI (urinary tract infection) (Resolved)      Skin (within normal limits)      GYN (within normal limits)      Digestive   (+) Chronic gastritis   (+) Chronic idiopathic constipation   (+) Nausea       Clinical information reviewed:                   NPO Detail:  No data recorded     Physical Exam    Airway  Mallampati: I  TM distance: >3 FB  Neck ROM: full  Mouth opening: 3 or more finger widths     Cardiovascular - normal exam   Dental     (+) upper dentures, lower dentures     Pulmonary - normal exam   Abdominal - normal exam           Anesthesia Plan    History of general anesthesia?: yes  History of complications of general anesthesia?: no    ASA 3     MAC     The patient is not a current smoker.    intravenous induction   Anesthetic plan and risks discussed with patient.  Use of blood products discussed with patient who consented to blood products.    Plan discussed with attending.

## 2025-04-08 NOTE — PREPROCEDURE INSTRUCTIONS
Medication List            Accurate as of April 8, 2025  2:49 PM. Always use your most recent med list.                acetaminophen 325 mg tablet  Commonly known as: Tylenol  Medication Adjustments for Surgery: Take/Use as prescribed     diphenoxylate-atropine 2.5-0.025 mg tablet  Commonly known as: Lomotil  Take 1 tablet by mouth 4 times a day as needed for diarrhea.  Medication Adjustments for Surgery: Do Not take on the morning of surgery     estradiol 0.01 % (0.1 mg/gram) vaginal cream  Commonly known as: Estrace  Insert pea size amount into vagina every night for 2 weeks, then 2x/week after  Medication Adjustments for Surgery: Take/Use as prescribed     hyoscyamine 0.125 mg disintegrating tablet  Commonly known as: Anaspaz  Place 2 tablets (0.25 mg) under the tongue 4 times a day before meals.  Medication Adjustments for Surgery: Do Not take on the morning of surgery     levothyroxine 50 mcg tablet  Commonly known as: Synthroid, Levoxyl  Take 1 tablet (50 mcg) by mouth once daily.  Medication Adjustments for Surgery: Take on the morning of surgery     linaCLOtide 72 mcg capsule  Commonly known as: Linzess  Take 1 capsule (72 mcg) by mouth every 3 days. Do not crush or chew. Take 30-60 mins before breakfast  Medication Adjustments for Surgery: Take on the morning of surgery     LORazepam 0.5 mg tablet  Commonly known as: Ativan  Take 1 tablet (0.5 mg) by mouth once daily as needed for anxiety.  Medication Adjustments for Surgery: Do Not take on the morning of surgery     magnesium oxide 400 mg tablet  Commonly known as: Mag-Ox  Take 1 tablet (400 mg) by mouth 2 times a day for 7 days.  Medication Adjustments for Surgery: Do Not take on the morning of surgery     methenamine hippurate 1 gram tablet  Commonly known as: Hiprex  Take 1 tablet (1 g) by mouth 2 times a day.  Medication Adjustments for Surgery: Do Not take on the morning of surgery     mirtazapine 7.5 mg tablet  Commonly known as: Remeron  Take 1  tablet (7.5 mg) by mouth once daily at bedtime.  Medication Adjustments for Surgery: Do Not take on the morning of surgery     pantoprazole 40 mg EC tablet  Commonly known as: ProtoNix  Take 1 tablet (40 mg) by mouth once daily.  Medication Adjustments for Surgery: Do Not take on the morning of surgery     potassium chloride 20 mEq packet  Commonly known as: Klor-Con  Mix 1 packet in 8oz of liquid, and drink once a day  Medication Adjustments for Surgery: Do Not take on the morning of surgery     promethazine 12.5 mg tablet  Commonly known as: Phenergan  Take 1 tablet (12.5 mg) by mouth every 12 hours if needed for nausea or vomiting.  Medication Adjustments for Surgery: Do Not take on the morning of surgery     sucralfate 1 gram tablet  Commonly known as: Carafate  Take 1 tablet (1 g) by mouth 4 times a day. Before meals and at bedtime  Medication Adjustments for Surgery: Do Not take on the morning of surgery     traMADol 50 mg tablet  Commonly known as: Ultram  Take 1 tablet (50 mg) by mouth every 12 hours if needed for severe pain (7 - 10) for up to 5 days.  Medication Adjustments for Surgery: Do Not take on the morning of surgery     traZODone 150 mg tablet  Commonly known as: Desyrel  Take 1 tablet (150 mg) by mouth once daily at bedtime.  Medication Adjustments for Surgery: Do Not take on the morning of surgery     Vitamin D3 50 mcg (2,000 unit) capsule  Generic drug: cholecalciferol  Additional Medication Adjustments for Surgery: Take last dose 7 days before surgery                     NPO Instructions:    **AFTER MIDNIGHT - ONLY WATER, BLACK COFFEE, TEA, CLEAR SODA**  OK TO USE SUGAR.  NO MILK, CREAMER OR HALF & HALF.  NO GUM, CANDY OR MINTS IN THE MORNING!  PLEASE REFRAIN FROM SMOKING IN THE MORNING.    STOP DRINKING 3 HOURS PRIOR TO PROCEDURE.        Additional Instructions:      Review your medication instructions, take indicated medications and STOP those when applicable.  Wear comfortable, loose fitting  clothing.  Please remove ALL jewelry and body piercing's.   All valuables should be left at home.  Bring a glass case or container/solution for contacts.  Bring Photo ID and Insurance card.     Park in back of hospital by ER. Come up to Second floor-OUTPATIENT dept to check-in.    You MUST have an adult  with you. NO DRIVING FOR 24 HOURS AFTER SURGERY.     If you get ill at all the week before your procedure- CALL YOUR DOCTOR/SURGEON.  Any illness might lead to your procedure being delayed.       Call Outpatient dept at [phone] between 1-3pm the day before your procedure (Friday for Monday procedure), for your arrival time.

## 2025-04-14 ENCOUNTER — APPOINTMENT (OUTPATIENT)
Dept: PRIMARY CARE | Facility: CLINIC | Age: 80
End: 2025-04-14
Payer: MEDICARE

## 2025-04-14 VITALS
SYSTOLIC BLOOD PRESSURE: 124 MMHG | DIASTOLIC BLOOD PRESSURE: 67 MMHG | TEMPERATURE: 97.8 F | HEIGHT: 60 IN | WEIGHT: 76.6 LBS | HEART RATE: 66 BPM | OXYGEN SATURATION: 99 % | BODY MASS INDEX: 15.04 KG/M2

## 2025-04-14 DIAGNOSIS — R19.7 DIARRHEA OF PRESUMED INFECTIOUS ORIGIN: ICD-10-CM

## 2025-04-14 DIAGNOSIS — K52.9 COLITIS: ICD-10-CM

## 2025-04-14 DIAGNOSIS — E83.42 HYPOMAGNESEMIA: Primary | ICD-10-CM

## 2025-04-14 DIAGNOSIS — R93.5 ABNORMAL CT OF THE ABDOMEN: ICD-10-CM

## 2025-04-14 DIAGNOSIS — K86.89 DILATED PANCREATIC DUCT (HHS-HCC): ICD-10-CM

## 2025-04-14 PROCEDURE — 99214 OFFICE O/P EST MOD 30 MIN: CPT | Performed by: FAMILY MEDICINE

## 2025-04-14 PROCEDURE — 1159F MED LIST DOCD IN RCRD: CPT | Performed by: FAMILY MEDICINE

## 2025-04-14 PROCEDURE — 1036F TOBACCO NON-USER: CPT | Performed by: FAMILY MEDICINE

## 2025-04-14 RX ORDER — LANOLIN ALCOHOL/MO/W.PET/CERES
400 CREAM (GRAM) TOPICAL DAILY
Qty: 30 TABLET | Refills: 11 | Status: SHIPPED | OUTPATIENT
Start: 2025-04-14 | End: 2026-04-14

## 2025-04-14 RX ORDER — METRONIDAZOLE 500 MG/1
500 TABLET ORAL 2 TIMES DAILY
Qty: 14 TABLET | Refills: 0 | Status: SHIPPED | OUTPATIENT
Start: 2025-04-14 | End: 2025-04-21

## 2025-04-14 NOTE — PROGRESS NOTES
Subjective   Patient ID: Latha Quispe is a 79 y.o. female who presents for ER Follow-up, Abdominal Pain (Ongoing ), and Cholelithiasis (Showed on ultrasound in ER)    HPI  Here for follow up of ED visit on 4/6/25 for lower abdominal pain and diarrhea. Supposed to take linzess every 3rd day, but had been taking it daily. CT scan showed Fluid-filled small-bowel as well as liquid stool near the ileosigmoid anastomosis suggestive of nonspecific enterocolitis, likely infectious/inflammatory. No bowel obstruction or pneumatosis. Progressive bile and pancreatic duct dilation. Recommend further evaluation with outpatient MRCP to exclude underlying obstructive  process/mass.  Stopped the linzess and still having diarrhea and abdominal pain, will treat for colitis.   Going for EGD with dilation next week  Also had low magnesium on blood work done    Review of Systems  General: no fever  Eyes: no blurry vision  ENT: no sore throat, no ear pain  Resp: no cough, sob or wheezing  Cardio: no chest pain, no palpitations  Abd: no nausea/vomiting  : no dysuria, no increased urinary frequency      /67   Pulse 66   Temp 36.6 °C (97.8 °F)   Ht 1.524 m (5')   Wt (!) 34.7 kg (76 lb 9.6 oz)   SpO2 99%   BMI 14.96 kg/m²       Objective   Physical Exam  Gen: NAD, alert  Head: normocephalic/atraumatic  Eyes: conjunctivae normal  Ears: canals clear bilaterally, TM normal   Nose: external nose normal   Oropharynx: clear   Resp: Clear to auscultation  CVS: Regular rate and rhythm  Abdomen: soft, NT, ND  Ext: no edema, NT of lower extremities  Neuro: gait normal     Personally reviewed the following imaging labs:     Chemistry    Lab Results   Component Value Date/Time     (L) 04/06/2025 0717     02/25/2025 1100    K 4.0 04/06/2025 0717    K 3.8 02/25/2025 1100     04/06/2025 0717     02/25/2025 1100    CO2 24 04/06/2025 0717    CO2 30 02/25/2025 1100    BUN 15 04/06/2025 0717    BUN 10 02/25/2025 1100     CREATININE 0.95 04/06/2025 0717    CREATININE 0.91 02/25/2025 1100    Lab Results   Component Value Date/Time    CALCIUM 9.4 04/06/2025 0717    CALCIUM 9.5 02/25/2025 1100    ALKPHOS 95 04/06/2025 0717    ALKPHOS 93 02/25/2025 1100    AST 22 04/06/2025 0717    AST 17 02/25/2025 1100    ALT 10 04/06/2025 0717    ALT 9 02/25/2025 1100    BILITOT 0.6 04/06/2025 0717    BILITOT 0.5 02/25/2025 1100        Lab Results   Component Value Date    WBC 4.9 04/06/2025    HGB 12.1 04/06/2025    HCT 37.2 04/06/2025    MCV 92 04/06/2025     04/06/2025     Component      Latest Ref Rng 4/6/2025   MAGNESIUM      1.60 - 2.40 mg/dL 1.45 (L)        CT abd/pelvis (4/6/25)- Fluid-filled small-bowel as well as liquid stool near the ileosigmoid anastomosis suggestive of nonspecific enterocolitis, likely infectious/inflammatory. No bowel obstruction or pneumatosis. Progressive bile and pancreatic duct dilation. Recommend further evaluation with outpatient MRCP to exclude underlying obstructive process/mass.    Assessment/Plan   Problem List Items Addressed This Visit       Hypomagnesemia - Primary    Relevant Medications    magnesium oxide (Mag-Ox) 400 mg (241.3 mg magnesium) tablet    Other Relevant Orders    Magnesium     Other Visit Diagnoses       Diarrhea of presumed infectious origin        Relevant Orders    C. difficile, PCR    Stool Pathogen Panel, PCR    Colitis        Relevant Medications    metroNIDAZOLE (Flagyl) 500 mg tablet    Other Relevant Orders    CBC    Comprehensive Metabolic Panel    Abnormal CT of the abdomen        Relevant Orders    MRCP pancreas w and wo IV contrast    Dilated pancreatic duct ([facility])        Relevant Orders    MRCP pancreas w and wo IV contrast

## 2025-04-15 LAB
ALBUMIN SERPL-MCNC: 3.9 G/DL (ref 3.6–5.1)
ALP SERPL-CCNC: 75 U/L (ref 37–153)
ALT SERPL-CCNC: 11 U/L (ref 6–29)
ANION GAP SERPL CALCULATED.4IONS-SCNC: 5 MMOL/L (CALC) (ref 7–17)
AST SERPL-CCNC: 18 U/L (ref 10–35)
BILIRUB SERPL-MCNC: 0.3 MG/DL (ref 0.2–1.2)
BUN SERPL-MCNC: 12 MG/DL (ref 7–25)
CALCIUM SERPL-MCNC: 9.2 MG/DL (ref 8.6–10.4)
CHLORIDE SERPL-SCNC: 98 MMOL/L (ref 98–110)
CO2 SERPL-SCNC: 34 MMOL/L (ref 20–32)
CREAT SERPL-MCNC: 0.79 MG/DL (ref 0.6–1)
EGFRCR SERPLBLD CKD-EPI 2021: 77 ML/MIN/1.73M2
ERYTHROCYTE [DISTWIDTH] IN BLOOD BY AUTOMATED COUNT: 12.8 % (ref 11–15)
GLUCOSE SERPL-MCNC: 88 MG/DL (ref 65–139)
HCT VFR BLD AUTO: 35.3 % (ref 35–45)
HGB BLD-MCNC: 11.3 G/DL (ref 11.7–15.5)
MAGNESIUM SERPL-MCNC: 1.6 MG/DL (ref 1.5–2.5)
MCH RBC QN AUTO: 29.8 PG (ref 27–33)
MCHC RBC AUTO-ENTMCNC: 32 G/DL (ref 32–36)
MCV RBC AUTO: 93.1 FL (ref 80–100)
PLATELET # BLD AUTO: 286 THOUSAND/UL (ref 140–400)
PMV BLD REES-ECKER: 11.7 FL (ref 7.5–12.5)
POTASSIUM SERPL-SCNC: 4.1 MMOL/L (ref 3.5–5.3)
PROT SERPL-MCNC: 6 G/DL (ref 6.1–8.1)
RBC # BLD AUTO: 3.79 MILLION/UL (ref 3.8–5.1)
SODIUM SERPL-SCNC: 137 MMOL/L (ref 135–146)
WBC # BLD AUTO: 5.2 THOUSAND/UL (ref 3.8–10.8)

## 2025-04-16 LAB — C DIFF TOX GENS STL QL NAA+PROBE: NOT DETECTED

## 2025-04-17 DIAGNOSIS — R19.7 DIARRHEA OF PRESUMED INFECTIOUS ORIGIN: ICD-10-CM

## 2025-04-17 LAB
C COLI+JEJUNI+LARI FUSA STL QL NAA+PROBE: NOT DETECTED
EC STX1 GENE STL QL NAA+PROBE: NOT DETECTED
EC STX2 GENE STL QL NAA+PROBE: NOT DETECTED
NOROV GI+II ORF1-ORF2 JNC STL QL NAA+PR: NOT DETECTED
RVA NSP5 STL QL NAA+PROBE: NOT DETECTED
SALMONELLA SP RPOD STL QL NAA+PROBE: NOT DETECTED
SHIGELLA DNA SPEC QL NAA+PROBE: NOT DETECTED
V CHOL+PARA RFBL+TRKH+TNAA STL QL NAA+PR: NOT DETECTED
Y ENTERO RECN STL QL NAA+PROBE: NOT DETECTED

## 2025-04-17 RX ORDER — DIPHENOXYLATE HYDROCHLORIDE AND ATROPINE SULFATE 2.5; .025 MG/1; MG/1
1 TABLET ORAL EVERY 12 HOURS PRN
Qty: 10 TABLET | Refills: 0 | Status: SHIPPED | OUTPATIENT
Start: 2025-04-17

## 2025-04-23 ENCOUNTER — TELEPHONE (OUTPATIENT)
Dept: PRIMARY CARE | Facility: CLINIC | Age: 80
End: 2025-04-23

## 2025-04-23 ENCOUNTER — APPOINTMENT (OUTPATIENT)
Dept: PRIMARY CARE | Facility: CLINIC | Age: 80
End: 2025-04-23
Payer: MEDICARE

## 2025-04-23 VITALS
SYSTOLIC BLOOD PRESSURE: 124 MMHG | DIASTOLIC BLOOD PRESSURE: 62 MMHG | HEART RATE: 53 BPM | WEIGHT: 77 LBS | BODY MASS INDEX: 15.12 KG/M2 | TEMPERATURE: 97.4 F | HEIGHT: 60 IN | OXYGEN SATURATION: 99 %

## 2025-04-23 DIAGNOSIS — N39.0 ACUTE UTI: ICD-10-CM

## 2025-04-23 DIAGNOSIS — R30.0 DYSURIA: ICD-10-CM

## 2025-04-23 DIAGNOSIS — K52.9 COLITIS: Primary | ICD-10-CM

## 2025-04-23 DIAGNOSIS — F41.0 PANIC ATTACKS: ICD-10-CM

## 2025-04-23 DIAGNOSIS — J44.9 COPD, MILD (MULTI): ICD-10-CM

## 2025-04-23 LAB
POC APPEARANCE, URINE: ABNORMAL
POC BILIRUBIN, URINE: ABNORMAL
POC BLOOD, URINE: ABNORMAL
POC COLOR, URINE: YELLOW
POC GLUCOSE, URINE: NEGATIVE MG/DL
POC KETONES, URINE: NEGATIVE MG/DL
POC LEUKOCYTES, URINE: ABNORMAL
POC NITRITE,URINE: NEGATIVE
POC PH, URINE: 5.5 PH
POC PROTEIN, URINE: ABNORMAL MG/DL
POC SPECIFIC GRAVITY, URINE: 1.02
POC UROBILINOGEN, URINE: 0.2 EU/DL

## 2025-04-23 PROCEDURE — 1036F TOBACCO NON-USER: CPT | Performed by: FAMILY MEDICINE

## 2025-04-23 PROCEDURE — 1159F MED LIST DOCD IN RCRD: CPT | Performed by: FAMILY MEDICINE

## 2025-04-23 PROCEDURE — 81003 URINALYSIS AUTO W/O SCOPE: CPT | Performed by: FAMILY MEDICINE

## 2025-04-23 PROCEDURE — 99214 OFFICE O/P EST MOD 30 MIN: CPT | Performed by: FAMILY MEDICINE

## 2025-04-23 RX ORDER — SULFAMETHOXAZOLE AND TRIMETHOPRIM 800; 160 MG/1; MG/1
1 TABLET ORAL 2 TIMES DAILY
Qty: 10 TABLET | Refills: 0 | Status: SHIPPED | OUTPATIENT
Start: 2025-04-23 | End: 2025-04-28

## 2025-04-23 RX ORDER — LORAZEPAM 0.5 MG/1
0.5 TABLET ORAL DAILY PRN
Qty: 30 TABLET | Refills: 5 | Status: SHIPPED | OUTPATIENT
Start: 2025-04-30

## 2025-04-23 NOTE — PROGRESS NOTES
Subjective   Patient ID: Latha Quispe is a 79 y.o. female who presents for Follow-up.  HPI  Here for follow up  Finished the flagyl for colitis, still having abdominal pain. Has MRI scheduled next week, and EGD scheduled for friday  Never started the lomotil for diarrhea, still having, advised to start taking.   Needs refill of her ativan.   Has been having dysuria since yesterday.   COPD under control     Review of Systems  General: no fever  Eyes: no blurry vision  ENT: no sore throat, no ear pain  Resp: no cough, sob or wheezing  Cardio: no chest pain, no palpitations  Abd: see HPI  : see HPI      /62   Pulse 53   Temp 36.3 °C (97.4 °F)   Ht 1.524 m (5')   Wt (!) 34.9 kg (77 lb)   SpO2 99%   BMI 15.04 kg/m²       Objective   Physical Exam  Gen: NAD, alert  Head: normocephalic/atraumatic  Eyes: conjunctivae normal  Ears: canals clear bilaterally, TM normal   Nose: external nose normal   Oropharynx: clear   Resp: Clear to auscultation  CVS: Regular rate and rhythm  Abdomen: soft, diffuse tenderness, ND  Ext: no edema, NT of lower extremities  Neuro: gait normal     Personally reviewed the following labs and imaging  CT abd/pelvis (4/6/25)  Fluid-filled small-bowel as well as liquid stool near the ileosigmoid anastomosis suggestive of nonspecific enterocolitis, likely infectious/inflammatory. No bowel obstruction or pneumatosis. Progressive bile and pancreatic duct dilation. Recommend further evaluation with outpatient MRCP to exclude underlying obstructive process/mass.      Chemistry    Lab Results   Component Value Date/Time     04/14/2025 1018    K 4.1 04/14/2025 1018    CL 98 04/14/2025 1018    CO2 34 (H) 04/14/2025 1018    BUN 12 04/14/2025 1018    CREATININE 0.79 04/14/2025 1018    Lab Results   Component Value Date/Time    CALCIUM 9.2 04/14/2025 1018    ALKPHOS 75 04/14/2025 1018    AST 18 04/14/2025 1018    ALT 11 04/14/2025 1018    BILITOT 0.3 04/14/2025 1018        Lab Results    Component Value Date    WBC 5.2 04/14/2025    HGB 11.3 (L) 04/14/2025    HCT 35.3 04/14/2025    MCV 93.1 04/14/2025     04/14/2025       Component      Latest Ref Rng 4/14/2025   MAGNESIUM      1.5 - 2.5 mg/dL 1.6          Assessment/Plan   Problem List Items Addressed This Visit       COPD, mild (Multi)  Under control     Other Visit Diagnoses         Colitis    -  Primary    Relevant Orders    Basic metabolic panel    CBC    Magnesium      Panic attacks        Relevant Medications    LORazepam (Ativan) 0.5 mg tablet (Start on 4/30/2025)      Dysuria        Relevant Orders    POCT UA Automated manually resulted (Completed)      Acute UTI        Relevant Medications    sulfamethoxazole-trimethoprim (Bactrim DS) 800-160 mg tablet    Other Relevant Orders    Urine Culture

## 2025-04-23 NOTE — TELEPHONE ENCOUNTER
-Rhonda called  at home  - looked at bottles   -Latha has been taking the trimethoprim 100 mg  -NOT taking any methenamine   - told to clarify meds with Urologist

## 2025-04-23 NOTE — TELEPHONE ENCOUNTER
Caller: Pharmacist Rhonda at [facility]     - Latha prescribed Bactrim today   - Methenamine 1 gram on her med list, per  not taking  - she IS taking trimethoprim 100 mg, discontinued by urologist in Oct 2024  - Rhonda trying to call Urologist to clarify    Can Latha still take the Bactrim since she is taking the trimethoprim?

## 2025-04-25 ENCOUNTER — ANESTHESIA (OUTPATIENT)
Dept: GASTROENTEROLOGY | Facility: HOSPITAL | Age: 80
End: 2025-04-25
Payer: MEDICARE

## 2025-04-25 ENCOUNTER — TELEPHONE (OUTPATIENT)
Dept: PRIMARY CARE | Facility: CLINIC | Age: 80
End: 2025-04-25
Payer: MEDICARE

## 2025-04-25 ENCOUNTER — HOSPITAL ENCOUNTER (OUTPATIENT)
Dept: GASTROENTEROLOGY | Facility: HOSPITAL | Age: 80
Discharge: HOME | End: 2025-04-25
Payer: MEDICARE

## 2025-04-25 VITALS
SYSTOLIC BLOOD PRESSURE: 140 MMHG | BODY MASS INDEX: 14.72 KG/M2 | HEIGHT: 60 IN | RESPIRATION RATE: 17 BRPM | OXYGEN SATURATION: 100 % | HEART RATE: 59 BPM | TEMPERATURE: 100 F | WEIGHT: 75 LBS | DIASTOLIC BLOOD PRESSURE: 62 MMHG

## 2025-04-25 DIAGNOSIS — R13.19 ESOPHAGEAL DYSPHAGIA: ICD-10-CM

## 2025-04-25 ASSESSMENT — PAIN - FUNCTIONAL ASSESSMENT: PAIN_FUNCTIONAL_ASSESSMENT: 0-10

## 2025-04-25 ASSESSMENT — PAIN SCALES - GENERAL: PAINLEVEL_OUTOF10: 10 - WORST POSSIBLE PAIN

## 2025-04-25 NOTE — TELEPHONE ENCOUNTER
Patient went to get scoped today, they would not do the procedure because she had a temp of 100 degrees and an infection.  What should she do?

## 2025-04-26 LAB
ANION GAP SERPL CALCULATED.4IONS-SCNC: 11 MMOL/L (CALC) (ref 7–17)
BACTERIA UR CULT: NORMAL
BUN SERPL-MCNC: 11 MG/DL (ref 7–25)
BUN/CREAT SERPL: ABNORMAL (CALC) (ref 6–22)
CALCIUM SERPL-MCNC: 8.9 MG/DL (ref 8.6–10.4)
CHLORIDE SERPL-SCNC: 100 MMOL/L (ref 98–110)
CO2 SERPL-SCNC: 27 MMOL/L (ref 20–32)
CREAT SERPL-MCNC: 0.94 MG/DL (ref 0.6–1)
EGFRCR SERPLBLD CKD-EPI 2021: 62 ML/MIN/1.73M2
ERYTHROCYTE [DISTWIDTH] IN BLOOD BY AUTOMATED COUNT: 12.7 % (ref 11–15)
GLUCOSE SERPL-MCNC: 130 MG/DL (ref 65–99)
HBA1C MFR BLD: 5.9 %
HCT VFR BLD AUTO: 35.8 % (ref 35–45)
HGB BLD-MCNC: 11.5 G/DL (ref 11.7–15.5)
MAGNESIUM SERPL-MCNC: 1.6 MG/DL (ref 1.5–2.5)
MCH RBC QN AUTO: 29.8 PG (ref 27–33)
MCHC RBC AUTO-ENTMCNC: 32.1 G/DL (ref 32–36)
MCV RBC AUTO: 92.7 FL (ref 80–100)
PLATELET # BLD AUTO: 307 THOUSAND/UL (ref 140–400)
PMV BLD REES-ECKER: 11.2 FL (ref 7.5–12.5)
POTASSIUM SERPL-SCNC: 4 MMOL/L (ref 3.5–5.3)
RBC # BLD AUTO: 3.86 MILLION/UL (ref 3.8–5.1)
SODIUM SERPL-SCNC: 138 MMOL/L (ref 135–146)
WBC # BLD AUTO: 5.2 THOUSAND/UL (ref 3.8–10.8)

## 2025-04-27 LAB — BACTERIA UR CULT: ABNORMAL

## 2025-04-28 ENCOUNTER — HOSPITAL ENCOUNTER (OUTPATIENT)
Dept: RADIOLOGY | Facility: HOSPITAL | Age: 80
Discharge: HOME | End: 2025-04-28
Payer: MEDICARE

## 2025-04-28 DIAGNOSIS — N39.0 ACUTE UTI: Primary | ICD-10-CM

## 2025-04-28 DIAGNOSIS — R93.5 ABNORMAL CT OF THE ABDOMEN: ICD-10-CM

## 2025-04-28 DIAGNOSIS — K86.89 DILATED PANCREATIC DUCT (HHS-HCC): ICD-10-CM

## 2025-04-28 PROCEDURE — 2550000001 HC RX 255 CONTRASTS: Performed by: FAMILY MEDICINE

## 2025-04-28 PROCEDURE — 74183 MRI ABD W/O CNTR FLWD CNTR: CPT

## 2025-04-28 PROCEDURE — 76376 3D RENDER W/INTRP POSTPROCES: CPT | Performed by: RADIOLOGY

## 2025-04-28 PROCEDURE — 74183 MRI ABD W/O CNTR FLWD CNTR: CPT | Performed by: RADIOLOGY

## 2025-04-28 PROCEDURE — A9575 INJ GADOTERATE MEGLUMI 0.1ML: HCPCS | Performed by: FAMILY MEDICINE

## 2025-04-28 RX ORDER — GADOTERATE MEGLUMINE 376.9 MG/ML
6 INJECTION INTRAVENOUS
Status: COMPLETED | OUTPATIENT
Start: 2025-04-28 | End: 2025-04-28

## 2025-04-28 RX ORDER — NITROFURANTOIN 25; 75 MG/1; MG/1
100 CAPSULE ORAL 2 TIMES DAILY
Qty: 14 CAPSULE | Refills: 0 | Status: SHIPPED | OUTPATIENT
Start: 2025-04-28 | End: 2025-05-05

## 2025-04-28 RX ADMIN — GADOTERATE MEGLUMINE 6 ML: 376.9 INJECTION INTRAVENOUS at 08:02

## 2025-04-30 ENCOUNTER — APPOINTMENT (OUTPATIENT)
Dept: PRIMARY CARE | Facility: CLINIC | Age: 80
End: 2025-04-30
Payer: MEDICARE

## 2025-04-30 VITALS
WEIGHT: 74.8 LBS | DIASTOLIC BLOOD PRESSURE: 62 MMHG | OXYGEN SATURATION: 100 % | TEMPERATURE: 98.1 F | BODY MASS INDEX: 14.69 KG/M2 | HEIGHT: 60 IN | HEART RATE: 54 BPM | SYSTOLIC BLOOD PRESSURE: 119 MMHG

## 2025-04-30 DIAGNOSIS — R10.84 DIFFUSE ABDOMINAL PAIN: ICD-10-CM

## 2025-04-30 DIAGNOSIS — K86.89 DILATED PANCREATIC DUCT (HHS-HCC): ICD-10-CM

## 2025-04-30 DIAGNOSIS — N39.0 ACUTE UTI: Primary | ICD-10-CM

## 2025-04-30 PROCEDURE — 1159F MED LIST DOCD IN RCRD: CPT | Performed by: FAMILY MEDICINE

## 2025-04-30 PROCEDURE — 99213 OFFICE O/P EST LOW 20 MIN: CPT | Performed by: FAMILY MEDICINE

## 2025-04-30 NOTE — PROGRESS NOTES
Subjective   Patient ID: Latha Quispe is a 79 y.o. female who presents for Follow-up.  HPI  Here for follow up  Finished the metronidazole, still having diffuse abdominal pain, diarrhea has gotten better. On nitrofurantoin for UTI. Going for EGD in a few days.   Had MRI pancreas which showed diffuse main pancreatic ductal dilatation of uncertain etiology, increased compared to CT 07/19/2024. Evaluation is severely limited due to motion artifact. No obstructing masses or filling defects are identified. Consider ERCP for further evaluation to assess for main  duct IPMN or occult obstructing mass  Has appt with GI for follow up in a few weeks.     Review of Systems  General: no fever  Eyes: no blurry vision  ENT: no sore throat, no ear pain  Resp: no cough, sob or wheezing  Cardio: no chest pain, no palpitations  Abd: see HPI  : see HPI    /62   Pulse 54   Temp 36.7 °C (98.1 °F)   Ht 1.524 m (5')   Wt (!) 33.9 kg (74 lb 12.8 oz)   SpO2 100%   BMI 14.61 kg/m²       Objective   Physical Exam  Gen: NAD, alert  Head: normocephalic/atraumatic  Eyes: conjunctivae normal  Ears: canals clear bilaterally, TM normal   Nose: external nose normal   Oropharynx: clear   Resp: Clear to auscultation  CVS: Regular rate and rhythm  Abdomen: soft, diffuse tenderness  Ext: no edema, NT of lower extremities    Personally reviewed the following labs and imaging:     Chemistry    Lab Results   Component Value Date/Time     04/24/2025 0844    K 4.0 04/24/2025 0844     04/24/2025 0844    CO2 27 04/24/2025 0844    BUN 11 04/24/2025 0844    CREATININE 0.94 04/24/2025 0844    Lab Results   Component Value Date/Time    CALCIUM 8.9 04/24/2025 0844    ALKPHOS 75 04/14/2025 1018    AST 18 04/14/2025 1018    ALT 11 04/14/2025 1018    BILITOT 0.3 04/14/2025 1018          CT abd/pelvis - Fluid-filled small-bowel as well as liquid stool near the ileosigmoid anastomosis suggestive of nonspecific enterocolitis, likely  infectious/inflammatory. No bowel obstruction or pneumatosis. Progressive bile and pancreatic duct dilation. Recommend further evaluation with outpatient MRCP to exclude underlying obstructive process/mass.      MRI pancreas - 1.  Severely motion degraded exam which limits evaluation.  2. Bile ducts are at the upper limits of normal in caliber with  otherwise no definite cause of biliary obstruction identified.  Specifically, no definite choledocholithiasis or obstructing masses are identified. Suggest correlation with LFTs to assess for biliary obstruction. 3. Diffuse main pancreatic ductal dilatation of uncertain etiology, increased compared to CT 07/19/2024. Evaluation is severely limited due to motion artifact. No obstructing masses or filling defects are identified. Consider ERCP for further evaluation to assess for main  duct IPMN or occult obstructing mass    Assessment/Plan   Problem List Items Addressed This Visit    None  Visit Diagnoses         Acute UTI    -  Primary    Relevant Orders    Urine Culture      Diffuse abdominal pain          Dilated pancreatic duct ([facility])      To follow up with GI

## 2025-05-05 ENCOUNTER — HOSPITAL ENCOUNTER (OUTPATIENT)
Dept: GASTROENTEROLOGY | Facility: HOSPITAL | Age: 80
Discharge: HOME | End: 2025-05-05
Payer: MEDICARE

## 2025-05-05 VITALS
WEIGHT: 74.96 LBS | DIASTOLIC BLOOD PRESSURE: 66 MMHG | TEMPERATURE: 97.2 F | BODY MASS INDEX: 14.72 KG/M2 | HEART RATE: 64 BPM | OXYGEN SATURATION: 100 % | RESPIRATION RATE: 16 BRPM | SYSTOLIC BLOOD PRESSURE: 133 MMHG | HEIGHT: 60 IN

## 2025-05-05 DIAGNOSIS — R19.8 ABNORMAL FINDINGS ON ESOPHAGOGASTRODUODENOSCOPY (EGD): ICD-10-CM

## 2025-05-05 DIAGNOSIS — N39.0 ACUTE UTI: ICD-10-CM

## 2025-05-05 DIAGNOSIS — R13.10 DYSPHAGIA, UNSPECIFIED TYPE: Primary | ICD-10-CM

## 2025-05-05 PROCEDURE — 7100000010 HC PHASE TWO TIME - EACH INCREMENTAL 1 MINUTE

## 2025-05-05 PROCEDURE — 3700000001 HC GENERAL ANESTHESIA TIME - INITIAL BASE CHARGE

## 2025-05-05 PROCEDURE — 43450 DILATE ESOPHAGUS 1/MULT PASS: CPT | Performed by: SURGERY

## 2025-05-05 PROCEDURE — 7100000009 HC PHASE TWO TIME - INITIAL BASE CHARGE

## 2025-05-05 PROCEDURE — 3700000002 HC GENERAL ANESTHESIA TIME - EACH INCREMENTAL 1 MINUTE

## 2025-05-05 PROCEDURE — 43239 EGD BIOPSY SINGLE/MULTIPLE: CPT | Performed by: SURGERY

## 2025-05-05 PROCEDURE — 2500000004 HC RX 250 GENERAL PHARMACY W/ HCPCS (ALT 636 FOR OP/ED): Mod: JZ | Performed by: NURSE ANESTHETIST, CERTIFIED REGISTERED

## 2025-05-05 RX ORDER — SODIUM CHLORIDE, SODIUM LACTATE, POTASSIUM CHLORIDE, CALCIUM CHLORIDE 600; 310; 30; 20 MG/100ML; MG/100ML; MG/100ML; MG/100ML
30 INJECTION, SOLUTION INTRAVENOUS CONTINUOUS
Status: CANCELLED | OUTPATIENT
Start: 2025-05-05 | End: 2025-05-05

## 2025-05-05 RX ORDER — SODIUM CHLORIDE, SODIUM LACTATE, POTASSIUM CHLORIDE, CALCIUM CHLORIDE 600; 310; 30; 20 MG/100ML; MG/100ML; MG/100ML; MG/100ML
INJECTION, SOLUTION INTRAVENOUS CONTINUOUS PRN
Status: DISCONTINUED | OUTPATIENT
Start: 2025-05-05 | End: 2025-05-05

## 2025-05-05 RX ORDER — PROPOFOL 10 MG/ML
INJECTION, EMULSION INTRAVENOUS AS NEEDED
Status: DISCONTINUED | OUTPATIENT
Start: 2025-05-05 | End: 2025-05-05

## 2025-05-05 RX ADMIN — PROPOFOL 30 MG: 10 INJECTION, EMULSION INTRAVENOUS at 08:17

## 2025-05-05 RX ADMIN — PROPOFOL 30 MG: 10 INJECTION, EMULSION INTRAVENOUS at 08:15

## 2025-05-05 RX ADMIN — PROPOFOL 50 MG: 10 INJECTION, EMULSION INTRAVENOUS at 08:14

## 2025-05-05 RX ADMIN — PROPOFOL 30 MG: 10 INJECTION, EMULSION INTRAVENOUS at 08:19

## 2025-05-05 RX ADMIN — SODIUM CHLORIDE, SODIUM LACTATE, POTASSIUM CHLORIDE, AND CALCIUM CHLORIDE: .6; .31; .03; .02 INJECTION, SOLUTION INTRAVENOUS at 08:11

## 2025-05-05 RX ADMIN — PROPOFOL 20 MG: 10 INJECTION, EMULSION INTRAVENOUS at 08:23

## 2025-05-05 RX ADMIN — PROPOFOL 20 MG: 10 INJECTION, EMULSION INTRAVENOUS at 08:21

## 2025-05-05 ASSESSMENT — PAIN SCALES - GENERAL
PAINLEVEL_OUTOF10: 0 - NO PAIN
PAINLEVEL_OUTOF10: 1
PAINLEVEL_OUTOF10: 0 - NO PAIN
PAIN_LEVEL: 0
PAINLEVEL_OUTOF10: 0 - NO PAIN
PAINLEVEL_OUTOF10: 0 - NO PAIN

## 2025-05-05 ASSESSMENT — PAIN - FUNCTIONAL ASSESSMENT
PAIN_FUNCTIONAL_ASSESSMENT: 0-10

## 2025-05-05 NOTE — INTERVAL H&P NOTE
H&P reviewed. The patient was examined and there are no changes to the H&P.    Here for EGD. This was postponed on 4/25/24 due to a fever. She is feeling much better today and vital signs are normal. Still experiencing dysphagia.     No significant findings; reviewed medications and allergies; patient seen and discussed with attending physician responsible for performing the procedure.

## 2025-05-05 NOTE — ANESTHESIA POSTPROCEDURE EVALUATION
Patient: Latha Quispe    Procedure Summary       Date: 05/05/25 Room / Location: [facility]    Anesthesia Start: 0811 Anesthesia Stop: 0832    Procedure: EGD Diagnosis: Esophageal dysphagia    Scheduled Providers: Sam Llanos MD Responsible Provider: DINORAH Meade    Anesthesia Type: MAC ASA Status: 3            Anesthesia Type: MAC    Vitals Value Taken Time   /66 05/05/25 08:58   Temp 36.2 °C (97.2 °F) 05/05/25 08:28   Pulse 64 05/05/25 08:58   Resp 16 05/05/25 08:58   SpO2 100 % 05/05/25 08:58       Anesthesia Post Evaluation    Patient location during evaluation: PACU  Patient participation: complete - patient participated  Level of consciousness: awake and alert  Pain score: 0  Pain management: adequate  Airway patency: patent  Cardiovascular status: acceptable  Respiratory status: acceptable  Hydration status: acceptable  Postoperative Nausea and Vomiting: none        There were no known notable events for this encounter.

## 2025-05-07 ENCOUNTER — TELEPHONE (OUTPATIENT)
Dept: UROLOGY | Facility: CLINIC | Age: 80
End: 2025-05-07
Payer: MEDICARE

## 2025-05-07 ASSESSMENT — PAIN SCALES - GENERAL: PAINLEVEL_OUTOF10: 0 - NO PAIN

## 2025-05-07 NOTE — TELEPHONE ENCOUNTER
Attempted to return patient's call regarding proper antibiotic treatment for current UTI. LVM for patient to return call to our office.

## 2025-05-08 LAB — BACTERIA UR CULT: NORMAL

## 2025-05-09 NOTE — PROGRESS NOTES
History Of Present Illness  Latha Quispe is a 79 y.o. female presenting to GI clinic for follow up  GERD, IBS-C.  She is here with her .  Since last visit, she completed atb therapy for persistent diarrhea/abdomianl pain, and she completed MRCP pancreas.  She completed an EGD with Dr. Llanos with dilation, there was food noted in the stomach.     She denies dysphagia since her EGD with dilaiton.  Dr. Llanos ordered gastric emptying study which she will be completing later this week.    Patient states she is doing well. She is taking Linzess as needed. She states she has had no episodes of severe constipation or diarrhea.  She has been eating more consistently and reports that she has been more hungry lately.  Her weight has been stable at approximately 75 pounds.    She unfortunately had a fall last week and sustained a pelvic fracture, so she reports a lot of leg/hip pain due to this.    Past medical history  Medical History[1]   Surgical History[2]   Social History  She reports that she has never smoked. She has never been exposed to tobacco smoke. She has never used smokeless tobacco. She reports that she does not drink alcohol and does not use drugs.  She does not take NSAIDs on a regular basis    Family History  Family History[3]  The patient does have a FH of CRC. she does not have a FH of IBD    Review of Systems   Gastrointestinal:  Negative for abdominal pain, anal bleeding, blood in stool, constipation, diarrhea, nausea and vomiting.        See HPI   All other systems reviewed and are negative.        Physical Exam  Constitutional:       General: She is not in acute distress.     Appearance: Normal appearance. She is cachectic. She is not ill-appearing.   HENT:      Head: Normocephalic and atraumatic.      Mouth/Throat:      Mouth: Mucous membranes are moist.   Eyes:      General: No scleral icterus.     Conjunctiva/sclera: Conjunctivae normal.      Pupils: Pupils are equal, round, and reactive to  light.   Pulmonary:      Effort: Pulmonary effort is normal.   Abdominal:      General: Abdomen is flat. Bowel sounds are normal. There is no distension.      Palpations: Abdomen is soft. There is no mass.      Tenderness: There is no abdominal tenderness.      Hernia: No hernia is present.   Musculoskeletal:         General: Normal range of motion.      Cervical back: Normal range of motion.   Skin:     General: Skin is warm and dry.      Coloration: Skin is not jaundiced or pale.   Neurological:      Mental Status: She is alert. Mental status is at baseline.   Psychiatric:         Mood and Affect: Mood normal.         Behavior: Behavior normal.          Last Vital Signs  /74 (BP Location: Left arm, Patient Position: Sitting, BP Cuff Size: Small adult)   Pulse 65   Resp 18   Ht 1.524 m (5')   Wt (!) 34 kg (75 lb)   SpO2 97%   BMI 14.65 kg/m²      Relevant Results  Lab Results   Component Value Date    WBC 5.2 04/24/2025    HGB 11.5 (L) 04/24/2025    HCT 35.8 04/24/2025    MCV 92.7 04/24/2025     04/24/2025       Lab Results   Component Value Date    GLUCOSE 130 (H) 04/24/2025    CALCIUM 8.9 04/24/2025     04/24/2025    K 4.0 04/24/2025    CO2 27 04/24/2025     04/24/2025    BUN 11 04/24/2025    CREATININE 0.94 04/24/2025       Lab Results   Component Value Date    ALT 11 04/14/2025    AST 18 04/14/2025    ALKPHOS 75 04/14/2025    BILITOT 0.3 04/14/2025    INR 1.2 (H) 05/05/2024       Lab Results   Component Value Date    EYREXMSG75 407 10/20/2024       Lab Results   Component Value Date    CALPS 85 (H) 10/30/2024        C-Reactive Protein   Date Value Ref Range Status   05/05/2024 <0.10 <1.00 mg/dL Final       Lab Results   Component Value Date    LIPASE 16 04/06/2025    LIPASE 26 10/19/2024    LIPASE 10 09/06/2024    LIPASE 14 07/19/2024    LIPASE 10 05/05/2024    LIPASE 41 01/25/2024    LIPASE 18 07/08/2023    LIPASE 11 04/26/2021       Lab Results   Component Value Date    HGBA1C  5.9 (H) 04/24/2025      Wt Readings from Last 20 Encounters:   05/19/25 (!) 34 kg (75 lb)   05/13/25 (!) 34 kg (75 lb)   05/05/25 (!) 34 kg (74 lb 15.3 oz)   04/30/25 (!) 33.9 kg (74 lb 12.8 oz)   04/25/25 (!) 34 kg (75 lb)   04/23/25 (!) 34.9 kg (77 lb)   04/14/25 (!) 34.7 kg (76 lb 9.6 oz)   04/07/25 (!) 35 kg (77 lb 1.6 oz)   04/06/25 (!) 34 kg (75 lb)   04/02/25 (!) 34 kg (75 lb)   03/11/25 (!) 36.3 kg (80 lb)   02/25/25 (!) 36.1 kg (79 lb 9.6 oz)   12/11/24 (!) 36 kg (79 lb 6.4 oz)   12/09/24 (!) 36.5 kg (80 lb 8 oz)   11/06/24 (!) 33.2 kg (73 lb 3.2 oz)   11/04/24 (!) 33.9 kg (74 lb 12.8 oz)   10/29/24 (!) 33.8 kg (74 lb 9.6 oz)   10/22/24 (!) 33 kg (72 lb 12 oz)   10/11/24 (!) 32.2 kg (71 lb)   10/10/24 (!) 31.8 kg (70 lb)        History of gastrojejunostomy in 1992 for treatment of ulcers and 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   EGD/COLONOSCOPY/COLOGUARD  EGD 5/5/2025 with Dr. Llanos  Findings  Healthy previous Billroth II procedure in the stomach and jejunum; no bleeding was observed. Large volume of food in stomach. Suspect gastroparesis as this has been seen on previous endoscopy as well.  Hill grade 1  Mild, generalized abnormal mucosa with plaque in the upper third of the esophagus, middle third of the esophagus and lower third of the esophagus; no bleeding was observed; performed cold forceps biopsy  Regular Z-line 36 cm from the incisors  Dilated in the upper third of the esophagus, middle third of the esophagus, lower third of the esophagus and GE junction with Savary-Rodríguez dilator. Dilation caused bleeding and mucosal tears  FINAL DIAGNOSIS   A.  RANDOM ESOPHAGUS BIOPSY: ACUTE AND CHRONIC ESOPHAGITIS, SEE NOTE.     Note: No microorganisms are identified.     The underlying etiology of this is unclear.  Reflux effect and medication effect should be considered.           EGD 05/10/24 with Dr. Courtney- Findings  The upper third of the esophagus, middle third  of the esophagus and lower third of the esophagus appeared normal.  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  The afferent limb was difficult to visualize.     EGD 01/02/24 with Dr. Diez- Findings  Performed forceps biopsies in the stomach  Regular Z-line 32 cm from the incisors  FOOD in the greater curve of the stomach   FINAL DIAGNOSIS   A. STOMACH ANTRUM BIOPSY:    -- GASTRIC MUCOSA WITH MILD CHRONIC NONSPECIFIC GASTRITIS AND REACTIVE GASTROPATHY  -- HELICOBACTER PYLORI NOT IDENTIFIED       EUS 3/15/2022 with Dr. Caba- Impression:            EGD:                         - Normal esophagus.                         - Z-line, 36 cm from the incisors.                         - Erythematous mucosa in the stomach. Biopsied.                         - A gastroenterostomy was found, characterized by an                          intact appearance and healthy appearing mucosa. See                          Findings above.                         - Normal ampulla and examined duodenal limb.                         - Duodenal limb was examined in its entirety. At end,                          mucosal changes with altered texture and friability                          noted. Biopsied.                         - Normal examined jejunal limb.                         EUS:                         - The pancreatic duct had a dilated endosonographic                          appearance, had a tortuous/ectatic appearance and had                          hyperechoic walls in the main pancreatic duct. The                          pancreatic duct measured up to 5 mm in diameter in the                          neck and 3 mm in the tail.                         - Pancreatic parenchymal abnormalities consisting of                          hyperechoic foci were noted in the genu of the                          pancreas, pancreatic body and pancreatic tail.                          - No pancreatic mass was seen, however head of                          pancreas incompletely visualized due to patient's                          post-surgical anatomy.                         - There was no evidence of significant pathology in                          the visualized portion of the liver.  FINAL DIAGNOSIS  A.  SMALL BOWEL, COLD FORCEPS:    --SMALL INTESTINAL MUCOSA WITH FOCAL ACUTE INFLAMMATION, GASTRIC MUCIN CELL METAPLASIA AND HISTIOCYTIC INFILTRATION, SEE NOTE.    Note: Immunohistochemical study shows the histiocytes are positive for CD 68,  and are negative for S100.  GMS, PAS/D and AFB stains are negative for microorganisms.    B.  STOMACH BIOPSY, COLD FORCEPS:    --GASTRIC OXYNTIC MUCOSA WITH MILD CHRONIC INFLAMMATION AND FEATURES SUGGESTIVE OF REACTIVE GASTROPATHY.  --NO HELICOBACTER PYLORI-LIKE ORGANISMS SEEN IN ROUTINE STAINED SECTIONS.       EGD 12/31/2020 with Dr. Guzmán- moderate food residue in stomach, hx Vadim en Y due to previous ulcers and negative biopsies for h.pylori.      Colonoscopy 12/31/2020 with Dr. Guzmán (modified due to altered anatomy) which noted friable anastomosis but otherwise WNL. 5 year follow up recommended.     EGD 12/23/2020 with Dr. Guzmán- ESOPHAGUS: The entire examined esophagus appeared normal.  GE JUNCTION: The Z-line was located at 34 cm from the incissors and appeared regular.  STOMACH: There was a large amount of food debris in the stomach which limited views. There was no gross blood seen. There appeared to be postoperative changes with a possible diverticulum in the stomach. The gastrojejunal anastomosis appeared to be at 45 cm from the incisors without any ulceration seen. Because of amount of food present in the stomach the procedure was abbreviated for patient safety to avoid aspiration and the jejunum was not intubated.      EGD 10/17/2016 with Dr. Huerta- ESOPHAGUS: The distal esophagus had several concentric rings but there were  nonobstructing.  STOMACH: There was evidence of previous gastric surgery (Vadim-en-Y). The gastrojejunal anastomosis was friable with 2 small punctate ulcerations identified. The mucosa bled easily on contact and with biopsy. The jejunal loop beyond the anastomosis appeared normal. The gastric mucosa along the suture line in the stomach was edematous and erythematous. Multiple cold biopsies were also obtained from the stomach to rule out H. pylori infection.      EGD 8/15/2016 with Dr. Huerta- ESOPHAGUS: The distal esophagus was noted to be tortuous with a nonobstructing Schatzki's ring at the GE junction.  STOMACH: There was evidence of previous gastric resection secondary to peptic ulcer disease. A Billroth II anastomosis was identified. Both the limbs were entered and examined. He fair in limb demonstrated some edema and erythema which was biopsied using cold biopsy forceps to rule out celiac disease. At the anastomosis there was a small benign-appearing ulcer identified. Cold biopsies were obtained to rule out malignancy. In addition, the proximal body of the stomach demonstrated a linear erythema and edema which was biopsied using cold forceps to rule out the presence of H. pylori infection. Retroflexion of the gastroscope in the antrum demonstrated a small sliding-type hiatal hernia.   FINAL DIAGNOSIS  A.  Anastomosis Site, Biopsy - CHRONIC AND ACTIVE GASTRITIS WITH ACUTE  ULCERATION AND FOCAL INTESTINAL METAPLASIA.  Comment:  Giemsa stain is negative for Helicobacter pylori.  An Alcian Blue/PAS stain confirms the presence of intestinal metaplasia.  There is no evidence of dysplasia or malignancy.    B.  Small Intestine, Biopsy - NO SIGNIFICANT PATHOLOGIC CHANGE.  Comment:  The villous architecture is preserved and there is no evidence of  intraepithelial lymphocytosis. No granulomas or parasites are seen.    C.  Stomach, Body, Biopsy - CHRONIC ACTIVE GASTRITIS.  Comment:  Giemsa stain is negative for  Helicobacter pylori.       Colonoscopy (flex sig due to altered anatomy) 8/15/2016 with Dr. Huerta- Findings:  RECTAL EXAM: Normal sphincter tone, no palpable masses.  COLON: The colonoscope was inserted into the rectum and advanced to approximately 20 cm from the anal verge where the ileocolonic anastomosis was identified. This was an end-to-side anastomosis. The anastomosis had some friable mucosa along its margin. No active bleeding. No ulcer identified. Residual vegetable debris was noted near the anastomotic region. The scope was then retracted back in the rectum is retroflexed demonstrating grade 2 nonbleeding internal hemorrhoids.      EGD 4/21/2015 with Dr. Garrison-Findings:  ESOPHAGUS: There was a very small sliding (1cm) hiatal hernia. The esophagus appeared otherwise normal.   GE JUNCTION: The Z-line was located at cm from the gums and appeared irregular. No erosive esophagitis was noted at the GE junction. Also no lesion of the lower esophagus mentioned in the upper GI series was seen.  STOMACH: The antrum of the stomach is missing (previous gastrectomy). There was slight dilation of the stomach ending in a double barrel opening. The more proximal opening is blind ended while the second one is slightly narrower than the expected, accommodating the gastroscope easily through it. Based on the se findings the previous procedure must have been Vagotomy with antrectomy and Vadim en Y anastomosis. There were two lesions, one in the stomach closure line and a second one at the G-J anastomosis. Both were biopsied/removed with biopsy forceps.   FINAL DIAGNOSIS  A.  Gastric Lesion, Biopsy - SMALL FRAGMENT OF GASTRIC FUNDIC/BODY TYPE MUCOSA WITH FOCAL ACUTE EROSIVE GASTRITIS.  Comment: Focal fibrinoinflammatory exudate is present. There is minimal chronic inflammation. Giemsa stain is negative for Helicobacter pylori.  An Alcian blue/PAS stain reveals no evidence of intestinal metaplasia.    B.  Gastrojejunal  Junction Polyp, Biopsy - ACUTE INFLAMMATION WITH GRANULATION TISSUE.  -  NEGATIVE FOR DYSPLASIA OR MALIGNANCY.       Colonoscopy 4/21/2015 with Dr. Garrison- Findings: 1) Extensive melanosis coli, due chronic abuse of laxatives  2) Redundancy and dilation of a multi-curved colon. No anatomic abnormalities, causing obstruction were seen though     PERTINENT IMAGING  MRCP pancreas w and wo iv contrast 4/28/2025- IMPRESSION:  1.  Severely motion degraded exam which limits evaluation.  2. Bile ducts are at the upper limits of normal in caliber with  otherwise no definite cause of biliary obstruction identified.  Specifically, no definite choledocholithiasis or obstructing masses  are identified. Suggest correlation with LFTs to assess for biliary  obstruction.  3. Diffuse main pancreatic ductal dilatation of uncertain etiology,  increased compared to CT 07/19/2024. Evaluation is severely limited  due to motion artifact. No obstructing masses or filling defects are  identified. Consider ERCP for further evaluation to assess for main  duct IPMN or occult obstructing mass.    === 04/06/25 ===  CT ABDOMEN PELVIS W IV CONTRAST  - Impression -  Fluid-filled small-bowel as well as liquid stool near the ileosigmoid anastomosis suggestive of nonspecific enterocolitis, likely infectious/inflammatory. No bowel obstruction or pneumatosis.    Progressive bile and pancreatic duct dilation. Recommend further evaluation with outpatient MRCP to exclude underlying obstructive process/mass.    === 10/19/24 ===  CT ABDOMEN PELVIS WO IV CONTRAST  - Impression -  1. Limited exam due to lack of intravenous contrast and lack of intra-abdominal fat.  2. Status post gastric bypass and colonic surgery at the level of the sigmoid colon and rectum with possible thickening at the level of the rectum, which may be related to proctitis.  3. Cholelithiasis.  4. Small amount of periportal edema, which has not significantly changed.  5. Status post  hysterectomy.  6. New 1.5 cm irregular density in the right lung base. Since the prior exam was only 6 weeks ago, this may be related to an infiltrate. Follow-up is suggested. If this density persists further evaluation with CT of the chest should be considered.  7. Emphysematous changes in the lung bases.  8. Redemonstration of punctate nonobstructing left renal stone     === 09/06/24 ===  CT ABDOMEN PELVIS WO IV CONTRAST  - Impression -  Limited evaluation due to lack of intravenous contrast and paucity of  intra-abdominal fat.  Questionable edema in the pancreas.  Please  clinically for acute pancreatitis.  Mild edema in the distal sigmoid colon/rectum concerning for component  of colitis/proctitis.  Cholelithiasis.  Punctate nonobstructive calculus in the upper pole of the left kidney.  Mild periportal edema.  Moderate colonic stool.  Postsurgical changes in the distal colon.     === 07/19/24 ===  CT CHEST ABDOMEN PELVIS W IV CONTRAST  - Impression -  CHEST:  Moderately severe emphysema. Moderate kyphosis. No acute intrathoracic abnormality.     ABDOMEN-PELVIS:  Postop changes. No acute abnormality of the abdomen or pelvis. The exam is limited by the paucity of fat     === 07/08/24 ===  NM GASTRIC EMPTYING SOLID   12% %  at 1 hr    (normal max 90%)  5% %  at 2 hr    (normal max 60%)      IMPRESSION  1. Rapid gastric emptying without evidence of gastroparesis (Pt was taking prescribed Linzess)     === 05/05/24 ===  CT ABDOMEN PELVIS W IV CONTRAST  - Impression -  1.  No acute findings in the abdomen and pelvis.  2. Few fluid-filled small bowel loops in the lower abdomen without definite segmental distention to suggest obstruction.  3. Redemonstration of diffusely dilated main pancreatic duct, similar compared to prior imaging dating back to 2021. This was worked up by MRI and CT pancreas protocol in 2022. Recommend correlation with prior reports.  4. Cholelithiasis without acute cholecystitis.  5. Diffuse osseous  demineralization       Assessment/Plan   Assessment & Plan  Irritable bowel syndrome with constipation  Currently doing well on Linzess as needed and mirtazapine 7.5 mg at bedtime       Pancreatic ductal abnormality  Pt's PCP ordered MRCP for further eval of worsened ductal dilatation noted on CT 4/6/2025, MRCP suggested ERCP for further eval. Discussed with Dr. 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  Her Billroth II history makes EUS unlikely and ERCP would be possible but higher risk.  Per Dr. Courtney, she may benefit from Creon, however she has no abdominal symptoms.        Dysphagia, unspecified type  Resolved after EGD with dilation earlier this month       Epigastric pain  Resolved. Continue current GERD regimen   Consider Creon if pain recurs       Gastroesophageal reflux disease without esophagitis  Continue pantoprazole daily and Carafate AC/HS       Failure to thrive in adult  Continue mirtazapine 7.5 mg at bedtime  Reports consistent hunger and stable weight       3-month follow-up        Cori Carrasco, GEO-CNP  05/19/25          [1]   Past Medical History:  Diagnosis Date    Abdominal pain     Anxiety     Breast cancer     Cat bite 03/11/2023    Cataract     COPD (chronic obstructive pulmonary disease) (Multi)     Depression     Disease of thyroid gland     GERD (gastroesophageal reflux disease)     Hypothyroidism     Irritable bowel syndrome     Panic attacks     Personal history of malignant neoplasm of breast 06/30/2021    Personal history of other complications of pregnancy, childbirth and the puerperium     miscarriage   [2]   Past Surgical History:  Procedure Laterality Date    BREAST SURGERY Left     CATARACT EXTRACTION Bilateral     COLECTOMY  1992    COLON SURGERY      CT ABDOMEN ANGIOGRAM W AND/OR WO IV CONTRAST  12/28/2023    ESOPHAGOGASTRODUODENOSCOPY      GASTRIC BYPASS      HYSTERECTOMY      MASTECTOMY, PARTIAL Left     OTHER SURGICAL HISTORY Left 06/30/2021     Varicose vein ligation   [3]   Family History  Problem Relation Name Age of Onset    Osteoporosis Mother      Alcohol abuse Father      Cancer Sister          breast    Osteoporosis Sister      Other (bladder cancer) Sister      Cancer Brother          colon. prostate    Colon cancer Brother      Colon cancer Brother      Hypothyroidism Other

## 2025-05-13 ENCOUNTER — APPOINTMENT (OUTPATIENT)
Dept: RADIOLOGY | Facility: HOSPITAL | Age: 80
End: 2025-05-13
Payer: MEDICARE

## 2025-05-13 ENCOUNTER — HOSPITAL ENCOUNTER (EMERGENCY)
Facility: HOSPITAL | Age: 80
Discharge: HOME | End: 2025-05-13
Attending: EMERGENCY MEDICINE
Payer: MEDICARE

## 2025-05-13 VITALS
WEIGHT: 75 LBS | OXYGEN SATURATION: 100 % | DIASTOLIC BLOOD PRESSURE: 61 MMHG | HEIGHT: 60 IN | RESPIRATION RATE: 16 BRPM | SYSTOLIC BLOOD PRESSURE: 119 MMHG | BODY MASS INDEX: 14.72 KG/M2 | TEMPERATURE: 97.7 F | HEART RATE: 58 BPM

## 2025-05-13 DIAGNOSIS — S32.591A CLOSED FRACTURE OF RAMUS OF RIGHT PUBIS, INITIAL ENCOUNTER (MULTI): ICD-10-CM
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
## 2025-05-13 PROCEDURE — 99284 EMERGENCY DEPT VISIT MOD MDM: CPT | Mod: 25 | Performed by: EMERGENCY MEDICINE

## 2025-05-13 PROCEDURE — 70450 CT HEAD/BRAIN W/O DYE: CPT

## 2025-05-13 PROCEDURE — 72192 CT PELVIS W/O DYE: CPT

## 2025-05-13 PROCEDURE — 72125 CT NECK SPINE W/O DYE: CPT

## 2025-05-13 PROCEDURE — 73502 X-RAY EXAM HIP UNI 2-3 VIEWS: CPT | Mod: RT

## 2025-05-13 PROCEDURE — 73502 X-RAY EXAM HIP UNI 2-3 VIEWS: CPT | Mod: RIGHT SIDE | Performed by: RADIOLOGY

## 2025-05-13 RX ORDER — HYDROCODONE BITARTRATE AND ACETAMINOPHEN 5; 325 MG/1; MG/1
1 TABLET ORAL 3 TIMES DAILY
Qty: 6 TABLET | Refills: 0 | Status: SHIPPED | OUTPATIENT
Start: 2025-05-13 | End: 2025-05-16

## 2025-05-13 ASSESSMENT — PAIN - FUNCTIONAL ASSESSMENT: PAIN_FUNCTIONAL_ASSESSMENT: 0-10

## 2025-05-13 ASSESSMENT — PAIN DESCRIPTION - LOCATION: LOCATION: HIP

## 2025-05-13 ASSESSMENT — PAIN SCALES - GENERAL
PAINLEVEL_OUTOF10: 10 - WORST POSSIBLE PAIN
PAINLEVEL_OUTOF10: 9

## 2025-05-13 ASSESSMENT — PAIN DESCRIPTION - ORIENTATION: ORIENTATION: RIGHT

## 2025-05-13 ASSESSMENT — PAIN DESCRIPTION - DESCRIPTORS: DESCRIPTORS: SHARP

## 2025-05-13 NOTE — ED PROVIDER NOTES
HPI   Chief Complaint   Patient presents with    Hip Pain    Leg Pain    Fall       HPI        Patient History   Medical History[1]  Surgical History[2]  Family History[3]  Social History[4]    Physical Exam   ED Triage Vitals [05/13/25 0925]   Temperature Heart Rate Respirations BP   36.5 °C (97.7 °F) 58 16 107/64      Pulse Ox Temp src Heart Rate Source Patient Position   100 % -- -- --      BP Location FiO2 (%)     -- --       Physical Exam  Constitutional:       General: She is not in acute distress.     Appearance: Normal appearance. She is not toxic-appearing.   HENT:      Head: Normocephalic and atraumatic.      Right Ear: Tympanic membrane normal.      Left Ear: Tympanic membrane normal.      Mouth/Throat:      Mouth: Mucous membranes are moist.      Pharynx: Oropharynx is clear.   Eyes:      Conjunctiva/sclera: Conjunctivae normal.      Pupils: Pupils are equal, round, and reactive to light.   Cardiovascular:      Rate and Rhythm: Normal rate and regular rhythm.      Pulses: Normal pulses.      Heart sounds: Normal heart sounds.   Pulmonary:      Effort: Pulmonary effort is normal. No respiratory distress.      Breath sounds: Normal breath sounds. No wheezing.   Abdominal:      General: Bowel sounds are normal.      Palpations: Abdomen is soft.      Tenderness: There is no abdominal tenderness. There is no guarding or rebound.   Musculoskeletal:         General: Normal range of motion.      Cervical back: Normal range of motion.        Legs:    Skin:     General: Skin is warm and dry.   Neurological:      General: No focal deficit present.      Mental Status: She is alert and oriented to person, place, and time.           ED Course & MDM   Diagnoses as of 05/13/25 1239   Fall, initial encounter   Closed fracture of ramus of right pubis, initial encounter (Multi)                 No data recorded     [place] Coma Scale Score: 15 (05/13/25 0926 : Letty Egan, VIRIDIANA)                           Medical Decision  Making  79-year-old female status post fall.  Patient reports that she was walking too quickly there was a adjustment on the floor and she tripped over and fell to the ground.  Patient denies on anything else she is able to move all extremities except that her right hip hurts.  Patient came to the ED for evaluation.  This happened 4 days ago.  Was still cautious due to head and neck CT which was negative patient did have a hip fracture.  I did contact Dr. Jara from orthopedics he recommends weightbearing as tolerated and for pain control.  Patient does not want to be admitted to the hospital she like to be discharged home she stated she can use her walker she has everything she needs at home.  I explained to her that we could admit her for pain control but again patient does not want to be admitted  is at bedside they understand the plan patient discharged home.        Procedure  Procedures       [1]   Past Medical History:  Diagnosis Date    Abdominal pain     Anxiety     Breast cancer     Cat bite 03/11/2023    Cataract     COPD (chronic obstructive pulmonary disease) (Multi)     Depression     Disease of thyroid gland     GERD (gastroesophageal reflux disease)     Hypothyroidism     Irritable bowel syndrome     Panic attacks     Personal history of malignant neoplasm of breast 06/30/2021    Personal history of other complications of pregnancy, childbirth and the puerperium     miscarriage   [2]   Past Surgical History:  Procedure Laterality Date    BREAST SURGERY Left     CATARACT EXTRACTION Bilateral     COLECTOMY  1992    COLON SURGERY      CT ABDOMEN ANGIOGRAM W AND/OR WO IV CONTRAST  12/28/2023    ESOPHAGOGASTRODUODENOSCOPY      GASTRIC BYPASS      HYSTERECTOMY      MASTECTOMY, PARTIAL Left     OTHER SURGICAL HISTORY Left 06/30/2021    Varicose vein ligation   [3]   Family History  Problem Relation Name Age of Onset    Osteoporosis Mother      Alcohol abuse Father      Cancer Sister          breast     Osteoporosis Sister      Other (bladder cancer) Sister      Cancer Brother          colon. prostate    Colon cancer Brother      Colon cancer Brother      Hypothyroidism Other     [4]   Social History  Tobacco Use    Smoking status: Never     Passive exposure: Never    Smokeless tobacco: Never   Vaping Use    Vaping status: Never Used   Substance Use Topics    Alcohol use: Never    Drug use: Never        Vinayak Groves,   05/13/25 1238

## 2025-05-13 NOTE — ED TRIAGE NOTES
Patient presents with c/o right upper leg and righ t hip pain . Patient fell on Thursday, pain significantly worsened today, patient unable to bear weight on right leg

## 2025-05-14 LAB
LABORATORY COMMENT REPORT: NORMAL
PATH REPORT.FINAL DX SPEC: NORMAL
PATH REPORT.GROSS SPEC: NORMAL
PATH REPORT.RELEVANT HX SPEC: NORMAL
PATH REPORT.TOTAL CANCER: NORMAL

## 2025-05-15 ENCOUNTER — TELEPHONE (OUTPATIENT)
Dept: PRIMARY CARE | Facility: CLINIC | Age: 80
End: 2025-05-15
Payer: MEDICARE

## 2025-05-15 DIAGNOSIS — S32.511A CLOSED FRACTURE OF SUPERIOR RAMUS OF RIGHT PUBIS, INITIAL ENCOUNTER (MULTI): Primary | ICD-10-CM

## 2025-05-15 NOTE — TELEPHONE ENCOUNTER
Patient fell, hip fx.  Did not want to be admitted for pain control, currently at home.  Ortho recommended weight bearing as tolerated.  Do you recommend anything?

## 2025-05-19 ENCOUNTER — APPOINTMENT (OUTPATIENT)
Dept: GASTROENTEROLOGY | Facility: CLINIC | Age: 80
End: 2025-05-19
Payer: MEDICARE

## 2025-05-19 ENCOUNTER — TELEPHONE (OUTPATIENT)
Dept: PRIMARY CARE | Facility: CLINIC | Age: 80
End: 2025-05-19

## 2025-05-19 VITALS
HEART RATE: 65 BPM | SYSTOLIC BLOOD PRESSURE: 148 MMHG | RESPIRATION RATE: 18 BRPM | WEIGHT: 75 LBS | OXYGEN SATURATION: 97 % | BODY MASS INDEX: 14.72 KG/M2 | HEIGHT: 60 IN | DIASTOLIC BLOOD PRESSURE: 74 MMHG

## 2025-05-19 DIAGNOSIS — R62.7 FAILURE TO THRIVE IN ADULT: ICD-10-CM

## 2025-05-19 DIAGNOSIS — R13.10 DYSPHAGIA, UNSPECIFIED TYPE: ICD-10-CM

## 2025-05-19 DIAGNOSIS — K21.9 GASTROESOPHAGEAL REFLUX DISEASE WITHOUT ESOPHAGITIS: ICD-10-CM

## 2025-05-19 DIAGNOSIS — R10.13 EPIGASTRIC PAIN: ICD-10-CM

## 2025-05-19 DIAGNOSIS — K58.1 IRRITABLE BOWEL SYNDROME WITH CONSTIPATION: Primary | ICD-10-CM

## 2025-05-19 DIAGNOSIS — S32.591A CLOSED FRACTURE OF RAMUS OF RIGHT PUBIS, INITIAL ENCOUNTER (MULTI): ICD-10-CM
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
## 2025-05-19 DIAGNOSIS — Q45.3 PANCREATIC DUCTAL ABNORMALITY: ICD-10-CM

## 2025-05-19 PROCEDURE — 1126F AMNT PAIN NOTED NONE PRSNT: CPT

## 2025-05-19 PROCEDURE — 99214 OFFICE O/P EST MOD 30 MIN: CPT

## 2025-05-19 PROCEDURE — 1036F TOBACCO NON-USER: CPT

## 2025-05-19 PROCEDURE — 1160F RVW MEDS BY RX/DR IN RCRD: CPT

## 2025-05-19 PROCEDURE — 1159F MED LIST DOCD IN RCRD: CPT

## 2025-05-19 RX ORDER — HYDROCODONE BITARTRATE AND ACETAMINOPHEN 5; 325 MG/1; MG/1
1 TABLET ORAL DAILY PRN
Qty: 7 TABLET | Refills: 0 | Status: SHIPPED | OUTPATIENT
Start: 2025-05-19

## 2025-05-19 ASSESSMENT — ENCOUNTER SYMPTOMS
DIARRHEA: 0
CONSTIPATION: 0
BLOOD IN STOOL: 0
ROS GI COMMENTS: SEE HPI
VOMITING: 0
OCCASIONAL FEELINGS OF UNSTEADINESS: 1
ANAL BLEEDING: 0
NAUSEA: 0
ABDOMINAL PAIN: 0

## 2025-05-19 ASSESSMENT — PATIENT HEALTH QUESTIONNAIRE - PHQ9
1. LITTLE INTEREST OR PLEASURE IN DOING THINGS: NOT AT ALL
2. FEELING DOWN, DEPRESSED OR HOPELESS: NOT AT ALL
SUM OF ALL RESPONSES TO PHQ9 QUESTIONS 1 AND 2: 0

## 2025-05-19 ASSESSMENT — PAIN SCALES - GENERAL: PAINLEVEL_OUTOF10: 0-NO PAIN

## 2025-05-19 NOTE — PATIENT INSTRUCTIONS
Continue current regimen-   Hyoscyamine under tongue before meals  Carafate before meals and at bedtime  Mirtazapine 7.5 mg at bedtime  Linzess as needed  3 month follow up

## 2025-05-19 NOTE — TELEPHONE ENCOUNTER
Patient received HYDROcodone-acetaminophen (Norco) 5-325 mg tablet from the [facility] for a hip fx, she was taking one daily and used regular tylenol as well.  Could you send in another 6 day supply to help her with the pain?

## 2025-05-23 ENCOUNTER — APPOINTMENT (OUTPATIENT)
Dept: RADIOLOGY | Facility: HOSPITAL | Age: 80
End: 2025-05-23
Payer: MEDICARE

## 2025-05-27 ENCOUNTER — APPOINTMENT (OUTPATIENT)
Dept: PRIMARY CARE | Facility: CLINIC | Age: 80
End: 2025-05-27
Payer: MEDICARE

## 2025-05-27 VITALS
DIASTOLIC BLOOD PRESSURE: 62 MMHG | OXYGEN SATURATION: 99 % | TEMPERATURE: 97.9 F | BODY MASS INDEX: 14.37 KG/M2 | SYSTOLIC BLOOD PRESSURE: 118 MMHG | HEIGHT: 60 IN | HEART RATE: 60 BPM | WEIGHT: 73.2 LBS

## 2025-05-27 DIAGNOSIS — E83.42 HYPOMAGNESEMIA: ICD-10-CM

## 2025-05-27 DIAGNOSIS — R39.9 URINARY SYMPTOM OR SIGN: ICD-10-CM

## 2025-05-27 DIAGNOSIS — Z79.899 CONTROLLED SUBSTANCE AGREEMENT SIGNED: ICD-10-CM
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
## 2025-05-27 DIAGNOSIS — S32.591A CLOSED FRACTURE OF RAMUS OF RIGHT PUBIS, INITIAL ENCOUNTER (MULTI): Primary | ICD-10-CM

## 2025-05-27 RX ORDER — HYDROCODONE BITARTRATE AND ACETAMINOPHEN 5; 325 MG/1; MG/1
1 TABLET ORAL DAILY PRN
Qty: 20 TABLET | Refills: 0 | Status: SHIPPED | OUTPATIENT
Start: 2025-05-27

## 2025-05-27 RX ORDER — LANOLIN ALCOHOL/MO/W.PET/CERES
1 CREAM (GRAM) TOPICAL 2 TIMES DAILY
Qty: 60 TABLET | Refills: 3 | Status: SHIPPED | OUTPATIENT
Start: 2025-05-27 | End: 2026-05-27

## 2025-05-27 NOTE — PROGRESS NOTES
Subjective   Patient ID: Latha Quispe is a 79 y.o. female who presents for Follow-up (3 months) and Hip Injury (Wants to ask about recovery )    HPI  Here for follow up of ED visit 5/13/2025 after patient tripped and and suffered a fracture of her inferior and superior ramus. Has not yet followed up with orthopedics. On norco for pain control.   Has not yet gotten CT chest or her dexa scan done yet.   Had UTI, feeling better, no longer having abdominal pain.   Needs refill of her magnesium.    OARRS:  Melanie Andrade MD on 5/27/2025  9:37 AM  I have personally reviewed the OARRS report for Latha Quispe. I have considered the risks of abuse, dependence, addiction and diversion    Is the patient prescribed a combination of a benzodiazepine and opioid?  Yes, I feel it is clincially indicated to continue the medication and have discussed with the patient risks/benefits/alternatives.    Last Urine Drug Screen / ordered today: Yes     Controlled Substance Agreement:  Date of the Last Agreement: 5/27/25  Reviewed Controlled Substance Agreement including but not limited to the benefits, risks, and alternatives to treatment with a Controlled Substance medication(s).    Opioids:    Pain Assessment:  Analgesia  What was your pain level on average during the past week?: 8  What was your pain level at its worst during the past week?: 10 - Pain as bad as it can be  What percentage of your pain has been relieved during the past week?: 50 %  Is the amount of pain relief you are now obtaining from your current pain relievers enough to make a real difference in your life?: Y  Query to Clinician: Is the patient's pain relief clinically significant?: Yes    Activities of Daily Living  Physical Functioning: Better  Family Relationships: Same  Social Relationships: Same  Mood: Same  Sleep Patterns: Same  Overall Functioning: Same    Adverse Events  Is patient experiencing any side effects from current pain relievers?:  N  Patient's Overall Severity of Side Effects: None      Assessment  Is your overall impression that this patient is benefiting from opioid therapy?: Yes  Specific Analgesic Plan: Continue present regimen      Review of Systems  General: no fever  Eyes: no blurry vision  ENT: no sore throat, no ear pain  Resp: no cough, sob or wheezing  Cardio: no chest pain, no palpitations  Abd: no nausea/vomiting  : no dysuria, no increased urinary frequency      /62   Pulse 60   Temp 36.6 °C (97.9 °F)   Ht 1.524 m (5')   Wt (!) 33.2 kg (73 lb 3.2 oz)   SpO2 99%   BMI 14.30 kg/m²       Objective   Physical Exam  Gen: NAD, alert  Head: normocephalic/atraumatic  Eyes: conjunctivae normal  Ears: canals clear bilaterally, TM normal   Nose: external nose normal   Oropharynx: clear   Resp: Clear to auscultation  CVS: Regular rate and rhythm  Abdomen: soft, NT, ND  Ext: no edema, NT of lower extremities  Neuro:using walker to ambulate    Personally reviewed the following labs and imaging:   Lab Results   Component Value Date    WBC 5.2 04/24/2025    HGB 11.5 (L) 04/24/2025    HCT 35.8 04/24/2025    MCV 92.7 04/24/2025     04/24/2025       Chemistry    Lab Results   Component Value Date/Time     04/24/2025 0844    K 4.0 04/24/2025 0844     04/24/2025 0844    CO2 27 04/24/2025 0844    BUN 11 04/24/2025 0844    CREATININE 0.94 04/24/2025 0844    Lab Results   Component Value Date/Time    CALCIUM 8.9 04/24/2025 0844    ALKPHOS 75 04/14/2025 1018    AST 18 04/14/2025 1018    ALT 11 04/14/2025 1018    BILITOT 0.3 04/14/2025 1018        Xray right hip - Subtle oblique lucency through the intertrochanteric region on the  oblique view. Recommend further evaluation with CT.    CT pelvis - IMPRESSION:  Nondisplaced fractures through the right superior and inferior pubic rami. The proximal right femur is intact. No intratrochanteric  fracture identified.    Assessment/Plan   Problem List Items Addressed This Visit        Hypomagnesemia    Relevant Medications    magnesium oxide (Mag-Ox) 400 mg (241.3 mg elemental) tablet     Other Visit Diagnoses         Closed fracture of ramus of right pubis, initial encounter (Multi)    -  Primary    Relevant Medications    HYDROcodone-acetaminophen (Norco) 5-325 mg tablet      Fall, initial encounter        Relevant Medications    HYDROcodone-acetaminophen (Norco) 5-325 mg tablet      Controlled substance agreement signed        Relevant Orders    Opiate/Opioid/Benzo Prescription Compliance      Urinary symptom or sign        Relevant Orders    Urine Culture

## 2025-05-29 ENCOUNTER — HOSPITAL ENCOUNTER (OUTPATIENT)
Dept: RADIOLOGY | Facility: HOSPITAL | Age: 80
Discharge: HOME | End: 2025-05-29
Payer: MEDICARE

## 2025-05-29 DIAGNOSIS — R13.10 DYSPHAGIA, UNSPECIFIED TYPE: ICD-10-CM

## 2025-05-29 DIAGNOSIS — R19.8 ABNORMAL FINDINGS ON ESOPHAGOGASTRODUODENOSCOPY (EGD): ICD-10-CM

## 2025-05-29 PROCEDURE — 78264 GASTRIC EMPTYING IMG STUDY: CPT

## 2025-05-29 PROCEDURE — 3430000001 HC RX 343 DIAGNOSTIC RADIOPHARMACEUTICALS: Performed by: SURGERY

## 2025-05-29 PROCEDURE — A9541 TC99M SULFUR COLLOID: HCPCS | Performed by: SURGERY

## 2025-05-29 RX ADMIN — TECHNETIUM TC 99M SULFUR COLLOID KIT 1 MILLICURIE: KIT at 07:45

## 2025-05-30 ENCOUNTER — HOSPITAL ENCOUNTER (OUTPATIENT)
Dept: RADIOLOGY | Facility: HOSPITAL | Age: 80
Discharge: HOME | End: 2025-05-30
Payer: MEDICARE

## 2025-05-30 ENCOUNTER — OFFICE VISIT (OUTPATIENT)
Dept: ORTHOPEDIC SURGERY | Facility: CLINIC | Age: 80
End: 2025-05-30
Payer: MEDICARE

## 2025-05-30 DIAGNOSIS — M17.11 ARTHRITIS OF RIGHT KNEE: ICD-10-CM

## 2025-05-30 DIAGNOSIS — M25.561 RIGHT KNEE PAIN, UNSPECIFIED CHRONICITY: ICD-10-CM

## 2025-05-30 DIAGNOSIS — M17.12 ARTHRITIS OF LEFT KNEE: Primary | ICD-10-CM

## 2025-05-30 LAB
1OH-MIDAZOLAM UR-MCNC: NEGATIVE NG/ML
7AMINOCLONAZEPAM UR-MCNC: NEGATIVE NG/ML
A-OH ALPRAZ UR-MCNC: NEGATIVE NG/ML
A-OH-TRIAZOLAM UR-MCNC: NEGATIVE NG/ML
AMPHET UR-MCNC: NEGATIVE NG/ML
AMPHETAMINES UR QL: ABNORMAL NG/ML
BACTERIA UR CULT: NORMAL
BARBITURATES UR QL: NEGATIVE NG/ML
BZE UR QL: NEGATIVE NG/ML
CODEINE UR-MCNC: NEGATIVE NG/ML
CREAT UR-MCNC: 168.3 MG/DL
DRUG SCREEN COMMENT UR-IMP: ABNORMAL
EDDP UR-MCNC: NEGATIVE NG/ML
FENTANYL UR-MCNC: NEGATIVE NG/ML
HYDROCODONE UR-MCNC: 143 NG/ML
HYDROMORPHONE UR-MCNC: NEGATIVE NG/ML
LORAZEPAM UR-MCNC: 1300 NG/ML
METHADONE UR-MCNC: NEGATIVE NG/ML
METHAMPHET UR-MCNC: NEGATIVE NG/ML
MORPHINE UR-MCNC: NEGATIVE NG/ML
NORDIAZEPAM UR-MCNC: NEGATIVE NG/ML
NORFENTANYL UR-MCNC: NEGATIVE NG/ML
NORHYDROCODONE UR CFM-MCNC: 949 NG/ML
NOROXYCODONE UR CFM-MCNC: NEGATIVE NG/ML
NORTRAMADOL UR-MCNC: NEGATIVE NG/ML
OH-ETHYLFLURAZ UR-MCNC: NEGATIVE NG/ML
OXAZEPAM UR-MCNC: NEGATIVE NG/ML
OXIDANTS UR QL: NEGATIVE MCG/ML
OXYCODONE UR CFM-MCNC: NEGATIVE NG/ML
OXYMORPHONE UR CFM-MCNC: NEGATIVE NG/ML
PCP UR QL: NEGATIVE NG/ML
PH UR: 5.7 [PH] (ref 4.5–9)
QUEST 6 ACETYLMORPHINE: NEGATIVE NG/ML
QUEST NOTES AND COMMENTS: ABNORMAL
QUEST ZOLPIDEM: NEGATIVE NG/ML
TEMAZEPAM UR-MCNC: NEGATIVE NG/ML
THC UR QL: POSITIVE NG/ML
THC UR-MCNC: >5000 NG/ML
TRAMADOL UR-MCNC: NEGATIVE NG/ML
ZOLPIDEM PHENYL-4-CARB UR CFM-MCNC: NEGATIVE NG/ML

## 2025-05-30 PROCEDURE — 73564 X-RAY EXAM KNEE 4 OR MORE: CPT | Mod: RT

## 2025-05-30 PROCEDURE — 73562 X-RAY EXAM OF KNEE 3: CPT | Mod: LT

## 2025-05-30 ASSESSMENT — PAIN - FUNCTIONAL ASSESSMENT: PAIN_FUNCTIONAL_ASSESSMENT: 0-10

## 2025-05-30 ASSESSMENT — PAIN SCALES - GENERAL: PAINLEVEL_OUTOF10: 9

## 2025-05-30 NOTE — PROGRESS NOTES
Did b/l knee shots first timer   ehl/fhl/gs/ta. dwaynet s/s/sp/dp/t. 2+ dp/pt        Imaging:  Xrays were ordered by me, they were reviewed and independently interpreted by me today, they show right knee with severe joint space generation especially in the medial compartment.  No presence of acute fracture or dislocation.    L Inj/Asp: bilateral knee on 6/2/2025 9:19 AM  Indications: pain and joint swelling  Details: 22 G needle, anterolateral approach  Medications (Right): 2.5 mg triamcinolone acetonide 40 mg/mL  Medications (Left): 2.5 mg triamcinolone acetonide 40 mg/mL    Discussion:  I discussed the conservative treatment options for knee osteoarthritis including but not limited to physical therapy, oral NSAIDS, activity and lifestyle modification, and corticosteroid injections. Pt has elected to undergo a cortisone injection today. I have explained the risk and benefits of an injection including the possibility of joint infection, bleeding, damage to cartilage, allergic reaction. Patient verbalized understanding and gave verbal consent wishes to proceed with a intra-articular cortisone injection for their knee.    Procedure:  After discussing the risk and benefits of the procedure, we proceeded with an intra-articular bilateral knee injection. We discussed the risks and benefits and potential morbidity related to the treatment, and to the prescription medication administered in the injection    With the patient's informed verbal consent, the bilateral knees were prepped in standard sterile fashion with Chlorhexidine. The skin was then anesthetized with ethyl chloride spray and cleaned again with Chlorhexidine. The bilateral knees were then apirated/injected with a prefilled 20-gauge syringe of 40 mg Kenalog + 4 ml Lidocaine using the lateral approach without complications.  The patient tolerated this well and felt immediate initial relief of symptoms. A bandaid was applied and the patient ambulated out of the clinic on ther own accord without  difficulty. Patient was instructed to avoid physical activity for 24-48 hours to prevent the knees from swelling and may ice the knees as tolerated. Patient should contact the office if any signs of of infection appear: redness, fever, chills, drainage, swelling or warmth to the knees.  Pt understands that the injections can be repeated no sooner than 3 months.      Procedure, treatment alternatives, risks and benefits explained, specific risks discussed. Consent was given by the patient. Immediately prior to procedure a time out was called to verify the correct patient, procedure, equipment, support staff and site/side marked as required. Patient was prepped and draped in the usual sterile fashion.             Impression/Plan: This is a 79 y.o. female with severe arthritis and progressive knee pain.  I had an in depth discussion with the patient regarding treatment options for arthritis and their relative risks and benefits. We reviewed surgical and nonsurgical option for treatment. Treatments include anti inflammatory medications, physical therapy, weight loss, activity modification, use of assistive devices, injection therapies. We discussed current prescriptions and risks and benefits of continuation of prescription medication as apporpriate. We discussed that arthritis is often progressive over time, an in end stage arthritis surgical interventions can be considered, including arthroplasty. All questions were answered and the patient voiced their understanding.  We did bilateral steroid injections and she is welcome back in 3 months or more for another round.    BMI Readings from Last 1 Encounters:   05/27/25 14.30 kg/m²      Lab Results   Component Value Date    CREATININE 0.94 04/24/2025     Tobacco Use: Low Risk  (5/30/2025)    Patient History     Smoking Tobacco Use: Never     Smokeless Tobacco Use: Never     Passive Exposure: Never      Computed MELD 3.0 unavailable. One or more values for this score  "either were not found within the given timeframe or did not fit some other criterion.  Computed MELD-Na unavailable. One or more values for this score either were not found within the given timeframe or did not fit some other criterion.       Lab Results   Component Value Date    HGBA1C 5.9 (H) 04/24/2025     No results found for: \"STAPHMRSASCR\"  "

## 2025-06-02 RX ORDER — TRIAMCINOLONE ACETONIDE 40 MG/ML
2.5 INJECTION, SUSPENSION INTRA-ARTICULAR; INTRAMUSCULAR
Status: COMPLETED | OUTPATIENT
Start: 2025-06-02 | End: 2025-06-02

## 2025-06-02 RX ADMIN — TRIAMCINOLONE ACETONIDE 2.5 MG: 40 INJECTION, SUSPENSION INTRA-ARTICULAR; INTRAMUSCULAR at 09:19

## 2025-06-03 ENCOUNTER — APPOINTMENT (OUTPATIENT)
Dept: RADIOLOGY | Facility: HOSPITAL | Age: 80
End: 2025-06-03
Payer: MEDICARE

## 2025-06-09 ENCOUNTER — HOSPITAL ENCOUNTER (OUTPATIENT)
Dept: RADIOLOGY | Facility: HOSPITAL | Age: 80
Discharge: HOME | End: 2025-06-09
Payer: MEDICARE

## 2025-06-09 DIAGNOSIS — Z78.0 ENCOUNTER FOR OSTEOPOROSIS SCREENING IN ASYMPTOMATIC POSTMENOPAUSAL PATIENT: ICD-10-CM

## 2025-06-09 DIAGNOSIS — Z13.820 ENCOUNTER FOR OSTEOPOROSIS SCREENING IN ASYMPTOMATIC POSTMENOPAUSAL PATIENT: ICD-10-CM

## 2025-06-09 PROCEDURE — 77080 DXA BONE DENSITY AXIAL: CPT | Performed by: RADIOLOGY

## 2025-06-09 PROCEDURE — 77080 DXA BONE DENSITY AXIAL: CPT

## 2025-06-23 ENCOUNTER — APPOINTMENT (OUTPATIENT)
Dept: RADIOLOGY | Facility: HOSPITAL | Age: 80
End: 2025-06-23
Payer: MEDICARE

## 2025-06-23 ENCOUNTER — APPOINTMENT (OUTPATIENT)
Dept: CARDIOLOGY | Facility: HOSPITAL | Age: 80
End: 2025-06-23
Payer: MEDICARE

## 2025-06-23 ENCOUNTER — HOSPITAL ENCOUNTER (EMERGENCY)
Facility: HOSPITAL | Age: 80
Discharge: HOME | End: 2025-06-23
Attending: STUDENT IN AN ORGANIZED HEALTH CARE EDUCATION/TRAINING PROGRAM
Payer: MEDICARE

## 2025-06-23 VITALS
DIASTOLIC BLOOD PRESSURE: 73 MMHG | TEMPERATURE: 98.6 F | BODY MASS INDEX: 14.53 KG/M2 | OXYGEN SATURATION: 100 % | RESPIRATION RATE: 20 BRPM | SYSTOLIC BLOOD PRESSURE: 111 MMHG | HEART RATE: 63 BPM | WEIGHT: 74 LBS | HEIGHT: 60 IN

## 2025-06-23 DIAGNOSIS — N30.00 ACUTE CYSTITIS WITHOUT HEMATURIA: Primary | ICD-10-CM

## 2025-06-23 DIAGNOSIS — R10.84 GENERALIZED ABDOMINAL PAIN: ICD-10-CM

## 2025-06-23 LAB
ALBUMIN SERPL BCP-MCNC: 3.6 G/DL (ref 3.4–5)
ALP SERPL-CCNC: 166 U/L (ref 33–136)
ALT SERPL W P-5'-P-CCNC: 21 U/L (ref 7–45)
ANION GAP SERPL CALC-SCNC: 15 MMOL/L (ref 10–20)
APPEARANCE UR: ABNORMAL
AST SERPL W P-5'-P-CCNC: 22 U/L (ref 9–39)
BACTERIA #/AREA URNS AUTO: ABNORMAL /HPF
BASOPHILS # BLD AUTO: 0.03 X10*3/UL (ref 0–0.1)
BASOPHILS NFR BLD AUTO: 0.5 %
BILIRUB SERPL-MCNC: 0.3 MG/DL (ref 0–1.2)
BILIRUB UR STRIP.AUTO-MCNC: NEGATIVE MG/DL
BUN SERPL-MCNC: 15 MG/DL (ref 6–23)
CALCIUM SERPL-MCNC: 8.7 MG/DL (ref 8.6–10.3)
CARDIAC TROPONIN I PNL SERPL HS: 6 NG/L (ref 0–13)
CARDIAC TROPONIN I PNL SERPL HS: 7 NG/L (ref 0–13)
CHLORIDE SERPL-SCNC: 97 MMOL/L (ref 98–107)
CO2 SERPL-SCNC: 21 MMOL/L (ref 21–32)
COLOR UR: COLORLESS
CREAT SERPL-MCNC: 0.86 MG/DL (ref 0.5–1.05)
EGFRCR SERPLBLD CKD-EPI 2021: 69 ML/MIN/1.73M*2
EOSINOPHIL # BLD AUTO: 0.02 X10*3/UL (ref 0–0.4)
EOSINOPHIL NFR BLD AUTO: 0.3 %
ERYTHROCYTE [DISTWIDTH] IN BLOOD BY AUTOMATED COUNT: 13.2 % (ref 11.5–14.5)
GLUCOSE SERPL-MCNC: 208 MG/DL (ref 74–99)
GLUCOSE UR STRIP.AUTO-MCNC: ABNORMAL MG/DL
HCT VFR BLD AUTO: 31.8 % (ref 36–46)
HGB BLD-MCNC: 10.4 G/DL (ref 12–16)
HYALINE CASTS #/AREA URNS AUTO: ABNORMAL /LPF
IMM GRANULOCYTES # BLD AUTO: 0.01 X10*3/UL (ref 0–0.5)
IMM GRANULOCYTES NFR BLD AUTO: 0.2 % (ref 0–0.9)
KETONES UR STRIP.AUTO-MCNC: NEGATIVE MG/DL
LEUKOCYTE ESTERASE UR QL STRIP.AUTO: ABNORMAL
LIPASE SERPL-CCNC: 23 U/L (ref 9–82)
LYMPHOCYTES # BLD AUTO: 0.82 X10*3/UL (ref 0.8–3)
LYMPHOCYTES NFR BLD AUTO: 12.4 %
MAGNESIUM SERPL-MCNC: 1.5 MG/DL (ref 1.6–2.4)
MCH RBC QN AUTO: 30.6 PG (ref 26–34)
MCHC RBC AUTO-ENTMCNC: 32.7 G/DL (ref 32–36)
MCV RBC AUTO: 94 FL (ref 80–100)
MONOCYTES # BLD AUTO: 0.39 X10*3/UL (ref 0.05–0.8)
MONOCYTES NFR BLD AUTO: 5.9 %
MUCOUS THREADS #/AREA URNS AUTO: ABNORMAL /LPF
NEUTROPHILS # BLD AUTO: 5.33 X10*3/UL (ref 1.6–5.5)
NEUTROPHILS NFR BLD AUTO: 80.7 %
NITRITE UR QL STRIP.AUTO: ABNORMAL
NRBC BLD-RTO: 0 /100 WBCS (ref 0–0)
PH UR STRIP.AUTO: 5.5 [PH]
PLATELET # BLD AUTO: 317 X10*3/UL (ref 150–450)
POTASSIUM SERPL-SCNC: 3.1 MMOL/L (ref 3.5–5.3)
PROT SERPL-MCNC: 6.1 G/DL (ref 6.4–8.2)
PROT UR STRIP.AUTO-MCNC: NEGATIVE MG/DL
RBC # BLD AUTO: 3.4 X10*6/UL (ref 4–5.2)
RBC # UR STRIP.AUTO: ABNORMAL MG/DL
RBC #/AREA URNS AUTO: ABNORMAL /HPF
SODIUM SERPL-SCNC: 130 MMOL/L (ref 136–145)
SP GR UR STRIP.AUTO: 1.02
SQUAMOUS #/AREA URNS AUTO: ABNORMAL /HPF
UROBILINOGEN UR STRIP.AUTO-MCNC: NORMAL MG/DL
WBC # BLD AUTO: 6.6 X10*3/UL (ref 4.4–11.3)
WBC #/AREA URNS AUTO: >50 /HPF
WBC CLUMPS #/AREA URNS AUTO: ABNORMAL /HPF

## 2025-06-23 PROCEDURE — 36415 COLL VENOUS BLD VENIPUNCTURE: CPT

## 2025-06-23 PROCEDURE — 85025 COMPLETE CBC W/AUTO DIFF WBC: CPT

## 2025-06-23 PROCEDURE — 83690 ASSAY OF LIPASE: CPT

## 2025-06-23 PROCEDURE — 2500000002 HC RX 250 W HCPCS SELF ADMINISTERED DRUGS (ALT 637 FOR MEDICARE OP, ALT 636 FOR OP/ED)

## 2025-06-23 PROCEDURE — 74177 CT ABD & PELVIS W/CONTRAST: CPT | Performed by: RADIOLOGY

## 2025-06-23 PROCEDURE — 84484 ASSAY OF TROPONIN QUANT: CPT

## 2025-06-23 PROCEDURE — 96375 TX/PRO/DX INJ NEW DRUG ADDON: CPT

## 2025-06-23 PROCEDURE — 2500000005 HC RX 250 GENERAL PHARMACY W/O HCPCS

## 2025-06-23 PROCEDURE — 71046 X-RAY EXAM CHEST 2 VIEWS: CPT | Performed by: RADIOLOGY

## 2025-06-23 PROCEDURE — 93005 ELECTROCARDIOGRAM TRACING: CPT

## 2025-06-23 PROCEDURE — 96366 THER/PROPH/DIAG IV INF ADDON: CPT

## 2025-06-23 PROCEDURE — 2500000001 HC RX 250 WO HCPCS SELF ADMINISTERED DRUGS (ALT 637 FOR MEDICARE OP)

## 2025-06-23 PROCEDURE — 74177 CT ABD & PELVIS W/CONTRAST: CPT

## 2025-06-23 PROCEDURE — 2550000001 HC RX 255 CONTRASTS: Performed by: STUDENT IN AN ORGANIZED HEALTH CARE EDUCATION/TRAINING PROGRAM

## 2025-06-23 PROCEDURE — 2500000004 HC RX 250 GENERAL PHARMACY W/ HCPCS (ALT 636 FOR OP/ED)

## 2025-06-23 PROCEDURE — 84075 ASSAY ALKALINE PHOSPHATASE: CPT

## 2025-06-23 PROCEDURE — 81001 URINALYSIS AUTO W/SCOPE: CPT

## 2025-06-23 PROCEDURE — 83735 ASSAY OF MAGNESIUM: CPT

## 2025-06-23 PROCEDURE — 99285 EMERGENCY DEPT VISIT HI MDM: CPT | Mod: 25 | Performed by: STUDENT IN AN ORGANIZED HEALTH CARE EDUCATION/TRAINING PROGRAM

## 2025-06-23 PROCEDURE — 96365 THER/PROPH/DIAG IV INF INIT: CPT | Mod: 59

## 2025-06-23 PROCEDURE — 2500000001 HC RX 250 WO HCPCS SELF ADMINISTERED DRUGS (ALT 637 FOR MEDICARE OP): Performed by: STUDENT IN AN ORGANIZED HEALTH CARE EDUCATION/TRAINING PROGRAM

## 2025-06-23 PROCEDURE — 71046 X-RAY EXAM CHEST 2 VIEWS: CPT

## 2025-06-23 RX ORDER — CEPHALEXIN 500 MG/1
500 CAPSULE ORAL 2 TIMES DAILY
Qty: 20 CAPSULE | Refills: 0 | Status: SHIPPED | OUTPATIENT
Start: 2025-06-23 | End: 2025-07-03

## 2025-06-23 RX ORDER — CEPHALEXIN 500 MG/1
500 CAPSULE ORAL ONCE
Status: COMPLETED | OUTPATIENT
Start: 2025-06-23 | End: 2025-06-23

## 2025-06-23 RX ORDER — LIDOCAINE HYDROCHLORIDE 20 MG/ML
15 SOLUTION OROPHARYNGEAL ONCE
Status: COMPLETED | OUTPATIENT
Start: 2025-06-23 | End: 2025-06-23

## 2025-06-23 RX ORDER — FAMOTIDINE 10 MG/ML
20 INJECTION, SOLUTION INTRAVENOUS ONCE
Status: COMPLETED | OUTPATIENT
Start: 2025-06-23 | End: 2025-06-23

## 2025-06-23 RX ORDER — MAGNESIUM SULFATE HEPTAHYDRATE 40 MG/ML
2 INJECTION, SOLUTION INTRAVENOUS ONCE
Status: COMPLETED | OUTPATIENT
Start: 2025-06-23 | End: 2025-06-23

## 2025-06-23 RX ORDER — POTASSIUM CHLORIDE 20 MEQ/1
40 TABLET, EXTENDED RELEASE ORAL ONCE
Status: COMPLETED | OUTPATIENT
Start: 2025-06-23 | End: 2025-06-23

## 2025-06-23 RX ORDER — ALUMINUM HYDROXIDE, MAGNESIUM HYDROXIDE, AND SIMETHICONE 1200; 120; 1200 MG/30ML; MG/30ML; MG/30ML
30 SUSPENSION ORAL ONCE
Status: COMPLETED | OUTPATIENT
Start: 2025-06-23 | End: 2025-06-23

## 2025-06-23 RX ADMIN — MAGNESIUM SULFATE HEPTAHYDRATE 2 G: 2 INJECTION, SOLUTION INTRAVENOUS at 21:14

## 2025-06-23 RX ADMIN — IOHEXOL 55 ML: 350 INJECTION, SOLUTION INTRAVENOUS at 20:53

## 2025-06-23 RX ADMIN — LIDOCAINE HYDROCHLORIDE 15 ML: 20 SOLUTION ORAL at 19:57

## 2025-06-23 RX ADMIN — FAMOTIDINE 20 MG: 10 INJECTION, SOLUTION INTRAVENOUS at 19:58

## 2025-06-23 RX ADMIN — ALUMINUM HYDROXIDE, MAGNESIUM HYDROXIDE, AND DIMETHICONE 30 ML: 200; 20; 200 SUSPENSION ORAL at 19:57

## 2025-06-23 RX ADMIN — CEPHALEXIN 500 MG: 500 CAPSULE ORAL at 23:28

## 2025-06-23 RX ADMIN — POTASSIUM CHLORIDE 40 MEQ: 1500 TABLET, EXTENDED RELEASE ORAL at 21:14

## 2025-06-23 ASSESSMENT — PAIN - FUNCTIONAL ASSESSMENT: PAIN_FUNCTIONAL_ASSESSMENT: 0-10

## 2025-06-23 ASSESSMENT — PAIN SCALES - GENERAL: PAINLEVEL_OUTOF10: 9

## 2025-06-23 ASSESSMENT — PAIN DESCRIPTION - LOCATION: LOCATION: ABDOMEN

## 2025-06-23 ASSESSMENT — PAIN DESCRIPTION - PAIN TYPE: TYPE: ACUTE PAIN

## 2025-06-23 NOTE — ED TRIAGE NOTES
Pt BIBA from home with new complaints of abd pain around 3 PM. Describes pain as sharp and tearing. GCS 15. EMS gave 4 mg of zofran and 50 mg of fentanyl. Pt appears comfortable but reports mild nausea and abd pain 9/10

## 2025-06-24 LAB
ATRIAL RATE: 62 BPM
P AXIS: 68 DEGREES
P OFFSET: 185 MS
P ONSET: 135 MS
PR INTERVAL: 148 MS
Q ONSET: 209 MS
QRS COUNT: 10 BEATS
QRS DURATION: 96 MS
QT INTERVAL: 430 MS
QTC CALCULATION(BAZETT): 436 MS
QTC FREDERICIA: 435 MS
R AXIS: -68 DEGREES
T AXIS: 76 DEGREES
T OFFSET: 424 MS
VENTRICULAR RATE: 62 BPM

## 2025-06-24 NOTE — ED PROVIDER NOTES
CC: Abdominal Pain     HPI:  Patient is a 80-year-old female with a past medical history of GERD, IBS, COPD, gastric bypass, and colectomy who presented to the ED for abdominal pain.  Patient noted that her abdominal pain began suddenly around 3 PM today after eating lunch.  Notes the pain was sharp and tearing.  Patient was administered 4 mg Zofran 50 g of fentanyl by EMS.  Patient noted feeling significantly better afterwards.  Denied having vomiting but had nausea.  Patient notes that the pain radiates to her chest and feels like acid reflux.  Denied loss of consciousness, trauma, falls, difficulty breathing, neck pain, dysuria, and abnormal bowel movements.    Limitations to history: None  Independent historian(s): Patient  Records Reviewed: Recent available ED and inpatient notes reviewed in EMR.    PMHx/PSHx:  Per HPI.   - has a past medical history of Abdominal pain, Anxiety, Breast cancer, Cat bite (03/11/2023), Cataract, COPD (chronic obstructive pulmonary disease) (Multi), Depression, Disease of thyroid gland, GERD (gastroesophageal reflux disease), Hypothyroidism, Irritable bowel syndrome, Panic attacks, Personal history of malignant neoplasm of breast (06/30/2021), and Personal history of other complications of pregnancy, childbirth and the puerperium.  - has a past surgical history that includes Other surgical history (Left, 06/30/2021); Breast surgery (Left); Gastric bypass; Colon surgery; Hysterectomy; Cataract extraction (Bilateral); Mastectomy, partial (Left); CT angio abdomen w and or wo IV IV contrast (12/28/2023); Colectomy (1992); and Esophagogastroduodenoscopy.    Medications:  Reviewed in EMR. See EMR for complete list of medications and doses.    Allergies:  Diflucan [fluconazole] and Nsaids (non-steroidal anti-inflammatory drug)    Social History:  - Tobacco:  reports that she has never smoked. She has never been exposed to tobacco smoke. She has never used smokeless tobacco.   - Alcohol:   reports no history of alcohol use.   - Illicit Drugs:  reports no history of drug use.     ROS:  Per HPI.       ???????????????????????????????????????????????????????????????  Triage Vitals:  T 37 °C (98.6 °F)  HR 64  /71  RR 15  O2 98 %      Physical Exam  Vitals and nursing note reviewed.   Constitutional:       General: She is not in acute distress.  HENT:      Head: Normocephalic and atraumatic.      Nose: Nose normal.      Mouth/Throat:      Mouth: Mucous membranes are moist.   Eyes:      Conjunctiva/sclera: Conjunctivae normal.   Cardiovascular:      Rate and Rhythm: Normal rate and regular rhythm.      Pulses: Normal pulses.   Pulmonary:      Effort: Pulmonary effort is normal. No respiratory distress.      Breath sounds: Normal breath sounds.   Abdominal:      Palpations: Abdomen is soft.      Tenderness: There is no abdominal tenderness. There is no right CVA tenderness or left CVA tenderness.   Skin:     General: Skin is warm.   Neurological:      General: No focal deficit present.      Mental Status: She is alert.           ???????????????????????????????????????????????????????????????  Labs:   Labs Reviewed   CBC WITH AUTO DIFFERENTIAL - Abnormal       Result Value    WBC 6.6      nRBC 0.0      RBC 3.40 (*)     Hemoglobin 10.4 (*)     Hematocrit 31.8 (*)     MCV 94      MCH 30.6      MCHC 32.7      RDW 13.2      Platelets 317      Neutrophils % 80.7      Immature Granulocytes %, Automated 0.2      Lymphocytes % 12.4      Monocytes % 5.9      Eosinophils % 0.3      Basophils % 0.5      Neutrophils Absolute 5.33      Immature Granulocytes Absolute, Automated 0.01      Lymphocytes Absolute 0.82      Monocytes Absolute 0.39      Eosinophils Absolute 0.02      Basophils Absolute 0.03     COMPREHENSIVE METABOLIC PANEL - Abnormal    Glucose 208 (*)     Sodium 130 (*)     Potassium 3.1 (*)     Chloride 97 (*)     Bicarbonate 21      Anion Gap 15      Urea Nitrogen 15      Creatinine 0.86      eGFR 69       Calcium 8.7      Albumin 3.6      Alkaline Phosphatase 166 (*)     Total Protein 6.1 (*)     AST 22      Bilirubin, Total 0.3      ALT 21     MAGNESIUM - Abnormal    Magnesium 1.50 (*)    LIPASE - Normal    Lipase 23      Narrative:     Venipuncture immediately after or during the administration of Metamizole may lead to falsely low results. Testing should be performed immediately prior to Metamizole dosing.   SERIAL TROPONIN-INITIAL - Normal    Troponin I, High Sensitivity 6      Narrative:     Less than 99th percentile of normal range cutoff-  Female and children under 18 years old <14 ng/L; Male <21 ng/L: Negative  Repeat testing should be performed if clinically indicated.     Female and children under 18 years old 14-50 ng/L; Male 21-50 ng/L:  Consistent with possible cardiac damage and possible increased clinical   risk. Serial measurements may help to assess extent of myocardial damage.     >50 ng/L: Consistent with cardiac damage, increased clinical risk and  myocardial infarction. Serial measurements may help assess extent of   myocardial damage.      NOTE: Children less than 1 year old may have higher baseline troponin   levels and results should be interpreted in conjunction with the overall   clinical context.     NOTE: Troponin I testing is performed using a different   testing methodology at [facility] than at other   [facility]. Direct result comparisons should only   be made within the same method.   TROPONIN SERIES- (INITIAL, 1 HR)    Narrative:     The following orders were created for panel order Troponin I Series, High Sensitivity (0, 1 HR).  Procedure                               Abnormality         Status                     ---------                               -----------         ------                     Troponin I, High Sensiti...[[id number]]  Normal              Final result               Troponin, High Sensitivi...[[id number]]                                                    Please view results for these tests on the individual orders.   URINALYSIS WITH REFLEX MICROSCOPIC   SERIAL TROPONIN, 1 HOUR        Imaging:   XR chest 2 views   Final Result   1. Advanced emphysematous changes without evidence of acute   cardiopulmonary process.                  MACRO:   None        Signed by: Julio Hernandez 6/23/2025 8:25 PM   Dictation workstation:   [id number]      CT abdomen pelvis w IV contrast    (Results Pending)        MDM:  Patient is a 80-year-old female with a past medical history of GERD, IBS, COPD, gastric bypass, and colectomy who presented to the ED for abdominal pain.  Patient presented HDS.  Physical exam findings significant for 80-year-old female no acute distress with a benign abdomen.  Local concern for SBO, acute mesenteric ischemia, and acute aortic process including or dissection.  Patient administered GI cocktail.  CTAP, basic labs, cardiac workup, lipase, CXR, and UA ordered.  Please see ED course and disposition for remainder of care.    ED Course:  ED Course as of 06/23/25 2129 Mon Jun 23, 2025 2024 CBC and Auto Differential(!)  CBC without leukocytosis and mild anemia with a hemoglobin of 10.4.  No active bleeding noted. [MH]   2025 Troponin I Series, High Sensitivity (0, 1 HR)  Troponin elevated at 6.  Delta troponin pending. [MH]   2028 XR chest 2 views  IMPRESSION:  1. Advanced emphysematous changes without evidence of acute  cardiopulmonary process.   [MH]   2050 Metabolic panel significant for hypokalemia.  Patient did have hypomagnesemia.  Electrolytes repleted.  Patient also has noted to have mildly elevated alk phos of 166.  Lipase normal at 23. []      ED Course User Index  [MH] Del Urban MD       Social Determinants Limiting Care:  None identified    Disposition:  Patient signed out to oncoming provider pending imaging and lab work.  Symptoms may be secondary to acid reflux.  Low clinical concern for ACS.    Del Urban MD    Emergency Medicine PGY-3  [facility]    Comment: Please note this report has been produced using speech recognition software and may contain errors related to that system including errors in grammar, punctuation, and spelling as well as words and phrases that may be inappropriate.  If there are any questions or concerns please feel free to contact the dictating provider for clarification.    Procedures ? SmartLinks last updated 6/23/2025 8:50 PM        Del Urban MD  Resident  06/23/25 5450

## 2025-06-26 ENCOUNTER — TELEPHONE (OUTPATIENT)
Dept: PRIMARY CARE | Facility: CLINIC | Age: 80
End: 2025-06-26

## 2025-06-26 ENCOUNTER — OFFICE VISIT (OUTPATIENT)
Dept: PRIMARY CARE | Facility: CLINIC | Age: 80
End: 2025-06-26
Payer: MEDICARE

## 2025-06-26 VITALS
DIASTOLIC BLOOD PRESSURE: 76 MMHG | HEART RATE: 68 BPM | OXYGEN SATURATION: 100 % | WEIGHT: 70.2 LBS | SYSTOLIC BLOOD PRESSURE: 122 MMHG | HEIGHT: 60 IN | TEMPERATURE: 97.7 F | BODY MASS INDEX: 13.78 KG/M2

## 2025-06-26 DIAGNOSIS — S32.591S CLOSED FRACTURE OF RAMUS OF RIGHT PUBIS, SEQUELA: ICD-10-CM

## 2025-06-26 DIAGNOSIS — M81.0 AGE-RELATED OSTEOPOROSIS WITHOUT CURRENT PATHOLOGICAL FRACTURE: ICD-10-CM

## 2025-06-26 DIAGNOSIS — M25.561 CHRONIC PAIN OF RIGHT KNEE: ICD-10-CM

## 2025-06-26 DIAGNOSIS — N39.0 ACUTE UTI: ICD-10-CM

## 2025-06-26 DIAGNOSIS — E83.42 HYPOMAGNESEMIA: ICD-10-CM

## 2025-06-26 DIAGNOSIS — R79.0 LOW MAGNESIUM LEVEL: Primary | ICD-10-CM

## 2025-06-26 DIAGNOSIS — G89.29 CHRONIC PAIN OF RIGHT KNEE: ICD-10-CM

## 2025-06-26 LAB
POC APPEARANCE, URINE: CLEAR
POC BILIRUBIN, URINE: NEGATIVE
POC BLOOD, URINE: ABNORMAL
POC COLOR, URINE: YELLOW
POC GLUCOSE, URINE: NEGATIVE MG/DL
POC KETONES, URINE: NEGATIVE MG/DL
POC LEUKOCYTES, URINE: NEGATIVE
POC NITRITE,URINE: NEGATIVE
POC PH, URINE: 6 PH
POC PROTEIN, URINE: ABNORMAL MG/DL
POC SPECIFIC GRAVITY, URINE: >=1.03
POC UROBILINOGEN, URINE: 0.2 EU/DL

## 2025-06-26 PROCEDURE — 81003 URINALYSIS AUTO W/O SCOPE: CPT | Performed by: FAMILY MEDICINE

## 2025-06-26 PROCEDURE — 1159F MED LIST DOCD IN RCRD: CPT | Performed by: FAMILY MEDICINE

## 2025-06-26 PROCEDURE — 99214 OFFICE O/P EST MOD 30 MIN: CPT | Performed by: FAMILY MEDICINE

## 2025-06-26 RX ORDER — ALENDRONATE SODIUM 70 MG/1
70 TABLET ORAL
Qty: 4 TABLET | Refills: 11 | Status: SHIPPED | OUTPATIENT
Start: 2025-06-26 | End: 2026-06-26

## 2025-06-26 RX ORDER — HYDROCODONE BITARTRATE AND ACETAMINOPHEN 5; 325 MG/1; MG/1
1 TABLET ORAL DAILY PRN
Qty: 20 TABLET | Refills: 0 | Status: SHIPPED | OUTPATIENT
Start: 2025-06-26

## 2025-06-26 RX ORDER — LANOLIN ALCOHOL/MO/W.PET/CERES
CREAM (GRAM) TOPICAL
Qty: 90 TABLET | Refills: 3 | Status: SHIPPED | OUTPATIENT
Start: 2025-06-26

## 2025-06-26 RX ORDER — MAGNESIUM OXIDE 200 MG
TABLET,CHEWABLE ORAL
Qty: 120 TABLET | Refills: 1 | Status: SHIPPED | OUTPATIENT
Start: 2025-06-26 | End: 2025-06-26

## 2025-06-26 NOTE — TELEPHONE ENCOUNTER
Can you please let [facility] know [facility] not [facility] the [facility] kind, will switch back to the regular tablets but she can take them 3 times a day then

## 2025-06-26 NOTE — TELEPHONE ENCOUNTER
Caller [facility]     They do not carry the chewable tablets for magnesium.  They do have the oral tablets as prescribed before.  Pharmacist said the only place she knows of that carries them is [facility] or online.

## 2025-06-26 NOTE — PROGRESS NOTES
Subjective   Patient ID: Latha Quispe is a 80 y.o. female who presents for Follow-up (UTI).  HPI  Here for follow up of ED visit on 6/23 for UT, on Cephalexin. Also magnesium still low, does not think the tablet is absorbing and she is passing it through. Will try chewable tablets instead, ad recheck in 1 month. Had a lot of muscle cramps yesterday.   Had dexa scan showed osteoporosis. Discussed alendronate.   Also would like to go back on the vicodin for pain for her knee and from fracture of her pubis. Did get injection in her knee from orthopedics, did not help. Discussed would need to stop the ativan if she is going on pain medication, patient expressed understanding   OARRS:  Melanie Andrade MD on 6/26/2025  9:26 AM  I have personally reviewed the OARRS report for Latha Quispe. I have considered the risks of abuse, dependence, addiction and diversion    Is the patient prescribed a combination of a benzodiazepine and opioid?  No- Ativan discontinued    Last Urine Drug Screen  Recent Results (from the past 8760 hours)   Opiate/Opioid/Benzo Prescription Compliance    Collection Time: 05/27/25  9:44 AM   Result Value Ref Range    Creatinine 168.3 > or = 20.0 mg/dL    pH 5.7 4.5 - 9.0    Oxidant NEGATIVE <200 mcg/mL    Amphetamines NEGATIVE CONFIRMED <500 ng/mL    Amphetamine NEGATIVE <250 ng/mL    Methamphetamine NEGATIVE <250 ng/mL    Amphetamines Comments      Barbiturates NEGATIVE <300 ng/mL    Cocaine Metabolite NEGATIVE <150 ng/mL    Marijuana Metabolite POSITIVE (A) <20 ng/mL    Marijuana Metabolite >5000 (H) <5 ng/mL    Marijuana Comments      Phencyclidine NEGATIVE <25 ng/mL    Alphahydroxyalprazolam NEGATIVE <25 ng/mL    Alphahydroxymidazolam NEGATIVE <50 ng/mL    Alphahydroxytriazolam NEGATIVE <50 ng/mL    Aminoclonazepam NEGATIVE <25 ng/mL    Hydroxyethylflurazepam NEGATIVE <50 ng/mL    Lorazepam 1,300 (H) <50 ng/mL    Nordiazepam NEGATIVE <50 ng/mL    Oxazepam NEGATIVE <50 ng/mL     Temazepam NEGATIVE <50 ng/mL    Benzodiazepines Comments      Fentanyl NEGATIVE <0.5 ng/mL    Norfentanyl NEGATIVE <0.5 ng/mL    Fentanyl Comments      6 Acetylmorphine NEGATIVE <10 ng/mL    Heroin Metab Comments      EDDP NEGATIVE <100 ng/mL    Methadone NEGATIVE <100 ng/mL    Methadone Comments      Codeine NEGATIVE <50 ng/mL    Hydrocodone 143 (H) <50 ng/mL    Hydromorphone NEGATIVE <50 ng/mL    Morphine NEGATIVE <50 ng/mL    Norhydrocodone 949 (H) <50 ng/mL    Opiates Comments      Noroxycodone NEGATIVE <50 ng/mL    Oxycodone NEGATIVE <50 ng/mL    Oxymorphone NEGATIVE <50 ng/mL    Oxycodone Comments      Desmethyltramadol NEGATIVE <100 ng/mL    Tramadol NEGATIVE <100 ng/mL    Tramadol Comments      Zolpidem NEGATIVE <5 ng/mL    Zolpidem Metabolite NEGATIVE <5 ng/mL    Zolpidem Comments      Notes and Comments       Controlled Substance Agreement:  Date of the Last Agreement: ***  {CSA Attest:22891}    {Controlled Substances:07131}    Review of Systems      /76   Pulse 68   Temp 36.5 °C (97.7 °F)   Ht (!) 1.524 m (5')   Wt (!) 31.8 kg (70 lb 3.2 oz)   SpO2 100%   BMI 13.71 kg/m²       Objective   Physical Exam    Assessment/Plan   Problem List Items Addressed This Visit    None  Visit Diagnoses         Urinary symptom or sign        Relevant Orders    POCT UA Automated manually resulted (Completed)                   CL 97 (L) 06/23/2025 1938     04/24/2025 0844    CO2 21 06/23/2025 1938    CO2 27 04/24/2025 0844    BUN 15 06/23/2025 1938    BUN 11 04/24/2025 0844    CREATININE 0.86 06/23/2025 1938    CREATININE 0.94 04/24/2025 0844    Lab Results   Component Value Date/Time    CALCIUM 8.7 06/23/2025 1938    CALCIUM 8.9 04/24/2025 0844    ALKPHOS 166 (H) 06/23/2025 1938    ALKPHOS 75 04/14/2025 1018    AST 22 06/23/2025 1938    AST 18 04/14/2025 1018    ALT 21 06/23/2025 1938    ALT 11 04/14/2025 1018    BILITOT 0.3 06/23/2025 1938    BILITOT 0.3 04/14/2025 1018        Lab Results   Component Value Date    WBC 6.6 06/23/2025    HGB 10.4 (L) 06/23/2025    HCT 31.8 (L) 06/23/2025    MCV 94 06/23/2025     06/23/2025     Component      Latest Ref Rng 6/23/2025   MAGNESIUM      1.60 - 2.40 mg/dL 1.50 (L)       Legend:  (L) Low  Assessment/Plan   Problem List Items Addressed This Visit    None  Visit Diagnoses         Low magnesium level    -  Primary    Relevant Orders    Magnesium      Acute UTI        Relevant Orders    POCT UA Automated manually resulted (Completed)    Urine Culture (Completed)      Age-related osteoporosis without current pathological fracture        Relevant Medications    alendronate (Fosamax) 70 mg tablet      Closed fracture of ramus of right pubis, sequela        Relevant Medications    HYDROcodone-acetaminophen (Norco) 5-325 mg tablet      Chronic pain of right knee        Relevant Medications    HYDROcodone-acetaminophen (Norco) 5-325 mg tablet

## 2025-06-27 ENCOUNTER — APPOINTMENT (OUTPATIENT)
Dept: PRIMARY CARE | Facility: CLINIC | Age: 80
End: 2025-06-27
Payer: MEDICARE

## 2025-06-27 ENCOUNTER — TELEPHONE (OUTPATIENT)
Dept: PRIMARY CARE | Facility: CLINIC | Age: 80
End: 2025-06-27

## 2025-06-27 NOTE — TELEPHONE ENCOUNTER
Took the pain medication last night. Did not experience any relief at all. Wondering there is something else that she can do for the pain. Please advise.

## 2025-06-27 NOTE — TELEPHONE ENCOUNTER
Pt notified. Asked again if she could take the Lorazepam with the pain medication. I said no you cannot take those together. Gave number to the referral scheduling line for ortho.

## 2025-06-28 LAB — BACTERIA UR CULT: NORMAL

## 2025-07-14 ENCOUNTER — APPOINTMENT (OUTPATIENT)
Dept: UROLOGY | Facility: CLINIC | Age: 80
End: 2025-07-14
Payer: MEDICARE

## 2025-07-15 ENCOUNTER — TELEPHONE (OUTPATIENT)
Dept: PRIMARY CARE | Facility: CLINIC | Age: 80
End: 2025-07-15

## 2025-07-15 ENCOUNTER — HOSPITAL ENCOUNTER (OUTPATIENT)
Dept: RADIOLOGY | Facility: HOSPITAL | Age: 80
Discharge: HOME | End: 2025-07-15
Payer: MEDICARE

## 2025-07-15 ENCOUNTER — APPOINTMENT (OUTPATIENT)
Dept: ORTHOPEDIC SURGERY | Facility: CLINIC | Age: 80
End: 2025-07-15
Payer: COMMERCIAL

## 2025-07-15 DIAGNOSIS — M25.561 RIGHT KNEE PAIN, UNSPECIFIED CHRONICITY: ICD-10-CM

## 2025-07-15 DIAGNOSIS — M25.551 RIGHT HIP PAIN: ICD-10-CM

## 2025-07-15 DIAGNOSIS — F41.0 PANIC ATTACKS: Primary | ICD-10-CM

## 2025-07-15 PROCEDURE — 99213 OFFICE O/P EST LOW 20 MIN: CPT | Performed by: ORTHOPAEDIC SURGERY

## 2025-07-15 PROCEDURE — 1160F RVW MEDS BY RX/DR IN RCRD: CPT | Performed by: ORTHOPAEDIC SURGERY

## 2025-07-15 PROCEDURE — 1036F TOBACCO NON-USER: CPT | Performed by: ORTHOPAEDIC SURGERY

## 2025-07-15 PROCEDURE — 73502 X-RAY EXAM HIP UNI 2-3 VIEWS: CPT | Mod: RIGHT SIDE | Performed by: RADIOLOGY

## 2025-07-15 PROCEDURE — 1159F MED LIST DOCD IN RCRD: CPT | Performed by: ORTHOPAEDIC SURGERY

## 2025-07-15 PROCEDURE — 73502 X-RAY EXAM HIP UNI 2-3 VIEWS: CPT | Mod: RT

## 2025-07-15 PROCEDURE — 1125F AMNT PAIN NOTED PAIN PRSNT: CPT | Performed by: ORTHOPAEDIC SURGERY

## 2025-07-15 RX ORDER — LORAZEPAM 0.5 MG/1
0.5 TABLET ORAL DAILY PRN
Qty: 7 TABLET | Refills: 0 | Status: SHIPPED | OUTPATIENT
Start: 2025-07-15 | End: 2025-07-22

## 2025-07-15 ASSESSMENT — PAIN SCALES - GENERAL: PAINLEVEL_OUTOF10: 6

## 2025-07-15 ASSESSMENT — PAIN - FUNCTIONAL ASSESSMENT: PAIN_FUNCTIONAL_ASSESSMENT: 0-10

## 2025-07-15 NOTE — PROGRESS NOTES
This is a consultation from Dr. Melanie Andrade MD for   Chief Complaint   Patient presents with    Right Hip - Pain       This is a 80 y.o. female who presents for evaluation of right hip pain.  Patient had an injury couple of months ago she fell and was told she had a fracture.  She has been walking using a walker.  It is getting better over time.  No numbness or tingling no fevers or chills no shooting pain down the leg.  She takes occasional Tylenol when needed but she feels her function is improving.    Physical Exam    There has been no interval change in this patient's past medical, surgical, medications, allergies, family history or social history since the most recent visit to a provider within our department. 14 point review of systems was performed, reviewed, and negative except for pertinent positives documented in the history of present illness.     Constitutional: well developed, well nourished female in no acute distress  Psychiatric: normal mood, appropriate affect  Eyes: sclera anicteric  HENT: normocephalic/atraumatic  CV: regular rate and rhythm   Respiratory: non labored breathing  Integumentary: no rash  Neurological: moves all extremities    Right hip exam: skin normal, no abrasions, wounds, or lacerations. nttp over greater trochanter. negative log roll, negative josh's test. flexion to 90 degrees without pain. no pain with flexion abduction and external rotation, slight pain with flexion adduction and internal rotation. neurovascularly intact distally        Imaging:  Xrays were ordered by me, they were reviewed and independently interpreted by me today, they show healing fractures of the anterior and superior pubic rami on the right side.  Also reviewed CT for pelvis from the emergency room showing stable LC 1 type pelvic fracture with stable pelvic ring.    Procedures      Impression/Plan: This is a 80 y.o. female with healing LC 1 type stable pelvis fracture.  Weightbearing as  "tolerated activity as tolerated.  She can use the walker as needed for balance.  Recommended physical therapy.  Will see her back as needed    BMI Readings from Last 1 Encounters:   06/26/25 13.71 kg/m²      Lab Results   Component Value Date    CREATININE 0.86 06/23/2025     Tobacco Use: Low Risk  (7/15/2025)    Patient History     Smoking Tobacco Use: Never     Smokeless Tobacco Use: Never     Passive Exposure: Never      Computed MELD 3.0 unavailable. One or more values for this score either were not found within the given timeframe or did not fit some other criterion.  Computed MELD-Na unavailable. One or more values for this score either were not found within the given timeframe or did not fit some other criterion.       Lab Results   Component Value Date    HGBA1C 5.9 (H) 04/24/2025     No results found for: \"STAPHMRSASCR\"  "

## 2025-07-15 NOTE — TELEPHONE ENCOUNTER
Spouse called: Dameon.    The fracture is healing and she is doing better. She finished the hydrocodone but she has been having panic attacks. Asking to be put back on the Lorazepam. Dameon gave her some fast release tylenol and that helped with the pain as well.     I advised Dameon that Dr. Kaufman will be out of office the rest of the week and won't be back till July 22.

## 2025-07-20 DIAGNOSIS — G47.00 INSOMNIA, UNSPECIFIED TYPE: ICD-10-CM

## 2025-07-20 RX ORDER — TRAZODONE HYDROCHLORIDE 150 MG/1
150 TABLET ORAL NIGHTLY
Qty: 30 TABLET | Refills: 11 | Status: SHIPPED | OUTPATIENT
Start: 2025-07-20

## 2025-07-21 DIAGNOSIS — F41.0 PANIC ATTACKS: ICD-10-CM

## 2025-07-21 RX ORDER — LORAZEPAM 0.5 MG/1
0.5 TABLET ORAL DAILY PRN
Qty: 7 TABLET | Refills: 0 | OUTPATIENT
Start: 2025-07-21 | End: 2025-07-28

## 2025-07-21 RX ORDER — LORAZEPAM 0.5 MG/1
0.5 TABLET ORAL DAILY PRN
Qty: 20 TABLET | Refills: 0 | Status: SHIPPED | OUTPATIENT
Start: 2025-07-21

## 2025-07-23 LAB — MAGNESIUM SERPL-MCNC: 1.8 MG/DL (ref 1.5–2.5)

## 2025-07-26 DIAGNOSIS — R79.0 LOW MAGNESIUM LEVEL: ICD-10-CM

## 2025-08-07 DIAGNOSIS — E03.9 HYPOTHYROIDISM, UNSPECIFIED TYPE: Primary | ICD-10-CM

## 2025-08-08 LAB — TSH SERPL-ACNC: 2.6 MIU/L (ref 0.4–4.5)

## 2025-08-11 DIAGNOSIS — F41.0 PANIC ATTACKS: ICD-10-CM

## 2025-08-11 RX ORDER — LORAZEPAM 0.5 MG/1
0.5 TABLET ORAL DAILY PRN
Qty: 7 TABLET | Refills: 0 | Status: SHIPPED | OUTPATIENT
Start: 2025-08-11 | End: 2025-08-18

## 2025-08-18 ENCOUNTER — APPOINTMENT (OUTPATIENT)
Dept: GASTROENTEROLOGY | Facility: CLINIC | Age: 80
End: 2025-08-18
Payer: MEDICARE

## 2025-08-18 VITALS
SYSTOLIC BLOOD PRESSURE: 119 MMHG | TEMPERATURE: 98.1 F | OXYGEN SATURATION: 98 % | HEIGHT: 60 IN | BODY MASS INDEX: 13.25 KG/M2 | WEIGHT: 67.5 LBS | HEART RATE: 63 BPM | DIASTOLIC BLOOD PRESSURE: 73 MMHG | RESPIRATION RATE: 20 BRPM

## 2025-08-18 DIAGNOSIS — F41.0 PANIC ATTACKS: ICD-10-CM

## 2025-08-18 DIAGNOSIS — F32.0 MILD MAJOR DEPRESSION: ICD-10-CM

## 2025-08-18 DIAGNOSIS — K58.1 IRRITABLE BOWEL SYNDROME WITH CONSTIPATION: ICD-10-CM

## 2025-08-18 DIAGNOSIS — R62.7 FAILURE TO THRIVE IN ADULT: Primary | ICD-10-CM

## 2025-08-18 DIAGNOSIS — Q45.3 PANCREATIC DUCTAL ABNORMALITY: ICD-10-CM

## 2025-08-18 DIAGNOSIS — K58.0 IRRITABLE BOWEL SYNDROME WITH DIARRHEA: ICD-10-CM

## 2025-08-18 DIAGNOSIS — K21.9 GASTROESOPHAGEAL REFLUX DISEASE WITHOUT ESOPHAGITIS: ICD-10-CM

## 2025-08-18 PROCEDURE — 1159F MED LIST DOCD IN RCRD: CPT

## 2025-08-18 PROCEDURE — 1160F RVW MEDS BY RX/DR IN RCRD: CPT

## 2025-08-18 PROCEDURE — 1126F AMNT PAIN NOTED NONE PRSNT: CPT

## 2025-08-18 PROCEDURE — 99215 OFFICE O/P EST HI 40 MIN: CPT

## 2025-08-18 PROCEDURE — 1036F TOBACCO NON-USER: CPT

## 2025-08-18 RX ORDER — MIRTAZAPINE 7.5 MG/1
7.5 TABLET, FILM COATED ORAL NIGHTLY
Qty: 90 TABLET | Refills: 1 | Status: SHIPPED | OUTPATIENT
Start: 2025-08-18 | End: 2026-02-14

## 2025-08-18 RX ORDER — LORAZEPAM 0.5 MG/1
0.5 TABLET ORAL DAILY PRN
Qty: 30 TABLET | Refills: 0 | Status: SHIPPED | OUTPATIENT
Start: 2025-08-18

## 2025-08-18 RX ORDER — HYOSCYAMINE SULFATE 0.12 MG/1
0.25 TABLET, ORALLY DISINTEGRATING ORAL
Qty: 240 TABLET | Refills: 2 | Status: SHIPPED | OUTPATIENT
Start: 2025-08-18 | End: 2025-11-16

## 2025-08-18 ASSESSMENT — ENCOUNTER SYMPTOMS: OCCASIONAL FEELINGS OF UNSTEADINESS: 1

## 2025-08-18 ASSESSMENT — PAIN SCALES - GENERAL: PAINLEVEL_OUTOF10: 0-NO PAIN

## 2025-08-19 ENCOUNTER — TELEPHONE (OUTPATIENT)
Facility: CLINIC | Age: 80
End: 2025-08-19
Payer: MEDICARE

## 2025-08-25 ENCOUNTER — PHARMACY VISIT (OUTPATIENT)
Dept: PHARMACY | Facility: CLINIC | Age: 80
End: 2025-08-25
Payer: COMMERCIAL

## 2025-08-25 ENCOUNTER — TELEPHONE (OUTPATIENT)
Dept: GASTROENTEROLOGY | Facility: CLINIC | Age: 80
End: 2025-08-25
Payer: MEDICARE

## 2025-08-25 PROCEDURE — RXMED WILLOW AMBULATORY MEDICATION CHARGE

## 2025-09-03 DIAGNOSIS — K58.1 IRRITABLE BOWEL SYNDROME WITH CONSTIPATION: ICD-10-CM

## 2025-09-03 RX ORDER — LINACLOTIDE 72 UG/1
CAPSULE, GELATIN COATED ORAL
Qty: 30 CAPSULE | Refills: 0 | Status: SHIPPED | OUTPATIENT
Start: 2025-09-03

## 2025-09-23 ENCOUNTER — APPOINTMENT (OUTPATIENT)
Dept: PRIMARY CARE | Facility: CLINIC | Age: 80
End: 2025-09-23
Payer: MEDICARE

## 2025-10-16 ENCOUNTER — APPOINTMENT (OUTPATIENT)
Dept: UROLOGY | Facility: CLINIC | Age: 80
End: 2025-10-16
Payer: MEDICARE

## 2025-11-18 ENCOUNTER — APPOINTMENT (OUTPATIENT)
Dept: GASTROENTEROLOGY | Facility: CLINIC | Age: 80
End: 2025-11-18
Payer: MEDICARE